# Patient Record
Sex: MALE | Race: WHITE | Employment: OTHER | URBAN - METROPOLITAN AREA
[De-identification: names, ages, dates, MRNs, and addresses within clinical notes are randomized per-mention and may not be internally consistent; named-entity substitution may affect disease eponyms.]

---

## 2017-05-05 ENCOUNTER — TELEPHONE (OUTPATIENT)
Dept: FAMILY MEDICINE | Facility: CLINIC | Age: 82
End: 2017-05-05

## 2017-05-05 DIAGNOSIS — Z51.81 MEDICATION MONITORING ENCOUNTER: ICD-10-CM

## 2017-05-05 DIAGNOSIS — G47.30 HYPERSOMNIA WITH SLEEP APNEA: ICD-10-CM

## 2017-05-05 DIAGNOSIS — Z82.49 FAMILY HISTORY OF CARDIOVASCULAR DISEASE: ICD-10-CM

## 2017-05-05 DIAGNOSIS — G47.10 HYPERSOMNIA WITH SLEEP APNEA: ICD-10-CM

## 2017-05-05 DIAGNOSIS — N18.30 CKD (CHRONIC KIDNEY DISEASE) STAGE 3, GFR 30-59 ML/MIN (H): Primary | ICD-10-CM

## 2017-05-05 DIAGNOSIS — N52.9 ERECTILE DYSFUNCTION, UNSPECIFIED ERECTILE DYSFUNCTION TYPE: ICD-10-CM

## 2017-05-05 DIAGNOSIS — I47.10 PAROXYSMAL SUPRAVENTRICULAR TACHYCARDIA (H): ICD-10-CM

## 2017-05-05 DIAGNOSIS — I10 HYPERTENSION GOAL BP (BLOOD PRESSURE) < 140/90: ICD-10-CM

## 2017-05-05 DIAGNOSIS — K21.9 GASTROESOPHAGEAL REFLUX DISEASE WITHOUT ESOPHAGITIS: ICD-10-CM

## 2017-05-05 DIAGNOSIS — E78.5 HYPERLIPIDEMIA LDL GOAL <130: ICD-10-CM

## 2017-05-05 NOTE — TELEPHONE ENCOUNTER
Patient is coming in for pre physical lab work 5/24. Please put in orders.     Thank you,  Pallavi CASTAÑEDA   Central Scheduler

## 2017-05-24 DIAGNOSIS — K21.9 GASTROESOPHAGEAL REFLUX DISEASE WITHOUT ESOPHAGITIS: ICD-10-CM

## 2017-05-24 DIAGNOSIS — I47.10 PAROXYSMAL SUPRAVENTRICULAR TACHYCARDIA (H): ICD-10-CM

## 2017-05-24 DIAGNOSIS — E78.5 HYPERLIPIDEMIA LDL GOAL <130: ICD-10-CM

## 2017-05-24 DIAGNOSIS — Z51.81 MEDICATION MONITORING ENCOUNTER: ICD-10-CM

## 2017-05-24 DIAGNOSIS — Z82.49 FAMILY HISTORY OF CARDIOVASCULAR DISEASE: ICD-10-CM

## 2017-05-24 DIAGNOSIS — N18.30 CKD (CHRONIC KIDNEY DISEASE) STAGE 3, GFR 30-59 ML/MIN (H): ICD-10-CM

## 2017-05-24 DIAGNOSIS — I10 HYPERTENSION GOAL BP (BLOOD PRESSURE) < 140/90: ICD-10-CM

## 2017-05-24 LAB
ALBUMIN UR-MCNC: ABNORMAL MG/DL
APPEARANCE UR: CLEAR
BILIRUB UR QL STRIP: NEGATIVE
COLOR UR AUTO: YELLOW
ERYTHROCYTE [DISTWIDTH] IN BLOOD BY AUTOMATED COUNT: 13.2 % (ref 10–15)
GLUCOSE UR STRIP-MCNC: NEGATIVE MG/DL
HCT VFR BLD AUTO: 39.5 % (ref 40–53)
HGB BLD-MCNC: 13.2 G/DL (ref 13.3–17.7)
HGB UR QL STRIP: ABNORMAL
KETONES UR STRIP-MCNC: NEGATIVE MG/DL
LEUKOCYTE ESTERASE UR QL STRIP: NEGATIVE
MCH RBC QN AUTO: 33.2 PG (ref 26.5–33)
MCHC RBC AUTO-ENTMCNC: 33.4 G/DL (ref 31.5–36.5)
MCV RBC AUTO: 99 FL (ref 78–100)
NITRATE UR QL: NEGATIVE
PH UR STRIP: 6.5 PH (ref 5–7)
PLATELET # BLD AUTO: 194 10E9/L (ref 150–450)
RBC # BLD AUTO: 3.98 10E12/L (ref 4.4–5.9)
RBC #/AREA URNS AUTO: NORMAL /HPF (ref 0–2)
SP GR UR STRIP: 1.02 (ref 1–1.03)
URN SPEC COLLECT METH UR: ABNORMAL
UROBILINOGEN UR STRIP-ACNC: 0.2 EU/DL (ref 0.2–1)
WBC # BLD AUTO: 9 10E9/L (ref 4–11)
WBC #/AREA URNS AUTO: NORMAL /HPF (ref 0–2)

## 2017-05-24 PROCEDURE — 80053 COMPREHEN METABOLIC PANEL: CPT | Performed by: FAMILY MEDICINE

## 2017-05-24 PROCEDURE — 85027 COMPLETE CBC AUTOMATED: CPT | Performed by: FAMILY MEDICINE

## 2017-05-24 PROCEDURE — 36415 COLL VENOUS BLD VENIPUNCTURE: CPT | Performed by: FAMILY MEDICINE

## 2017-05-24 PROCEDURE — 81001 URINALYSIS AUTO W/SCOPE: CPT | Performed by: FAMILY MEDICINE

## 2017-05-24 PROCEDURE — 82550 ASSAY OF CK (CPK): CPT | Performed by: FAMILY MEDICINE

## 2017-05-24 PROCEDURE — 82043 UR ALBUMIN QUANTITATIVE: CPT | Performed by: FAMILY MEDICINE

## 2017-05-24 PROCEDURE — 80061 LIPID PANEL: CPT | Performed by: FAMILY MEDICINE

## 2017-05-25 LAB
ALBUMIN SERPL-MCNC: 3.8 G/DL (ref 3.4–5)
ALP SERPL-CCNC: 38 U/L (ref 40–150)
ALT SERPL W P-5'-P-CCNC: 27 U/L (ref 0–70)
ANION GAP SERPL CALCULATED.3IONS-SCNC: 12 MMOL/L (ref 3–14)
AST SERPL W P-5'-P-CCNC: 23 U/L (ref 0–45)
BILIRUB SERPL-MCNC: 0.3 MG/DL (ref 0.2–1.3)
BUN SERPL-MCNC: 31 MG/DL (ref 7–30)
CALCIUM SERPL-MCNC: 9.3 MG/DL (ref 8.5–10.1)
CHLORIDE SERPL-SCNC: 111 MMOL/L (ref 94–109)
CHOLEST SERPL-MCNC: 176 MG/DL
CK SERPL-CCNC: 132 U/L (ref 30–300)
CO2 SERPL-SCNC: 20 MMOL/L (ref 20–32)
CREAT SERPL-MCNC: 1.53 MG/DL (ref 0.66–1.25)
CREAT UR-MCNC: 156 MG/DL
GFR SERPL CREATININE-BSD FRML MDRD: 44 ML/MIN/1.7M2
GLUCOSE SERPL-MCNC: 106 MG/DL (ref 70–99)
HDLC SERPL-MCNC: 48 MG/DL
LDLC SERPL CALC-MCNC: 96 MG/DL
MICROALBUMIN UR-MCNC: 63 MG/L
MICROALBUMIN/CREAT UR: 40.45 MG/G CR (ref 0–17)
NONHDLC SERPL-MCNC: 128 MG/DL
POTASSIUM SERPL-SCNC: 4.5 MMOL/L (ref 3.4–5.3)
PROT SERPL-MCNC: 6.9 G/DL (ref 6.8–8.8)
SODIUM SERPL-SCNC: 143 MMOL/L (ref 133–144)
TRIGL SERPL-MCNC: 159 MG/DL

## 2017-05-30 PROBLEM — Z91.09 ENVIRONMENTAL ALLERGIES: Status: ACTIVE | Noted: 2017-05-30

## 2017-05-31 ENCOUNTER — OFFICE VISIT (OUTPATIENT)
Dept: FAMILY MEDICINE | Facility: CLINIC | Age: 82
End: 2017-05-31
Payer: COMMERCIAL

## 2017-05-31 VITALS
HEART RATE: 89 BPM | DIASTOLIC BLOOD PRESSURE: 68 MMHG | SYSTOLIC BLOOD PRESSURE: 126 MMHG | TEMPERATURE: 98.2 F | WEIGHT: 207 LBS | HEIGHT: 67 IN | OXYGEN SATURATION: 94 % | BODY MASS INDEX: 32.49 KG/M2

## 2017-05-31 DIAGNOSIS — Z00.00 ENCOUNTER FOR ROUTINE ADULT HEALTH EXAMINATION WITHOUT ABNORMAL FINDINGS: Primary | ICD-10-CM

## 2017-05-31 DIAGNOSIS — Z82.49 FAMILY HISTORY OF CARDIOVASCULAR DISEASE: ICD-10-CM

## 2017-05-31 DIAGNOSIS — H91.93 HEARING LOSS, BILATERAL: ICD-10-CM

## 2017-05-31 DIAGNOSIS — G89.29 CHRONIC MIDLINE LOW BACK PAIN WITHOUT SCIATICA: ICD-10-CM

## 2017-05-31 DIAGNOSIS — K21.9 GASTROESOPHAGEAL REFLUX DISEASE WITHOUT ESOPHAGITIS: ICD-10-CM

## 2017-05-31 DIAGNOSIS — Z12.5 SCREENING FOR PROSTATE CANCER: ICD-10-CM

## 2017-05-31 DIAGNOSIS — N18.30 CKD (CHRONIC KIDNEY DISEASE) STAGE 3, GFR 30-59 ML/MIN (H): ICD-10-CM

## 2017-05-31 DIAGNOSIS — Z51.81 MEDICATION MONITORING ENCOUNTER: ICD-10-CM

## 2017-05-31 DIAGNOSIS — I10 HYPERTENSION GOAL BP (BLOOD PRESSURE) < 140/90: ICD-10-CM

## 2017-05-31 DIAGNOSIS — N52.9 ERECTILE DYSFUNCTION, UNSPECIFIED ERECTILE DYSFUNCTION TYPE: ICD-10-CM

## 2017-05-31 DIAGNOSIS — R06.09 DOE (DYSPNEA ON EXERTION): ICD-10-CM

## 2017-05-31 DIAGNOSIS — Z12.11 SCREEN FOR COLON CANCER: ICD-10-CM

## 2017-05-31 DIAGNOSIS — E78.5 HYPERLIPIDEMIA LDL GOAL <130: ICD-10-CM

## 2017-05-31 DIAGNOSIS — G47.30 HYPERSOMNIA WITH SLEEP APNEA: ICD-10-CM

## 2017-05-31 DIAGNOSIS — Z91.09 ENVIRONMENTAL ALLERGIES: ICD-10-CM

## 2017-05-31 DIAGNOSIS — N40.0 HYPERTROPHY OF PROSTATE WITHOUT URINARY OBSTRUCTION: ICD-10-CM

## 2017-05-31 DIAGNOSIS — G47.10 HYPERSOMNIA WITH SLEEP APNEA: ICD-10-CM

## 2017-05-31 DIAGNOSIS — M54.50 CHRONIC MIDLINE LOW BACK PAIN WITHOUT SCIATICA: ICD-10-CM

## 2017-05-31 PROBLEM — Z71.89 ADVANCED DIRECTIVES, COUNSELING/DISCUSSION: Status: ACTIVE | Noted: 2017-05-31

## 2017-05-31 PROCEDURE — G0439 PPPS, SUBSEQ VISIT: HCPCS | Performed by: FAMILY MEDICINE

## 2017-05-31 RX ORDER — NICOTINE POLACRILEX 4 MG/1
20 GUM, CHEWING ORAL DAILY
Qty: 90 TABLET | Refills: 3 | Status: SHIPPED | OUTPATIENT
Start: 2017-05-31 | End: 2018-09-20

## 2017-05-31 RX ORDER — ATORVASTATIN CALCIUM 10 MG/1
10 TABLET, FILM COATED ORAL DAILY
Qty: 90 TABLET | Refills: 3 | Status: SHIPPED | OUTPATIENT
Start: 2017-05-31 | End: 2018-09-20

## 2017-05-31 RX ORDER — FINASTERIDE 5 MG/1
5 TABLET, FILM COATED ORAL DAILY
Qty: 90 TABLET | Refills: 3 | Status: SHIPPED | OUTPATIENT
Start: 2017-05-31 | End: 2018-09-20

## 2017-05-31 RX ORDER — TERAZOSIN 10 MG/1
10 CAPSULE ORAL AT BEDTIME
Qty: 90 CAPSULE | Refills: 3 | Status: SHIPPED | OUTPATIENT
Start: 2017-05-31 | End: 2018-06-18

## 2017-05-31 RX ORDER — ATENOLOL 50 MG/1
50 TABLET ORAL DAILY
Qty: 90 TABLET | Refills: 3 | Status: SHIPPED | OUTPATIENT
Start: 2017-05-31 | End: 2018-06-18

## 2017-05-31 NOTE — PATIENT INSTRUCTIONS
[facility]                        To reach your care team during and after hours:   [phone]  To reach our pharmacy:        [phone]    Clinic Hours                        Our clinic hours are:    Monday   7:30 am to 7:00 pm                  Tuesday through Friday 7:30 am to 5:00 pm                             Saturday   8:00 am to 12:00 pm      Sunday   Closed      Pharmacy Hours                        Our pharmacy hours are:    Monday   8:30 am to 7:00 pm       Tuesday to Friday  8:30 am to 6:00 pm                       Saturday    9:00 am to 1:00 pm              Sunday    Closed              There is also information available at our web site:  [email or web address]    If your provider ordered any lab tests and you do not receive the results within 10 business days, please call the clinic.    If you need a medication refill please contact your pharmacy.  Please allow 2-3 business days for your refill to be completed.    Our clinic offers telephone visits and e visits.  Please ask one of your team members to explain more.      Use [employer] (secure email communication and access to your chart) to send your primary care provider a message or make an appointment. Ask someone on your Team how to sign up for [employer].  Immunizations                      Immunization History   Administered Date(s) Administered     Influenza (H1N1) 01/20/2010     Influenza (High Dose) 3 valent vaccine 10/03/2011, 09/20/2012, 10/15/2013, 10/03/2014, 10/15/2015, 09/30/2016     Influenza (IIV3) 10/18/2004, 10/12/2005, 10/23/2006, 10/17/2007, 10/15/2009, 09/22/2010     Pneumococcal (PCV 13) 05/20/2015     Pneumococcal 23 valent 01/04/2007     TD (ADULT, 7+) 04/20/2007     TDAP Vaccine (Boostrix) 05/23/2012     Zoster vaccine, live 01/01/2008        Health Maintenance                         There are no preventive care reminders to display for this patient.        Services Typically covered by Medicare Recommended  Completed   Vaccines    Pneumonoccol    Influenza    Hepatitis B (if medium/high risk)     Once for patients after age 65    Yearly  Medium/high risk factors:    End Stage Kidney Disease    Hemophiliacs who received Factor XIII or IX concentrates    Clients of institutions for developmentally disabled    Persons who live in same house as a Hepatitis B carrier    Homosexual men    Illicit injectable drug users    Health care workers     Mammogram Covered: One-time screen between age 35-39, annually for age 40+     Pap and Pelvic Exam Covered: Annually if  high risk,  or childbearing age with abnormal Pap in last 3 years.  Q24 months for all other women     Prostate Cancer Screening    Digital rectal exam    PSA Covered: Annually for all men > age 50     Corolrectal Cancer Screening Screening colonoscopy every 10 years, more often for high risk patients     Diabetes Self-Management Training Requires referral by treating physician for patient with diabetes     Diabetes Screening    Fasting blood sugar or glucose tolerance test   Once yearly, twice yearly if prediabetic     Cardiovascular Screening Blood Tests    Total Cholesterol    HDL    Triglycerides Every 5 years     Medical Nutrition Therapy for Diabetes or Renal Disease Requires referral by treating physician for patient with diabetes or kidney disease     Glaucoma Screening Annually for patients with one of the following risk factors:    Diabetes Mellitus    Family history of Glaucoma    -American age 50 and over    -American age 65 and over     Bone Mass Measurement Every 24 months if one of the following risk factors:    Estrogen deficiency    Vertebral abnormalities on x-ray indicative of Osteoporosis, Osteopenia, or Vertebral fracture    Receiving/expected to receive the equivalent of at least 5 mg of Prednisone per day for > 3 months    Hyperparathyroidism    Patient being monitored for response to Osteoporosis Therapy     One-time AAA screen   Must be ordered as part of Medicare IPPE   Any patient with a family history of AAA    Males Age 65-75, with history of smoking at least 100 cigarettes in lifetime     Smoking Cessation Counseling Beneficiaries who use tobacco are eligible to receive 2 cessation attempts per year; each attempt includes maximum of 4 sessions     HIV Screening Annually for beneficiaries at increased risk:       Increased risk for HIV infection is defined in the  National Coverage Determinations (NCD) Manual,  Publication 100-03 Sections 190.14 (diagnostic) and 210.7 (screening). See http://www.cms.gov/manuals/downloads/nlc129p0_Jpjn6.pdf and http://www.cms.gov/manuals/downloads/xsq060q6_Omqr0.pdf on the Internet.  Three times per pregnancy for beneficiaries who are pregnant.     Future Annual Wellness Visit Annually, for all beneficiaries.       Preventive Health Recommendations:       Male Ages 65 and over    Yearly exam:             See your health care provider every year in order to  o   Review health changes.   o   Discuss preventive care.    o   Review your medicines if your doctor has prescribed any.    Talk with your health care provider about whether you should have a test to screen for prostate cancer (PSA).    Every 3 years, have a diabetes test (fasting glucose). If you are at risk for diabetes, you should have this test more often.    Every 5 years, have a cholesterol test. Have this test more often if you are at risk for high cholesterol or heart disease.     Every 10 years, have a colonoscopy. Or, have a yearly FIT test (stool test). These exams will check for colon cancer.    Talk to with your health care provider about screening for Abdominal Aortic Aneurysm if you have a family history of AAA or have a history of smoking.  Shots:     Get a flu shot each year.     Get a tetanus shot every 10 years.     Talk to your doctor about your pneumonia vaccines. There are now two you should receive - Pneumovax (PPSV 23) and  Prevnar (PCV 13).    Talk to your doctor about a shingles vaccine.     Talk to your doctor about the hepatitis B vaccine.  Nutrition:     Eat at least 5 servings of fruits and vegetables each day.     Eat whole-grain bread, whole-wheat pasta and brown rice instead of white grains and rice.     Talk to your doctor about Calcium and Vitamin D.   Lifestyle    Exercise for at least 150 minutes a week (30 minutes a day, 5 days a week). This will help you control your weight and prevent disease.     Limit alcohol to one drink per day.     No smoking.     Wear sunscreen to prevent skin cancer.     See your dentist every six months for an exam and cleaning.     See your eye doctor every 1 to 2 years to screen for conditions such as glaucoma, macular degeneration and cataracts.               Low Back Pain            What is low back pain?   Low back pain is pain and stiffness in the lower back. It is one of the most common reasons people miss work.   How does it occur?   Your lower back is called your lumbar spine. It is made up of 5 bones called lumbar vertebrae. In between the vertebrae are shock absorbers called disks. Back pain can occur from an injury to the vertebrae or when a disk bulges or herniates.   Low back pain is usually caused when a ligament or muscle holding a vertebra in its proper position is strained. Vertebrae are bones that make up the spinal column through which the spinal cord passes. When these muscles or ligaments become weak or strained, the spine loses its stability, resulting in pain.   Low back pain can occur if your job involves lifting and carrying heavy objects, or if you spend a lot of time sitting or standing in one position or bending over. It can be caused by a fall or by unusually strenuous exercise. It can be brought on by the tension and stress that cause headaches in some people. It can even be brought on by violent sneezing or coughing.   People who are overweight may have low back  pain because of the added stress on their back.   Back pain may occur when the muscles, joints, bones, and connective tissues of the back become inflamed as a result of an infection or an immune system problem. Arthritic disorders as well as some congenital and degenerative conditions may cause back pain.   Back pain accompanied by loss of bladder or bowel control, trouble moving your legs, or numbness or tingling in your arms or legs requires immediate medical treatment.   What are the symptoms?   Symptoms include:   pain in the back or legs   stiffness, spasm, or limited motion   The pain may be constant or may happen only in certain positions. It may get worse when you cough, sneeze, bend, twist, or strain during a bowel movement. The pain may be in only one spot or may spread to other areas, most commonly down the buttocks and into the back of the thigh.   A low back strain typically does not produce pain past the knee into the calf or foot. Tingling or numbness in the calf or foot may indicate a herniated disk or pinched nerve.   Be sure to see your healthcare provider if:   You have weakness in your leg, especially if you cannot lift your foot, because this may be a sign of nerve damage.   You have new bowel or bladder problems as well as back pain, which may be a sign of severe injury to your spinal cord.   You have pain that gets worse despite treatment.   How is it diagnosed?   Your healthcare provider will review your medical history and examine you. You may have X-rays, an MRI, CT scan, or a bone scan.   How is it treated?   To treat this condition:   Put an ice pack, gel pack, or package of frozen vegetables, wrapped in a cloth on the area every 3 to 4 hours, for up to 20 minutes at a time for the first 2 or 3 days.   Use a heating pad or hot water bottle. Don't let the heating pad get too hot, and don't fall asleep with it. You could get a burn.   Rest in bed on a firm mattress. Often it helps to lie on  your back with your knees raised on a pillow. However, some people prefer to lie on their side with their knees bent. It's best to try to stay active, so try not to rest in bed longer than 1 to 2 days.   Take muscle relaxants as recommended by your healthcare provider.   Take an anti-inflammatory such as ibuprofen, or other medicine as directed by your provider. Nonsteroidal anti-inflammatory medicines (NSAIDs) may cause stomach bleeding and other problems. These risks increase with age. Read the label and take as directed. Unless recommended by your healthcare provider, do not take for more than 10 days.   Get a back massage by a trained person.   Wear a belt or corset to support your back.   Do the exercises recommended by your provider. Your provider may also prescribe physical therapy.   Talk with a counselor, if your back pain is related to tension caused by emotional problems.   When the pain is gone, ask your healthcare provider about starting an exercise program such as the following:   Exercise moderately every day, using stretching and warm-up exercises suggested by your provider or physical therapist.   Exercise vigorously for about 30 minutes 3 times a week by walking, swimming, using a stationary bicycle, or doing low-impact aerobics.   Exercising regularly will not only help your back, it will also help keep you healthier overall.   How long will the effects last?   The effects of back pain last as long as the cause exists or until your body recovers from the strain, usually a day or two but sometimes weeks.   How can I take care of myself?   In addition to the treatment described above, keep in mind these suggestions:   Practice good posture. Stand with your head up, shoulders straight, chest forward, weight balanced evenly on both feet, and pelvis tucked in.   Lose weight if you are overweight   Keep your core muscles strong. These are your abdominal and back muscles.   Sleep without a pillow under  your head.   Pain is the best way to  the pace you should set in increasing your activity and exercise. Minor discomfort, stiffness, soreness, and mild aches need not interfere with activity. However, limit your activities temporarily if:   Your symptoms return.   The pain increases when you are more active.   The pain increases within 24 hours after a new or higher level of activity.   When can I return to my normal activities?   Everyone recovers from an injury at a different rate. Return to your activities depends on how soon your back recovers, not by how many days or weeks it has been since your injury has occurred. In general, the longer you have symptoms before you start treatment, the longer it will take to get better. The goal is to return to your normal activities as soon as is safely possible. If you return too soon you may worsen your injury.   It is important that you have fully recovered from your low back pain before you return to any strenuous activity. You must be able to have the same range of motion that you had before your injury. You must be able to walk and twist without pain.   What can I do to help prevent low back pain?   You can reduce the strain on your back by doing the following:   Don't push with your arms when you move a heavy object. Turn around and push backwards so the strain is taken by your legs.   Whenever you sit, sit in a straight-backed chair and hold your spine against the back of the chair.   Bend your knees and hips and keep your back straight when you lift a heavy object.   Avoid lifting heavy objects higher than your waist.   Hold packages you carry close to your body, with your arms bent.   Use a footrest for one foot when you stand or sit in one spot for a long time. This keeps your back straight.   Bend your knees when you bend over.   Sit close to the pedals when you drive and use your seat belt and a hard backrest or pillow.   Lie on your side with your knees  bent when you sleep or rest. It may help to put a pillow between your knees.   Put a pillow under your knees when you sleep on your back.   Raise the foot of the bed 8 inches to discourage sleeping on your stomach unless you have other problems that require that you keep your head elevated.   To rest your back, hold each of these positions for 5?minutes or longer:   Lie on your back, bend your knees, and put pillows under your knees.   Lie on your back on the floor with a pillow under your neck. Bend your knees to a 90-degree angle, and put your lower legs and feet on a chair.   Lie on your back, bend your knees, and bring one knee up to your chest and hold it there. Repeat with the other knee, then bring both knees to your chest. When holding your knee to your chest, grab your thigh rather than your lower leg to avoid over flexing your knee.     Published by [employer].  This content is reviewed periodically and is subject to change as new health information becomes available. The information is intended to inform and educate and is not a replacement for medical evaluation, advice, diagnosis or treatment by a healthcare professional.   Developed by Alia Angel RN, [place], and [facility].   ? 2010 [facility] and/or its affiliates. All Rights Reserved.           Low Back Pain Exercise          Standing hamstring stretch: Put the heel of one leg on a stool about 15 inches high. Keep your leg straight. Lean forward, bending at the hips until you feel a mild stretch in the back of your thigh. Make sure you do not roll your shoulders or bend at the waist when doing this. You want to stretch your leg, not your lower back. Hold the stretch for 15 to 30 seconds. Repeat with each leg 3 times.   Cat and camel: Get down on your hands and knees. Let your stomach sag, allowing your back to curve downward. Hold this position for 5 seconds. Then arch your back and hold for 5 seconds. Do 3 sets of 10.   Quadruped arm and leg  raise: Get down on your hands and knees. Pull in your belly button and tighten your abdominal muscles to stiffen your spine. While keeping your abdominals tight, raise one arm and the opposite leg away from you. Hold this position for 5 seconds. Lower your arm and leg slowly and change sides. Do this 10 times on each side.   Pelvic tilt: Lie on your back with your knees bent and your feet flat on the floor. Tighten your abdominal muscles and push your lower back into the floor. Hold this position for 5 seconds, then relax. Do 3 sets of 10.   Partial curl: Lie on your back with your knees bent and your feet flat on the floor. Tighten your stomach muscles. Tuck your chin to your chest. With your hands stretched out in front of you, curl your upper body forward until your shoulders clear the floor. Hold this position for 3 seconds. Don't hold your breath. It helps to breathe out as you lift your shoulders up. Relax back to the floor. Repeat 10 times. Build to 3 sets of 10. To challenge yourself, clasp your hands behind your head and keep your elbows out to the side.   Gluteal stretch: Lie on your back with both knees bent. Rest the ankle of one leg over the knee of your other leg. Grasp the thigh of the bottom leg and pull toward your chest. You will feel a stretch along the buttocks and possibly along the outside of your hip. Hold the stretch for 15 to 30 seconds. Repeat 3 times with each leg.   Extension exercise:   0. Lie face down on the floor for 5 minutes. If this hurts too much, lie face down with a pillow under your stomach. This should relieve your leg or back pain. When you can lie on your stomach for 5 minutes without a pillow, you can continue with Part B of this exercise.   0. After lying on your stomach for 5 minutes, prop yourself up on your elbows for another 5 minutes. If you can do this without having more leg or buttock pain, you can start doing part C of this exercise.   0. Lie on your stomach with  your hands under your shoulders. Then press down on your hands and extend your elbows while keeping your hips flat on the floor. Hold for 1 second and lower yourself to the floor. Do 3 to 5 sets of 10 repetitions. Rest for 1 minute between sets. You should have no pain in your legs when you do this, but it is normal to feel some pain in your lower back.   Do this exercise several times a day.   Side plank: Lie on your side with your legs, hips, and shoulders in a straight line. Prop yourself up onto your forearm so your elbow is directly under your shoulder. Lift your hips off the floor and balance on your forearm and the outside of your foot. Try to hold this position for 15 seconds, then slowly lower your hip to the ground. Switch sides and repeat. Work up to holding for 1 minute or longer. This exercise can be made easier by starting with your knees and hips flexed toward your chest.   Published by [employer].  This content is reviewed periodically and is subject to change as new health information becomes available. The information is intended to inform and educate and is not a replacement for medical evaluation, advice, diagnosis or treatment by a healthcare professional.   Written by Patsy Ordonez, MS, PT, and Alia Marrufo, PT, [facility], [facility], for [employer]   ? 2010 [facility] and/or its affiliates. All Rights Reserved.         Copyright   [employer] 2011

## 2017-05-31 NOTE — PROGRESS NOTES
SUBJECTIVE:                                                            Kevin Fierro is a 83 year old male who presents for Preventive Visit/CPX.    Are you in the first 12 months of your Medicare Part B coverage?  No    Healthy Habits:    Do you get at least three servings of calcium containing foods daily (dairy, green leafy vegetables, etc.)? yes    Amount of exercise or daily activities, outside of work: some    Problems taking medications regularly No    Medication side effects: No    Have you had an eye exam in the past two years? yes    Do you see a dentist twice per year? yes    Do you have sleep apnea, excessive snoring or daytime drowsiness?uses CPAP    COGNITIVE SCREEN  1) Repeat 3 items (Banana, Sunrise, Chair)    2) Clock draw: NORMAL  3) 3 item recall: Recalls 2 objects   Results: NORMAL clock, 1-2 items recalled: COGNITIVE IMPAIRMENT LESS LIKELY    Mini-CogTM Copyright S Delphine. Licensed by the author for use in [facility] [email or web address]; reprinted with permission ([email or web address]). All rights reserved.      Low back pain - persists - HX DDD - no radiation - no incontinence, except mild urine, nocturia x 2, stools normal brown, constipation at times, using miralax, back better with menthol pads, no help with tylenol, worse leaning over at kitchen counter    WALKER - no cp - no edema - worse going up hills/stairs - no sob at rest or with sleeping    SNHL - using hearing aids    Follows with derm - skin cancer    Sleep Apnea - using CPAP - snoring resolved and sleeping    GERD - overall quite good.    Environmental allergies - better    Hyperlipidemia Follow-Up      Rate your low fat/cholesterol diet?: fair    Taking statin?  Yes, no muscle aches from statin    Other lipid medications/supplements?:  None    Recent Labs   Lab Test  05/24/17   0900  08/26/16   0850  05/14/15   0822  05/15/14   0753   CHOL  176  193  159  180   HDL  48  39*  49  33*   LDL  96  97  83  87   TRIG  159*  284*  137  303*    CHOLHDLRATIO   --    --   3.2  5.5*          CKD/Hypertension Follow-up      Outpatient blood pressures are being checked at home very rarely.    Low Salt Diet: not monitoring salt    Creatinine   Date Value Ref Range Status   05/24/2017 1.53 (H) 0.66 - 1.25 mg/dL Final     BP Readings from Last 3 Encounters:   05/31/17 126/68   08/31/16 126/76   08/15/15 124/70        Reviewed and updated as needed this visit by clinical staff  Tobacco  Allergies  Meds  Med Hx  Surg Hx  Fam Hx  Soc Hx        Reviewed and updated as needed this visit by Provider        Social History   Substance Use Topics     Smoking status: Never Smoker     Smokeless tobacco: Never Used     Alcohol use 2.4 - 3.0 oz/week     4 - 5 Standard drinks or equivalent per week      Comment: 4-5 drinks per week avg       The patient does not drink >3 drinks per day nor >7 drinks per week.    Today's PHQ-2 Score:   PHQ-2 ( 1999 Pf[employer]) 5/31/2017 8/31/2016   Q1: Little interest or pleasure in doing things 0 0   Q2: Feeling down, depressed or hopeless 0 0   PHQ-2 Score 0 0   Q1: Little interest or pleasure in doing things - -   Q2: Feeling down, depressed or hopeless - -   PHQ-2 Score - -       Do you feel safe in your environment - Yes    Do you have a Health Care Directive?: Yes: Advance Directive has been received and scanned.    Current providers sharing in care for this patient include:   Patient Care Team:  Chance Espinosa MD as PCP - General      Hearing impairment: Yes, uses hearing aids    Ability to successfully perform activities of daily living: Yes, no assistance needed     Fall risk:  Fallen 2 or more times in the past year?: No  Any fall with injury in the past year?: No    Home safety:  none identified      The following health maintenance items are reviewed in Epic and correct as of today:  Health Maintenance   Topic Date Due     PSA Q1 YR  08/26/2017     FALL RISK ASSESSMENT  08/31/2017     INFLUENZA VACCINE (SYSTEM ASSIGNED)  09/01/2017      BMP Q1 YR  05/24/2018     HEMOGLOBIN Q1 YR  05/24/2018     LIPID MONITORING Q1 YEAR  05/24/2018     MICROALBUMIN Q1 YEAR  05/24/2018     WELLNESS VISIT Q1 YR  05/31/2018     ADVANCE DIRECTIVE PLANNING Q5 YRS  08/31/2021     TETANUS Q10 YR  05/23/2022     PNEUMOCOCCAL  Completed         Health Maintenance     Colonoscopy:  NA, per GI   FIT:  NA, per GI              PSA:  Recent PSA noted   DEXA:  NA    PSA   Date Value Ref Range Status   08/26/2016 2.95 0 - 4 ug/L Final     Comment:     Assay Method:  Chemiluminescence using Siemens Vista analyzer     There are no preventive care reminders to display for this patient.    Current Problem List    Patient Active Problem List   Diagnosis     Hypertension goal BP (blood pressure) < 140/90     Hypertrophy of prostate without urinary obstruction     Hearing loss     PSVT     Seborrheic dermatitis     Other and unspecified 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     Hypersomnia with sleep apnea     Family history of cardiovascular disease     Seasonal allergies     ED (erectile dysfunction)     Hyperlipidemia LDL goal <130     Lung nodules     Advanced directives, counseling/discussion     GERD (gastroesophageal reflux disease)     CKD (chronic kidney disease) stage 3, GFR 30-59 ml/min     Environmental allergies       Past Medical History    Past Medical History:   Diagnosis Date     Arthritis .     Choroidal nevus of left eye     Dr Connor     CKD (chronic kidney disease) stage 3, GFR 30-59 ml/min      Colon polyps      ED (erectile dysfunction)      Family history of cardiovascular disease      GERD (gastroesophageal reflux disease) 2013     Hematuria 1999    dr scott     Hyperlipidemia LDL goal <130 1990     Hypertension goal BP (blood pressure) < 140/90 1990     Hypertrophy of prostate without urinary obstruction and other lower urinary tract symptoms (LUTS)      Lung nodules 11/10    CT scan stable     obstructive SLEEP APNEA 5/2007     CPAP     Other and unspecified 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 5/25/2005     PSVT      Seasonal allergies      Unspecified hearing loss 5/2005    hearing aids, L>R - Dr Hernandez       Past Surgical History    Past Surgical History:   Procedure Laterality Date     HC COLONOSCOPY THRU STOMA, DIAGNOSTIC  2005, 5/10, 5/11    Polyps-> due 2015 - Ct Colonography negative     HC REPAIR INCISIONAL HERNIA,REDUCIBLE  1960    Hernia Repair, Incisional, Unilateral     STRESS ECHO (METRO)  7/09, 5/12    Negative     TONSILLECTOMY & ADENOIDECTOMY  1946       Current Medications    Current Outpatient Prescriptions   Medication Sig Dispense Refill     atenolol (TENORMIN) 50 MG tablet Take 1 tablet (50 mg) by mouth daily 90 tablet 3     atorvastatin (LIPITOR) 10 MG tablet Take 1 tablet (10 mg) by mouth daily 90 tablet 3     terazosin (HYTRIN) 10 MG capsule Take 1 capsule (10 mg) by mouth At Bedtime 90 capsule 3     finasteride (PROSCAR) 5 MG tablet Take 1 tablet (5 mg) by mouth daily 90 tablet 3     omeprazole 20 MG tablet Take 1 tablet (20 mg) by mouth daily Take 30-60 minutes before a meal. 90 tablet 3     Capsaicin (CAPSAICIN HP) 0.1 % CREA        sildenafil (VIAGRA) 50 MG tablet Take 1 tablet (50 mg) by mouth daily as needed for erectile dysfunction 18 tablet 3     clotrimazole-betamethasone (LOTRISONE) cream Apply  topically 2 times daily. 45 g 3     selenium sulfide (SELSUN) 2.5 % shampoo Apply daily to every other day - lather, wait 10 minutes and rinse 360 mL 3     oxymetazoline (AFRIN) 0.05 % nasal spray Spray 2 sprays into both nostrils as needed.       CALCIUM 600 + D 600-200 MG-UNIT OR TABS  3 MONTHS 3     MULTI-VITAMIN OR TABS 1 tablet daily       ASPIRIN 81 MG OR TABS 1 tab po QD (Once per day)       [DISCONTINUED] atenolol (TENORMIN) 50 MG tablet Take 1 tablet (50 mg) by mouth daily 90 tablet 3     [DISCONTINUED] atorvastatin (LIPITOR) 10 MG tablet Take 1 tablet (10 mg) by mouth daily  90 tablet 3     [DISCONTINUED] terazosin (HYTRIN) 10 MG capsule Take 1 capsule (10 mg) by mouth At Bedtime 90 capsule 3       Allergies    No Known Allergies    Immunizations    Immunization History   Administered Date(s) Administered     Influenza (H1N1) 01/20/2010     Influenza (High Dose) 3 valent vaccine 10/03/2011, 09/20/2012, 10/15/2013, 10/03/2014, 10/15/2015, 09/30/2016     Influenza (IIV3) 10/18/2004, 10/12/2005, 10/23/2006, 10/17/2007, 10/15/2009, 09/22/2010     Pneumococcal (PCV 13) 05/20/2015     Pneumococcal 23 valent 01/04/2007     TD (ADULT, 7+) 04/20/2007     TDAP Vaccine (Boostrix) 05/23/2012     Zoster vaccine, live 01/01/2008       Family History    Family History   Problem Relation Age of Onset     C.A.D. Father      age 50's     Hypertension Father      C.A.D. Brother      C.A.D. Brother      mid 40's     DIABETES Brother      CANCER Brother      pancreatic CA     Coronary Artery Disease Brother      Cancer - colorectal No family hx of      Prostate Cancer No family hx of        Social History    Social History     Social History     Marital status:      Spouse name: Leticia     Number of children: 3     Years of education: 18     Occupational History      Retired     Social History Main Topics     Smoking status: Never Smoker     Smokeless tobacco: Never Used     Alcohol use 2.4 - 3.0 oz/week     4 - 5 Standard drinks or equivalent per week      Comment: 4-5 drinks per week avg     Drug use: No     Sexual activity: Yes     Partners: Female     Other Topics Concern     Blood Transfusions No     Caffeine Concern Yes     1 serv/day, rare pop, occas chocolate (3-4/yr)     Sleep Concern Yes     MIMI, wakes feeling rested     Exercise Yes     30-45' 2-3 x/wk     Seat Belt Yes     Parent/Sibling W/ Cabg, Mi Or Angioplasty Before 65f 55m? Yes     Social History Narrative               ROS:  C: NEGATIVE for fever, chills, change in weight  I: NEGATIVE for worrisome rashes, moles or lesions  E:  "NEGATIVE for vision changes or irritation  E/M: NEGATIVE for ear, mouth and throat problems  R: NEGATIVE for significant cough or SOB  CV: NEGATIVE for chest pain, palpitations or peripheral edema  GI: NEGATIVE for nausea, abdominal pain, heartburn, or change in bowel habits  : NEGATIVE for frequency, dysuria, or hematuria  M: NEGATIVE for significant arthralgias or myalgia  N: NEGATIVE for weakness, dizziness or paresthesias  E: NEGATIVE for temperature intolerance, skin/hair changes  H: NEGATIVE for bleeding problems  P: NEGATIVE for changes in mood or affect    Problem list, Medication list, Allergies, and Medical/Social/Surgical histories reviewed in [facility] and updated as appropriate.  OBJECTIVE:                                                            /68  Pulse 89  Temp 98.2  F (36.8  C) (Oral)  Ht 5' 7\" (1.702 m)  Wt 207 lb (93.9 kg)  SpO2 94%  BMI 32.42 kg/m2 Estimated body mass index is 32.42 kg/(m^2) as calculated from the following:    Height as of this encounter: 5' 7\" (1.702 m).    Weight as of this encounter: 207 lb (93.9 kg).  EXAM:   GENERAL: healthy, alert and no distress  EYES: Eyes grossly normal to inspection, PERRL and conjunctivae and sclerae normal  HENT: ear canals and TM's normal, nose and mouth without ulcers or lesions  NECK: no adenopathy, no asymmetry, masses, or scars and thyroid normal to palpation  RESP: lungs clear to auscultation - no rales, rhonchi or wheezes  CV: regular rate and rhythm, normal S1 S2, no S3 or S4, no murmur, click or rub, no peripheral edema and peripheral pulses strong  ABDOMEN: soft, nontender, no hepatosplenomegaly, no masses and bowel sounds normal   (male): deferred per patient  RECTAL: deferred per patient  MS: no gross musculoskeletal defects noted, no edema  SKIN: no suspicious lesions or rashes  NEURO: Normal strength and tone, mentation intact and speech normal  PSYCH: mentation appears normal, affect normal/bright  LYMPH: no cervical, " "supraclavicular, axillary, or inguinal adenopathy    ASSESSMENT / PLAN:                                                                ICD-10-CM    1. Encounter for routine adult health examination without abnormal findings Z00.00    2. Chronic midline low back pain without sciatica M54.5 MR Lumbar Spine w/o Contrast    G89.29    3. WALKER (dyspnea on exertion) R06.09 Exercise Stress Echocardiogram   4. CKD (chronic kidney disease) stage 3, GFR 30-59 ml/min N18.3 atenolol (TENORMIN) 50 MG tablet     terazosin (HYTRIN) 10 MG capsule   5. Hypertension goal BP (blood pressure) < 140/90 I10 Exercise Stress Echocardiogram     atenolol (TENORMIN) 50 MG tablet     terazosin (HYTRIN) 10 MG capsule   6. Hyperlipidemia LDL goal <130 E78.5 Exercise Stress Echocardiogram     atorvastatin (LIPITOR) 10 MG tablet   7. 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 Gastroesophageal reflux disease without esophagitis K21.9 omeprazole 20 MG tablet   9. Environmental allergies Z91.09    10. Hearing loss, bilateral H91.93    11. Hypertrophy of prostate without urinary obstruction N40.0 finasteride (PROSCAR) 5 MG tablet   12. Erectile dysfunction, unspecified erectile dysfunction type N52.9    13. Family history of cardiovascular disease Z82.49    14. Screening for prostate cancer Z12.5    15. Screen for colon cancer Z12.11    16. Medication monitoring encounter Z51.81        End of Life Planning:  Patient currently has an advanced directive: Yes.  Practitioner is supportive of decision.    COUNSELING:  Reviewed preventive health counseling, as reflected in patient instructions       Regular exercise       Healthy diet/nutrition       Vision screening       Hearing screening       Dental care       Colon cancer screening       Prostate cancer screening        Estimated body mass index is 32.42 kg/(m^2) as calculated from the following:    Height as of this encounter: 5' 7\" (1.702 m).    Weight as of this encounter: 207 lb (93.9 " kg).  Weight management plan: diet and exercise   reports that he has never smoked. He has never used smokeless tobacco.      Appropriate preventive services were discussed with this patient, including applicable screening as appropriate for cardiovascular disease, diabetes, osteopenia/osteoporosis, and glaucoma.  As appropriate for age/gender, discussed screening for colorectal cancer, prostate cancer, breast cancer, and cervical cancer. Checklist reviewing preventive services available has been given to the patient.    Reviewed patients plan of care and provided an AVS. The Intermediate Care Plan ( asthma action plan, low back pain action plan, and migraine action plan) for Kevin meets the Care Plan requirement. This Care Plan has been established and reviewed with the Patient.    Counseling Resources:  ATP IV Guidelines  Pooled Cohorts Equation Calculator  Breast Cancer Risk Calculator  FRAX Risk Assessment  ICSI Preventive Guidelines  Dietary Guidelines for Americans, 2010  USDA's MyPlate  ASA Prophylaxis  Lung CA Screening    Chance Espinosa MD FAALittle [facility]

## 2017-05-31 NOTE — NURSING NOTE
"Chief Complaint   Patient presents with     Wellness Visit       Initial /68  Pulse 89  Temp 98.2  F (36.8  C) (Oral)  Ht 5' 7\" (1.702 m)  Wt 207 lb (93.9 kg)  SpO2 94%  BMI 32.42 kg/m2 Estimated body mass index is 32.42 kg/(m^2) as calculated from the following:    Height as of this encounter: 5' 7\" (1.702 m).    Weight as of this encounter: 207 lb (93.9 kg)..  BP completed using cuff size: soraida [place]  "

## 2017-05-31 NOTE — MR AVS SNAPSHOT
After Visit Summary   5/31/2017    Kevin Fierro    MRN: [id number]           Patient Information     Date Of Birth          5/11/1934        Visit Information        Provider Department      5/31/2017 10:40 AM Chance Espinosa MD [facility] [place]        Today's Diagnoses     Encounter for routine adult health examination without abnormal findings    -  1    Chronic midline low back pain without sciatica        WALKER (dyspnea on exertion)        CKD (chronic kidney disease) stage 3, GFR 30-59 ml/min        Hypertension goal BP (blood pressure) < 140/90        Hyperlipidemia LDL goal <130        Hypersomnia with sleep apnea        Gastroesophageal reflux disease without esophagitis        Environmental allergies        Hearing loss, bilateral        Hypertrophy of prostate without urinary obstruction        Erectile dysfunction, unspecified erectile dysfunction type        Family history of cardiovascular disease        Screening for prostate cancer        Screen for colon cancer        Medication monitoring encounter          Care Instructions        [facility] [place]                        To reach your care team during and after hours:   [phone]  To reach our pharmacy:        [phone]    Clinic Hours                        Our clinic hours are:    Monday   7:30 am to 7:00 pm                  Tuesday through Friday 7:30 am to 5:00 pm                             Saturday   8:00 am to 12:00 pm      Sunday   Closed      Pharmacy Hours                        Our pharmacy hours are:    Monday   8:30 am to 7:00 pm       Tuesday to Friday  8:30 am to 6:00 pm                       Saturday    9:00 am to 1:00 pm              Sunday    Closed              There is also information available at our web site:  [email or web address]    If your provider ordered any lab tests and you do not receive the results within 10 business days, please call the clinic.    If you need a medication  refill please contact your pharmacy.  Please allow 2-3 business days for your refill to be completed.    Our clinic offers telephone visits and e visits.  Please ask one of your team members to explain more.      Use [employer]hart (secure email communication and access to your chart) to send your primary care provider a message or make an appointment. Ask someone on your Team how to sign up for [employer].  Immunizations                      Immunization History   Administered Date(s) Administered     Influenza (H1N1) 01/20/2010     Influenza (High Dose) 3 valent vaccine 10/03/2011, 09/20/2012, 10/15/2013, 10/03/2014, 10/15/2015, 09/30/2016     Influenza (IIV3) 10/18/2004, 10/12/2005, 10/23/2006, 10/17/2007, 10/15/2009, 09/22/2010     Pneumococcal (PCV 13) 05/20/2015     Pneumococcal 23 valent 01/04/2007     TD (ADULT, 7+) 04/20/2007     TDAP Vaccine (Boostrix) 05/23/2012     Zoster vaccine, live 01/01/2008        Health Maintenance                         There are no preventive care reminders to display for this patient.        Services Typically covered by Medicare Recommended Completed   Vaccines    Pneumonoccol    Influenza    Hepatitis B (if medium/high risk)     Once for patients after age 65    Yearly  Medium/high risk factors:    End Stage Kidney Disease    Hemophiliacs who received Factor XIII or IX concentrates    Clients of institutions for developmentally disabled    Persons who live in same house as a Hepatitis B carrier    Homosexual men    Illicit injectable drug users    Health care workers     Mammogram Covered: One-time screen between age 35-39, annually for age 40+     Pap and Pelvic Exam Covered: Annually if  high risk,  or childbearing age with abnormal Pap in last 3 years.  Q24 months for all other women     Prostate Cancer Screening    Digital rectal exam    PSA Covered: Annually for all men > age 50     Corolrectal Cancer Screening Screening colonoscopy every 10 years, more often for high risk  patients     Diabetes Self-Management Training Requires referral by treating physician for patient with diabetes     Diabetes Screening    Fasting blood sugar or glucose tolerance test   Once yearly, twice yearly if prediabetic     Cardiovascular Screening Blood Tests    Total Cholesterol    HDL    Triglycerides Every 5 years     Medical Nutrition Therapy for Diabetes or Renal Disease Requires referral by treating physician for patient with diabetes or kidney disease     Glaucoma Screening Annually for patients with one of the following risk factors:    Diabetes Mellitus    Family history of Glaucoma    -American age 50 and over    -American age 65 and over     Bone Mass Measurement Every 24 months if one of the following risk factors:    Estrogen deficiency    Vertebral abnormalities on x-ray indicative of Osteoporosis, Osteopenia, or Vertebral fracture    Receiving/expected to receive the equivalent of at least 5 mg of Prednisone per day for > 3 months    Hyperparathyroidism    Patient being monitored for response to Osteoporosis Therapy     One-time AAA screen  Must be ordered as part of Medicare IPPE   Any patient with a family history of AAA    Males Age 65-75, with history of smoking at least 100 cigarettes in lifetime     Smoking Cessation Counseling Beneficiaries who use tobacco are eligible to receive 2 cessation attempts per year; each attempt includes maximum of 4 sessions     HIV Screening Annually for beneficiaries at increased risk:       Increased risk for HIV infection is defined in the  National Coverage Determinations (NCD) Manual,  Publication 100-03 Sections 190.14 (diagnostic) and 210.7 (screening). See http://www.cms.gov/manuals/downloads/hyq672l9_Ynwu9.pdf and http://www.cms.gov/manuals/downloads/lce873s0_Lehu6.pdf on the Internet.  Three times per pregnancy for beneficiaries who are pregnant.     Future Annual Wellness Visit Annually, for all beneficiaries.       Preventive  Health Recommendations:       Male Ages 65 and over    Yearly exam:             See your health care provider every year in order to  o   Review health changes.   o   Discuss preventive care.    o   Review your medicines if your doctor has prescribed any.    Talk with your health care provider about whether you should have a test to screen for prostate cancer (PSA).    Every 3 years, have a diabetes test (fasting glucose). If you are at risk for diabetes, you should have this test more often.    Every 5 years, have a cholesterol test. Have this test more often if you are at risk for high cholesterol or heart disease.     Every 10 years, have a colonoscopy. Or, have a yearly FIT test (stool test). These exams will check for colon cancer.    Talk to with your health care provider about screening for Abdominal Aortic Aneurysm if you have a family history of AAA or have a history of smoking.  Shots:     Get a flu shot each year.     Get a tetanus shot every 10 years.     Talk to your doctor about your pneumonia vaccines. There are now two you should receive - Pneumovax (PPSV 23) and Prevnar (PCV 13).    Talk to your doctor about a shingles vaccine.     Talk to your doctor about the hepatitis B vaccine.  Nutrition:     Eat at least 5 servings of fruits and vegetables each day.     Eat whole-grain bread, whole-wheat pasta and brown rice instead of white grains and rice.     Talk to your doctor about Calcium and Vitamin D.   Lifestyle    Exercise for at least 150 minutes a week (30 minutes a day, 5 days a week). This will help you control your weight and prevent disease.     Limit alcohol to one drink per day.     No smoking.     Wear sunscreen to prevent skin cancer.     See your dentist every six months for an exam and cleaning.     See your eye doctor every 1 to 2 years to screen for conditions such as glaucoma, macular degeneration and cataracts.               Low Back Pain            What is low back pain?   Low back  pain is pain and stiffness in the lower back. It is one of the most common reasons people miss work.   How does it occur?   Your lower back is called your lumbar spine. It is made up of 5 bones called lumbar vertebrae. In between the vertebrae are shock absorbers called disks. Back pain can occur from an injury to the vertebrae or when a disk bulges or herniates.   Low back pain is usually caused when a ligament or muscle holding a vertebra in its proper position is strained. Vertebrae are bones that make up the spinal column through which the spinal cord passes. When these muscles or ligaments become weak or strained, the spine loses its stability, resulting in pain.   Low back pain can occur if your job involves lifting and carrying heavy objects, or if you spend a lot of time sitting or standing in one position or bending over. It can be caused by a fall or by unusually strenuous exercise. It can be brought on by the tension and stress that cause headaches in some people. It can even be brought on by violent sneezing or coughing.   People who are overweight may have low back pain because of the added stress on their back.   Back pain may occur when the muscles, joints, bones, and connective tissues of the back become inflamed as a result of an infection or an immune system problem. Arthritic disorders as well as some congenital and degenerative conditions may cause back pain.   Back pain accompanied by loss of bladder or bowel control, trouble moving your legs, or numbness or tingling in your arms or legs requires immediate medical treatment.   What are the symptoms?   Symptoms include:   pain in the back or legs   stiffness, spasm, or limited motion   The pain may be constant or may happen only in certain positions. It may get worse when you cough, sneeze, bend, twist, or strain during a bowel movement. The pain may be in only one spot or may spread to other areas, most commonly down the buttocks and into the  back of the thigh.   A low back strain typically does not produce pain past the knee into the calf or foot. Tingling or numbness in the calf or foot may indicate a herniated disk or pinched nerve.   Be sure to see your healthcare provider if:   You have weakness in your leg, especially if you cannot lift your foot, because this may be a sign of nerve damage.   You have new bowel or bladder problems as well as back pain, which may be a sign of severe injury to your spinal cord.   You have pain that gets worse despite treatment.   How is it diagnosed?   Your healthcare provider will review your medical history and examine you. You may have X-rays, an MRI, CT scan, or a bone scan.   How is it treated?   To treat this condition:   Put an ice pack, gel pack, or package of frozen vegetables, wrapped in a cloth on the area every 3 to 4 hours, for up to 20 minutes at a time for the first 2 or 3 days.   Use a heating pad or hot water bottle. Don't let the heating pad get too hot, and don't fall asleep with it. You could get a burn.   Rest in bed on a firm mattress. Often it helps to lie on your back with your knees raised on a pillow. However, some people prefer to lie on their side with their knees bent. It's best to try to stay active, so try not to rest in bed longer than 1 to 2 days.   Take muscle relaxants as recommended by your healthcare provider.   Take an anti-inflammatory such as ibuprofen, or other medicine as directed by your provider. Nonsteroidal anti-inflammatory medicines (NSAIDs) may cause stomach bleeding and other problems. These risks increase with age. Read the label and take as directed. Unless recommended by your healthcare provider, do not take for more than 10 days.   Get a back massage by a trained person.   Wear a belt or corset to support your back.   Do the exercises recommended by your provider. Your provider may also prescribe physical therapy.   Talk with a counselor, if your back pain is  related to tension caused by emotional problems.   When the pain is gone, ask your healthcare provider about starting an exercise program such as the following:   Exercise moderately every day, using stretching and warm-up exercises suggested by your provider or physical therapist.   Exercise vigorously for about 30 minutes 3 times a week by walking, swimming, using a stationary bicycle, or doing low-impact aerobics.   Exercising regularly will not only help your back, it will also help keep you healthier overall.   How long will the effects last?   The effects of back pain last as long as the cause exists or until your body recovers from the strain, usually a day or two but sometimes weeks.   How can I take care of myself?   In addition to the treatment described above, keep in mind these suggestions:   Practice good posture. Stand with your head up, shoulders straight, chest forward, weight balanced evenly on both feet, and pelvis tucked in.   Lose weight if you are overweight   Keep your core muscles strong. These are your abdominal and back muscles.   Sleep without a pillow under your head.   Pain is the best way to  the pace you should set in increasing your activity and exercise. Minor discomfort, stiffness, soreness, and mild aches need not interfere with activity. However, limit your activities temporarily if:   Your symptoms return.   The pain increases when you are more active.   The pain increases within 24 hours after a new or higher level of activity.   When can I return to my normal activities?   Everyone recovers from an injury at a different rate. Return to your activities depends on how soon your back recovers, not by how many days or weeks it has been since your injury has occurred. In general, the longer you have symptoms before you start treatment, the longer it will take to get better. The goal is to return to your normal activities as soon as is safely possible. If you return too soon you  may worsen your injury.   It is important that you have fully recovered from your low back pain before you return to any strenuous activity. You must be able to have the same range of motion that you had before your injury. You must be able to walk and twist without pain.   What can I do to help prevent low back pain?   You can reduce the strain on your back by doing the following:   Don't push with your arms when you move a heavy object. Turn around and push backwards so the strain is taken by your legs.   Whenever you sit, sit in a straight-backed chair and hold your spine against the back of the chair.   Bend your knees and hips and keep your back straight when you lift a heavy object.   Avoid lifting heavy objects higher than your waist.   Hold packages you carry close to your body, with your arms bent.   Use a footrest for one foot when you stand or sit in one spot for a long time. This keeps your back straight.   Bend your knees when you bend over.   Sit close to the pedals when you drive and use your seat belt and a hard backrest or pillow.   Lie on your side with your knees bent when you sleep or rest. It may help to put a pillow between your knees.   Put a pillow under your knees when you sleep on your back.   Raise the foot of the bed 8 inches to discourage sleeping on your stomach unless you have other problems that require that you keep your head elevated.   To rest your back, hold each of these positions for 5?minutes or longer:   Lie on your back, bend your knees, and put pillows under your knees.   Lie on your back on the floor with a pillow under your neck. Bend your knees to a 90-degree angle, and put your lower legs and feet on a chair.   Lie on your back, bend your knees, and bring one knee up to your chest and hold it there. Repeat with the other knee, then bring both knees to your chest. When holding your knee to your chest, grab your thigh rather than your lower leg to avoid over flexing your  knee.     Published by [employer].  This content is reviewed periodically and is subject to change as new health information becomes available. The information is intended to inform and educate and is not a replacement for medical evaluation, advice, diagnosis or treatment by a healthcare professional.   Developed by Alia Angel RN, [place], and [facility].   ? 2010 [facility] and/or its affiliates. All Rights Reserved.           Low Back Pain Exercise          Standing hamstring stretch: Put the heel of one leg on a stool about 15 inches high. Keep your leg straight. Lean forward, bending at the hips until you feel a mild stretch in the back of your thigh. Make sure you do not roll your shoulders or bend at the waist when doing this. You want to stretch your leg, not your lower back. Hold the stretch for 15 to 30 seconds. Repeat with each leg 3 times.   Cat and camel: Get down on your hands and knees. Let your stomach sag, allowing your back to curve downward. Hold this position for 5 seconds. Then arch your back and hold for 5 seconds. Do 3 sets of 10.   Quadruped arm and leg raise: Get down on your hands and knees. Pull in your belly button and tighten your abdominal muscles to stiffen your spine. While keeping your abdominals tight, raise one arm and the opposite leg away from you. Hold this position for 5 seconds. Lower your arm and leg slowly and change sides. Do this 10 times on each side.   Pelvic tilt: Lie on your back with your knees bent and your feet flat on the floor. Tighten your abdominal muscles and push your lower back into the floor. Hold this position for 5 seconds, then relax. Do 3 sets of 10.   Partial curl: Lie on your back with your knees bent and your feet flat on the floor. Tighten your stomach muscles. Tuck your chin to your chest. With your hands stretched out in front of you, curl your upper body forward until your shoulders clear the floor. Hold this position for 3 seconds. Don't  hold your breath. It helps to breathe out as you lift your shoulders up. Relax back to the floor. Repeat 10 times. Build to 3 sets of 10. To challenge yourself, clasp your hands behind your head and keep your elbows out to the side.   Gluteal stretch: Lie on your back with both knees bent. Rest the ankle of one leg over the knee of your other leg. Grasp the thigh of the bottom leg and pull toward your chest. You will feel a stretch along the buttocks and possibly along the outside of your hip. Hold the stretch for 15 to 30 seconds. Repeat 3 times with each leg.   Extension exercise:   0. Lie face down on the floor for 5 minutes. If this hurts too much, lie face down with a pillow under your stomach. This should relieve your leg or back pain. When you can lie on your stomach for 5 minutes without a pillow, you can continue with Part B of this exercise.   0. After lying on your stomach for 5 minutes, prop yourself up on your elbows for another 5 minutes. If you can do this without having more leg or buttock pain, you can start doing part C of this exercise.   0. Lie on your stomach with your hands under your shoulders. Then press down on your hands and extend your elbows while keeping your hips flat on the floor. Hold for 1 second and lower yourself to the floor. Do 3 to 5 sets of 10 repetitions. Rest for 1 minute between sets. You should have no pain in your legs when you do this, but it is normal to feel some pain in your lower back.   Do this exercise several times a day.   Side plank: Lie on your side with your legs, hips, and shoulders in a straight line. Prop yourself up onto your forearm so your elbow is directly under your shoulder. Lift your hips off the floor and balance on your forearm and the outside of your foot. Try to hold this position for 15 seconds, then slowly lower your hip to the ground. Switch sides and repeat. Work up to holding for 1 minute or longer. This exercise can be made easier by  starting with your knees and hips flexed toward your chest.   Published by [employer].  This content is reviewed periodically and is subject to change as new health information becomes available. The information is intended to inform and educate and is not a replacement for medical evaluation, advice, diagnosis or treatment by a healthcare professional.   Written by Patsy Ordonez, MS, PT, and Alia Marrufo, PT, [facility], OCS, for [facility]   ? 2010 [facility] and/or its affiliates. All Rights Reserved.         Copyright   [employer] Reference Systems 2011                        Follow-ups after your visit        Future tests that were ordered for you today     Open Future Orders        Priority Expected Expires Ordered    MR Lumbar Spine w/o Contrast Routine  5/31/2018 5/31/2017    Exercise Stress Echocardiogram Routine  5/31/2018 5/31/2017            Who to contact     If you have questions or need follow up information about today's clinic visit or your schedule please contact [facility] directly at [phone].  Normal or non-critical lab and imaging results will be communicated to you by [employer], letter or phone within 4 business days after the clinic has received the results. If you do not hear from us within 7 days, please contact the clinic through [employer] or phone. If you have a critical or abnormal lab result, we will notify you by phone as soon as possible.  Submit refill requests through [employer] or call your pharmacy and they will forward the refill request to us. Please allow 3 business days for your refill to be completed.          Additional Information About Your Visit        [employer] Information     [employer] gives you secure access to your electronic health record. If you see a primary care provider, you can also send messages to your care team and make appointments. If you have questions, please call your primary care clinic.  If you do not have a primary care provider, please call  "[phone] and they will assist you.        Care EveryWhere ID     This is your Care EveryWhere ID. This could be used by other organizations to access your [facility] medical records  [id number]        Your Vitals Were     Pulse Temperature Height Pulse Oximetry BMI (Body Mass Index)       89 98.2  F (36.8  C) (Oral) 5' 7\" (1.702 m) 94% 32.42 kg/m2        Blood Pressure from Last 3 Encounters:   05/31/17 126/68   08/31/16 126/76   08/15/15 124/70    Weight from Last 3 Encounters:   05/31/17 207 lb (93.9 kg)   08/31/16 200 lb (90.7 kg)   08/15/15 192 lb 5 oz (87.2 kg)                 Where to get your medicines      Some of these will need a paper prescription and others can be bought over the counter.  Ask your nurse if you have questions.     Bring a paper prescription for each of these medications     atenolol 50 MG tablet    atorvastatin 10 MG tablet    finasteride 5 MG tablet    omeprazole 20 MG tablet    terazosin 10 MG capsule          Primary Care Provider Office Phone # Fax #    Chance Espinosa [phone]       [address] [address]        Thank you!     Thank you for choosing [facility]  for your care. Our goal is always to provide you with excellent care. Hearing back from our patients is one way we can continue to improve our services. Please take a few minutes to complete the written survey that you may receive in the mail after your visit with us. Thank you!             Your Updated Medication List - Protect others around you: Learn how to safely use, store and throw away your medicines at www.disposemymeds.org.          This list is accurate as of: 5/31/17 11:51 AM.  Always use your most recent med list.                   Brand Name Dispense Instructions for use    aspirin 81 MG tablet      1 tab po QD (Once per day)       atenolol 50 MG tablet    TENORMIN    90 tablet    Take 1 tablet (50 mg) by mouth daily       " atorvastatin 10 MG tablet    LIPITOR    90 tablet    Take 1 tablet (10 mg) by mouth daily       CALCIUM 600 + D 600-200 MG-UNIT Tabs     3 MONTHS        CAPSAICIN HP 0.1 % cream   Generic drug:  Capsaicin          clotrimazole-betamethasone cream    LOTRISONE    45 g    Apply  topically 2 times daily.       finasteride 5 MG tablet    PROSCAR    90 tablet    Take 1 tablet (5 mg) by mouth daily       Multi-vitamin Tabs tablet   Generic drug:  multivitamin, therapeutic with minerals      1 tablet daily       omeprazole 20 MG tablet     90 tablet    Take 1 tablet (20 mg) by mouth daily Take 30-60 minutes before a meal.       oxymetazoline 0.05 % spray    AFRIN     Spray 2 sprays into both nostrils as needed.       selenium sulfide 2.5 % lotion    SELSUN    360 mL    Apply daily to every other day - lather, wait 10 minutes and rinse       sildenafil 50 MG cap/tab    VIAGRA    18 tablet    Take 1 tablet (50 mg) by mouth daily as needed for erectile dysfunction       terazosin 10 MG capsule    HYTRIN    90 capsule    Take 1 capsule (10 mg) by mouth At Bedtime

## 2017-06-22 ENCOUNTER — TELEPHONE (OUTPATIENT)
Dept: FAMILY MEDICINE | Facility: CLINIC | Age: 82
End: 2017-06-22

## 2017-06-22 NOTE — TELEPHONE ENCOUNTER
Patient states having stress test scheduled and requesting orders regarding medications to hold prior to test.    Please advise,  Amita Dye RN  [facility] [employer]

## 2017-06-22 NOTE — TELEPHONE ENCOUNTER
Topic: Procedural Question           o     On July 14 I'm scheduled for a stress test and they recommend that I check with you to see what meds I should avoid at that time.          Calixto Campuzano

## 2017-06-23 NOTE — TELEPHONE ENCOUNTER
Patient notified by phone.  He verbalized understanding and agrees with plan.    MELVIN Polk, RN, PHN  [facility]

## 2017-06-27 ENCOUNTER — TRANSFERRED RECORDS (OUTPATIENT)
Dept: HEALTH INFORMATION MANAGEMENT | Facility: CLINIC | Age: 82
End: 2017-06-27

## 2017-07-14 ENCOUNTER — HOSPITAL ENCOUNTER (OUTPATIENT)
Dept: CARDIOLOGY | Facility: CLINIC | Age: 82
End: 2017-07-14
Attending: FAMILY MEDICINE
Payer: COMMERCIAL

## 2017-07-14 ENCOUNTER — HOSPITAL ENCOUNTER (OUTPATIENT)
Dept: MRI IMAGING | Facility: CLINIC | Age: 82
Discharge: HOME | End: 2017-07-14
Attending: FAMILY MEDICINE | Admitting: FAMILY MEDICINE
Payer: COMMERCIAL

## 2017-07-14 DIAGNOSIS — M54.50 CHRONIC MIDLINE LOW BACK PAIN WITHOUT SCIATICA: ICD-10-CM

## 2017-07-14 DIAGNOSIS — R06.09 DOE (DYSPNEA ON EXERTION): ICD-10-CM

## 2017-07-14 DIAGNOSIS — G89.29 CHRONIC MIDLINE LOW BACK PAIN WITHOUT SCIATICA: ICD-10-CM

## 2017-07-14 DIAGNOSIS — I10 HYPERTENSION GOAL BP (BLOOD PRESSURE) < 140/90: ICD-10-CM

## 2017-07-14 DIAGNOSIS — E78.5 HYPERLIPIDEMIA LDL GOAL <130: ICD-10-CM

## 2017-07-14 PROCEDURE — 93016 CV STRESS TEST SUPVJ ONLY: CPT | Performed by: INTERNAL MEDICINE

## 2017-07-14 PROCEDURE — 93018 CV STRESS TEST I&R ONLY: CPT | Performed by: INTERNAL MEDICINE

## 2017-07-14 PROCEDURE — 40000264 ECHO STRESS TEST WITH LUMASON

## 2017-07-14 PROCEDURE — 93325 DOPPLER ECHO COLOR FLOW MAPG: CPT | Mod: 26 | Performed by: INTERNAL MEDICINE

## 2017-07-14 PROCEDURE — 93321 DOPPLER ECHO F-UP/LMTD STD: CPT | Mod: 26 | Performed by: INTERNAL MEDICINE

## 2017-07-14 PROCEDURE — 93350 STRESS TTE ONLY: CPT | Mod: 26 | Performed by: INTERNAL MEDICINE

## 2017-07-14 PROCEDURE — 72148 MRI LUMBAR SPINE W/O DYE: CPT

## 2017-07-14 PROCEDURE — 25500064 ZZH RX 255 OP 636: Performed by: FAMILY MEDICINE

## 2017-07-14 RX ADMIN — SULFUR HEXAFLUORIDE 4 ML: KIT at 13:15

## 2017-07-16 PROBLEM — M48.061 LUMBAR STENOSIS: Status: ACTIVE | Noted: 2017-01-01

## 2017-07-18 ENCOUNTER — MYC MEDICAL ADVICE (OUTPATIENT)
Dept: FAMILY MEDICINE | Facility: CLINIC | Age: 82
End: 2017-07-18

## 2017-07-18 ENCOUNTER — TELEPHONE (OUTPATIENT)
Dept: FAMILY MEDICINE | Facility: CLINIC | Age: 82
End: 2017-07-18

## 2017-07-18 DIAGNOSIS — M48.061 LUMBAR STENOSIS: Primary | ICD-10-CM

## 2017-07-18 NOTE — TELEPHONE ENCOUNTER
RN spoke with patient and gave him [facility] scheduling number.  He will call to schedule if he does not hear from them within a few days.    MELVIN Polk, RN, [facility]) [phone]

## 2017-07-18 NOTE — TELEPHONE ENCOUNTER
Patient given [facility] referral information over the phone.    Maria Teresa Cali, MELVIN, RN, N  [facility]) [phone]

## 2017-07-18 NOTE — TELEPHONE ENCOUNTER
Reason for Call: Request for an order or referral:    Order or referral being requested: [facility]  Referral needed    Date needed: as soon as possible    Has the patient been seen by the PCP for this problem? YES    Additional comments: Pt got letter stating this. Just had a MRI. Please call pt when referral is in place     Phone number Patient can be reached at:  Cell number on file:    Telephone Information:   Mobile [phone]       Best Time:  any    Can we leave a detailed message on this number?  YES    Call taken on 7/18/2017 at 12:57 PM by Erika Cano

## 2017-07-22 ENCOUNTER — OFFICE VISIT (OUTPATIENT)
Dept: ORTHOPEDICS | Facility: CLINIC | Age: 82
End: 2017-07-22
Payer: COMMERCIAL

## 2017-07-22 VITALS
SYSTOLIC BLOOD PRESSURE: 118 MMHG | BODY MASS INDEX: 32.18 KG/M2 | WEIGHT: 205 LBS | HEIGHT: 67 IN | DIASTOLIC BLOOD PRESSURE: 70 MMHG

## 2017-07-22 DIAGNOSIS — M54.50 CHRONIC MIDLINE LOW BACK PAIN WITHOUT SCIATICA: Primary | ICD-10-CM

## 2017-07-22 DIAGNOSIS — G89.29 CHRONIC MIDLINE LOW BACK PAIN WITHOUT SCIATICA: Primary | ICD-10-CM

## 2017-07-22 DIAGNOSIS — M51.369 LUMBAR DEGENERATIVE DISC DISEASE: ICD-10-CM

## 2017-07-22 PROCEDURE — 99203 OFFICE O/P NEW LOW 30 MIN: CPT | Performed by: PHYSICAL MEDICINE & REHABILITATION

## 2017-07-22 NOTE — NURSING NOTE
"Chief Complaint   Patient presents with     Musculoskeletal Problem     chronic low back pain       Initial /70  Ht 5' 7\" (1.702 m)  Wt 205 lb (93 kg)  BMI 32.11 kg/m2 Estimated body mass index is 32.11 kg/(m^2) as calculated from the following:    Height as of this encounter: 5' 7\" (1.702 m).    Weight as of this encounter: 205 lb (93 kg).  Medication Reconciliation: complete     Patrick Vivas ATC      "

## 2017-07-22 NOTE — PROGRESS NOTES
[facility]   Clinic Visit s Jul 22, 2017    Subjective:  Kevin Fierro is a 83 year old male who is seen in consultation at the request of Dr. Espinosa for evaluation of chronic midline low back pain without radiation.    Symptoms began 1+ year ago.  Reports insidious onset without acute precipitating event.  Reports aching and dull back pain that is located midline with radiation absent.  Pain is 5/10 in maximal severity and 1/10 currently.  Symptoms are generally worse with standing activities and better with rest/laying down.  Other treatment has consisted of Acetaminophen and icy hot patches with moderate relief.  Associated symptoms include denies any bowel/bladder dysfunction or saddle anesthesia.  Denies any numbness/tingling/weakness of the lower extremities. Denies any history of low back injuries/surgeries.    Patient's past medical, surgical, social, and family histories are reviewed today.  There are no significant contributory medical issues  Past Medical History:   Diagnosis Date     Arthritis .     Choroidal nevus of left eye     Dr Connor     CKD (chronic kidney disease) stage 3, GFR 30-59 ml/min      Colon polyps      ED (erectile dysfunction)      Family history of cardiovascular disease      GERD (gastroesophageal reflux disease) 2013     Hematuria 1999    dr scott     Hyperlipidemia LDL goal <130 1990     Hypertension goal BP (blood pressure) < 140/90 1990     Hypertrophy of prostate without urinary obstruction and other lower urinary tract symptoms (LUTS)      Lumbar stenosis 2017    mild to moderate foraminal and central     Lung nodules 11/10    CT scan stable     obstructive SLEEP APNEA 5/2007    CPAP     Other and unspecified 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 5/25/2005     PSVT      Seasonal allergies      Unspecified hearing loss 5/2005    hearing aids, L>R - Dr Hernandez       Review of Systems:  Constitutional: NEGATIVE for fever,  "chills, or change in weight  Skin: NEGATIVE for worrisome rashes, moles, or lesions  Neuro: NEGATIVE for weakness of the lower extremities  MSK: see HPI  Additional 10 point ROS is negative other than symptoms noted above and in HPI    Objective:  /70  Ht 5' 7\" (1.702 m)  Wt 205 lb (93 kg)  BMI 32.11 kg/m2  General: healthy, alert, obese, and in no distress  Skin: no suspicious lesions or rashes  Psych: mentation appears normal and affect normal/bright  HEENT: no scleral icterus  CV: no pedal edema  Resp: normal respiratory effort without conversational dyspnea   Neuro: sensory exam is within normal limits.  Motor strength as noted below  Lymph: no palpable lymphadenopathy    MSK:    THORACIC/LUMBAR SPINE  Inspection:    No redness, swelling, overlying skin change, or gross deformity/asymmetry  Palpation:    Tender about the lower lumbar spinous processes    No tenderness over the left para lumbar muscles, right para lumbar muscles, left SI joint, right SI joint, left sciatic notch, or right sciatic notch  Range of Motion:     Lumbar flexion limited by pain    Lumbar extension within normal limits  Strength:    Quadriceps 5/5, hamstrings 5/5, gastrocsoleus 5/5, tibialis anterior 4+/5, and extensor hallicus longus 4+/5 (left)  Special Tests:    Positive: None    Negative: Straight leg raise (bilateral), SI joint compression (bilateral), and OSORIO (bilateral)    Imaging:  No x-rays indicated during today's visit  Previous films were reviewed today, independent visualization of images was performed, and results were discussed with the patient  MR LUMBAR SPINE WITHOUT CONTRAST  7/14/2017  IMPRESSION:   1. Multilevel degenerative disc disease throughout the lumbar spine without significant acute disc protrusion.  2. Mild-to-moderate central stenosis at L4-L5 related to multiple factors. No other significant central stenosis.  3. Multilevel foraminal stenosis bilaterally as described above, most prominent at " L4-L5.    ASSESSMENT:  1. Chronic midline low back pain without sciatica  2. Lumbar degenerative disc disease    PLAN:  1. Formal physical therapy - specific exercises to include centralization with flexion/extension activities with mobilization/manipulation and core stabilization with use of modalities (ie ice, ultrasound, electrical stimulation, etc.) as needed with home exercise prescription.  2. Activity modification as discussed, including limitation of activities that cause pain/discomfort.  3. Acetaminophen/heat/ice as needed for improved pain control.  4. Follow-up in 4-6 weeks if no improvement of symptoms for further evaluation/medical care.  If symptoms resolve completely, can follow-up as needed.  Consider a L4-5 interlaminar epidural corticosteroid injection, bilateral L4-5 or L5-S1 lumbar facet joint corticosteroid injections, etc. as deemed appropriate at follow-up clinical visit. Instructed to contact our office should the condition evolve or worsen, or should any new/progressive neurologic symptoms develop.    Patient's conditions were thoroughly discussed during today's visit with greater than 50% of the visit spent counseling the patient with total time spent face-to-face with the patient being 20 minutes.    Kodak Long DO, CAM  [facility]    Disclaimer: This note consists of symbols derived from keyboarding, dictation and/or voice recognition software. As a result, there may be errors in the script that have gone undetected. Please consider this when interpreting information found in this chart.

## 2017-07-22 NOTE — MR AVS SNAPSHOT
After Visit Summary   7/22/2017    Kevin Fierro    MRN: [id number]           Patient Information     Date Of Birth          5/11/1934        Visit Information        Provider Department      7/22/2017 8:20 AM Kodak Long DO [facility]        Today's Diagnoses     Chronic midline low back pain without sciatica    -  1    Lumbar degenerative disc disease          Care Instructions    We addressed the following today:    1. Low back pain/arthritis    Activity modification as discussed  Physical therapy: [facility] - [phone]  Topical Treatments: Ice or heat  Over the counter medication: Acetaminophen (Tylenol) 1000 mg every 6 hours with food (Maximum of 3000 mg/day)  Follow up in 4-6 weeks if no improvement of symptoms for further evaluation/medical care (sooner if needed; call direct clinic number [[phone]] at any time with questions or concerns)              Follow-ups after your visit        Additional Services     NA[facility] PT, HAND, AND CHIROPRACTIC REFERRAL       **This order will print in the [facility] Scheduling Office**    Physical Therapy, Hand Therapy and Chiropractic Care are available through:    *[facility]  *[facility]  *[facility]    Call one number to schedule at any of the above locations: [phone].    Your provider has referred you to: Physical Therapy at [facility] or [facility]    Indication/Reason for Referral: Low Back Pain  Therapy Orders: Evaluate and Treat  Special Programs: None  Special Request: None    Lara Bermudez      Additional Comments for the Therapist or Chiropractor: Formal physical therapy - specific exercises to include centralization with flexion/extension activities with mobilization/manipulation and core stabilization with use of modalities (ie ice, ultrasound, electrical stimulation, etc.) as needed with home exercise prescription.    Please be aware that coverage  "of these services is subject to the terms and limitations of your health insurance plan.  Call member services at your health plan with any benefit or coverage questions.      Please bring the following to your appointment:    *Your personal calendar for scheduling future appointments  *Comfortable clothing                  Who to contact     If you have questions or need follow up information about today's clinic visit or your schedule please contact [facility] directly at [phone].  Normal or non-critical lab and imaging results will be communicated to you by Lindsey Shellhart, letter or phone within 4 business days after the clinic has received the results. If you do not hear from us within 7 days, please contact the clinic through [employer] or phone. If you have a critical or abnormal lab result, we will notify you by phone as soon as possible.  Submit refill requests through [employer] or call your pharmacy and they will forward the refill request to us. Please allow 3 business days for your refill to be completed.          Additional Information About Your Visit        Lindsey ShellharHashTip Information     [employer] gives you secure access to your electronic health record. If you see a primary care provider, you can also send messages to your care team and make appointments. If you have questions, please call your primary care clinic.  If you do not have a primary care provider, please call [phone] and they will assist you.        Care EveryWhere ID     This is your Care EveryWhere ID. This could be used by other organizations to access your [place] medical records  [id number]        Your Vitals Were     Height BMI (Body Mass Index)                5' 7\" (1.702 m) 32.11 kg/m2           Blood Pressure from Last 3 Encounters:   07/22/17 118/70   05/31/17 126/68   08/31/16 126/76    Weight from Last 3 Encounters:   07/22/17 205 lb (93 kg)   05/31/17 207 lb (93.9 kg)   08/31/16 200 lb (90.7 kg)              We " Performed the Following     NADIR PT, HAND, AND CHIROPRACTIC REFERRAL        Primary Care Provider Office Phone # Fax #    Chance Espinosa [phone]       [facility] [address] [place] [address]        Equal Access to Services     RATNA SILVIA : Hadii aad ku hadasho Soomaali, waaxda luqadaha, qaybta kaalmada adeegyada, waxay bernardoin hayaan julieth flahertydarinelje dias. So [facility] [phone].    ATENCIÓN: Si habla español, tiene a helms disposición servicios gratuitos de asistencia lingüística. Llame al [phone].    We comply with applicable federal civil rights laws and [place] laws. We do not discriminate on the basis of race, color, national origin, age, disability sex, sexual orientation or gender identity.            Thank you!     Thank you for choosing [facility]  for your care. Our goal is always to provide you with excellent care. Hearing back from our patients is one way we can continue to improve our services. Please take a few minutes to complete the written survey that you may receive in the mail after your visit with us. Thank you!             Your Updated Medication List - Protect others around you: Learn how to safely use, store and throw away your medicines at www.disposemymeds.org.          This list is accurate as of: 7/22/17  9:05 AM.  Always use your most recent med list.                   Brand Name Dispense Instructions for use Diagnosis    aspirin 81 MG tablet      1 tab po QD (Once per day)        atenolol 50 MG tablet    TENORMIN    90 tablet    Take 1 tablet (50 mg) by mouth daily    CKD (chronic kidney disease) stage 3, GFR 30-59 ml/min, Hypertension goal BP (blood pressure) < 140/90       atorvastatin 10 MG tablet    LIPITOR    90 tablet    Take 1 tablet (10 mg) by mouth daily    Hyperlipidemia LDL goal <130       CALCIUM 600 + D 600-200 MG-UNIT Tabs     3 MONTHS         CAPSAICIN HP 0.1 % cream   Generic drug:  Capsaicin            clotrimazole-betamethasone cream    LOTRISONE    45 g    Apply  topically 2 times daily.    Jock itch       finasteride 5 MG tablet    PROSCAR    90 tablet    Take 1 tablet (5 mg) by mouth daily    Hypertrophy of prostate without urinary obstruction       Multi-vitamin Tabs tablet   Generic drug:  multivitamin, therapeutic with minerals      1 tablet daily        omeprazole 20 MG tablet     90 tablet    Take 1 tablet (20 mg) by mouth daily Take 30-60 minutes before a meal.    Gastroesophageal reflux disease without esophagitis       oxymetazoline 0.05 % spray    AFRIN     Spray 2 sprays into both nostrils as needed.        selenium sulfide 2.5 % lotion    SELSUN    360 mL    Apply daily to every other day - lather, wait 10 minutes and rinse    Seborrheic dermatitis, unspecified       sildenafil 50 MG cap/tab    VIAGRA    18 tablet    Take 1 tablet (50 mg) by mouth daily as needed for erectile dysfunction    Erectile dysfunction, unspecified erectile dysfunction type       terazosin 10 MG capsule    HYTRIN    90 capsule    Take 1 capsule (10 mg) by mouth At Bedtime    CKD (chronic kidney disease) stage 3, GFR 30-59 ml/min, Hypertension goal BP (blood pressure) < 140/90

## 2017-07-22 NOTE — PATIENT INSTRUCTIONS
We addressed the following today:    1. Low back pain/arthritis    Activity modification as discussed  Physical therapy: [facility] - [phone]  Topical Treatments: Ice or heat  Over the counter medication: Acetaminophen (Tylenol) 1000 mg every 6 hours with food (Maximum of 3000 mg/day)  Follow up in 4-6 weeks if no improvement of symptoms for further evaluation/medical care (sooner if needed; call direct clinic number [[phone]] at any time with questions or concerns)

## 2017-07-22 NOTE — Clinical Note
Dear Kevin Curiel saw me at [facility] on Jul 22, 2017.  Please refer to the visit note at your convenience and feel free to contact me should you have any questions.  Sincerely,  Kodak Long DO, [facility]

## 2017-07-23 ENCOUNTER — MYC MEDICAL ADVICE (OUTPATIENT)
Dept: FAMILY MEDICINE | Facility: CLINIC | Age: 82
End: 2017-07-23

## 2017-07-24 ENCOUNTER — TELEPHONE (OUTPATIENT)
Dept: FAMILY MEDICINE | Facility: CLINIC | Age: 82
End: 2017-07-24

## 2017-07-24 DIAGNOSIS — N18.30 CKD (CHRONIC KIDNEY DISEASE) STAGE 3, GFR 30-59 ML/MIN (H): Primary | ICD-10-CM

## 2017-07-24 NOTE — TELEPHONE ENCOUNTER
Advised patient to make an OV with MD MARIBEL.     MyChart message sent.     Viviane Garrett RN  [facility]

## 2017-07-24 NOTE — TELEPHONE ENCOUNTER
[employer] Message sent to patient.     Awaiting response.     Viviane Garrett RN  [facility]

## 2017-07-25 ENCOUNTER — RADIANT APPOINTMENT (OUTPATIENT)
Dept: GENERAL RADIOLOGY | Facility: CLINIC | Age: 82
End: 2017-07-25
Attending: FAMILY MEDICINE
Payer: COMMERCIAL

## 2017-07-25 ENCOUNTER — OFFICE VISIT (OUTPATIENT)
Dept: FAMILY MEDICINE | Facility: CLINIC | Age: 82
End: 2017-07-25
Payer: COMMERCIAL

## 2017-07-25 VITALS
SYSTOLIC BLOOD PRESSURE: 116 MMHG | HEIGHT: 67 IN | TEMPERATURE: 97 F | OXYGEN SATURATION: 96 % | HEART RATE: 84 BPM | DIASTOLIC BLOOD PRESSURE: 74 MMHG | BODY MASS INDEX: 32.96 KG/M2 | WEIGHT: 210 LBS

## 2017-07-25 DIAGNOSIS — G47.10 HYPERSOMNIA WITH SLEEP APNEA: ICD-10-CM

## 2017-07-25 DIAGNOSIS — E78.5 HYPERLIPIDEMIA LDL GOAL <130: ICD-10-CM

## 2017-07-25 DIAGNOSIS — B02.9 HERPES ZOSTER WITHOUT COMPLICATION: ICD-10-CM

## 2017-07-25 DIAGNOSIS — Z51.81 MEDICATION MONITORING ENCOUNTER: ICD-10-CM

## 2017-07-25 DIAGNOSIS — K21.9 GASTROESOPHAGEAL REFLUX DISEASE WITHOUT ESOPHAGITIS: ICD-10-CM

## 2017-07-25 DIAGNOSIS — N18.30 CKD (CHRONIC KIDNEY DISEASE) STAGE 3, GFR 30-59 ML/MIN (H): ICD-10-CM

## 2017-07-25 DIAGNOSIS — I10 HYPERTENSION GOAL BP (BLOOD PRESSURE) < 140/90: ICD-10-CM

## 2017-07-25 DIAGNOSIS — Z91.09 ENVIRONMENTAL ALLERGIES: ICD-10-CM

## 2017-07-25 DIAGNOSIS — R07.81 RIB PAIN ON LEFT SIDE: Primary | ICD-10-CM

## 2017-07-25 DIAGNOSIS — G47.30 HYPERSOMNIA WITH SLEEP APNEA: ICD-10-CM

## 2017-07-25 DIAGNOSIS — R07.81 RIB PAIN ON LEFT SIDE: ICD-10-CM

## 2017-07-25 PROCEDURE — 99214 OFFICE O/P EST MOD 30 MIN: CPT | Performed by: FAMILY MEDICINE

## 2017-07-25 PROCEDURE — 71101 X-RAY EXAM UNILAT RIBS/CHEST: CPT | Mod: LT

## 2017-07-25 RX ORDER — ACYCLOVIR 800 MG/1
800 TABLET ORAL
Qty: 35 TABLET | Refills: 0 | Status: SHIPPED | OUTPATIENT
Start: 2017-07-25 | End: 2017-08-01

## 2017-07-25 NOTE — NURSING NOTE
"Chief Complaint   Patient presents with     Rib Pain       Initial /74  Pulse 84  Temp 97  F (36.1  C) (Oral)  Ht 5' 7\" (1.702 m)  Wt 210 lb (95.3 kg)  SpO2 96%  BMI 32.89 kg/m2 Estimated body mass index is 32.89 kg/(m^2) as calculated from the following:    Height as of this encounter: 5' 7\" (1.702 m).    Weight as of this encounter: 210 lb (95.3 kg)..  BP completed using cuff size: soraida [place]  "

## 2017-07-25 NOTE — MR AVS SNAPSHOT
After Visit Summary   7/25/2017    Kevin Fierro    MRN: [id number]           Patient Information     Date Of Birth          5/11/1934        Visit Information        Provider Department      7/25/2017 9:20 AM Chance Espinosa MD [facility]        Today's Diagnoses     Rib pain on left side    -  1    Herpes zoster without complication        CKD (chronic kidney disease) stage 3, GFR 30-59 ml/min        Hypertension goal BP (blood pressure) < 140/90        Hyperlipidemia LDL goal <130        Hypersomnia with sleep apnea        Environmental allergies        Gastroesophageal reflux disease without esophagitis        Medication monitoring encounter          Care Instructions    Prescribed acyclovir for Shingles - take 5 times daily.    Tylenol as needed - 2 x 500 mg every 8 hours routinely.    Will consider pain medication if needed.     Heat could worsen symptoms.     Chest X-Ray completed.    Follow up with Dr. Long and PT.     [facility] [place] [facility]                        To reach your care team during and after hours:   [phone]  To reach our pharmacy:        [phone]    Clinic Hours                        Our clinic hours are:    Monday   7:30 am to 7:00 pm                  Tuesday through Friday 7:30 am to 5:00 pm                             Saturday   8:00 am to 12:00 pm      Sunday   Closed      Pharmacy Hours                        Our pharmacy hours are:    Monday   8:30 am to 7:00 pm       Tuesday to Friday  8:30 am to 6:00 pm                       Saturday    9:00 am to 1:00 pm              Sunday    Closed              There is also information available at our web site:  [email or web address]    If your provider ordered any lab tests and you do not receive the results within 10 business days, please call the clinic.    If you need a medication refill please contact your pharmacy.  Please allow 2-3 business days for your refill to be completed.    Our clinic offers  telephone visits and e visits.  Please ask one of your team members to explain more.      Use [employer] (secure email communication and access to your chart) to send your primary care provider a message or make an appointment. Ask someone on your Team how to sign up for [employer].  Immunizations                      Immunization History   Administered Date(s) Administered     Influenza (H1N1) 01/20/2010     Influenza (High Dose) 3 valent vaccine 10/03/2011, 09/20/2012, 10/15/2013, 10/03/2014, 10/15/2015, 09/30/2016     Influenza (IIV3) 10/18/2004, 10/12/2005, 10/23/2006, 10/17/2007, 10/15/2009, 09/22/2010     Pneumococcal (PCV 13) 05/20/2015     Pneumococcal 23 valent 01/04/2007     TD (ADULT, 7+) 04/20/2007     TDAP Vaccine (Boostrix) 05/23/2012     Zoster vaccine, live 01/01/2008        Health Maintenance                         There are no preventive care reminders to display for this patient.                Shingles (Herpes Zoster)  What is shingles?   Shingles is an infection caused by the same virus that causes chickenpox. This virus is called varicella zoster. You cannot develop shingles unless you have had a previous infection of chickenpox (usually as a child).   Shingles is also called herpes zoster. This infection is most common in people over 50 years old, but young people can have it as well.   How does it occur?   If you have had chickenpox, you are at risk for later developing shingles. After you recover from chickenpox, the chickenpox virus stays in your body. It moves to the roots of your nerve cells (near the spinal cord) and becomes inactive (dormant). Later, if the virus becomes active again, shingles is the name given to the symptoms it causes.   What exactly causes the virus to become active is not known. A weakened immune system seems to allow reactivation of the virus. This may occur with normal aging, immune-suppressing medicines, or another illness, or after major surgery. It can also happen  as a complication of cancer or AIDS or treatment of these illnesses. Chronic use of steroid drugs may trigger shingles. The virus may also become active again after the skin is injured or sunburned. Emotional stress seems to be a common trigger as well.   What are the symptoms?   The first sign of shingles is often burning, sharp pain, tingling, or numbness in your skin on one side of your body or face. The most common site is the back or upper abdomen. You may have severe itching or aching. You also may feel tired and ill with fever, chills, headache, and upset stomach or belly pain.   One to 14 days after you start feeling pain, you will notice a rash of small blisters on reddened skin. Within a few days after they appear, the blisters will turn yellow, then dry and crust over. Over the next 2 weeks the crusts drop off, and the skin continues to heal over the next several days to weeks.   Because shingles usually follows nerve paths, the blisters are usually found in a line, often extending from the back or side around to the belly. The blisters are almost always on just one side of the body. Shingles usually doesn't cross the midline of the body. The rash also may appear on one side of your face or scalp. The painful rash may be in the area of your ear or eye. When shingles occurs on the head or scalp, symptoms can include headaches and weakness of one side of the face, which causes that side of the face to look droopy. The symptoms usually go away eventually, but it may take many months.   In some cases the pain can last for weeks, months, or years, long after the rash heals. This is called postherpetic neuralgia.   Is shingles contagious?   You cannot get shingles from someone else. However, if you have never had chickenpox, you may get chickenpox from close contact with someone who has shingles because the blisters contain chickenpox virus.   If you have shingles, make sure that anyone who has not had  chickenpox or the chickenpox shot does not come into contact with your blisters until the blisters are completely dry. Once your blisters are crusted over, they are no longer contagious.   How is it diagnosed?   Your healthcare provider will ask about your medical history and symptoms and will examine you. The diagnosis is usually obvious from the appearance of the skin. To confirm the diagnosis, your provider may order lab tests to look for the virus in fluid from a blister.   How is it treated?   It is best to start treatment as soon as possible after you notice the rash. See your healthcare provider to discuss treatment with antiviral medicine, such as acyclovir. This medicine is most effective if you start taking it within the first 3 days of the rash. Antiviral medicine may speed your recovery and lessen the chance that the pain will last for a long time.   Your provider may also recommend or prescribe:   medicine for pain   antibacterial salves or lotions to help prevent bacterial infection of the blisters   corticosteroids (if you are over 50).   How long will the effects last?   The rash from shingles will heal in 1 to 3 weeks and the pain or irritation will usually go away in 3 to 5 weeks. When shingles occurs on the head or scalp, the symptoms usually go away eventually, but it may take many months.   If the virus damages a nerve, you may have pain, numbness, or tingling for months or even years after the rash is healed. This is called postherpetic neuralgia. This chronic condition is most likely to occur after a shingles outbreak in people over 50 years old. Taking antiviral medicine as soon as the shingles is diagnosed may help prevent this problem.   How can I take care of myself?   Take a pain-relief medicine such as acetaminophen. Take other medicine as prescribed by your healthcare provider.   Put cool, moist washcloths on the rash.   Rest in bed during the early stages if you have fever and other  symptoms.   Try not to let clothing or bed linens rub against the rash and irritate it.   Call your healthcare provider right away if:   You develop worsening pain or fever.   You develop a severe headache, stiff neck, hearing loss, or changes in your ability to think.   The blisters show signs of bacterial infection, such as increasing pain or redness, or milky yellow drainage from the blister sites.   The blisters are close to the eyes or you have pain in your eyes or trouble seeing.   You have trouble walking.   You have trouble breathing or a severe cough.   You have a fever higher than 101.5? F (38.6? C.)   You have a rash involving your eye or difficulty looking at bright light.   The blisters appear infected. Signs or symptoms of infection include:   Your skin is becoming redder or more painful.   You have red streaks from the blisters going toward your heart.   The blister area gets very warm to touch.   Pus or other fluid starts leaking from the blisters.   You have chills, nausea, vomiting, or muscle aches.   How can I help prevent shingles?   If you have never had chickenpox, you can get a shot to help prevent infection with the chickenpox virus.   If you have had chickenpox, a vaccine, called Zostavax, is available for people 60 years of age and older. The vaccine can help prevent or lessen the symptoms of shingles. It cannot be used to treat shingles once you have it.   You can protect your immune system and lessen your chances of getting shingles by trying to keep stress under control, exercising regularly, and eating a healthy diet.     Published by [employer].  This content is reviewed periodically and is subject to change as new health information becomes available. The information is intended to inform and educate and is not a replacement for medical evaluation, advice, diagnosis or treatment by a healthcare professional.   Developed by [employer].   ? 2010 [employer] and/or its affiliates. All  "Rights Reserved.   Copyright   [employer] Reference [employer] 2011                Follow-ups after your visit        Your next 10 appointments already scheduled     Jul 28, 2017  9:30 AM CDT   [facility] with Dariel Sanders PT   [facility]    [address] [place] [address] [phone]            Aug 04, 2017 11:50 AM CDT   [facility] with Job Georges PT   [facility] ([facility])    [address] [place] [address] [phone]              Who to contact     If you have questions or need follow up information about today's clinic visit or your schedule please contact [facility] directly at [phone].  Normal or non-critical lab and imaging results will be communicated to you by MyChart, letter or phone within 4 business days after the clinic has received the results. If you do not hear from us within 7 days, please contact the clinic through [employer] or phone. If you have a critical or abnormal lab result, we will notify you by phone as soon as possible.  Submit refill requests through [employer] or call your pharmacy and they will forward the refill request to us. Please allow 3 business days for your refill to be completed.          Additional Information About Your Visit        [employer] Information     [employer] gives you secure access to your electronic health record. If you see a primary care provider, you can also send messages to your care team and make appointments. If you have questions, please call your primary care clinic.  If you do not have a primary care provider, please call [phone] and they will assist you.        Care EveryWhere ID     This is your Care EveryWhere ID. This could be used by other organizations to access your [facility] medical records  [id number]        Your Vitals Were     Pulse Temperature Height Pulse Oximetry BMI (Body Mass Index)       84 97  F (36.1  C) (Oral) 5' 7\" " (1.702 m) 96% 32.89 kg/m2        Blood Pressure from Last 3 Encounters:   07/25/17 116/74   07/22/17 118/70   05/31/17 126/68    Weight from Last 3 Encounters:   07/25/17 210 lb (95.3 kg)   07/22/17 205 lb (93 kg)   05/31/17 207 lb (93.9 kg)                 Today's Medication Changes          These changes are accurate as of: 7/25/17 10:05 AM.  If you have any questions, ask your nurse or doctor.               Start taking these medicines.        Dose/Directions    acyclovir 800 MG tablet   Commonly known as:  ZOVIRAX   Used for:  Herpes zoster without complication   Started by:  Chance Espinosa MD        Dose:  800 mg   Take 1 tablet (800 mg) by mouth 5 times daily   Quantity:  35 tablet   Refills:  0            Where to get your medicines      These medications were sent to [facility] [place] - [address] [address]     Phone:  [phone]     acyclovir 800 MG tablet                Primary Care Provider Office Phone # Fax #    Chance Espinosa [phone]       [address] [address]        Equal Access to Services     TANIA VEGA AH: Hadii kira ku hadasho Soomaali, waaxda luqadaha, qaybta kaalmada adeegyada, waxay idiin hayebenezern julieth dias. So [facility] [phone].    ATENCIÓN: Si habla español, tiene a helms disposición servicios gratuitos de asistencia lingüística. ame al [phone].    We comply with applicable federal civil rights laws and [place] laws. We do not discriminate on the basis of race, color, national origin, age, disability sex, sexual orientation or gender identity.            Thank you!     Thank you for choosing [facility]  for your care. Our goal is always to provide you with excellent care. Hearing back from our patients is one way we can continue to improve our services. Please take a few minutes to complete the written survey that  you may receive in the mail after your visit with us. Thank you!             Your Updated Medication List - Protect others around you: Learn how to safely use, store and throw away your medicines at www.disposemymeds.org.          This list is accurate as of: 7/25/17 10:05 AM.  Always use your most recent med list.                   Brand Name Dispense Instructions for use Diagnosis    acyclovir 800 MG tablet    ZOVIRAX    35 tablet    Take 1 tablet (800 mg) by mouth 5 times daily    Herpes zoster without complication       aspirin 81 MG tablet      1 tab po QD (Once per day)        atenolol 50 MG tablet    TENORMIN    90 tablet    Take 1 tablet (50 mg) by mouth daily    CKD (chronic kidney disease) stage 3, GFR 30-59 ml/min, Hypertension goal BP (blood pressure) < 140/90       atorvastatin 10 MG tablet    LIPITOR    90 tablet    Take 1 tablet (10 mg) by mouth daily    Hyperlipidemia LDL goal <130       CALCIUM 600 + D 600-200 MG-UNIT Tabs     3 MONTHS         CAPSAICIN HP 0.1 % cream   Generic drug:  Capsaicin           clotrimazole-betamethasone cream    LOTRISONE    45 g    Apply  topically 2 times daily.    Jock itch       finasteride 5 MG tablet    PROSCAR    90 tablet    Take 1 tablet (5 mg) by mouth daily    Hypertrophy of prostate without urinary obstruction       Multi-vitamin Tabs tablet   Generic drug:  multivitamin, therapeutic with minerals      1 tablet daily        omeprazole 20 MG tablet     90 tablet    Take 1 tablet (20 mg) by mouth daily Take 30-60 minutes before a meal.    Gastroesophageal reflux disease without esophagitis       oxymetazoline 0.05 % spray    AFRIN     Spray 2 sprays into both nostrils as needed.        selenium sulfide 2.5 % lotion    SELSUN    360 mL    Apply daily to every other day - lather, wait 10 minutes and rinse    Seborrheic dermatitis, unspecified       sildenafil 50 MG cap/tab    VIAGRA    18 tablet    Take 1 tablet (50 mg) by mouth daily as needed for erectile  dysfunction    Erectile dysfunction, unspecified erectile dysfunction type       terazosin 10 MG capsule    HYTRIN    90 capsule    Take 1 capsule (10 mg) by mouth At Bedtime    CKD (chronic kidney disease) stage 3, GFR 30-59 ml/min, Hypertension goal BP (blood pressure) < 140/90

## 2017-07-25 NOTE — PATIENT INSTRUCTIONS
Prescribed acyclovir for Shingles - take 5 times daily.    Tylenol as needed - 2 x 500 mg every 8 hours routinely.    Will consider pain medication if needed.     Heat could worsen symptoms.     Chest X-Ray completed.    Follow up with Dr. Long and PT.     [facility]                        To reach your care team during and after hours:   [phone]  To reach our pharmacy:        [phone]    Clinic Hours                        Our clinic hours are:    Monday   7:30 am to 7:00 pm                  Tuesday through Friday 7:30 am to 5:00 pm                             Saturday   8:00 am to 12:00 pm      Sunday   Closed      Pharmacy Hours                        Our pharmacy hours are:    Monday   8:30 am to 7:00 pm       Tuesday to Friday  8:30 am to 6:00 pm                       Saturday    9:00 am to 1:00 pm              Sunday    Closed              There is also information available at our web site:  [email or web address]    If your provider ordered any lab tests and you do not receive the results within 10 business days, please call the clinic.    If you need a medication refill please contact your pharmacy.  Please allow 2-3 business days for your refill to be completed.    Our clinic offers telephone visits and e visits.  Please ask one of your team members to explain more.      Use [employer] (secure email communication and access to your chart) to send your primary care provider a message or make an appointment. Ask someone on your Team how to sign up for [email or web address].  Immunizations                      Immunization History   Administered Date(s) Administered     Influenza (H1N1) 01/20/2010     Influenza (High Dose) 3 valent vaccine 10/03/2011, 09/20/2012, 10/15/2013, 10/03/2014, 10/15/2015, 09/30/2016     Influenza (IIV3) 10/18/2004, 10/12/2005, 10/23/2006, 10/17/2007, 10/15/2009, 09/22/2010     Pneumococcal (PCV 13) 05/20/2015     Pneumococcal 23 valent 01/04/2007     TD (ADULT, 7+) 04/20/2007      TDAP Vaccine (Boostrix) 05/23/2012     Zoster vaccine, live 01/01/2008        Health Maintenance                         There are no preventive care reminders to display for this patient.                Shingles (Herpes Zoster)  What is shingles?   Shingles is an infection caused by the same virus that causes chickenpox. This virus is called varicella zoster. You cannot develop shingles unless you have had a previous infection of chickenpox (usually as a child).   Shingles is also called herpes zoster. This infection is most common in people over 50 years old, but young people can have it as well.   How does it occur?   If you have had chickenpox, you are at risk for later developing shingles. After you recover from chickenpox, the chickenpox virus stays in your body. It moves to the roots of your nerve cells (near the spinal cord) and becomes inactive (dormant). Later, if the virus becomes active again, shingles is the name given to the symptoms it causes.   What exactly causes the virus to become active is not known. A weakened immune system seems to allow reactivation of the virus. This may occur with normal aging, immune-suppressing medicines, or another illness, or after major surgery. It can also happen as a complication of cancer or AIDS or treatment of these illnesses. Chronic use of steroid drugs may trigger shingles. The virus may also become active again after the skin is injured or sunburned. Emotional stress seems to be a common trigger as well.   What are the symptoms?   The first sign of shingles is often burning, sharp pain, tingling, or numbness in your skin on one side of your body or face. The most common site is the back or upper abdomen. You may have severe itching or aching. You also may feel tired and ill with fever, chills, headache, and upset stomach or belly pain.   One to 14 days after you start feeling pain, you will notice a rash of small blisters on reddened skin. Within a few days  after they appear, the blisters will turn yellow, then dry and crust over. Over the next 2 weeks the crusts drop off, and the skin continues to heal over the next several days to weeks.   Because shingles usually follows nerve paths, the blisters are usually found in a line, often extending from the back or side around to the belly. The blisters are almost always on just one side of the body. Shingles usually doesn't cross the midline of the body. The rash also may appear on one side of your face or scalp. The painful rash may be in the area of your ear or eye. When shingles occurs on the head or scalp, symptoms can include headaches and weakness of one side of the face, which causes that side of the face to look droopy. The symptoms usually go away eventually, but it may take many months.   In some cases the pain can last for weeks, months, or years, long after the rash heals. This is called postherpetic neuralgia.   Is shingles contagious?   You cannot get shingles from someone else. However, if you have never had chickenpox, you may get chickenpox from close contact with someone who has shingles because the blisters contain chickenpox virus.   If you have shingles, make sure that anyone who has not had chickenpox or the chickenpox shot does not come into contact with your blisters until the blisters are completely dry. Once your blisters are crusted over, they are no longer contagious.   How is it diagnosed?   Your healthcare provider will ask about your medical history and symptoms and will examine you. The diagnosis is usually obvious from the appearance of the skin. To confirm the diagnosis, your provider may order lab tests to look for the virus in fluid from a blister.   How is it treated?   It is best to start treatment as soon as possible after you notice the rash. See your healthcare provider to discuss treatment with antiviral medicine, such as acyclovir. This medicine is most effective if you start  taking it within the first 3 days of the rash. Antiviral medicine may speed your recovery and lessen the chance that the pain will last for a long time.   Your provider may also recommend or prescribe:   medicine for pain   antibacterial salves or lotions to help prevent bacterial infection of the blisters   corticosteroids (if you are over 50).   How long will the effects last?   The rash from shingles will heal in 1 to 3 weeks and the pain or irritation will usually go away in 3 to 5 weeks. When shingles occurs on the head or scalp, the symptoms usually go away eventually, but it may take many months.   If the virus damages a nerve, you may have pain, numbness, or tingling for months or even years after the rash is healed. This is called postherpetic neuralgia. This chronic condition is most likely to occur after a shingles outbreak in people over 50 years old. Taking antiviral medicine as soon as the shingles is diagnosed may help prevent this problem.   How can I take care of myself?   Take a pain-relief medicine such as acetaminophen. Take other medicine as prescribed by your healthcare provider.   Put cool, moist washcloths on the rash.   Rest in bed during the early stages if you have fever and other symptoms.   Try not to let clothing or bed linens rub against the rash and irritate it.   Call your healthcare provider right away if:   You develop worsening pain or fever.   You develop a severe headache, stiff neck, hearing loss, or changes in your ability to think.   The blisters show signs of bacterial infection, such as increasing pain or redness, or milky yellow drainage from the blister sites.   The blisters are close to the eyes or you have pain in your eyes or trouble seeing.   You have trouble walking.   You have trouble breathing or a severe cough.   You have a fever higher than 101.5? F (38.6? C.)   You have a rash involving your eye or difficulty looking at bright light.   The blisters appear  infected. Signs or symptoms of infection include:   Your skin is becoming redder or more painful.   You have red streaks from the blisters going toward your heart.   The blister area gets very warm to touch.   Pus or other fluid starts leaking from the blisters.   You have chills, nausea, vomiting, or muscle aches.   How can I help prevent shingles?   If you have never had chickenpox, you can get a shot to help prevent infection with the chickenpox virus.   If you have had chickenpox, a vaccine, called Zostavax, is available for people 60 years of age and older. The vaccine can help prevent or lessen the symptoms of shingles. It cannot be used to treat shingles once you have it.   You can protect your immune system and lessen your chances of getting shingles by trying to keep stress under control, exercising regularly, and eating a healthy diet.     Published by [employer].  This content is reviewed periodically and is subject to change as new health information becomes available. The information is intended to inform and educate and is not a replacement for medical evaluation, advice, diagnosis or treatment by a healthcare professional.   Developed by [employer].   ? 2010 [employer] and/or its affiliates. All Rights Reserved.   Copyright   [employer] 2011

## 2017-07-25 NOTE — PROGRESS NOTES
SUBJECTIVE:                                                    Kevin Fierro is a 83 year old male who presents to clinic today for the following health issues:    Had stress test done 1 week ago 7/14/17 - sore rib on the left side from being probe from US, intermittent pain started a few days after US - pain has increased in frequency  - no reported bruising visual, no worsening factors, does not worsen with deep breaths, coughing, or sneezing - was using heating pad and ice pack for pain and may have left a burn gideon/rash on skin. Kevin reports that pain kept him awake last night.  No known rash.     Denies itching and burning.     Kevin reports that  Dr. Long referred him to PT which he has later this week for chronic low back pain.     GERD -- no current problems.     Sleep apnea -- controlled with cpap machine.     Hyperlipidemia -- atorvastatin     Recent Labs   Lab Test  05/24/17   0900  08/26/16   0850  05/14/15   0822  05/15/14   0753   CHOL  176  193  159  180   HDL  48  39*  49  33*   LDL  96  97  83  87   TRIG  159*  284*  137  303*   CHOLHDLRATIO   --    --   3.2  5.5*     Hypertension -- atenolol    BP Readings from Last 3 Encounters:   07/25/17 116/74   07/22/17 118/70   05/31/17 126/68     CKD -- stage 3    Creatinine   Date Value Ref Range Status   05/24/2017 1.53 (H) 0.66 - 1.25 mg/dL Final       Problem list and histories reviewed & adjusted, as indicated.  Additional history: as documented    Reviewed and updated as needed this visit by clinical staff  Tobacco  Allergies  Meds  Med Hx  Surg Hx  Fam Hx  Soc Hx      Reviewed and updated as needed this visit by Provider       Wt Readings from Last 4 Encounters:   07/25/17 210 lb (95.3 kg)   07/22/17 205 lb (93 kg)   05/31/17 207 lb (93.9 kg)   08/31/16 200 lb (90.7 kg)       Health Maintenance    There are no preventive care reminders to display for this patient.    Current Problem List    Patient Active Problem List   Diagnosis      Hypertension goal BP (blood pressure) < 140/90     Hypertrophy of prostate without urinary obstruction     Hearing loss     PSVT     Seborrheic dermatitis     Other and unspecified 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     Hypersomnia with sleep apnea     Family history of cardiovascular disease     Seasonal allergies     ED (erectile dysfunction)     Hyperlipidemia LDL goal <130     Lung nodules     Advanced directives, counseling/discussion     GERD (gastroesophageal reflux disease)     CKD (chronic kidney disease) stage 3, GFR 30-59 ml/min     Environmental allergies     Lumbar stenosis       Past Medical History    Past Medical History:   Diagnosis Date     Arthritis .     Choroidal nevus of left eye     Dr Connor     CKD (chronic kidney disease) stage 3, GFR 30-59 ml/min      Colon polyps      ED (erectile dysfunction)      Family history of cardiovascular disease      GERD (gastroesophageal reflux disease) 2013     Hematuria 1999    dr scott     Hyperlipidemia LDL goal <130 1990     Hypertension goal BP (blood pressure) < 140/90 1990     Hypertrophy of prostate without urinary obstruction and other lower urinary tract symptoms (LUTS)      Lumbar stenosis 2017    mild to moderate foraminal and central     Lung nodules 11/10    CT scan stable     obstructive SLEEP APNEA 5/2007    CPAP     Other and unspecified 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 5/25/2005     PSVT      Seasonal allergies      Unspecified hearing loss 5/2005    hearing aids, L>R - Dr Hernandez       Past Surgical History    Past Surgical History:   Procedure Laterality Date     HC COLONOSCOPY THRU STOMA, DIAGNOSTIC  2005, 5/10, 5/11    Polyps-> due 2015 - Ct Colonography negative     HC REPAIR INCISIONAL HERNIA,REDUCIBLE  1960    Hernia Repair, Incisional, Unilateral     STRESS ECHO (METRO)  7/09, 5/12, 7/17    Negative     TONSILLECTOMY & ADENOIDECTOMY  1946       Current  Medications    Current Outpatient Prescriptions   Medication Sig Dispense Refill     acyclovir (ZOVIRAX) 800 MG tablet Take 1 tablet (800 mg) by mouth 5 times daily 35 tablet 0     atenolol (TENORMIN) 50 MG tablet Take 1 tablet (50 mg) by mouth daily 90 tablet 3     atorvastatin (LIPITOR) 10 MG tablet Take 1 tablet (10 mg) by mouth daily 90 tablet 3     terazosin (HYTRIN) 10 MG capsule Take 1 capsule (10 mg) by mouth At Bedtime 90 capsule 3     finasteride (PROSCAR) 5 MG tablet Take 1 tablet (5 mg) by mouth daily 90 tablet 3     omeprazole 20 MG tablet Take 1 tablet (20 mg) by mouth daily Take 30-60 minutes before a meal. 90 tablet 3     Capsaicin (CAPSAICIN HP) 0.1 % CREA        sildenafil (VIAGRA) 50 MG tablet Take 1 tablet (50 mg) by mouth daily as needed for erectile dysfunction 18 tablet 3     clotrimazole-betamethasone (LOTRISONE) cream Apply  topically 2 times daily. 45 g 3     selenium sulfide (SELSUN) 2.5 % shampoo Apply daily to every other day - lather, wait 10 minutes and rinse 360 mL 3     oxymetazoline (AFRIN) 0.05 % nasal spray Spray 2 sprays into both nostrils as needed.       CALCIUM 600 + D 600-200 MG-UNIT OR TABS  3 MONTHS 3     MULTI-VITAMIN OR TABS 1 tablet daily       ASPIRIN 81 MG OR TABS 1 tab po QD (Once per day)         Allergies    No Known Allergies    Immunizations    Immunization History   Administered Date(s) Administered     Influenza (H1N1) 01/20/2010     Influenza (High Dose) 3 valent vaccine 10/03/2011, 09/20/2012, 10/15/2013, 10/03/2014, 10/15/2015, 09/30/2016     Influenza (IIV3) 10/18/2004, 10/12/2005, 10/23/2006, 10/17/2007, 10/15/2009, 09/22/2010     Pneumococcal (PCV 13) 05/20/2015     Pneumococcal 23 valent 01/04/2007     TD (ADULT, 7+) 04/20/2007     TDAP Vaccine (Boostrix) 05/23/2012     Zoster vaccine, live 01/01/2008       Family History    Family History   Problem Relation Age of Onset     C.A.D. Father      age 50's     Hypertension Father      C.A.D. Brother       "JENNY Brother      mid 40's     DIABETES Brother      CANCER Brother      pancreatic CA     Coronary Artery Disease Brother      Cancer - colorectal No family hx of      Prostate Cancer No family hx of        Social History    Social History     Social History     Marital status:      Spouse name: Leticia     Number of children: 3     Years of education: 18     Occupational History      Retired     Social History Main Topics     Smoking status: Never Smoker     Smokeless tobacco: Never Used     Alcohol use 2.4 - 3.0 oz/week     4 - 5 Standard drinks or equivalent per week      Comment: 4-5 drinks per week avg     Drug use: No     Sexual activity: Yes     Partners: Female     Other Topics Concern     Blood Transfusions No     Caffeine Concern Yes     1 serv/day, rare pop, occas chocolate (3-4/yr)     Sleep Concern Yes     MIMI, wakes feeling rested     Exercise Yes     30-45' 2-3 x/wk     Seat Belt Yes     Parent/Sibling W/ Cabg, Mi Or Angioplasty Before 65f 55m? Yes     Social History Narrative       All above reviewed and updated, all stable unless otherwise noted    Recent labs reviewed    ROS:  10 point ROS of systems including Constitutional, Eyes, Respiratory, Cardiovascular, Gastroenterology, Genitourinary, Integumentary, Muscularskeletal, Psychiatric were all negative except for pertinent positives noted in my HPI.    OBJECTIVE:                                                    /74  Pulse 84  Temp 97  F (36.1  C) (Oral)  Ht 5' 7\" (1.702 m)  Wt 210 lb (95.3 kg)  SpO2 96%  BMI 32.89 kg/m2  Body mass index is 32.89 kg/(m^2).  GENERAL: healthy, alert and no distress  EYES: Eyes grossly normal to inspection, extraocular movements - intact, and PERRL  HENT: ear canals and TM's normal upon viewing with otoscope - mild cerumen right ear, nose and mouth without ulcers or lesions upon viewing with otoscope  RESP: lungs clear to auscultation - no rales, no rhonchi, no wheezes  CV: regular rates and " rhythm, normal S1 S2, no S3 or S4 and no murmur, no click or rub -  ABDOMEN: umbilical hernia, soft, no tenderness, no  hepatosplenomegaly, no masses, normal bowel sounds  MS: extremities- no gross deformities noted, no edema  SKIN: rash mid left side in stripe pattern, lateral mid lower left ribs clusters of red vesicular changes, otherwise no suspicious lesions  NEURO: mentation intact and speech normal  BACK: tenderness 10 cm superior to rash, no CVA tenderness, no paralumbar tenderness  PSYCH: Alert and oriented times 3; speech- coherent , normal rate and volume; able to articulate logical thoughts, able to abstract reason, no tangential thoughts, no hallucinations or delusions, affect- normal    CXR -- no obvious fractures.     DIAGNOSTICS/PROCEDURES:                                                      No results found for this or any previous visit (from the past 24 hour(s)).     ASSESSMENT/PLAN:                                                        ICD-10-CM    1. Rib pain on left side R07.81 XR Ribs & Chest Left G/E 3 Views   2. Herpes zoster without complication B02.9 acyclovir (ZOVIRAX) 800 MG tablet   3. CKD (chronic kidney disease) stage 3, GFR 30-59 ml/min N18.3    4. Hypertension goal BP (blood pressure) < 140/90 I10    5. Hyperlipidemia LDL goal <130 E78.5    6. Hypersomnia with sleep apnea G47.10     G47.30    7. Environmental allergies Z91.09    8. Gastroesophageal reflux disease without esophagitis K21.9    9. Medication monitoring encounter Z51.81        Discussed treatment/modality options, including risk and benefits, he desires further health care maintenance, new medication(acyclovir), OTC meds(tylenol), physical therapy and observation. All diagnosis above reviewed and noted above, otherwise stable.  See [facility] orders for further details.  Follow up as needed.    Chest XR completed, radiology to review.     Prescribed acyclovir, reviewed no change no current CKD.     OTC Tylenol. Will  consider pain medication if needed.     Discussed that heat could worsen pain.     Follow up with Dr. Long and PT.     Follow up if not improving, appointment made in 1 week - will cancel if feeling better.     There are no preventive care reminders to display for this patient.    See Patient Instructions    This document serves as a record of the services and decisions personally performed and made by Chance Espinosa MD [facility]. It was created on their behalf by Joslyn Moreno, a trained medical scribe. The creation of this document is based the provider's statements to the medical scribe. Joslyn Moreno July 25, 2017 9:37 AM              Chance Espinosa MD [address]  [address] [phone] Fax

## 2017-07-26 RX ORDER — GABAPENTIN 300 MG/1
CAPSULE ORAL
Qty: 30 CAPSULE | Refills: 1 | Status: SHIPPED | OUTPATIENT
Start: 2017-07-26 | End: 2017-10-30

## 2017-07-26 NOTE — TELEPHONE ENCOUNTER
Pt calling back for xray results. (pt also went over stress test results with triage, letter was sent to pt today after 3rd attempt).    Also wanted to know if besides ice packs in shingles areas, if they can try a stronger strength capsacin ointment.  Pt uses [facility] pharmacy.    Routing to PCP for further review/recommendations/orders.  Dennise White RN

## 2017-07-26 NOTE — TELEPHONE ENCOUNTER
Please rx gabapentin 300 mg at HS (#30, r x 1) with close follow up    Creatinine   Date Value Ref Range Status   05/24/2017 1.53 (H) 0.66 - 1.25 mg/dL Final

## 2017-07-26 NOTE — TELEPHONE ENCOUNTER
Patient notified of note below.  He is okay with trying the Gabapentin.  He will let us know if pain is not controlled with Gabapentin and Tylenol.    MELVIN Polk, RN, N  [facility]) [phone]

## 2017-07-26 NOTE — TELEPHONE ENCOUNTER
Called [phone] (home)     Informed of message below    Patient stated an understanding and agreed with plan.    Viviane Garrett RN  [facility]

## 2017-07-26 NOTE — TELEPHONE ENCOUNTER
We could try gabapentin, typically for long term post herpetic pain.    Avoid topical pain creams with open wounds.    May use tylenol 500 mg 2 every 8 hrs as needed, could add narcotics if needed

## 2017-07-28 ENCOUNTER — THERAPY VISIT (OUTPATIENT)
Dept: PHYSICAL THERAPY | Facility: CLINIC | Age: 82
End: 2017-07-28
Payer: COMMERCIAL

## 2017-07-28 DIAGNOSIS — G89.29 CHRONIC MIDLINE LOW BACK PAIN WITHOUT SCIATICA: Primary | ICD-10-CM

## 2017-07-28 DIAGNOSIS — M54.50 CHRONIC MIDLINE LOW BACK PAIN WITHOUT SCIATICA: Primary | ICD-10-CM

## 2017-07-28 PROCEDURE — 97110 THERAPEUTIC EXERCISES: CPT | Mod: GP | Performed by: PHYSICAL THERAPIST

## 2017-07-28 PROCEDURE — G8982 BODY POS GOAL STATUS: HCPCS | Mod: GP | Performed by: PHYSICAL THERAPIST

## 2017-07-28 PROCEDURE — 97161 PT EVAL LOW COMPLEX 20 MIN: CPT | Mod: GP | Performed by: PHYSICAL THERAPIST

## 2017-07-28 PROCEDURE — G8981 BODY POS CURRENT STATUS: HCPCS | Mod: GP | Performed by: PHYSICAL THERAPIST

## 2017-07-28 NOTE — MR AVS SNAPSHOT
After Visit Summary   7/28/2017    Kevin Fierro    MRN: [id number]           Patient Information     Date Of Birth          5/11/1934        Visit Information        Provider Department      7/28/2017 9:30 AM Dariel Sanders PT [facility]        Today's Diagnoses     Chronic midline low back pain without sciatica    -  1       Follow-ups after your visit        Your next 10 appointments already scheduled     Aug 01, 2017 10:20 AM CDT   SHORT with Chance Espinosa MD   [facility]    [address] [place] [id number] [phone]            Aug 18, 2017 11:30 AM CDT   NADIR Spine with Dariel Sanders PT   [facility] [place] (NADIR [facility])    [address] [facility] [address] [phone]              Who to contact     If you have questions or need follow up information about today's clinic visit or your schedule please contact [facility] directly at [phone].  Normal or non-critical lab and imaging results will be communicated to you by [email or web address], letter or phone within 4 business days after the clinic has received the results. If you do not hear from us within 7 days, please contact the clinic through ReDent Novat or phone. If you have a critical or abnormal lab result, we will notify you by phone as soon as possible.  Submit refill requests through Aptana or call your pharmacy and they will forward the refill request to us. Please allow 3 business days for your refill to be completed.          Additional Information About Your Visit        [email or web address] Information     Aptana gives you secure access to your electronic health record. If you see a primary care provider, you can also send messages to your care team and make appointments. If you have questions, please call your primary care clinic.  If you do not have a primary care provider, please call [phone]  and they will assist you.        Care EveryWhere ID     This is your Care EveryWhere ID. This could be used by other organizations to access your [place] medical records  [id number]         Blood Pressure from Last 3 Encounters:   07/25/17 116/74   07/22/17 118/70   05/31/17 126/68    Weight from Last 3 Encounters:   07/25/17 95.3 kg (210 lb)   07/22/17 93 kg (205 lb)   05/31/17 93.9 kg (207 lb)              We Performed the Following     HC PT EVAL, LOW COMPLEXITY     NADIR INITIAL EVAL REPORT     THERAPEUTIC EXERCISES        Primary Care Provider Office Phone # Fax #    Chance Espinosa [phone]       [address] [address]        Equal Access to Services     TANIA VEGA : Hadii aad ku hadasho Soomaali, waaxda luqadaha, qaybta kaalmada adeegyada, waxay bernardoin hayaan julieth redmond . So [facility] [phone].    ATENCIÓN: Si habla español, tiene a helms disposición servicios gratuitos de asistencia lingüística. Llame al [phone].    We comply with applicable federal civil rights laws and [place] laws. We do not discriminate on the basis of race, color, national origin, age, disability sex, sexual orientation or gender identity.            Thank you!     Thank you for choosing [facility]  for your care. Our goal is always to provide you with excellent care. Hearing back from our patients is one way we can continue to improve our services. Please take a few minutes to complete the written survey that you may receive in the mail after your visit with us. Thank you!             Your Updated Medication List - Protect others around you: Learn how to safely use, store and throw away your medicines at www.disposemymeds.org.          This list is accurate as of: 7/28/17 10:07 AM.  Always use your most recent med list.                   Brand Name Dispense Instructions for use Diagnosis    acyclovir 800 MG tablet    ZOVIRAX    35  tablet    Take 1 tablet (800 mg) by mouth 5 times daily    Herpes zoster without complication       aspirin 81 MG tablet      1 tab po QD (Once per day)        atenolol 50 MG tablet    TENORMIN    90 tablet    Take 1 tablet (50 mg) by mouth daily    CKD (chronic kidney disease) stage 3, GFR 30-59 ml/min, Hypertension goal BP (blood pressure) < 140/90       atorvastatin 10 MG tablet    LIPITOR    90 tablet    Take 1 tablet (10 mg) by mouth daily    Hyperlipidemia LDL goal <130       CALCIUM 600 + D 600-200 MG-UNIT Tabs     3 MONTHS         clotrimazole-betamethasone cream    LOTRISONE    45 g    Apply  topically 2 times daily.    Jock itch       finasteride 5 MG tablet    PROSCAR    90 tablet    Take 1 tablet (5 mg) by mouth daily    Hypertrophy of prostate without urinary obstruction       gabapentin 300 MG capsule    NEURONTIN    30 capsule    Take 1 tablet (300 mg) every night    CKD (chronic kidney disease) stage 3, GFR 30-59 ml/min       Multi-vitamin Tabs tablet   Generic drug:  multivitamin, therapeutic with minerals      1 tablet daily        omeprazole 20 MG tablet     90 tablet    Take 1 tablet (20 mg) by mouth daily Take 30-60 minutes before a meal.    Gastroesophageal reflux disease without esophagitis       oxymetazoline 0.05 % spray    AFRIN     Spray 2 sprays into both nostrils as needed.        selenium sulfide 2.5 % lotion    SELSUN    360 mL    Apply daily to every other day - lather, wait 10 minutes and rinse    Seborrheic dermatitis, unspecified       sildenafil 50 MG cap/tab    VIAGRA    18 tablet    Take 1 tablet (50 mg) by mouth daily as needed for erectile dysfunction    Erectile dysfunction, unspecified erectile dysfunction type       terazosin 10 MG capsule    HYTRIN    90 capsule    Take 1 capsule (10 mg) by mouth At Bedtime    CKD (chronic kidney disease) stage 3, GFR 30-59 ml/min, Hypertension goal BP (blood pressure) < 140/90

## 2017-07-28 NOTE — PROGRESS NOTES
Subjective:    HPI Comments: Patient is a 83 year old male who presents with complaints of central LBP. Symptoms began 3 years ago with no known JOSEPH and have remained unchanged since initial occurrence. Pain is described as achy and is currently rated as 1/10 best (rest) and 4/10 worst (standing > 10 minutes). Symptoms worsen with standing > 10 minutes, sitting > 30 minutes and improve with lying supine with LE's extended. Pain is constant and worsens as the day goes on. Patient wishes to return to playing clarinet, prepping food, and performing all ADL's without difficulty. Patient's general health is listed as good. PT has reviewed and confirmed patient's past medical history.      Patient is a 83 year old male presenting with rehab back hpi.                                      Pertinent medical history includes:  Overweight, high blood pressure and other (Shingles).  Medical allergies: no.    Current medications:  High blood pressure medication.          Barriers include:  None as reported by the patient.    Red flags:  None as reported by the patient.                        Objective:    System                                           Hip Evaluation    Hip Strength:    Flexion:   Left: 5/5   Pain:  Right: 5/5   Pain:                    Extension:  Left: 5/5  Pain:Right: 5/5    Pain:    Abduction:  Left: 5/5     Pain:Right: 5/5    Pain:    Internal Rotation:  Left: 5/5    Pain:Right: 5/5   Pain:  External Rotation:  Left: 4/5   Pain:  Right: 4/5   Pain:                             Colleen Lumbar Evaluation    Posture:  Sitting: fair  Standing: fair  Lordosis: Reduced  Lateral Shift: no  Correction of Posture: no effect    Movement Loss:  Flexion (Flex): nil  Extension (EXT): nil and pain  Side Sabin R (SG R): nil  Side Sabin L (SG L): nil                                             Significant belly size with anterior pelvic tilt  ROS    Assessment/Plan:      Patient is a 83 year old male with lumbar  complaints.    Patient has the following significant findings with corresponding treatment plan.                Diagnosis 1:  Lumbar DDD  Pain -  self management, education and home program  Decreased strength - therapeutic exercise and home program  Impaired muscle performance - home program  Decreased function - home program  Impaired posture - home program    Therapy Evaluation Codes:   1) History comprised of:   Personal factors that impact the plan of care:      Age and Cognition.    Comorbidity factors that impact the plan of care are:      High blood pressure and Overweight.     Medications impacting care: High blood pressure.  2) Examination of Body Systems comprised of:   Body structures and functions that impact the plan of care:      Lumbar spine.   Activity limitations that impact the plan of care are:      Sitting and Standing.  3) Clinical presentation characteristics are:   Stable/Uncomplicated.  4) Decision-Making    Low complexity using standardized patient assessment instrument and/or measureable assessment of functional outcome.  Cumulative Therapy Evaluation is: Low complexity.    Previous and current functional limitations:  (See Goal Flow Sheet for this information)    Short term and Long term goals: (See Goal Flow Sheet for this information)     Communication ability:  Patient appears to be able to clearly communicate and understand verbal and written communication and follow directions correctly.  Treatment Explanation - The following has been discussed with the patient:   RX ordered/plan of care  Anticipated outcomes  Possible risks and side effects  This patient would benefit from PT intervention to resume normal activities.   Rehab potential is fair due to belly size and significant anterior pelvic tilt.    Frequency:  2 X a month, once daily  Duration:  for 1 months tapering to 1 X a month over 1 months  Discharge Plan:  Achieve all LTG.  Independent in home treatment program.  Reach  maximal therapeutic benefit.    Please refer to the daily flowsheet for treatment today, total treatment time and time spent performing 1:1 timed codes.

## 2017-08-01 ENCOUNTER — OFFICE VISIT (OUTPATIENT)
Dept: FAMILY MEDICINE | Facility: CLINIC | Age: 82
End: 2017-08-01
Payer: COMMERCIAL

## 2017-08-01 VITALS
SYSTOLIC BLOOD PRESSURE: 132 MMHG | OXYGEN SATURATION: 97 % | TEMPERATURE: 97.9 F | BODY MASS INDEX: 32.49 KG/M2 | DIASTOLIC BLOOD PRESSURE: 76 MMHG | HEART RATE: 82 BPM | WEIGHT: 207 LBS | HEIGHT: 67 IN

## 2017-08-01 DIAGNOSIS — B02.9 HERPES ZOSTER WITHOUT COMPLICATION: Primary | ICD-10-CM

## 2017-08-01 DIAGNOSIS — M54.50 CHRONIC MIDLINE LOW BACK PAIN WITHOUT SCIATICA: ICD-10-CM

## 2017-08-01 DIAGNOSIS — K21.9 GASTROESOPHAGEAL REFLUX DISEASE WITHOUT ESOPHAGITIS: ICD-10-CM

## 2017-08-01 DIAGNOSIS — G47.30 HYPERSOMNIA WITH SLEEP APNEA: ICD-10-CM

## 2017-08-01 DIAGNOSIS — M48.061 LUMBAR STENOSIS: ICD-10-CM

## 2017-08-01 DIAGNOSIS — Z91.09 ENVIRONMENTAL ALLERGIES: ICD-10-CM

## 2017-08-01 DIAGNOSIS — N18.30 CKD (CHRONIC KIDNEY DISEASE) STAGE 3, GFR 30-59 ML/MIN (H): ICD-10-CM

## 2017-08-01 DIAGNOSIS — I10 HYPERTENSION GOAL BP (BLOOD PRESSURE) < 140/90: ICD-10-CM

## 2017-08-01 DIAGNOSIS — Z51.81 MEDICATION MONITORING ENCOUNTER: ICD-10-CM

## 2017-08-01 DIAGNOSIS — G89.29 CHRONIC MIDLINE LOW BACK PAIN WITHOUT SCIATICA: ICD-10-CM

## 2017-08-01 DIAGNOSIS — G47.10 HYPERSOMNIA WITH SLEEP APNEA: ICD-10-CM

## 2017-08-01 DIAGNOSIS — E78.5 HYPERLIPIDEMIA LDL GOAL <130: ICD-10-CM

## 2017-08-01 PROCEDURE — 99214 OFFICE O/P EST MOD 30 MIN: CPT | Performed by: FAMILY MEDICINE

## 2017-08-01 NOTE — NURSING NOTE
"Chief Complaint   Patient presents with     RECHECK       Initial /76  Pulse 82  Temp 97.9  F (36.6  C) (Oral)  Ht 5' 7\" (1.702 m)  Wt 207 lb (93.9 kg)  SpO2 97%  BMI 32.42 kg/m2 Estimated body mass index is 32.42 kg/(m^2) as calculated from the following:    Height as of this encounter: 5' 7\" (1.702 m).    Weight as of this encounter: 207 lb (93.9 kg)..  BP completed using cuff size: soraida New MA  "

## 2017-08-01 NOTE — MR AVS SNAPSHOT
After Visit Summary   8/1/2017    Kevin Fierro    MRN: [id number]           Patient Information     Date Of Birth          5/11/1934        Visit Information        Provider Department      8/1/2017 10:20 AM Chance Espinosa MD [facility]        Today's Diagnoses     Herpes zoster without complication    -  1    CKD (chronic kidney disease) stage 3, GFR 30-59 ml/min        Hypertension goal BP (blood pressure) < 140/90        Hyperlipidemia LDL goal <130        Hypersomnia with sleep apnea        Chronic midline low back pain without sciatica        Lumbar stenosis        Gastroesophageal reflux disease without esophagitis        Environmental allergies        Medication monitoring encounter          Care Instructions    Continue with gabapentin as needed for pain.    Follow up if not improving or worsening.    Shingles can return to the same location. If the same pain begins again, follow up to refill and begin acyclovir.       [facility]                        To reach your care team during and after hours:   [phone]  To reach our pharmacy:        [phone]    Clinic Hours                        Our clinic hours are:    Monday   7:30 am to 7:00 pm                  Tuesday through Friday 7:30 am to 5:00 pm                             Saturday   8:00 am to 12:00 pm      Sunday   Closed      Pharmacy Hours                        Our pharmacy hours are:    Monday   8:30 am to 7:00 pm       Tuesday to Friday  8:30 am to 6:00 pm                       Saturday    9:00 am to 1:00 pm              Sunday    Closed              There is also information available at our web site:  [email or web address]    If your provider ordered any lab tests and you do not receive the results within 10 business days, please call the clinic.    If you need a medication refill please contact your pharmacy.  Please allow 2-3 business days for your refill to be completed.    Our clinic  offers telephone visits and e visits.  Please ask one of your team members to explain more.      Use Digital Bloomhart (secure email communication and access to your chart) to send your primary care provider a message or make an appointment. Ask someone on your Team how to sign up for Digital Bloomhart.  Immunizations                      Immunization History   Administered Date(s) Administered     Influenza (H1N1) 01/20/2010     Influenza (High Dose) 3 valent vaccine 10/03/2011, 09/20/2012, 10/15/2013, 10/03/2014, 10/15/2015, 09/30/2016     Influenza (IIV3) 10/18/2004, 10/12/2005, 10/23/2006, 10/17/2007, 10/15/2009, 09/22/2010     Pneumococcal (PCV 13) 05/20/2015     Pneumococcal 23 valent 01/04/2007     TD (ADULT, 7+) 04/20/2007     TDAP Vaccine (Boostrix) 05/23/2012     Zoster vaccine, live 01/01/2008        Health Maintenance                         Health Maintenance Due   Topic Date Due     ANDREW QUESTIONNAIRE 1 YEAR  05/11/1952     Depression Assessment - yearly  05/11/1952     Prostate Test (PSA) - yearly  08/26/2017                   Follow-ups after your visit        Your next 10 appointments already scheduled     Aug 18, 2017 11:30 AM CDT   NADIR Spine with Dariel Sanders PT   [facility] [place] ([facility] [place])    [address] [facility]  [place] [address] [phone]              Who to contact     If you have questions or need follow up information about today's clinic visit or your schedule please contact [facility] directly at [phone].  Normal or non-critical lab and imaging results will be communicated to you by MyChart, letter or phone within 4 business days after the clinic has received the results. If you do not hear from us within 7 days, please contact the clinic through MyChart or phone. If you have a critical or abnormal lab result, we will notify you by phone as soon as possible.  Submit refill requests through [employer] or call your pharmacy and they will forward the refill  "request to us. Please allow 3 business days for your refill to be completed.          Additional Information About Your Visit        MyChart Information     Silver Pushhart gives you secure access to your electronic health record. If you see a primary care provider, you can also send messages to your care team and make appointments. If you have questions, please call your primary care clinic.  If you do not have a primary care provider, please call [phone] and they will assist you.        Care EveryWhere ID     This is your Care EveryWhere ID. This could be used by other organizations to access your [place] medical records  [id number]        Your Vitals Were     Pulse Temperature Height Pulse Oximetry BMI (Body Mass Index)       82 97.9  F (36.6  C) (Oral) 5' 7\" (1.702 m) 97% 32.42 kg/m2        Blood Pressure from Last 3 Encounters:   08/01/17 132/76   07/25/17 116/74   07/22/17 118/70    Weight from Last 3 Encounters:   08/01/17 207 lb (93.9 kg)   07/25/17 210 lb (95.3 kg)   07/22/17 205 lb (93 kg)              Today, you had the following     No orders found for display       Primary Care Provider Office Phone # Fax #    Chance Espinosa [phone]       [facility] [address] [place] [address]        Equal Access to Services     TANIA VEGA AH: Hadii aad ku hadasho Soomaali, waaxda luqadaha, qaybta kaalmada adeegyada, mahad dias. So [facility] [phone].    ATENCIÓN: Si habla español, tiene a helms disposición servicios gratuitos de asistencia lingüística. Llame al [phone].    We comply with applicable federal civil rights laws and [place] laws. We do not discriminate on the basis of race, color, national origin, age, disability sex, sexual orientation or gender identity.            Thank you!     Thank you for choosing [facility]  for your care. Our goal is always to provide you with excellent care. Hearing back from our " patients is one way we can continue to improve our services. Please take a few minutes to complete the written survey that you may receive in the mail after your visit with us. Thank you!             Your Updated Medication List - Protect others around you: Learn how to safely use, store and throw away your medicines at www.disposemymeds.org.          This list is accurate as of: 8/1/17 11:23 AM.  Always use your most recent med list.                   Brand Name Dispense Instructions for use Diagnosis    aspirin 81 MG tablet      1 tab po QD (Once per day)        atenolol 50 MG tablet    TENORMIN    90 tablet    Take 1 tablet (50 mg) by mouth daily    CKD (chronic kidney disease) stage 3, GFR 30-59 ml/min, Hypertension goal BP (blood pressure) < 140/90       atorvastatin 10 MG tablet    LIPITOR    90 tablet    Take 1 tablet (10 mg) by mouth daily    Hyperlipidemia LDL goal <130       CALCIUM 600 + D 600-200 MG-UNIT Tabs     3 MONTHS         clotrimazole-betamethasone cream    LOTRISONE    45 g    Apply  topically 2 times daily.    Jock itch       finasteride 5 MG tablet    PROSCAR    90 tablet    Take 1 tablet (5 mg) by mouth daily    Hypertrophy of prostate without urinary obstruction       gabapentin 300 MG capsule    NEURONTIN    30 capsule    Take 1 tablet (300 mg) every night    CKD (chronic kidney disease) stage 3, GFR 30-59 ml/min       Multi-vitamin Tabs tablet   Generic drug:  multivitamin, therapeutic with minerals      1 tablet daily        omeprazole 20 MG tablet     90 tablet    Take 1 tablet (20 mg) by mouth daily Take 30-60 minutes before a meal.    Gastroesophageal reflux disease without esophagitis       oxymetazoline 0.05 % spray    AFRIN     Spray 2 sprays into both nostrils as needed.        selenium sulfide 2.5 % lotion    SELSUN    360 mL    Apply daily to every other day - lather, wait 10 minutes and rinse    Seborrheic dermatitis, unspecified       sildenafil 50 MG cap/tab    VIAGRA    18  tablet    Take 1 tablet (50 mg) by mouth daily as needed for erectile dysfunction    Erectile dysfunction, unspecified erectile dysfunction type       terazosin 10 MG capsule    HYTRIN    90 capsule    Take 1 capsule (10 mg) by mouth At Bedtime    CKD (chronic kidney disease) stage 3, GFR 30-59 ml/min, Hypertension goal BP (blood pressure) < 140/90

## 2017-08-01 NOTE — PROGRESS NOTES
SUBJECTIVE:                                                    Kevin Fierro is a 83 year old male who presents to clinic today for the following health issues:    8/1/17 --    Follow up on rib pain - was prescribed acyclovir and advised tylenol as needed for pain. Overall, Kevin reports that he feels better and believes that the rash is improving. Pain has improved and he has been able to sleep better at night - notes that ice packs have helped.     Kevin reports a recent knot in the middle of his back and believes it could be due to tensing up from shingles pain. He notes that low back pain has improved with PT.    Allergies -- no current problems.    Depression/Anxiety -- no current concerns.     PHQ-9: 1, ANDREW-7: 0    Sleep apnea -- currently using cpap machine with improvement.      Hypertension -- controlled with atenolol.     BP Readings from Last 3 Encounters:   08/01/17 132/76   07/25/17 116/74   07/22/17 118/70     Hyperlipidemia -- controlled with atorvastatin.     Recent Labs   Lab Test  05/24/17   0900  08/26/16   0850  05/14/15   0822  05/15/14   0753   CHOL  176  193  159  180   HDL  48  39*  49  33*   LDL  96  97  83  87   TRIG  159*  284*  137  303*   CHOLHDLRATIO   --    --   3.2  5.5*     GERD -- no current problems.     CKD -- stage 3.    Creatinine   Date Value Ref Range Status   05/24/2017 1.53 (H) 0.66 - 1.25 mg/dL Final     7/25/17 --     Had stress test done 1 week ago 7/14/17 - sore rib on the left side from being probe from US, intermittent pain started a few days after US - pain has increased in frequency  - no reported bruising visual, no worsening factors, does not worsen with deep breaths, coughing, or sneezing - was using heating pad and ice pack for pain and may have left a burn gideon/rash on skin. Kevin reports that pain kept him awake last night.  No known rash.      Denies itching and burning.     Radiology review of chest XR: Elevation left hemidiaphragm. Slight  irregularity distal  end of the left 10th rib which could represent a fracture deformity,  age unknown. No other findings.    Problem list and histories reviewed & adjusted, as indicated.  Additional history: as documented    Reviewed and updated as needed this visit by clinical staff  Tobacco  Allergies  Meds  Med Hx  Surg Hx  Fam Hx  Soc Hx      Reviewed and updated as needed this visit by Provider       BP Readings from Last 3 Encounters:   08/01/17 132/76   07/25/17 116/74   07/22/17 118/70       Wt Readings from Last 4 Encounters:   08/01/17 207 lb (93.9 kg)   07/25/17 210 lb (95.3 kg)   07/22/17 205 lb (93 kg)   05/31/17 207 lb (93.9 kg)       Health Maintenance    Health Maintenance Due   Topic Date Due     ANDREW QUESTIONNAIRE 1 YEAR  05/11/1952     PHQ-9 Q1YR  05/11/1952     PSA Q1 YR  08/26/2017       Current Problem List    Patient Active Problem List   Diagnosis     Hypertension goal BP (blood pressure) < 140/90     Hypertrophy of prostate without urinary obstruction     Hearing loss     PSVT     Seborrheic dermatitis     Other and unspecified 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     Hypersomnia with sleep apnea     Family history of cardiovascular disease     Seasonal allergies     ED (erectile dysfunction)     Hyperlipidemia LDL goal <130     Lung nodules     Advanced directives, counseling/discussion     GERD (gastroesophageal reflux disease)     CKD (chronic kidney disease) stage 3, GFR 30-59 ml/min     Environmental allergies     Lumbar stenosis     Chronic midline low back pain without sciatica       Past Medical History    Past Medical History:   Diagnosis Date     Arthritis .     Choroidal nevus of left eye     Dr Connor     CKD (chronic kidney disease) stage 3, GFR 30-59 ml/min      Colon polyps      ED (erectile dysfunction)      Family history of cardiovascular disease      GERD (gastroesophageal reflux disease) 2013     Hematuria 1999    dr scott      Hyperlipidemia LDL goal <130 1990     Hypertension goal BP (blood pressure) < 140/90 1990     Hypertrophy of prostate without urinary obstruction and other lower urinary tract symptoms (LUTS)      Lumbar stenosis 2017    mild to moderate foraminal and central     Lung nodules 11/10    CT scan stable     obstructive SLEEP APNEA 5/2007    CPAP     Other and unspecified 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 5/25/2005     PSVT      Seasonal allergies      Unspecified hearing loss 5/2005    hearing aids, L>R - Dr Hernandez       Past Surgical History    Past Surgical History:   Procedure Laterality Date     HC COLONOSCOPY THRU STOMA, DIAGNOSTIC  2005, 5/10, 5/11    Polyps-> due 2015 - Ct Colonography negative     HC REPAIR INCISIONAL HERNIA,REDUCIBLE  1960    Hernia Repair, Incisional, Unilateral     STRESS ECHO (METRO)  7/09, 5/12, 7/17    Negative     TONSILLECTOMY & ADENOIDECTOMY  1946       Current Medications    Current Outpatient Prescriptions   Medication Sig Dispense Refill     gabapentin (NEURONTIN) 300 MG capsule Take 1 tablet (300 mg) every night 30 capsule 1     atenolol (TENORMIN) 50 MG tablet Take 1 tablet (50 mg) by mouth daily 90 tablet 3     atorvastatin (LIPITOR) 10 MG tablet Take 1 tablet (10 mg) by mouth daily 90 tablet 3     terazosin (HYTRIN) 10 MG capsule Take 1 capsule (10 mg) by mouth At Bedtime 90 capsule 3     finasteride (PROSCAR) 5 MG tablet Take 1 tablet (5 mg) by mouth daily 90 tablet 3     omeprazole 20 MG tablet Take 1 tablet (20 mg) by mouth daily Take 30-60 minutes before a meal. 90 tablet 3     sildenafil (VIAGRA) 50 MG tablet Take 1 tablet (50 mg) by mouth daily as needed for erectile dysfunction 18 tablet 3     clotrimazole-betamethasone (LOTRISONE) cream Apply  topically 2 times daily. 45 g 3     selenium sulfide (SELSUN) 2.5 % shampoo Apply daily to every other day - lather, wait 10 minutes and rinse 360 mL 3     oxymetazoline (AFRIN) 0.05 % nasal spray  Spray 2 sprays into both nostrils as needed.       CALCIUM 600 + D 600-200 MG-UNIT OR TABS  3 MONTHS 3     MULTI-VITAMIN OR TABS 1 tablet daily       ASPIRIN 81 MG OR TABS 1 tab po QD (Once per day)         Allergies    No Known Allergies    Immunizations    Immunization History   Administered Date(s) Administered     Influenza (H1N1) 01/20/2010     Influenza (High Dose) 3 valent vaccine 10/03/2011, 09/20/2012, 10/15/2013, 10/03/2014, 10/15/2015, 09/30/2016     Influenza (IIV3) 10/18/2004, 10/12/2005, 10/23/2006, 10/17/2007, 10/15/2009, 09/22/2010     Pneumococcal (PCV 13) 05/20/2015     Pneumococcal 23 valent 01/04/2007     TD (ADULT, 7+) 04/20/2007     TDAP Vaccine (Boostrix) 05/23/2012     Zoster vaccine, live 01/01/2008       Family History    Family History   Problem Relation Age of Onset     C.A.HALEIGH Father      age 50's     Hypertension Father      C.A.HALEIGH Brother      C.A.MANE. Brother      mid 40's     DIABETES Brother      CANCER Brother      pancreatic CA     Coronary Artery Disease Brother      Cancer - colorectal No family hx of      Prostate Cancer No family hx of        Social History    Social History     Social History     Marital status:      Spouse name: Leticia     Number of children: 3     Years of education: 18     Occupational History      Retired     Social History Main Topics     Smoking status: Never Smoker     Smokeless tobacco: Never Used     Alcohol use 2.4 - 3.0 oz/week     4 - 5 Standard drinks or equivalent per week      Comment: 4-5 drinks per week avg     Drug use: No     Sexual activity: Yes     Partners: Female     Other Topics Concern     Blood Transfusions No     Caffeine Concern Yes     1 serv/day, rare pop, occas chocolate (3-4/yr)     Sleep Concern Yes     MIMI, wakes feeling rested     Exercise Yes     30-45' 2-3 x/wk     Seat Belt Yes     Parent/Sibling W/ Cabg, Mi Or Angioplasty Before 65f 55m? Yes     Social History Narrative       All above reviewed and updated, all  "stable unless otherwise noted    Recent labs reviewed    ROS:  10 point ROS of systems including Constitutional, Eyes, Respiratory, Cardiovascular, Gastroenterology, Genitourinary, Integumentary, Muscularskeletal, Psychiatric were all negative except for pertinent positives noted in my HPI.    OBJECTIVE:                                                    /76  Pulse 82  Temp 97.9  F (36.6  C) (Oral)  Ht 5' 7\" (1.702 m)  Wt 207 lb (93.9 kg)  SpO2 97%  BMI 32.42 kg/m2  Body mass index is 32.42 kg/(m^2).  GENERAL: healthy, alert and no distress  EYES: Eyes grossly normal to inspection, extraocular movements - intact, and PERRL  HENT: ear canals and TM's normal upon viewing with otoscope, nose and mouth without ulcers or lesions upon viewing with otoscope  NECK: no tenderness, no adenopathy, no asymmetry, no masses, no stiffness; thyroid- normal to palpation  RESP: lungs clear to auscultation - no rales, no rhonchi, no wheezes  CV: regular rates and rhythm, normal S1 S2, no S3 or S4 and 2/6 systolic murmur best at RUSB, no click or rub -  ABDOMEN: soft, no tenderness, no  hepatosplenomegaly, no masses, normal bowel sounds  MS: extremities- no gross deformities noted, no edema  SKIN: rash mid left side with red clusters - improved, healing, no signs of secondary infection, no suspicious lesions, no rashes  NEURO: mentation intact and speech normal  BACK: mid back tenderness without rash, no CVA tenderness, no paralumbar tenderness  PSYCH: Alert and oriented times 3; speech- coherent , normal rate and volume; able to articulate logical thoughts, able to abstract reason, no tangential thoughts, no hallucinations or delusions, affect- normal    DIAGNOSTICS/PROCEDURES:                                                      No results found for this or any previous visit (from the past 24 hour(s)).     ASSESSMENT/PLAN:                                                        ICD-10-CM    1. Herpes zoster without " complication B02.9    2. CKD (chronic kidney disease) stage 3, GFR 30-59 ml/min N18.3    3. Hypertension goal BP (blood pressure) < 140/90 I10    4. Hyperlipidemia LDL goal <130 E78.5    5. Hypersomnia with sleep apnea G47.10     G47.30    6. Chronic midline low back pain without sciatica M54.5     G89.29    7. Lumbar stenosis M48.06    8. Gastroesophageal reflux disease without esophagitis K21.9    9. Environmental allergies Z91.09    10. Medication monitoring encounter Z51.81        Discussed treatment/modality options, including risk and benefits, he desires advised diet, exercise, and weight loss, further health care maintenance, physical therapy and observation. All diagnosis above reviewed and noted above, otherwise stable.  [facility].  Follow up as needed.    Continue with gabapentin HS as needed for pain.    Ice as needed.      Follow up if not improving or worsening.    Advised to follow up if shingles return in the future.      Health Maintenance Due   Topic Date Due     ANDREW QUESTIONNAIRE 1 YEAR  05/11/1952     PHQ-9 Q1YR  05/11/1952     PSA Q1 YR  08/26/2017     See Patient Instructions    This document serves as a record of the services and decisions personally performed and made by Chance Espinosa MD [facility]. It was created on their behalf by Joslyn Moreno, a trained medical scribe. The creation of this document is based the provider's statements to the medical scribe. Joslyn Moreno August 1, 2017 10:53 AM              Chance Espinosa MD [address]  [address]  [phone] Fax

## 2017-08-01 NOTE — PATIENT INSTRUCTIONS
Continue with gabapentin as needed for pain.    Follow up if not improving or worsening.    Shingles can return to the same location. If the same pain begins again, follow up to refill and begin acyclovir.       [facility]                        To reach your care team during and after hours:   [phone]  To reach our pharmacy:        [phone]    Clinic Hours                        Our clinic hours are:    Monday   7:30 am to 7:00 pm                  Tuesday through Friday 7:30 am to 5:00 pm                             Saturday   8:00 am to 12:00 pm      Sunday   Closed      Pharmacy Hours                        Our pharmacy hours are:    Monday   8:30 am to 7:00 pm       Tuesday to Friday  8:30 am to 6:00 pm                       Saturday    9:00 am to 1:00 pm              Sunday    Closed              There is also information available at our web site:  [email or web address]    If your provider ordered any lab tests and you do not receive the results within 10 business days, please call the clinic.    If you need a medication refill please contact your pharmacy.  Please allow 2-3 business days for your refill to be completed.    Our clinic offers telephone visits and e visits.  Please ask one of your team members to explain more.      Use UsherBuddy (secure email communication and access to your chart) to send your primary care provider a message or make an appointment. Ask someone on your Team how to sign up for UsherBuddy.  Immunizations                      Immunization History   Administered Date(s) Administered     Influenza (H1N1) 01/20/2010     Influenza (High Dose) 3 valent vaccine 10/03/2011, 09/20/2012, 10/15/2013, 10/03/2014, 10/15/2015, 09/30/2016     Influenza (IIV3) 10/18/2004, 10/12/2005, 10/23/2006, 10/17/2007, 10/15/2009, 09/22/2010     Pneumococcal (PCV 13) 05/20/2015     Pneumococcal 23 valent 01/04/2007     TD (ADULT, 7+) 04/20/2007     TDAP Vaccine (Boostrix) 05/23/2012      Zoster vaccine, live 01/01/2008        Health Maintenance                         Health Maintenance Due   Topic Date Due     ANDREW QUESTIONNAIRE 1 YEAR  05/11/1952     Depression Assessment - yearly  05/11/1952     Prostate Test (PSA) - yearly  08/26/2017

## 2017-08-02 ASSESSMENT — ANXIETY QUESTIONNAIRES
1. FEELING NERVOUS, ANXIOUS, OR ON EDGE: NOT AT ALL
2. NOT BEING ABLE TO STOP OR CONTROL WORRYING: NOT AT ALL
7. FEELING AFRAID AS IF SOMETHING AWFUL MIGHT HAPPEN: NOT AT ALL
3. WORRYING TOO MUCH ABOUT DIFFERENT THINGS: NOT AT ALL
6. BECOMING EASILY ANNOYED OR IRRITABLE: NOT AT ALL
5. BEING SO RESTLESS THAT IT IS HARD TO SIT STILL: NOT AT ALL
GAD7 TOTAL SCORE: 0
IF YOU CHECKED OFF ANY PROBLEMS ON THIS QUESTIONNAIRE, HOW DIFFICULT HAVE THESE PROBLEMS MADE IT FOR YOU TO DO YOUR WORK, TAKE CARE OF THINGS AT HOME, OR GET ALONG WITH OTHER PEOPLE: NOT DIFFICULT AT ALL

## 2017-08-02 ASSESSMENT — PATIENT HEALTH QUESTIONNAIRE - PHQ9: 5. POOR APPETITE OR OVEREATING: NOT AT ALL

## 2017-08-03 ASSESSMENT — ANXIETY QUESTIONNAIRES: GAD7 TOTAL SCORE: 0

## 2017-08-03 ASSESSMENT — PATIENT HEALTH QUESTIONNAIRE - PHQ9: SUM OF ALL RESPONSES TO PHQ QUESTIONS 1-9: 1

## 2017-08-18 ENCOUNTER — THERAPY VISIT (OUTPATIENT)
Dept: PHYSICAL THERAPY | Facility: CLINIC | Age: 82
End: 2017-08-18
Payer: COMMERCIAL

## 2017-08-18 DIAGNOSIS — G89.29 CHRONIC MIDLINE LOW BACK PAIN WITHOUT SCIATICA: ICD-10-CM

## 2017-08-18 DIAGNOSIS — M54.50 CHRONIC MIDLINE LOW BACK PAIN WITHOUT SCIATICA: ICD-10-CM

## 2017-08-18 PROCEDURE — 97110 THERAPEUTIC EXERCISES: CPT | Mod: GP | Performed by: PHYSICAL THERAPIST

## 2017-09-22 ENCOUNTER — THERAPY VISIT (OUTPATIENT)
Dept: PHYSICAL THERAPY | Facility: CLINIC | Age: 82
End: 2017-09-22
Payer: COMMERCIAL

## 2017-09-22 DIAGNOSIS — M54.50 CHRONIC MIDLINE LOW BACK PAIN WITHOUT SCIATICA: ICD-10-CM

## 2017-09-22 DIAGNOSIS — G89.29 CHRONIC MIDLINE LOW BACK PAIN WITHOUT SCIATICA: ICD-10-CM

## 2017-09-22 PROCEDURE — 97110 THERAPEUTIC EXERCISES: CPT | Mod: GP | Performed by: PHYSICAL THERAPIST

## 2017-09-22 NOTE — PROGRESS NOTES
"Subjective:    HPI  Oswestry Score: 10 %                 Objective:    System    Physical Exam    General     ROS    Assessment/Plan:      DISCHARGE REPORT    Progress reporting period is from July 2017 to September 2017.       SUBJECTIVE  Subjective: Patient reports minimal changes in pain; does feel stronger; states that he has performed his HEP 3x/day 80% of the time over the past 4 weeks; does c/o L Hip Bursitis which he has had \"for a long time\"    Current pain level is 1/10  .     Previous pain level was  4/10  .   Changes in function:  Yes (See Goal flowsheet attached for changes in current functional level)  Adverse reaction to treatment or activity: None    OBJECTIVE  Changes noted in objective findings:  Yes  Objective: Hip MMT: All 5/5 & pain-free     ASSESSMENT/PLAN  Updated problem list and treatment plan: Diagnosis 1:  Low Back Pain/DDD  Pain -  self management, education and home program  Decreased strength - therapeutic exercise and home program  Impaired muscle performance - home program  Decreased function - home program  STG/LTGs have been met or progress has been made towards goals:  Yes (See Goal flow sheet completed today.)  Assessment of Progress: The patient's condition has potential to improve. However, PT feels that patient's belly size is causing lumbar extension which is the pain-generating mechanism for the patient. Patient does understand this and states that he is OK with his belly size.  Self Management Plans:  Patient has been instructed in a home treatment program.  Patient is independent in a home treatment program.  Patient  has been instructed in self management of symptoms.  I have re-evaluated this patient and find that the nature, scope, duration and intensity of the therapy is appropriate for the medical condition of the patient.  Kevin continues to require the following intervention to meet STG and LTG's:  PT intervention is no longer required to meet " STG/LTG.    Recommendations:  This patient is ready to be discharged from therapy and continue their home treatment program.    Please refer to the daily flowsheet for treatment today, total treatment time and time spent performing 1:1 timed codes.

## 2017-09-22 NOTE — MR AVS SNAPSHOT
After Visit Summary   9/22/2017    Kevin Fierro    MRN: [id number]           Patient Information     Date Of Birth          5/11/1934        Visit Information        Provider Department      9/22/2017 11:30 AM Dariel Sanders PT [facility]        Today's Diagnoses     Chronic midline low back pain without sciatica           Follow-ups after your visit        Who to contact     If you have questions or need follow up information about today's clinic visit or your schedule please contact [facility] directly at [phone].  Normal or non-critical lab and imaging results will be communicated to you by Ryzinghart, letter or phone within 4 business days after the clinic has received the results. If you do not hear from us within 7 days, please contact the clinic through Tantalinet or phone. If you have a critical or abnormal lab result, we will notify you by phone as soon as possible.  Submit refill requests through [email or web address] or call your pharmacy and they will forward the refill request to us. Please allow 3 business days for your refill to be completed.          Additional Information About Your Visit        MyChart Information     [email or web address] gives you secure access to your electronic health record. If you see a primary care provider, you can also send messages to your care team and make appointments. If you have questions, please call your primary care clinic.  If you do not have a primary care provider, please call [phone] and they will assist you.        Care EveryWhere ID     This is your Care EveryWhere ID. This could be used by other organizations to access your [facility] medical records  [id number]         Blood Pressure from Last 3 Encounters:   08/01/17 132/76   07/25/17 116/74   07/22/17 118/70    Weight from Last 3 Encounters:   08/01/17 93.9 kg (207 lb)   07/25/17 95.3 kg (210 lb)   07/22/17 93 kg (205 lb)              We Performed the  Following     NADIR PROGRESS NOTES REPORT     THERAPEUTIC EXERCISES        Primary Care Provider Office Phone # Fax #    Chance Espinosa [phone]       [address] [place] [id number]        Equal Access to Services     TANIA VEGA : Hadtulio kira ku noaho Sosangitaali, waaxda luqadaha, qaybta kaalmada adeegyada, mahad silva laJessemirian dias. So [facility] [phone].    ATENCIÓN: Si habla español, tiene a helms disposición servicios gratuitos de asistencia lingüística. Llame al [phone].    We comply with applicable federal civil rights laws and [place] laws. We do not discriminate on the basis of race, color, national origin, age, disability sex, sexual orientation or gender identity.            Thank you!     Thank you for choosing [facility]  for your care. Our goal is always to provide you with excellent care. Hearing back from our patients is one way we can continue to improve our services. Please take a few minutes to complete the written survey that you may receive in the mail after your visit with us. Thank you!             Your Updated Medication List - Protect others around you: Learn how to safely use, store and throw away your medicines at www.disposemymeds.org.          This list is accurate as of: 9/22/17 11:51 AM.  Always use your most recent med list.                   Brand Name Dispense Instructions for use Diagnosis    aspirin 81 MG tablet      1 tab po QD (Once per day)        atenolol 50 MG tablet    TENORMIN    90 tablet    Take 1 tablet (50 mg) by mouth daily    CKD (chronic kidney disease) stage 3, GFR 30-59 ml/min, Hypertension goal BP (blood pressure) < 140/90       atorvastatin 10 MG tablet    LIPITOR    90 tablet    Take 1 tablet (10 mg) by mouth daily    Hyperlipidemia LDL goal <130       CALCIUM 600 + D 600-200 MG-UNIT Tabs     3 MONTHS         clotrimazole-betamethasone cream    LOTRISONE    45 g    Apply  topically 2 times  daily.    Jock itch       finasteride 5 MG tablet    PROSCAR    90 tablet    Take 1 tablet (5 mg) by mouth daily    Hypertrophy of prostate without urinary obstruction       gabapentin 300 MG capsule    NEURONTIN    30 capsule    Take 1 tablet (300 mg) every night    CKD (chronic kidney disease) stage 3, GFR 30-59 ml/min       Multi-vitamin Tabs tablet   Generic drug:  multivitamin, therapeutic with minerals      1 tablet daily        omeprazole 20 MG tablet     90 tablet    Take 1 tablet (20 mg) by mouth daily Take 30-60 minutes before a meal.    Gastroesophageal reflux disease without esophagitis       oxymetazoline 0.05 % spray    AFRIN     Spray 2 sprays into both nostrils as needed.        selenium sulfide 2.5 % lotion    SELSUN    360 mL    Apply daily to every other day - lather, wait 10 minutes and rinse    Seborrheic dermatitis, unspecified       sildenafil 50 MG tablet    VIAGRA    18 tablet    Take 1 tablet (50 mg) by mouth daily as needed for erectile dysfunction    Erectile dysfunction, unspecified erectile dysfunction type       terazosin 10 MG capsule    HYTRIN    90 capsule    Take 1 capsule (10 mg) by mouth At Bedtime    CKD (chronic kidney disease) stage 3, GFR 30-59 ml/min, Hypertension goal BP (blood pressure) < 140/90

## 2017-10-30 ENCOUNTER — OFFICE VISIT (OUTPATIENT)
Dept: FAMILY MEDICINE | Facility: CLINIC | Age: 82
End: 2017-10-30
Payer: COMMERCIAL

## 2017-10-30 ENCOUNTER — TELEPHONE (OUTPATIENT)
Dept: FAMILY MEDICINE | Facility: CLINIC | Age: 82
End: 2017-10-30

## 2017-10-30 VITALS
DIASTOLIC BLOOD PRESSURE: 74 MMHG | HEIGHT: 67 IN | HEART RATE: 92 BPM | TEMPERATURE: 97.5 F | WEIGHT: 203 LBS | OXYGEN SATURATION: 96 % | BODY MASS INDEX: 31.86 KG/M2 | SYSTOLIC BLOOD PRESSURE: 116 MMHG

## 2017-10-30 DIAGNOSIS — R00.2 HEART PALPITATIONS: Primary | ICD-10-CM

## 2017-10-30 PROCEDURE — 93000 ELECTROCARDIOGRAM COMPLETE: CPT | Performed by: PHYSICIAN ASSISTANT

## 2017-10-30 PROCEDURE — 99214 OFFICE O/P EST MOD 30 MIN: CPT | Performed by: PHYSICIAN ASSISTANT

## 2017-10-30 NOTE — TELEPHONE ENCOUNTER
"Patient is calling with symptoms for the last 3 days- fatigue, lightness in the head, no chest pain, no nausea, no radiation of pain to arm but then he says he has he may have a \"muscle cramp\" in the left arm. SOB x months.  Had stress test in July for all these issues. He also feels like he has skipped beats but this is long going.  He is wondering if his BP medications aren't working.   BP: 130/90  120/70.  No appts available with Dr Espinosa but Kevin is willing to see another provider.    Scheduled for 3:40 with Denise.  Routed to Dr Espinosa as EMI Dean RN- Triage [employer]      "

## 2017-10-30 NOTE — PROGRESS NOTES
"  SUBJECTIVE:                                                    Kevin Fierro is a 83 year old male who presents to clinic today for the following health issues:      Patient is calling with symptoms for the last 3 days- fatigue, lightness in the head, no chest pain, no nausea, no radiation of pain to arm but then he says he has he may have a \"muscle cramp\" in the left arm. SOB x months.  Had stress test in July for all these issues. He also feels like he has skipped beats but this is long going.  He is wondering if his BP medications aren't working.   BP: 130/90  120/70.  No appts available with Dr Espinosa but Kevin is willing to see another provider.     Scheduled for 3:40 with Denise.  Routed to Dr Espinosa as EMI Dean RN- Triage [employer]        Dizziness  Onset: on going - -worse x1 week    Description:   Do you feel faint:  YES- slightly  Does it feel like the surroundings (bed, room) are moving: YES- sometimes  Unsteady/off balance: no   Have you passed out or fallen: no     Intensity: mild, moderate    Progression of Symptoms:  worsening    Accompanying Signs & Symptoms:  Heart palpitations: YES- feels like it is skipping a beat on medication - \"acting up again\" states the pt  Nausea, vomiting: no   Weakness in arms or legs: no   Fatigue: YES -- on goign  Vision or speech changes: no   Ringing in ears (Tinnitus): no   Hearing Loss: YES - not worse from prior    History:   Head trauma/concussion hx: no   Previous similar symptoms: YES- diagnosed for at least 20 years  Recent bleeding history: no     Precipitating factors:   Worse with activity or head movement: no   Any new medications (BP?): no   Alcohol/drug abuse/withdrawal: no     Alleviating factors:   Does staying in a fixed position give relief:  YES- usually bothers the most hen laying down    Therapies Tried and outcome:       Patient reports that he is here for this \"skipped beat\" sensation that he feels.  He reports that these have " "come and gone over the last 20 years.  He reports that they are \"back\" in the last 1-2 weeks.  He denies dizziness, chest pains or shortness of breath with the palpitations.  He sometimes has a clammy feeling in his hands.      He does not feel like they are changed from what he has had in the past, but is here today because they are back.  He says they have tried to \"catch\" these in the past, but have not found much.        He is not having the palpitations right now.      Problem list and histories reviewed & adjusted, as indicated.  Additional history: as documented      ROS:  Constitutional, HEENT, cardiovascular, pulmonary, GI, , musculoskeletal, neuro, skin, endocrine and psych systems are negative, except as otherwise noted.    OBJECTIVE:                                                    /74 (BP Location: Right arm, Patient Position: Chair, Cuff Size: Adult Large)  Pulse 92  Temp 97.5  F (36.4  C) (Oral)  Ht 5' 7\" (1.702 m)  Wt 203 lb (92.1 kg)  SpO2 96%  BMI 31.79 kg/m2  Body mass index is 31.79 kg/(m^2).  GENERAL: healthy, alert and no distress  EYES: Eyes grossly normal to inspection, PERRL and conjunctivae and sclerae normal  RESP: lungs clear to auscultation - no rales, rhonchi or wheezes  CV: regular rate and rhythm, normal S1 S2, no S3 or S4, no murmur, click or rub, no peripheral edema and peripheral pulses strong  MS: no gross musculoskeletal defects noted, no edema  NEURO: Normal strength and tone, mentation intact and speech normal  PSYCH: mentation appears normal, affect normal/bright    Diagnostic Test Results:  EKG - unremarkable     ASSESSMENT/PLAN:                                                      Kevin was seen today for dizziness.    Diagnoses and all orders for this visit:    Heart palpitations  -     EKG 12-lead complete w/read - Clinics  -     Zio Patch Holter; Future  -     TSH with free T4 reflex; Future  -     Comprehensive metabolic panel; Future  -     CBC with " platelets differential; Future      - Patient informed that today, in clinic, the EKG is normal appearing.   - Exam is unremarkable.    - Patient denies symptoms with his hear palpitations and reports to having had these in the past.    - Zio patch ordered for further workup.      - Patient to seek immediate medical attention if symptoms change or worsen in any way.     -- Patient agrees with and understands the plan today.      See Patient Instructions:  Please work with [facility] to have the Zio patch placed.      Please seek immediate medical attention if symptoms change or worsen in any way.  Go to the ER if needed.         Denise Cerda PA-C    [facility] [place]

## 2017-10-30 NOTE — MR AVS SNAPSHOT
After Visit Summary   10/30/2017    Kevin Fierro    MRN: [id number]           Patient Information     Date Of Birth          5/11/1934        Visit Information        Provider Department      10/30/2017 3:40 PM Denise Cerda PA-C [facility]        Today's Diagnoses     Heart palpitations    -  1      Care Instructions    Please work with [facility] to have the Zio patch placed.      Please seek immediate medical attention if symptoms change or worsen in any way.  Go to the ER if needed.           Follow-ups after your visit        Future tests that were ordered for you today     Open Future Orders        Priority Expected Expires Ordered    Zio Patch Holter Routine  12/14/2017 10/30/2017            Who to contact     If you have questions or need follow up information about today's clinic visit or your schedule please contact [facility] directly at [phone].  Normal or non-critical lab and imaging results will be communicated to you by Aldishart, letter or phone within 4 business days after the clinic has received the results. If you do not hear from us within 7 days, please contact the clinic through Aldishart or phone. If you have a critical or abnormal lab result, we will notify you by phone as soon as possible.  Submit refill requests through TapMyBack or call your pharmacy and they will forward the refill request to us. Please allow 3 business days for your refill to be completed.          Additional Information About Your Visit        MyChart Information     TapMyBack gives you secure access to your electronic health record. If you see a primary care provider, you can also send messages to your care team and make appointments. If you have questions, please call your primary care clinic.  If you do not have a primary care provider, please call [phone] and they will assist you.        Care EveryWhere ID     This is your Care EveryWhere ID. This  "could be used by other organizations to access your [place] medical records  [id number]        Your Vitals Were     Pulse Temperature Height Pulse Oximetry BMI (Body Mass Index)       92 97.5  F (36.4  C) (Oral) 5' 7\" (1.702 m) 96% 31.79 kg/m2        Blood Pressure from Last 3 Encounters:   10/30/17 116/74   08/01/17 132/76   07/25/17 116/74    Weight from Last 3 Encounters:   10/30/17 203 lb (92.1 kg)   08/01/17 207 lb (93.9 kg)   07/25/17 210 lb (95.3 kg)              We Performed the Following     EKG 12-lead complete w/read - Clinics        Primary Care Provider Office Phone # Fax #    Chance Espinosa [phone] [address] [facility] [address]        Equal Access to Services     [facility]: Hadii kira carrizales hadasho Soomaali, waaxda luqadaha, qaybta kaalmada adeegyada, mahad barahona haymirian redmond . So [facility] [phone].    ATENCIÓN: Si habla español, tiene a helms disposición servicios gratuitos de asistencia lingüística. Llame al [phone].    We comply with applicable federal civil rights laws and [place] laws. We do not discriminate on the basis of race, color, national origin, age, disability, sex, sexual orientation, or gender identity.            Thank you!     Thank you for choosing [facility]  for your care. Our goal is always to provide you with excellent care. Hearing back from our patients is one way we can continue to improve our services. Please take a few minutes to complete the written survey that you may receive in the mail after your visit with us. Thank you!             Your Updated Medication List - Protect others around you: Learn how to safely use, store and throw away your medicines at www.disposemymeds.org.          This list is accurate as of: 10/30/17  4:40 PM.  Always use your most recent med list.                   Brand Name Dispense Instructions for use Diagnosis    aspirin 81 MG tablet      1 tab po QD (Once per day)    "     atenolol 50 MG tablet    TENORMIN    90 tablet    Take 1 tablet (50 mg) by mouth daily    CKD (chronic kidney disease) stage 3, GFR 30-59 ml/min, Hypertension goal BP (blood pressure) < 140/90       atorvastatin 10 MG tablet    LIPITOR    90 tablet    Take 1 tablet (10 mg) by mouth daily    Hyperlipidemia LDL goal <130       CALCIUM 600 + D 600-200 MG-UNIT Tabs     3 MONTHS         clotrimazole-betamethasone cream    LOTRISONE    45 g    Apply  topically 2 times daily.    Jock itch       finasteride 5 MG tablet    PROSCAR    90 tablet    Take 1 tablet (5 mg) by mouth daily    Hypertrophy of prostate without urinary obstruction       Multi-vitamin Tabs tablet   Generic drug:  multivitamin, therapeutic with minerals      1 tablet daily        omeprazole 20 MG tablet     90 tablet    Take 1 tablet (20 mg) by mouth daily Take 30-60 minutes before a meal.    Gastroesophageal reflux disease without esophagitis       oxymetazoline 0.05 % spray    AFRIN     Spray 2 sprays into both nostrils as needed.        selenium sulfide 2.5 % lotion    SELSUN    360 mL    Apply daily to every other day - lather, wait 10 minutes and rinse    Seborrheic dermatitis, unspecified       sildenafil 50 MG tablet    VIAGRA    18 tablet    Take 1 tablet (50 mg) by mouth daily as needed for erectile dysfunction    Erectile dysfunction, unspecified erectile dysfunction type       terazosin 10 MG capsule    HYTRIN    90 capsule    Take 1 capsule (10 mg) by mouth At Bedtime    CKD (chronic kidney disease) stage 3, GFR 30-59 ml/min, Hypertension goal BP (blood pressure) < 140/90

## 2017-10-30 NOTE — NURSING NOTE
"Chief Complaint   Patient presents with     Dizziness       Initial /74 (BP Location: Right arm, Patient Position: Chair, Cuff Size: Adult Large)  Pulse 92  Temp 97.5  F (36.4  C) (Oral)  Ht 5' 7\" (1.702 m)  Wt 203 lb (92.1 kg)  SpO2 96%  BMI 31.79 kg/m2 Estimated body mass index is 31.79 kg/(m^2) as calculated from the following:    Height as of this encounter: 5' 7\" (1.702 m).    Weight as of this encounter: 203 lb (92.1 kg).  Medication Reconciliation: complete   Csaba Mlnarik CMA    "

## 2017-10-30 NOTE — TELEPHONE ENCOUNTER
Reason for call:  Patient reporting a symptom    Symptom or request: same day work in for BP Med check     Duration (how long have symptoms been present): no symptoms    Have you been treated for this before? Yes    See triage note

## 2017-10-30 NOTE — PATIENT INSTRUCTIONS
Please work with [facility] to have the Zio patch placed.      Please seek immediate medical attention if symptoms change or worsen in any way.  Go to the ER if needed.

## 2017-11-02 ENCOUNTER — TELEPHONE (OUTPATIENT)
Dept: FAMILY MEDICINE | Facility: CLINIC | Age: 82
End: 2017-11-02

## 2017-11-02 DIAGNOSIS — R00.2 HEART PALPITATIONS: ICD-10-CM

## 2017-11-02 LAB
BASOPHILS # BLD AUTO: 0 10E9/L (ref 0–0.2)
BASOPHILS NFR BLD AUTO: 0.2 %
DIFFERENTIAL METHOD BLD: ABNORMAL
EOSINOPHIL # BLD AUTO: 0.3 10E9/L (ref 0–0.7)
EOSINOPHIL NFR BLD AUTO: 2.5 %
ERYTHROCYTE [DISTWIDTH] IN BLOOD BY AUTOMATED COUNT: 13.1 % (ref 10–15)
HCT VFR BLD AUTO: 39.8 % (ref 40–53)
HGB BLD-MCNC: 13.2 G/DL (ref 13.3–17.7)
LYMPHOCYTES # BLD AUTO: 1.4 10E9/L (ref 0.8–5.3)
LYMPHOCYTES NFR BLD AUTO: 14.3 %
MCH RBC QN AUTO: 33.1 PG (ref 26.5–33)
MCHC RBC AUTO-ENTMCNC: 33.2 G/DL (ref 31.5–36.5)
MCV RBC AUTO: 100 FL (ref 78–100)
MONOCYTES # BLD AUTO: 1 10E9/L (ref 0–1.3)
MONOCYTES NFR BLD AUTO: 10.3 %
NEUTROPHILS # BLD AUTO: 7.2 10E9/L (ref 1.6–8.3)
NEUTROPHILS NFR BLD AUTO: 72.7 %
PLATELET # BLD AUTO: 165 10E9/L (ref 150–450)
RBC # BLD AUTO: 3.99 10E12/L (ref 4.4–5.9)
WBC # BLD AUTO: 10 10E9/L (ref 4–11)

## 2017-11-02 PROCEDURE — 36415 COLL VENOUS BLD VENIPUNCTURE: CPT | Performed by: PHYSICIAN ASSISTANT

## 2017-11-02 PROCEDURE — 80050 GENERAL HEALTH PANEL: CPT | Performed by: PHYSICIAN ASSISTANT

## 2017-11-02 NOTE — TELEPHONE ENCOUNTER
Called [phone] (home)     Patient states that he still feels like his heart is skipping beats. Denies other symptoms (fatigue, chest pain, nausea)  Zio Patch is scheduled for Tuesday, 11/07/2017.   Lab only scheduled for today, 11/02/2017.     Viviane Garrett RN  [facility]

## 2017-11-02 NOTE — TELEPHONE ENCOUNTER
Please call patient to see how he is doing.  I saw him on Monday for symptoms of palpitations.  Please be sure he has scheduled a time to have the Zio patch placed.     Also, I did want to have him do labs.  Please let him know that I ordered a couple of blood tests that I would like him to have done as soon as he can.  He can schedule a lab only appointment for the lab work.      Thank you.

## 2017-11-03 LAB
ALBUMIN SERPL-MCNC: 3.2 G/DL (ref 3.4–5)
ALP SERPL-CCNC: 38 U/L (ref 40–150)
ALT SERPL W P-5'-P-CCNC: 39 U/L (ref 0–70)
ANION GAP SERPL CALCULATED.3IONS-SCNC: 10 MMOL/L (ref 3–14)
AST SERPL W P-5'-P-CCNC: 28 U/L (ref 0–45)
BILIRUB SERPL-MCNC: 0.4 MG/DL (ref 0.2–1.3)
BUN SERPL-MCNC: 32 MG/DL (ref 7–30)
CALCIUM SERPL-MCNC: 9 MG/DL (ref 8.5–10.1)
CHLORIDE SERPL-SCNC: 105 MMOL/L (ref 94–109)
CO2 SERPL-SCNC: 21 MMOL/L (ref 20–32)
CREAT SERPL-MCNC: 1.6 MG/DL (ref 0.66–1.25)
GFR SERPL CREATININE-BSD FRML MDRD: 41 ML/MIN/1.7M2
GLUCOSE SERPL-MCNC: 85 MG/DL (ref 70–99)
POTASSIUM SERPL-SCNC: 4.6 MMOL/L (ref 3.4–5.3)
PROT SERPL-MCNC: 6.7 G/DL (ref 6.8–8.8)
SODIUM SERPL-SCNC: 136 MMOL/L (ref 133–144)
TSH SERPL DL<=0.005 MIU/L-ACNC: 1.13 MU/L (ref 0.4–4)

## 2017-11-03 NOTE — TELEPHONE ENCOUNTER
Patient was already advised that if new or worsening symptoms appear (reviewed new & worsening symptoms) to be seen in the the ER    Viviane Garrett RN  [facility]

## 2017-11-03 NOTE — TELEPHONE ENCOUNTER
Noted.      Patient will be notified of lab results once they are back.  Please be sure he s told to seek immediate medical attention if symptoms change or worsen in any way.

## 2017-11-06 NOTE — PROGRESS NOTES
Barbara Brown ,    The results from your recent lab work appear to be stable.  The electrolytes are fine, and the other lab values appear to be stable.      For additional lab test information, labtestsonline.org is an excellent reference.      Thank you for choosing [facility] for your health care needs,      Denise Cerda PA-C

## 2017-11-07 ENCOUNTER — HOSPITAL ENCOUNTER (OUTPATIENT)
Dept: CARDIOLOGY | Facility: CLINIC | Age: 82
Discharge: HOME | End: 2017-11-07
Attending: PHYSICIAN ASSISTANT | Admitting: PHYSICIAN ASSISTANT
Payer: COMMERCIAL

## 2017-11-07 DIAGNOSIS — R00.2 HEART PALPITATIONS: ICD-10-CM

## 2017-11-07 PROCEDURE — 0296T ZIO PATCH HOLTER: CPT

## 2017-11-07 PROCEDURE — 0298T ZZC EXT ECG > 48HR TO 21 DAY REVIEW AND INTERPRETATN: CPT | Performed by: INTERNAL MEDICINE

## 2017-11-10 ENCOUNTER — TRANSFERRED RECORDS (OUTPATIENT)
Dept: HEALTH INFORMATION MANAGEMENT | Facility: CLINIC | Age: 82
End: 2017-11-10

## 2017-11-10 ENCOUNTER — TELEPHONE (OUTPATIENT)
Dept: FAMILY MEDICINE | Facility: CLINIC | Age: 82
End: 2017-11-10

## 2017-11-10 NOTE — TELEPHONE ENCOUNTER
EMI for PCP:    Wife calling in for patient- patient is in background answering questions:  patient was seen by Denise Cerda on 10/30/17 for heart skipping beats-   patient is reporting Increasing shortness of breath,  Denies chest pains, nausea/dizziness/  Wearing a heart monitor and having more than normal PVC's  Did have shortness of breath when he was seen by Denise- but feels it is increasing  Can speak in full sentences  Advised to go to the ER- patient requesting an appointment at the clinic. Advised that with his symptoms we would send him to the ER not the clinic  Wife will take  to [place] ER  If worsening symptoms- call 911    Wife verbalized understanding    Maryana Garcia,RN  [facility]  [phone]

## 2017-11-16 ENCOUNTER — TELEPHONE (OUTPATIENT)
Dept: FAMILY MEDICINE | Facility: CLINIC | Age: 82
End: 2017-11-16

## 2017-11-16 NOTE — TELEPHONE ENCOUNTER
Reason for Call:  Other returning call    Detailed comments: Pt and his wife called this afternoon and they said that they were called and left a message and were told to give the PL location a call and to ask for a triage nurse. Please give them a call back ASAP. Thank you.    Phone Number Patient can be reached at: Home number on file [phone] (home)    Best Time:     Can we leave a detailed message on this number? YES    Call taken on 11/16/2017 at 4:27 PM by Ebonie Randhawa

## 2017-11-17 ENCOUNTER — OFFICE VISIT (OUTPATIENT)
Dept: FAMILY MEDICINE | Facility: CLINIC | Age: 82
End: 2017-11-17
Payer: COMMERCIAL

## 2017-11-17 DIAGNOSIS — I47.10 PAROXYSMAL SUPRAVENTRICULAR TACHYCARDIA (H): ICD-10-CM

## 2017-11-17 DIAGNOSIS — I49.9 IRREGULAR CARDIAC RHYTHM: Primary | ICD-10-CM

## 2017-11-17 DIAGNOSIS — J40 BRONCHITIS: ICD-10-CM

## 2017-11-17 PROCEDURE — 93000 ELECTROCARDIOGRAM COMPLETE: CPT | Performed by: PHYSICIAN ASSISTANT

## 2017-11-17 PROCEDURE — 99214 OFFICE O/P EST MOD 30 MIN: CPT | Performed by: PHYSICIAN ASSISTANT

## 2017-11-17 NOTE — TELEPHONE ENCOUNTER
Please see 11/07/2017 Zio Results Note.     Viviane Garrett RN  [facility]

## 2017-11-17 NOTE — PROGRESS NOTES
"  SUBJECTIVE:   Kevin Fierro is a 83 year old male who presents to clinic today for the following health issues:      ED/UC Followup:    Facility:  [facility]  Date of visit: 11/10/2017  Reason for visit: SOB /Bronchitis  Current Status: Stable     Patient reports that he went to the ER for shortness of breath and cough.  He is tired.  He was started on Azithromycin for the bronchitis.   VQ scan was negative for PE.  Negative chest XR.  Troponins were negative.      He says that the symptoms have improved, but he still feels tired.  He denies fevers, chills or night sweats.      He would also like to review his holter report.  He is still having the heart palpitations.  He reports that his symptoms are stable and that his ER workup was negative.         Problem list and histories reviewed & adjusted, as indicated.  Additional history: as documented    Labs reviewed in EPIC    Reviewed and updated as needed this visit by clinical staff       Reviewed and updated as needed this visit by Provider         ROS:  Constitutional, HEENT, cardiovascular, pulmonary, gi and gu systems are negative, except as otherwise noted.      OBJECTIVE:   /70  Pulse 60  Temp 97.7  F (36.5  C) (Tympanic)  Resp 14  Ht 5' 7\" (1.702 m)  Wt 202 lb (91.6 kg)  SpO2 95%  BMI 31.64 kg/m2  Body mass index is 31.64 kg/(m^2).  GENERAL: healthy, alert and no distress  EYES: Eyes grossly normal to inspection, PERRL and conjunctivae and sclerae normal  HENT: ear canals and TM's normal, nose and mouth without ulcers or lesions  NECK: no adenopathy, no asymmetry, masses, or scars and thyroid normal to palpation  RESP: lungs clear to auscultation - no rales, rhonchi or wheezes  CV: regular rate and rhythm, normal S1 S2, no S3 or S4, no murmur, click or rub, no peripheral edema and peripheral pulses strong  MS: no gross musculoskeletal defects noted, no edema  SKIN: no suspicious lesions or rashes  NEURO: Normal strength and tone, " mentation intact and speech normal  PSYCH: mentation appears normal, affect normal/bright    Diagnostic Test Results:  none     ASSESSMENT/PLAN:     1. Irregular cardiac rhythm    - EKG 12-lead complete w/read - Clinics  - CARDIOLOGY EVAL ADULT REFERRAL    2. PSVT    - Five short runs of 19 beats or less seen on three day monitor.  Patient currently on Atenolol for the blood pressure and SVT.  Dose not increased due to lower pulse of 60 bpm.    - Patient to followup with EP as soon as he is able.     3. Bronchitis    - Lung are clear on exam.  Symptoms are improving.  Still some fatigue.  Antibiotic on board for 10 days, so medication still active.        Patient Instructions   Please followup with cardiology as soon as you are able.     Please seek immediate medical attention if symptoms change or worsen in any way.      -- Patient agrees with and understands the plan today.      Denise Cerda PA-C  [facility] [place]

## 2017-11-17 NOTE — MR AVS SNAPSHOT
After Visit Summary   11/17/2017    Kevin Fierro    MRN: [id number]           Patient Information     Date Of Birth          5/11/1934        Visit Information        Provider Department      11/17/2017 3:40 PM Denise Cerda PA-C [facility]        Today's Diagnoses     Irregular cardiac rhythm    -  1      Care Instructions    Please followup with cardiology as soon as you are able.     Please seek immediate medical attention if symptoms change or worsen in any way.            Follow-ups after your visit        Additional Services     CARDIOLOGY EVAL ADULT REFERRAL       Your provider has referred you to:  FM[facility]: [facility] [place] [phone]   [email or web address]  UMP: [facility] [phone]   [email or web address] [facility] [phone]   [email or web address]    Please be aware that coverage of these services is subject to the terms and limitations of your health insurance plan.  Call member services at your health plan with any benefit or coverage questions.      Type of Referral:  New EP Consult    Timeframe requested:  1-3 Days    Please bring the following to your appointment:  >>   Any x-rays, CTs or MRIs which have been performed.  Contact the facility where they were done to arrange for  prior to your scheduled appointment.    >>   List of current medications  >>   This referral request   >>   Any documents/labs given to you for this referral                  Who to contact     If you have questions or need follow up information about today's clinic visit or your schedule please contact [facility] directly at [phone].  Normal or non-critical lab and imaging results will be  "communicated to you by [employer] Labshart, letter or phone within 4 business days after the clinic has received the results. If you do not hear from us within 7 days, please contact the clinic through Wallstr or phone. If you have a critical or abnormal lab result, we will notify you by phone as soon as possible.  Submit refill requests through Wallstr or call your pharmacy and they will forward the refill request to us. Please allow 3 business days for your refill to be completed.          Additional Information About Your Visit        Wallstr Information     Wallstr gives you secure access to your electronic health record. If you see a primary care provider, you can also send messages to your care team and make appointments. If you have questions, please call your primary care clinic.  If you do not have a primary care provider, please call [phone] and they will assist you.        Care EveryWhere ID     This is your Care EveryWhere ID. This could be used by other organizations to access your [facility] medical records  [id number]        Your Vitals Were     Pulse Temperature Respirations Height Pulse Oximetry BMI (Body Mass Index)    90 97.7  F (36.5  C) (Tympanic) 14 5' 7\" (1.702 m) 95% 31.64 kg/m2       Blood Pressure from Last 3 Encounters:   11/17/17 124/70   10/30/17 116/74   08/01/17 132/76    Weight from Last 3 Encounters:   11/17/17 202 lb (91.6 kg)   10/30/17 203 lb (92.1 kg)   08/01/17 207 lb (93.9 kg)              We Performed the Following     CARDIOLOGY EVAL ADULT REFERRAL     EKG 12-lead complete w/read - Clinics        Primary Care Provider Office Phone # Fax #    Chance Espinosa [phone]       [facility] [address]        Equal Access to Services     [facility] SIL[facility]IA : Brooke Carreno, wabrandi chapman, qaybta kaalmada jessi, mahad dias. So [facility] [phone].    ATENCIÓN: Si habla español, tiene a helms disposición servicios " sarina de asistencia lingüística. Maeve hinton [phone].    We comply with applicable federal civil rights laws and [place] laws. We do not discriminate on the basis of race, color, national origin, age, disability, sex, sexual orientation, or gender identity.            Thank you!     Thank you for choosing [facility]  for your care. Our goal is always to provide you with excellent care. Hearing back from our patients is one way we can continue to improve our services. Please take a few minutes to complete the written survey that you may receive in the mail after your visit with us. Thank you!             Your Updated Medication List - Protect others around you: Learn how to safely use, store and throw away your medicines at www.disposemymeds.org.          This list is accurate as of: 11/17/17  5:14 PM.  Always use your most recent med list.                   Brand Name Dispense Instructions for use Diagnosis    aspirin 81 MG tablet      1 tab po QD (Once per day)        atenolol 50 MG tablet    TENORMIN    90 tablet    Take 1 tablet (50 mg) by mouth daily    CKD (chronic kidney disease) stage 3, GFR 30-59 ml/min, Hypertension goal BP (blood pressure) < 140/90       atorvastatin 10 MG tablet    LIPITOR    90 tablet    Take 1 tablet (10 mg) by mouth daily    Hyperlipidemia LDL goal <130       CALCIUM 600 + D 600-200 MG-UNIT Tabs     3 MONTHS         clotrimazole-betamethasone cream    LOTRISONE    45 g    Apply  topically 2 times daily.    Jock itch       finasteride 5 MG tablet    PROSCAR    90 tablet    Take 1 tablet (5 mg) by mouth daily    Hypertrophy of prostate without urinary obstruction       Multi-vitamin Tabs tablet   Generic drug:  multivitamin, therapeutic with minerals      1 tablet daily        omeprazole 20 MG tablet     90 tablet    Take 1 tablet (20 mg) by mouth daily Take 30-60 minutes before a meal.    Gastroesophageal reflux disease without esophagitis       oxymetazoline  0.05 % spray    AFRIN     Spray 2 sprays into both nostrils as needed.        selenium sulfide 2.5 % lotion    SELSUN    360 mL    Apply daily to every other day - lather, wait 10 minutes and rinse    Seborrheic dermatitis, unspecified       sildenafil 50 MG tablet    VIAGRA    18 tablet    Take 1 tablet (50 mg) by mouth daily as needed for erectile dysfunction    Erectile dysfunction, unspecified erectile dysfunction type       terazosin 10 MG capsule    HYTRIN    90 capsule    Take 1 capsule (10 mg) by mouth At Bedtime    CKD (chronic kidney disease) stage 3, GFR 30-59 ml/min, Hypertension goal BP (blood pressure) < 140/90

## 2017-11-17 NOTE — PATIENT INSTRUCTIONS
Please followup with cardiology as soon as you are able.     Please seek immediate medical attention if symptoms change or worsen in any way.

## 2017-11-17 NOTE — NURSING NOTE
"Chief Complaint   Patient presents with     Hospital F/U       Initial /70  Pulse 90  Temp 97.7  F (36.5  C) (Tympanic)  Resp 14  Ht 5' 7\" (1.702 m)  Wt 202 lb (91.6 kg)  SpO2 95%  BMI 31.64 kg/m2 Estimated body mass index is 31.64 kg/(m^2) as calculated from the following:    Height as of this encounter: 5' 7\" (1.702 m).    Weight as of this encounter: 202 lb (91.6 kg).  Medication Reconciliation: complete   Cecy Bloedow LPN    "

## 2017-11-19 VITALS
BODY MASS INDEX: 31.71 KG/M2 | DIASTOLIC BLOOD PRESSURE: 70 MMHG | HEIGHT: 67 IN | RESPIRATION RATE: 14 BRPM | OXYGEN SATURATION: 95 % | TEMPERATURE: 97.7 F | WEIGHT: 202 LBS | HEART RATE: 60 BPM | SYSTOLIC BLOOD PRESSURE: 124 MMHG

## 2017-11-21 ENCOUNTER — TELEPHONE (OUTPATIENT)
Dept: FAMILY MEDICINE | Facility: CLINIC | Age: 82
End: 2017-11-21

## 2017-11-21 NOTE — TELEPHONE ENCOUNTER
Wife is calling - stating she is pretty sure Michele MANN made comment that pt was having PACs as well with his Zio patch and would bandar to know how often/ or how many pt was having for their records     Rn reviewed zio patch results there was no comment about PACs - pt's wife would like to have Michele MANN review and again because she is sure Michele MANN mentioned this     Best # [phone] ok Lm     Please review and advise     Thank you     Danielle Mills RN, BSN  [facility]

## 2017-11-22 NOTE — TELEPHONE ENCOUNTER
Called and spoke with patient's wife, per patient request.  Answered question regarding PAC's on the report.  They did not have further questions.

## 2017-11-27 ENCOUNTER — OFFICE VISIT (OUTPATIENT)
Dept: CARDIOLOGY | Facility: CLINIC | Age: 82
End: 2017-11-27
Attending: PHYSICIAN ASSISTANT
Payer: COMMERCIAL

## 2017-11-27 VITALS
SYSTOLIC BLOOD PRESSURE: 148 MMHG | HEIGHT: 67 IN | HEART RATE: 74 BPM | DIASTOLIC BLOOD PRESSURE: 82 MMHG | WEIGHT: 201 LBS | BODY MASS INDEX: 31.55 KG/M2

## 2017-11-27 DIAGNOSIS — R00.2 PALPITATIONS: ICD-10-CM

## 2017-11-27 DIAGNOSIS — R06.09 DYSPNEA ON EXERTION: ICD-10-CM

## 2017-11-27 DIAGNOSIS — I47.10 PAROXYSMAL SUPRAVENTRICULAR TACHYCARDIA (H): Primary | ICD-10-CM

## 2017-11-27 DIAGNOSIS — R06.09 DYSPNEA ON EXERTION: Primary | ICD-10-CM

## 2017-11-27 PROCEDURE — 93000 ELECTROCARDIOGRAM COMPLETE: CPT | Performed by: INTERNAL MEDICINE

## 2017-11-27 PROCEDURE — 99204 OFFICE O/P NEW MOD 45 MIN: CPT | Performed by: INTERNAL MEDICINE

## 2017-11-27 NOTE — PROGRESS NOTES
HPI and Plan:   See dictation    Orders Placed This Encounter   Procedures     EKG 12-lead complete w/read - Clinics (performed today)     Pulmonary Function Test       No orders of the defined types were placed in this encounter.      There are no discontinued medications.      Encounter Diagnoses   Name Primary?     Hypertension goal BP (blood pressure) < 140/90 Yes     Hyperlipidemia LDL goal <130      PSVT      Dyspnea on exertion        CURRENT MEDICATIONS:  Current Outpatient Prescriptions   Medication Sig Dispense Refill     atenolol (TENORMIN) 50 MG tablet Take 1 tablet (50 mg) by mouth daily 90 tablet 3     atorvastatin (LIPITOR) 10 MG tablet Take 1 tablet (10 mg) by mouth daily 90 tablet 3     terazosin (HYTRIN) 10 MG capsule Take 1 capsule (10 mg) by mouth At Bedtime 90 capsule 3     finasteride (PROSCAR) 5 MG tablet Take 1 tablet (5 mg) by mouth daily 90 tablet 3     omeprazole 20 MG tablet Take 1 tablet (20 mg) by mouth daily Take 30-60 minutes before a meal. 90 tablet 3     sildenafil (VIAGRA) 50 MG tablet Take 1 tablet (50 mg) by mouth daily as needed for erectile dysfunction 18 tablet 3     clotrimazole-betamethasone (LOTRISONE) cream Apply  topically 2 times daily. 45 g 3     selenium sulfide (SELSUN) 2.5 % shampoo Apply daily to every other day - lather, wait 10 minutes and rinse 360 mL 3     oxymetazoline (AFRIN) 0.05 % nasal spray Spray 2 sprays into both nostrils as needed.       CALCIUM 600 + D 600-200 MG-UNIT OR TABS  3 MONTHS 3     MULTI-VITAMIN OR TABS 1 tablet daily       ASPIRIN 81 MG OR TABS 1 tab po QD (Once per day)         ALLERGIES   No Known Allergies    PAST MEDICAL HISTORY:  Past Medical History:   Diagnosis Date     Arthritis .     Choroidal nevus of left eye     Dr Connor     CKD (chronic kidney disease) stage 3, GFR 30-59 ml/min      Colon polyps      ED (erectile dysfunction)      Family history of cardiovascular disease      GERD (gastroesophageal reflux disease) 2013      Hematuria 1999    dr scott     Hyperlipidemia LDL goal <130 1990     Hypertension goal BP (blood pressure) < 140/90 1990     Hypertrophy of prostate without urinary obstruction and other lower urinary tract symptoms (LUTS)      Lumbar stenosis 2017    mild to moderate foraminal and central     Lung nodules 11/10    CT scan stable     obstructive SLEEP APNEA 5/2007    CPAP     Other and unspecified 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 5/25/2005     PSVT 11/2017    few runs, rare PVC     Seasonal allergies      Unspecified hearing loss 5/2005    hearing aids, L>R - Dr Hernandez       PAST SURGICAL HISTORY:  Past Surgical History:   Procedure Laterality Date     HC COLONOSCOPY THRU STOMA, DIAGNOSTIC  2005, 5/10, 5/11    Polyps-> due 2015 - Ct Colonography negative     HC REPAIR INCISIONAL HERNIA,REDUCIBLE  1960    Hernia Repair, Incisional, Unilateral     STRESS ECHO (METRO)  7/09, 5/12, 7/17    Negative     TONSILLECTOMY & ADENOIDECTOMY  1946       FAMILY HISTORY:  Family History   Problem Relation Age of Onset     C.A.D. Father      age 50's     Hypertension Father      C.A.D. Brother      C.A.D. Brother      mid 40's     DIABETES Brother      CANCER Brother      pancreatic CA     Coronary Artery Disease Brother      Cancer - colorectal No family hx of      Prostate Cancer No family hx of        SOCIAL HISTORY:  Social History     Social History     Marital status:      Spouse name: Leticia     Number of children: 3     Years of education: 18     Occupational History      Retired     Social History Main Topics     Smoking status: Never Smoker     Smokeless tobacco: Never Used     Alcohol use 2.4 - 3.0 oz/week     4 - 5 Standard drinks or equivalent per week      Comment: 4-5 drinks per week avg     Drug use: No     Sexual activity: Yes     Partners: Female     Other Topics Concern     Blood Transfusions No     Caffeine Concern Yes     1 serv/day, rare pop, occas chocolate (3-4/yr)      Sleep Concern Yes     MIMI, wakes feeling rested     Exercise Yes     30-45' 2-3 x/wk     Seat Belt Yes     Parent/Sibling W/ Cabg, Mi Or Angioplasty Before 65f 55m? Yes     Social History Narrative       Review of Systems:  Skin:  Negative       Eyes:  Positive for glasses    ENT:  Positive for hearing loss    Respiratory:  Positive for shortness of breath;dyspnea on exertion;sleep apnea;CPAP     Cardiovascular:    Positive for;palpitations;lightheadedness;dizziness;fatigue    Gastroenterology: Negative      Genitourinary:  Negative      Musculoskeletal:  Positive for nocturnal cramping    Neurologic:  Negative      Psychiatric:  Negative      Heme/Lymph/Imm:  Negative      Endocrine:  Negative        [id number]

## 2017-11-27 NOTE — MR AVS SNAPSHOT
After Visit Summary   11/27/2017    Kevin Fierro    MRN: [id number]           Patient Information     Date Of Birth          5/11/1934        Visit Information        Provider Department      11/27/2017 1:15 PM Sudha Hodges MD [facility]        Today's Diagnoses     Hypertension goal BP (blood pressure) < 140/90    -  1    Hyperlipidemia LDL goal <130        PSVT        Dyspnea on exertion           Follow-ups after your visit        Future tests that were ordered for you today     Open Future Orders        Priority Expected Expires Ordered    Pulmonary Function Test Routine 11/30/2017 2/13/2027 11/27/2017            Who to contact     If you have questions or need follow up information about today's clinic visit or your schedule please contact [facility] directly at [phone].  Normal or non-critical lab and imaging results will be communicated to you by Yakazhart, letter or phone within 4 business days after the clinic has received the results. If you do not hear from us within 7 days, please contact the clinic through Yakazhart or phone. If you have a critical or abnormal lab result, we will notify you by phone as soon as possible.  Submit refill requests through Sosedi or call your pharmacy and they will forward the refill request to us. Please allow 3 business days for your refill to be completed.          Additional Information About Your Visit        MyChart Information     Sosedi gives you secure access to your electronic health record. If you see a primary care provider, you can also send messages to your care team and make appointments. If you have questions, please call your primary care clinic.  If you do not have a primary care provider, please call [phone] and they will assist you.        Care EveryWhere ID     This is your Care EveryWhere ID. This could be used by other organizations  "to access your [facility] medical records  [id number]        Your Vitals Were     Pulse Height BMI (Body Mass Index)             74 1.702 m (5' 7\") 31.48 kg/m2          Blood Pressure from Last 3 Encounters:   11/27/17 148/82   11/17/17 124/70   10/30/17 116/74    Weight from Last 3 Encounters:   11/27/17 91.2 kg (201 lb)   11/17/17 91.6 kg (202 lb)   10/30/17 92.1 kg (203 lb)              We Performed the Following     EKG 12-lead complete w/read - Clinics (performed today)        Primary Care Provider Office Phone # Fax #    Chance Espinosa [phone]       [facility] [facility] [address]        Equal Access to Services     [facility] CenterELISABETH : Hadii aad ku hadasho Soodessa, waaxda luqadaha, qaybta kaalmada adeegyada, mahad redmond . So [facility] [phone].    ATENCIÓN: Si habla español, tiene a helms disposición servicios gratuitos de asistencia lingüística. Llame al [phone].    We comply with applicable federal civil rights laws and [place] laws. We do not discriminate on the basis of race, color, national origin, age, disability, sex, sexual orientation, or gender identity.            Thank you!     Thank you for choosing [facility]  for your care. Our goal is always to provide you with excellent care. Hearing back from our patients is one way we can continue to improve our services. Please take a few minutes to complete the written survey that you may receive in the mail after your visit with us. Thank you!             Your Updated Medication List - Protect others around you: Learn how to safely use, store and throw away your medicines at www.disposemymeds.org.          This list is accurate as of: 11/27/17  1:49 PM.  Always use your most recent med list.                   Brand Name Dispense Instructions for use Diagnosis    aspirin 81 MG tablet      1 tab po QD (Once per day)        atenolol 50 MG tablet    TENORMIN    90 " tablet    Take 1 tablet (50 mg) by mouth daily    CKD (chronic kidney disease) stage 3, GFR 30-59 ml/min, Hypertension goal BP (blood pressure) < 140/90       atorvastatin 10 MG tablet    LIPITOR    90 tablet    Take 1 tablet (10 mg) by mouth daily    Hyperlipidemia LDL goal <130       CALCIUM 600 + D 600-200 MG-UNIT Tabs     3 MONTHS         clotrimazole-betamethasone cream    LOTRISONE    45 g    Apply  topically 2 times daily.    Jock itch       finasteride 5 MG tablet    PROSCAR    90 tablet    Take 1 tablet (5 mg) by mouth daily    Hypertrophy of prostate without urinary obstruction       Multi-vitamin Tabs tablet   Generic drug:  multivitamin, therapeutic with minerals      1 tablet daily        omeprazole 20 MG tablet     90 tablet    Take 1 tablet (20 mg) by mouth daily Take 30-60 minutes before a meal.    Gastroesophageal reflux disease without esophagitis       oxymetazoline 0.05 % spray    AFRIN     Spray 2 sprays into both nostrils as needed.        selenium sulfide 2.5 % lotion    SELSUN    360 mL    Apply daily to every other day - lather, wait 10 minutes and rinse    Seborrheic dermatitis, unspecified       sildenafil 50 MG tablet    VIAGRA    18 tablet    Take 1 tablet (50 mg) by mouth daily as needed for erectile dysfunction    Erectile dysfunction, unspecified erectile dysfunction type       terazosin 10 MG capsule    HYTRIN    90 capsule    Take 1 capsule (10 mg) by mouth At Bedtime    CKD (chronic kidney disease) stage 3, GFR 30-59 ml/min, Hypertension goal BP (blood pressure) < 140/90

## 2017-11-27 NOTE — LETTER
11/27/2017      Chance Espinosa MD  [address] [facility] [id number]      RE: Kevin Fierro       Dear Colleague,    I had the pleasure of seeing Kevin Fierro in the [facility].    HISTORY OF PRESENT ILLNESS:    It is my pleasure seeing Mr. Kevin Fierro for evaluation of palpitation and irregular heart beating.  He is a very pleasant 83-year-old male with history of sleep apnea treated with CPAP, dyslipidemia and no previously documented cardiac issues.      The patient has been complaining of dyspnea on exertion.  In 07/2017, he underwent an exercise echocardiogram.  He exercised for 9 minutes and 43 seconds on a standard Guilherme protocol, achieving a heart rate of 120 beats per minute.  The heart rate response was not ideal, but there was no evidence of ischemia at this relatively modest level of myocardial oxygen demand.      The patient has also complained of palpitation which he feels as a tickle in his throat from time to time.  On occasion he has a sensation of irregular heart beating or brief racing.  A 3 day ZioPatch monitor was completed in early November.  This showed very frequent supraventricular ectopic beats with a burden of approximately 16%.  There were 5 runs of SVT, longest of 19 beats.  There were no significant pauses.  There was no atrial fibrillation.      On 11/10/2017, the patient presented to an emergency room complaining of dyspnea.  He underwent extensive evaluation including a chest x-ray that showed possible lung fibrosis as well as a lung perfusion scan which was normal.  No apparent reason for acute dyspnea was found.      Mr. Fierro is only mildly active at this point.  His wife of 60 years told me that he has abstained from exercising because of fear he may aggravate an underlying heart problem.  The patient is a lifelong nonsmoker, but he has had significant exposure to secondhand smoke.  Mr. Fierro has never had syncope or  near-syncope.  He lives with his wife in [place].      PHYSICAL EXAMINATION:   VITAL SIGNS:  Blood pressure 148/82, pulse is 74 and regular, weight 91 kg., height 170 cm.   GENERAL:  He is a pleasant gentleman who is moderately overweight.  He is in no apparent distress.  Room air saturation was 94%.  We went for an approximately 100 yard walk in the hallways.  The patient's oxygen saturation dropped to 92%.  His heart rate increased from the 70s up to the 90s.   HEENT:  Head normocephalic, atraumatic.  Sclerae anicteric.   NECK:  Very thick, supple, without apparent carotid bruits.   LUNGS:  With mildly decreased breath sounds, but no apparent crackles or wheezes.   CARDIOVASCULAR:  Normal JVP, regular rhythm with occasional ectopic beats.  No gallop, murmur or rub.     ABDOMEN:  At least moderately obese, soft, nontender.   EXTREMITIES:  Trace edema.   SKIN:  No rash.   BACK:  No CVA tenderness.   NEUROLOGIC:  Alert and oriented x3.      DIAGNOSTIC STUDIES:    Recent laboratory tests:  Sodium 136, potassium 4.6, creatinine 1.6, TSH 1.13, hematocrit 39.8%, white count 10,000.      His 12-lead ECG today showed sinus rhythm and was within normal limits.      For results of his stress echocardiogram and ZIO Patch monitor, please see HPI.     IMPRESSION:   1.  Palpitation.  This is due to frequent supraventricular ectopic beats.  He also has brief runs of atrial tachycardia.  There was no atrial fibrillation on the recent monitor.        I assured the patient that frequent atrial ectopy is an arrhythmia that is benign and does not require treatment.  In fact, treatment for this arrhythmia is often worse than the disease itself as meds can cause side-effects.  I do note that the patient is already on atenolol 50 mg daily, which I encouraged him to continue.         Frequent PACs in this age group is often a precursor for atrial fibrillation.  However, so far the patient has not had documented atrial fibrillation.     "  2.  Dyspnea on exertion.  I do not see a clear cardiac reason for dyspnea on exertion.  He does have a low normal resting oxygen saturation that slightly decreases with exertion.  He also had possible \"lung scarring\" on a recent chest x-ray.  I have ordered PFTs with DLCO.  If abnormal, it may be reasonable to refer the patient to a pulmonary specialist.      RECOMMENDATIONS:   A.  No treatment for PACs and nonsustained atrial tachycardia.   B.  Pulmonary function tests with DLCO.      We will call the patient with the results of his spirometry.  We would be happy to evaluate him in the future as needed.        Outpatient Encounter Prescriptions as of 11/27/2017   Medication Sig Dispense Refill     atenolol (TENORMIN) 50 MG tablet Take 1 tablet (50 mg) by mouth daily 90 tablet 3     atorvastatin (LIPITOR) 10 MG tablet Take 1 tablet (10 mg) by mouth daily 90 tablet 3     terazosin (HYTRIN) 10 MG capsule Take 1 capsule (10 mg) by mouth At Bedtime 90 capsule 3     finasteride (PROSCAR) 5 MG tablet Take 1 tablet (5 mg) by mouth daily 90 tablet 3     omeprazole 20 MG tablet Take 1 tablet (20 mg) by mouth daily Take 30-60 minutes before a meal. 90 tablet 3     sildenafil (VIAGRA) 50 MG tablet Take 1 tablet (50 mg) by mouth daily as needed for erectile dysfunction 18 tablet 3     clotrimazole-betamethasone (LOTRISONE) cream Apply  topically 2 times daily. 45 g 3     selenium sulfide (SELSUN) 2.5 % shampoo Apply daily to every other day - lather, wait 10 minutes and rinse 360 mL 3     oxymetazoline (AFRIN) 0.05 % nasal spray Spray 2 sprays into both nostrils as needed.       CALCIUM 600 + D 600-200 MG-UNIT OR TABS  3 MONTHS 3     MULTI-VITAMIN OR TABS 1 tablet daily       ASPIRIN 81 MG OR TABS 1 tab po QD (Once per day)       No facility-administered encounter medications on file as of 11/27/2017.        Again, thank you for allowing me to participate in the care of your patient.      Sincerely,    Sudha Hodges MD "     [facility]

## 2017-11-27 NOTE — PROGRESS NOTES
HISTORY OF PRESENT ILLNESS:    It is my pleasure seeing Mr. Kevin Fierro for evaluation of palpitation and irregular heart beating.  He is a very pleasant 83-year-old male with history of sleep apnea treated with CPAP, dyslipidemia and no previously documented cardiac issues.      The patient has been complaining of dyspnea on exertion.  In 07/2017, he underwent an exercise echocardiogram.  He exercised for 9 minutes and 43 seconds on a standard Guilherme protocol, achieving a heart rate of 120 beats per minute.  The heart rate response was not ideal, but there was no evidence of ischemia at this relatively modest level of myocardial oxygen demand.      The patient has also complained of palpitation which he feels as a tickle in his throat from time to time.  On occasion he has a sensation of irregular heart beating or brief racing.  A 3 day ZioPatch monitor was completed in early November.  This showed very frequent supraventricular ectopic beats with a burden of approximately 16%.  There were 5 runs of SVT, longest of 19 beats.  There were no significant pauses.  There was no atrial fibrillation.      On 11/10/2017, the patient presented to an emergency room complaining of dyspnea.  He underwent extensive evaluation including a chest x-ray that showed possible lung fibrosis as well as a lung perfusion scan which was normal.  No apparent reason for acute dyspnea was found.      Mr. Fierro is only mildly active at this point.  His wife of 60 years told me that he has abstained from exercising because of fear he may aggravate an underlying heart problem.  The patient is a lifelong nonsmoker, but he has had significant exposure to secondhand smoke.  Mr. Fierro has never had syncope or near-syncope.  He lives with his wife in [place].      PHYSICAL EXAMINATION:   VITAL SIGNS:  Blood pressure 148/82, pulse is 74 and regular, weight 91 kg., height 170 cm.   GENERAL:  He is a pleasant gentleman who is moderately  "overweight.  He is in no apparent distress.  Room air saturation was 94%.  We went for an approximately 100 yard walk in the hallways.  The patient's oxygen saturation dropped to 92%.  His heart rate increased from the 70s up to the 90s.   HEENT:  Head normocephalic, atraumatic.  Sclerae anicteric.   NECK:  Very thick, supple, without apparent carotid bruits.   LUNGS:  With mildly decreased breath sounds, but no apparent crackles or wheezes.   CARDIOVASCULAR:  Normal JVP, regular rhythm with occasional ectopic beats.  No gallop, murmur or rub.     ABDOMEN:  At least moderately obese, soft, nontender.   EXTREMITIES:  Trace edema.   SKIN:  No rash.   BACK:  No CVA tenderness.   NEUROLOGIC:  Alert and oriented x3.      DIAGNOSTIC STUDIES:    Recent laboratory tests:  Sodium 136, potassium 4.6, creatinine 1.6, TSH 1.13, hematocrit 39.8%, white count 10,000.      His 12-lead ECG today showed sinus rhythm and was within normal limits.      For results of his stress echocardiogram and ZIO Patch monitor, please see HPI.        IMPRESSION:   1.  Palpitation.  This is due to frequent supraventricular ectopic beats.  He also has brief runs of atrial tachycardia.  There was no atrial fibrillation on the recent monitor.        I assured the patient that frequent atrial ectopy is an arrhythmia that is benign and does not require treatment.  In fact, treatment for this arrhythmia is often worse than the disease itself as meds can cause side-effects.  I do note that the patient is already on atenolol 50 mg daily, which I encouraged him to continue.         Frequent PACs in this age group is often a precursor for atrial fibrillation.  However, so far the patient has not had documented atrial fibrillation.      2.  Dyspnea on exertion.  I do not see a clear cardiac reason for dyspnea on exertion.  He does have a low normal resting oxygen saturation that slightly decreases with exertion.  He also had possible \"lung scarring\" on a " recent chest x-ray.  I have ordered PFTs with DLCO.  If abnormal, it may be reasonable to refer the patient to a pulmonary specialist.      RECOMMENDATIONS:   A.  No treatment for PACs and nonsustained atrial tachycardia.   B.  Pulmonary function tests with DLCO.      We will call the patient with the results of his spirometry.  We would be happy to evaluate him in the future as needed.         NIA MANZANARES MD, FACC         cc:   [place] [address]          D: 2017 13:59   T: 2017 14:52   MT: ANILA      Name:     JAYLYN REDMAN   MRN:      [id number]        Account:      [id number]   :      1934           Service Date: 2017      Document: [id number]

## 2017-11-30 ENCOUNTER — HOSPITAL ENCOUNTER (OUTPATIENT)
Dept: RESPIRATORY THERAPY | Facility: CLINIC | Age: 82
Discharge: HOME | End: 2017-11-30
Attending: INTERNAL MEDICINE | Admitting: INTERNAL MEDICINE
Payer: COMMERCIAL

## 2017-11-30 DIAGNOSIS — R06.09 DYSPNEA ON EXERTION: ICD-10-CM

## 2017-11-30 LAB
DLCOCOR-%PRED-PRE: 56 %
DLCOCOR-PRE: 12.57 ML/MIN/MMHG
DLCOUNC-%PRED-PRE: 54 %
DLCOUNC-PRE: 12.04 ML/MIN/MMHG
DLCOUNC-PRED: 22.2 ML/MIN/MMHG
ERV-%PRED-PRE: 110 %
ERV-PRE: 0.62 L
ERV-PRED: 0.56 L
EXPTIME-PRE: 9.61 SEC
FEF2575-%PRED-PRE: 136 %
FEF2575-PRE: 2.48 L/SEC
FEF2575-PRED: 1.82 L/SEC
FEFMAX-%PRED-PRE: 125 %
FEFMAX-PRE: 7.93 L/SEC
FEFMAX-PRED: 6.33 L/SEC
FEV1-%PRED-PRE: 102 %
FEV1-PRE: 2.68 L
FEV1FEV6-PRE: 79 %
FEV1FEV6-PRED: 76 %
FEV1FVC-PRE: 79 %
FEV1FVC-PRED: 75 %
FEV1SVC-PRE: 79 %
FEV1SVC-PRED: 63 %
FIFMAX-PRE: 2 L/SEC
FRCPLETH-%PRED-PRE: 69 %
FRCPLETH-PRE: 2.56 L
FRCPLETH-PRED: 3.7 L
FVC-%PRED-PRE: 96 %
FVC-PRE: 3.39 L
FVC-PRED: 3.52 L
IC-%PRED-PRE: 77 %
IC-PRE: 2.75 L
IC-PRED: 3.54 L
RVPLETH-%PRED-PRE: 67 %
RVPLETH-PRE: 1.94 L
RVPLETH-PRED: 2.87 L
TLCPLETH-%PRED-PRE: 78 %
TLCPLETH-PRE: 5.3 L
TLCPLETH-PRED: 6.72 L
VA-%PRED-PRE: 64 %
VA-PRE: 4.14 L
VC-%PRED-PRE: 82 %
VC-PRE: 3.37 L
VC-PRED: 4.1 L

## 2017-11-30 PROCEDURE — 94010 BREATHING CAPACITY TEST: CPT

## 2017-11-30 PROCEDURE — 94726 PLETHYSMOGRAPHY LUNG VOLUMES: CPT

## 2017-11-30 PROCEDURE — 94729 DIFFUSING CAPACITY: CPT

## 2017-11-30 NOTE — PROGRESS NOTES
PFT Note:        Pt completed pulmonary function testing with DLCO.  Good Pt effort and cooperation.     Spirometry  Meets all ATS-ERS recommendations.    Plethysmography  All plethysmographic measurements meet ATS-ERS recommendations    DLCO  Meets all ATS-ERS recommendations.  DLCO is an average of 2 maneuvers.  Predicted DLCO is corrected for a Hgb of 13.2 drawn on 11/2/2017.    November 30, 2017.2:37 PM  Conner Toro

## 2017-12-04 ENCOUNTER — TELEPHONE (OUTPATIENT)
Dept: CARDIOLOGY | Facility: CLINIC | Age: 82
End: 2017-12-04

## 2017-12-04 NOTE — TELEPHONE ENCOUNTER
Message  Received: Yesterday       Sudha Hodges MD  [facility] Heart Ep Nurse                     I do not have the formal reading on this test but gas transfer (DLCO) seems impaired (56% of predicted).  No obstruction.   This may in part explain his WALKER.   Pt may want to further discuss this finding with Dr. Espinosa at the next appointment.   No need to see us again unless new issues arise.   DI       Writer called pt with results and he verbalized understanding. Will discuss with Dr. Espinosa. HALEIGH Argueta RN.

## 2017-12-05 ENCOUNTER — TELEPHONE (OUTPATIENT)
Dept: FAMILY MEDICINE | Facility: CLINIC | Age: 82
End: 2017-12-05

## 2017-12-05 NOTE — PROCEDURES
"Please see medical chart for graphs and statistics related to this report.       REFERRING PHYSICIAN: Sudha Hodges               TECHNICIAN: Conner Toro   DIAGNOSIS: WALKER, PSVT, lung nodule, HTN, GERD, CKD   HEIGHT: 68\"     WEIGHT: 200 lbs   DYSPNEA: on hills and stairs         COUGH: productive   WHEEZE: no wheeze         TOBACCO PROD: never smokes   MEDICATIONS:         POST-TEST COMMENTS: Patient completed pulmonary function testing with DLCO. Good patient effort and cooperation. All testing meets ATS-ERS recommendations. DLCO is an average of 2 maneuvers. Predicted DLCO is corrected for a Hgb of 13.2 drawn on 2017.       INTERPRETATION:   1. Normal lung mechanics   2. No obstruction   3. Moderate restriction   4. Moderately impaired gas transfer, but normal adjusted for volume         PAUL NEGRON MD             D: 2017 16:01   T: 2017 16:11   MT:       Name:     JAYLYN REDMAN   MRN:      [id number]        Account:        [id number]   :      1934           Procedure Date: 2017      Document: [id number]       cc: Sudha Espinosa MD   "

## 2017-12-06 ENCOUNTER — TELEPHONE (OUTPATIENT)
Dept: FAMILY MEDICINE | Facility: CLINIC | Age: 82
End: 2017-12-06

## 2017-12-06 DIAGNOSIS — J98.4 RESTRICTIVE LUNG DISEASE: Primary | ICD-10-CM

## 2017-12-06 NOTE — TELEPHONE ENCOUNTER
Clinic Action Needed:Yes, please return call  Reason for Call: Kevin called kelvin stating that he had a call from clinic to call them back.  It wasn't clear to me what clinic was calling him about.  He had already received message from Tuesday about getting a copy of his results at  to  and schedule a f/u appointment.  Please return call.  Thank you.     Routed to: RV Triage    Leonila Meyer RN  [facility]

## 2017-12-07 NOTE — TELEPHONE ENCOUNTER
Called pt regarding results of PFT. Pt wanted to follow up with pulmonary instead of TS first and will call pulmonary. Cancelled pts appt for now.    Dennise White RN  [facility]

## 2018-01-03 ENCOUNTER — OFFICE VISIT (OUTPATIENT)
Dept: FAMILY MEDICINE | Facility: CLINIC | Age: 83
End: 2018-01-03
Payer: COMMERCIAL

## 2018-01-03 VITALS
TEMPERATURE: 98 F | WEIGHT: 205 LBS | HEIGHT: 67 IN | HEART RATE: 97 BPM | SYSTOLIC BLOOD PRESSURE: 108 MMHG | DIASTOLIC BLOOD PRESSURE: 64 MMHG | BODY MASS INDEX: 32.18 KG/M2 | OXYGEN SATURATION: 97 %

## 2018-01-03 DIAGNOSIS — R07.0 THROAT PAIN: Primary | ICD-10-CM

## 2018-01-03 DIAGNOSIS — J06.9 VIRAL URI WITH COUGH: ICD-10-CM

## 2018-01-03 LAB
DEPRECATED S PYO AG THROAT QL EIA: NORMAL
SPECIMEN SOURCE: NORMAL

## 2018-01-03 PROCEDURE — 99213 OFFICE O/P EST LOW 20 MIN: CPT | Performed by: FAMILY MEDICINE

## 2018-01-03 PROCEDURE — 87880 STREP A ASSAY W/OPTIC: CPT | Performed by: FAMILY MEDICINE

## 2018-01-03 PROCEDURE — 87081 CULTURE SCREEN ONLY: CPT | Performed by: FAMILY MEDICINE

## 2018-01-03 NOTE — NURSING NOTE
"Chief Complaint   Patient presents with     Pharyngitis       Initial /64  Pulse 97  Temp 98  F (36.7  C) (Oral)  Ht 5' 7\" (1.702 m)  Wt 205 lb (93 kg)  SpO2 97%  BMI 32.11 kg/m2 Estimated body mass index is 32.11 kg/(m^2) as calculated from the following:    Height as of this encounter: 5' 7\" (1.702 m).    Weight as of this encounter: 205 lb (93 kg).  Medication Reconciliation: complete   Pat Johnson Certified Medical Assistant    "

## 2018-01-03 NOTE — PROGRESS NOTES
SUBJECTIVE:   Kevin Fierro is a 83 year old male who presents to clinic today for the following health issues:      Acute Illness   Acute illness concerns: Sore Throat   Onset: X1 Day     Fever: no    Chills/Sweats: no    Headache (location?): YES    Sinus Pressure:YES    Conjunctivitis:  no    Ear Pain: no    Rhinorrhea: YES    Congestion: YES    Sore Throat: YES, but resolved now.     Chest pain: no     Cough: YES-productive of clear sputum    Wheeze: no    Decreased Appetite: no    Nausea: no    Vomiting: no    Diarrhea:  no    Dysuria/Freq.: no    Fatigue/Achiness: YES- tired     Sick/Strep Exposure: YES- wife have similar symptoms     Seen in clinic November , Z-holger and prednisone was prescribed - Dx Bronchitis    Therapies Tried and outcome: OTC cold med - no relief         Problem list and histories reviewed & adjusted, as indicated.  Additional history: as documented    Patient Active Problem List   Diagnosis     Hypertension goal BP (blood pressure) < 140/90     Hypertrophy of prostate without urinary obstruction     Hearing loss     PSVT     Seborrheic dermatitis     Other and unspecified 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     Hypersomnia with sleep apnea     Family history of cardiovascular disease     Seasonal allergies     ED (erectile dysfunction)     Hyperlipidemia LDL goal <130     Lung nodules     Advanced directives, counseling/discussion     GERD (gastroesophageal reflux disease)     CKD (chronic kidney disease) stage 3, GFR 30-59 ml/min     Environmental allergies     Lumbar stenosis     Restrictive lung disease     Past Surgical History:   Procedure Laterality Date     HC COLONOSCOPY THRU STOMA, DIAGNOSTIC  2005, 5/10, 5/11    Polyps-> due 2015 - Ct Colonography negative     HC REPAIR INCISIONAL HERNIA,REDUCIBLE  1960    Hernia Repair, Incisional, Unilateral     STRESS ECHO (METRO)  7/09, 5/12, 7/17    Negative     TONSILLECTOMY & ADENOIDECTOMY  1946        Social History   Substance Use Topics     Smoking status: Never Smoker     Smokeless tobacco: Never Used     Alcohol use 2.4 - 3.0 oz/week     4 - 5 Standard drinks or equivalent per week      Comment: 4-5 drinks per week avg     Family History   Problem Relation Age of Onset     C.A.D. Father      age 50's     Hypertension Father      C.A.D. Brother      C.A.D. Brother      mid 40's     DIABETES Brother      CANCER Brother      pancreatic CA     Coronary Artery Disease Brother      Cancer - colorectal No family hx of      Prostate Cancer No family hx of          Current Outpatient Prescriptions   Medication Sig Dispense Refill     atenolol (TENORMIN) 50 MG tablet Take 1 tablet (50 mg) by mouth daily 90 tablet 3     atorvastatin (LIPITOR) 10 MG tablet Take 1 tablet (10 mg) by mouth daily 90 tablet 3     terazosin (HYTRIN) 10 MG capsule Take 1 capsule (10 mg) by mouth At Bedtime 90 capsule 3     finasteride (PROSCAR) 5 MG tablet Take 1 tablet (5 mg) by mouth daily 90 tablet 3     omeprazole 20 MG tablet Take 1 tablet (20 mg) by mouth daily Take 30-60 minutes before a meal. 90 tablet 3     sildenafil (VIAGRA) 50 MG tablet Take 1 tablet (50 mg) by mouth daily as needed for erectile dysfunction 18 tablet 3     clotrimazole-betamethasone (LOTRISONE) cream Apply  topically 2 times daily. 45 g 3     selenium sulfide (SELSUN) 2.5 % shampoo Apply daily to every other day - lather, wait 10 minutes and rinse 360 mL 3     oxymetazoline (AFRIN) 0.05 % nasal spray Spray 2 sprays into both nostrils as needed.       CALCIUM 600 + D 600-200 MG-UNIT OR TABS  3 MONTHS 3     MULTI-VITAMIN OR TABS 1 tablet daily       ASPIRIN 81 MG OR TABS 1 tab po QD (Once per day)           Reviewed and updated as needed this visit by clinical staffTobacco  Allergies  Meds  Problems  Med Hx  Surg Hx  Fam Hx  Soc Hx        Reviewed and updated as needed this visit by Provider  Allergies  Meds  Problems         ROS:  Constitutional, HEENT,  "cardiovascular, pulmonary, gi and gu systems are negative, except as otherwise noted.      OBJECTIVE:   /64  Pulse 97  Temp 98  F (36.7  C) (Oral)  Ht 5' 7\" (1.702 m)  Wt 205 lb (93 kg)  SpO2 97%  BMI 32.11 kg/m2  Body mass index is 32.11 kg/(m^2).  GENERAL: healthy, alert and no distress  EYES: Eyes grossly normal to inspection, PERRL and conjunctivae and sclerae normal  HENT: ear canals and TM's normal, nose and mouth without ulcers or lesions  NECK: no adenopathy and no asymmetry, masses, or scars  RESP: lungs clear to auscultation - no rales, rhonchi or wheezes  CV: regular rate and rhythm, normal S1 S2, no S3 or S4, no murmur, click or rub, no peripheral edema and peripheral pulses strong  ABDOMEN: soft, nontender, no hepatosplenomegaly, no masses and bowel sounds normal  MS: no gross musculoskeletal defects noted, no edema    Diagnostic Test Results:  Rapid strep - negative, culture pending    ASSESSMENT/PLAN:   1. Throat pain: likely due to viral upper respiratory infection. Follow-up on strep culture  - Strep, Rapid Screen  - Beta strep group A culture    2. Viral URI with cough: hemodynamically stable, afebrile and non-toxic appearing. The signs and symptoms are consistent with viral upper respiratory illness. The patient is well appearing and in no significant distress. The patient will be treated symptomatically on an outpatient basis. Encourage oral hydration, acetaminophen for sore throat. Can also utilize salt water gargles. If any worsening of cough, new respiratory symptoms, return to clinic for further evaluation    Jesús Yeh, DO  [facility]  "

## 2018-01-03 NOTE — MR AVS SNAPSHOT
"              After Visit Summary   1/3/2018    Kevin Fierro    MRN: [id number]           Patient Information     Date Of Birth          5/11/1934        Visit Information        Provider Department      1/3/2018 10:40 AM [facility]        Today's Diagnoses     Throat pain    -  1    Viral URI with cough           Follow-ups after your visit        Who to contact     If you have questions or need follow up information about today's clinic visit or your schedule please contact [facility] directly at [phone].  Normal or non-critical lab and imaging results will be communicated to you by MediSwipehart, letter or phone within 4 business days after the clinic has received the results. If you do not hear from us within 7 days, please contact the clinic through Cronotet or phone. If you have a critical or abnormal lab result, we will notify you by phone as soon as possible.  Submit refill requests through MetroLinked or call your pharmacy and they will forward the refill request to us. Please allow 3 business days for your refill to be completed.          Additional Information About Your Visit        MyChart Information     MetroLinked gives you secure access to your electronic health record. If you see a primary care provider, you can also send messages to your care team and make appointments. If you have questions, please call your primary care clinic.  If you do not have a primary care provider, please call [phone] and they will assist you.        Care EveryWhere ID     This is your Care EveryWhere ID. This could be used by other organizations to access your [place] medical records  [id number]        Your Vitals Were     Pulse Temperature Height Pulse Oximetry BMI (Body Mass Index)       97 98  F (36.7  C) (Oral) 5' 7\" (1.702 m) 97% 32.11 kg/m2        Blood Pressure from Last 3 Encounters:   01/03/18 108/64   11/27/17 148/82   11/17/17 124/70    Weight from Last 3 " Encounters:   01/03/18 205 lb (93 kg)   11/27/17 201 lb (91.2 kg)   11/17/17 202 lb (91.6 kg)              We Performed the Following     Beta strep group A culture     Strep, Rapid Screen        Primary Care Provider Office Phone # Fax #    Chance Espinosa [phone] [address] [facility] [address]        Equal Access to Services     [facility]IS[facility] : Hadii aad ku hadasho Soomaali, waaxda luqadaha, qaybta kaalmada adeegyada, waxay idiin hayaan adeeg khdarinelje lapatrice . So [facility] [phone].    ATENCIÓN: Si per rodriguez, tiene a helms disposición servicios gratuitos de asistencia lingüística. Llame al [phone].    We comply with applicable federal civil rights laws and [place] laws. We do not discriminate on the basis of race, color, national origin, age, disability, sex, sexual orientation, or gender identity.            Thank you!     Thank you for choosing [facility]  for your care. Our goal is always to provide you with excellent care. Hearing back from our patients is one way we can continue to improve our services. Please take a few minutes to complete the written survey that you may receive in the mail after your visit with us. Thank you!             Your Updated Medication List - Protect others around you: Learn how to safely use, store and throw away your medicines at www.disposemymeds.org.          This list is accurate as of: 1/3/18 11:30 AM.  Always use your most recent med list.                   Brand Name Dispense Instructions for use Diagnosis    aspirin 81 MG tablet      1 tab po QD (Once per day)        atenolol 50 MG tablet    TENORMIN    90 tablet    Take 1 tablet (50 mg) by mouth daily    CKD (chronic kidney disease) stage 3, GFR 30-59 ml/min, Hypertension goal BP (blood pressure) < 140/90       atorvastatin 10 MG tablet    LIPITOR    90 tablet    Take 1 tablet (10 mg) by mouth daily    Hyperlipidemia LDL goal <130       CALCIUM 600 + D 600-200 MG-UNIT  Tabs     3 MONTHS         clotrimazole-betamethasone cream    LOTRISONE    45 g    Apply  topically 2 times daily.    Jock itch       finasteride 5 MG tablet    PROSCAR    90 tablet    Take 1 tablet (5 mg) by mouth daily    Hypertrophy of prostate without urinary obstruction       Multi-vitamin Tabs tablet   Generic drug:  multivitamin, therapeutic with minerals      1 tablet daily        omeprazole 20 MG tablet     90 tablet    Take 1 tablet (20 mg) by mouth daily Take 30-60 minutes before a meal.    Gastroesophageal reflux disease without esophagitis       oxymetazoline 0.05 % spray    AFRIN     Spray 2 sprays into both nostrils as needed.        selenium sulfide 2.5 % lotion    SELSUN    360 mL    Apply daily to every other day - lather, wait 10 minutes and rinse    Seborrheic dermatitis, unspecified       sildenafil 50 MG tablet    VIAGRA    18 tablet    Take 1 tablet (50 mg) by mouth daily as needed for erectile dysfunction    Erectile dysfunction, unspecified erectile dysfunction type       terazosin 10 MG capsule    HYTRIN    90 capsule    Take 1 capsule (10 mg) by mouth At Bedtime    CKD (chronic kidney disease) stage 3, GFR 30-59 ml/min, Hypertension goal BP (blood pressure) < 140/90

## 2018-01-04 LAB
BACTERIA SPEC CULT: NORMAL
SPECIMEN SOURCE: NORMAL

## 2018-01-16 ENCOUNTER — TRANSFERRED RECORDS (OUTPATIENT)
Dept: HEALTH INFORMATION MANAGEMENT | Facility: CLINIC | Age: 83
End: 2018-01-16

## 2018-05-04 ENCOUNTER — TRANSFERRED RECORDS (OUTPATIENT)
Dept: HEALTH INFORMATION MANAGEMENT | Facility: CLINIC | Age: 83
End: 2018-05-04

## 2018-05-10 ENCOUNTER — THERAPY VISIT (OUTPATIENT)
Dept: PHYSICAL THERAPY | Facility: CLINIC | Age: 83
End: 2018-05-10
Payer: COMMERCIAL

## 2018-05-10 DIAGNOSIS — M25.551 BILATERAL HIP PAIN: Primary | ICD-10-CM

## 2018-05-10 DIAGNOSIS — M70.61 GREATER TROCHANTERIC BURSITIS OF BOTH HIPS: ICD-10-CM

## 2018-05-10 DIAGNOSIS — M70.62 GREATER TROCHANTERIC BURSITIS OF BOTH HIPS: ICD-10-CM

## 2018-05-10 DIAGNOSIS — M25.552 BILATERAL HIP PAIN: Primary | ICD-10-CM

## 2018-05-10 PROCEDURE — G8978 MOBILITY CURRENT STATUS: HCPCS | Mod: GP | Performed by: PHYSICAL THERAPIST

## 2018-05-10 PROCEDURE — 97110 THERAPEUTIC EXERCISES: CPT | Mod: GP | Performed by: PHYSICAL THERAPIST

## 2018-05-10 PROCEDURE — 97161 PT EVAL LOW COMPLEX 20 MIN: CPT | Mod: GP | Performed by: PHYSICAL THERAPIST

## 2018-05-10 PROCEDURE — G8979 MOBILITY GOAL STATUS: HCPCS | Mod: GP | Performed by: PHYSICAL THERAPIST

## 2018-05-10 NOTE — MR AVS SNAPSHOT
After Visit Summary   5/10/2018    Kevin Fierro    MRN: [id number]           Patient Information     Date Of Birth          5/11/1934        Visit Information        Provider Department      5/10/2018 12:30 PM Job Georges PT [facility]        Today's Diagnoses     Bilateral hip pain    -  1    Greater trochanteric bursitis of both hips           Follow-ups after your visit        Your next 10 appointments already scheduled     May 24, 2018 10:30 AM CDT   NADIR Extremity with Job Georges PT   [facility] (NA[facility])    [address] [facility] [address] [phone]              Who to contact     If you have questions or need follow up information about today's clinic visit or your schedule please contact [facility] directly at [phone].  Normal or non-critical lab and imaging results will be communicated to you by MyChart, letter or phone within 4 business days after the clinic has received the results. If you do not hear from us within 7 days, please contact the clinic through Mowblyhart or phone. If you have a critical or abnormal lab result, we will notify you by phone as soon as possible.  Submit refill requests through LoLo or call your pharmacy and they will forward the refill request to us. Please allow 3 business days for your refill to be completed.          Additional Information About Your Visit        MyChart Information     LoLo gives you secure access to your electronic health record. If you see a primary care provider, you can also send messages to your care team and make appointments. If you have questions, please call your primary care clinic.  If you do not have a primary care provider, please call [phone] and they will assist you.        Care EveryWhere ID     This is your Care EveryWhere ID. This could be used by other organizations to access your [place]  medical records  [id number]         Blood Pressure from Last 3 Encounters:   01/03/18 108/64   11/27/17 148/82   11/17/17 124/70    Weight from Last 3 Encounters:   01/03/18 93 kg (205 lb)   11/27/17 91.2 kg (201 lb)   11/17/17 91.6 kg (202 lb)              We Performed the Following     HC PT EVAL, LOW COMPLEXITY     NADIR INITIAL EVAL REPORT     THERAPEUTIC EXERCISES        Primary Care Provider Office Phone # Fax #    Chance Espinosa [phone] [address] [facility] [address]        Equal Access to Services     [facility]: Hadii aad ku hadasho Soodessa, waaxda luqadaha, qaybta kaalmada adejohnathanyada, mahad redmond . So [facility] [phone].    ATENCIÓN: Si habla español, tiene a helms disposición servicios gratuitos de asistencia lingüística. [facility] [phone].    We comply with applicable federal civil rights laws and [place] laws. We do not discriminate on the basis of race, color, national origin, age, disability, sex, sexual orientation, or gender identity.            Thank you!     Thank you for choosing [facility]  for your care. Our goal is always to provide you with excellent care. Hearing back from our patients is one way we can continue to improve our services. Please take a few minutes to complete the written survey that you may receive in the mail after your visit with us. Thank you!             Your Updated Medication List - Protect others around you: Learn how to safely use, store and throw away your medicines at www.disposemymeds.org.          This list is accurate as of 5/10/18 12:55 PM.  Always use your most recent med list.                   Brand Name Dispense Instructions for use Diagnosis    aspirin 81 MG tablet      1 tab po QD (Once per day)        atenolol 50 MG tablet    TENORMIN    90 tablet    Take 1 tablet (50 mg) by mouth daily    CKD (chronic kidney disease) stage 3, GFR 30-59 ml/min, Hypertension  goal BP (blood pressure) < 140/90       atorvastatin 10 MG tablet    LIPITOR    90 tablet    Take 1 tablet (10 mg) by mouth daily    Hyperlipidemia LDL goal <130       CALCIUM 600 + D 600-200 MG-UNIT Tabs     3 MONTHS         clotrimazole-betamethasone cream    LOTRISONE    45 g    Apply  topically 2 times daily.    Jock itch       finasteride 5 MG tablet    PROSCAR    90 tablet    Take 1 tablet (5 mg) by mouth daily    Hypertrophy of prostate without urinary obstruction       Multi-vitamin Tabs tablet   Generic drug:  multivitamin, therapeutic with minerals      1 tablet daily        omeprazole 20 MG tablet     90 tablet    Take 1 tablet (20 mg) by mouth daily Take 30-60 minutes before a meal.    Gastroesophageal reflux disease without esophagitis       oxymetazoline 0.05 % spray    AFRIN     Spray 2 sprays into both nostrils as needed.        selenium sulfide 2.5 % lotion    SELSUN    360 mL    Apply daily to every other day - lather, wait 10 minutes and rinse    Seborrheic dermatitis, unspecified       sildenafil 50 MG tablet    VIAGRA    18 tablet    Take 1 tablet (50 mg) by mouth daily as needed for erectile dysfunction    Erectile dysfunction, unspecified erectile dysfunction type       terazosin 10 MG capsule    HYTRIN    90 capsule    Take 1 capsule (10 mg) by mouth At Bedtime    CKD (chronic kidney disease) stage 3, GFR 30-59 ml/min, Hypertension goal BP (blood pressure) < 140/90

## 2018-05-10 NOTE — PROGRESS NOTES
[facility] Initial Evaluation  Subjective:  Patient is a 83 year old male presenting with rehab left hip hpi. The history is provided by the patient. No  was used.   Kevin Fierro is a 83 year old male with a bilateral hips (pain changes L, R hip pain) condition.  Occurance: Pt with bilat Hip pain, have had 2 rounds of hip injections with most recent injections being 2 weeks ago.  feeling better now.  Pt reports having pain lying on side sleeping.  Condition occurred: for unknown reasons.  This is a recurrent and chronic condition  April 2018.    Patient reports pain:  Lateral.    Pain is described as aching and is intermittent and reported as 1/10 and 2/10.  Associated symptoms:  Loss of strength. Pain is worse in the P.M..  Symptoms are exacerbated by walking, standing, ascending stairs and descending stairs and relieved by rest.          General health as reported by patient is good.  Pertinent medical history includes:  Osteoarthritis, heart problems and overweight (Lumbar DJD).  Medical allergies: no.  Other surgeries include:  None reported.  Current medications:  None as reported by the patient.  Current occupation is Retired  .        Barriers include:  Stairs.    Red flags:  None as reported by the patient.                        Objective:    Gait:    Gait Type:  Antalgic   Weight Bearing Status:  WBAT   Assistive Devices:  None                                                   Hip Evaluation  Hip PROM:    Flexion: Left: 100   Right: 100        Internal Rotation: Left: 30    Right: 30  External Rotation: Left: 50    Right: 50              Hip Strength:      Extension:  Left: 4+/5  Pain:Right: 4+/5    Pain:    Abduction:  Left: 5-/5     Pain:Right: 4+/5    Pain:    Internal Rotation:  Left: 5/5    Pain:Right: 5/5   Pain:  External Rotation:  Left: 5-/5   Pain:  Right: 5-/5   Pain:            Hip Special Testing:      Left hip negative for the following special  tests:  Fadir/Labrum or SLR  Right hip negative for the following special tests:  Fadir/Labrum or SLR    Hip Palpation:    Left hip tenderness present at:   Greater Trachanter; Abductors; Gluteus Medius and Bursa  Left hip tenderness not present at:  IT Band  Right hip tenderness present at:  Greater Trachanter; Abductors; Gluteus Medius and Bursa  Right hip tenderness not present at:  IT Band             General     ROS    Assessment/Plan:    Patient is a 83 year old male with both sides hip complaints.    Patient has the following significant findings with corresponding treatment plan.                Diagnosis 1:  B hip pain, Greater Trochanter Bursitis  Pain -  hot/cold therapy, self management, education and home program  Decreased strength - therapeutic exercise and therapeutic activities  Impaired balance - neuro re-education and therapeutic activities  Decreased proprioception - neuro re-education and therapeutic activities  Impaired gait - gait training  Decreased function - therapeutic activities    Therapy Evaluation Codes:   1) History comprised of:   Personal factors that impact the plan of care:      Age.    Comorbidity factors that impact the plan of care are:      Overweight.     Medications impacting care: None.  2) Examination of Body Systems comprised of:   Body structures and functions that impact the plan of care:      Hip.   Activity limitations that impact the plan of care are:      Lifting, Stairs, Walking and Laying down.  3) Clinical presentation characteristics are:   Stable/Uncomplicated.  4) Decision-Making    Low complexity using standardized patient assessment instrument and/or measureable assessment of functional outcome.  Cumulative Therapy Evaluation is: Low complexity.    Previous and current functional limitations:  (See Goal Flow Sheet for this information)    Short term and Long term goals: (See Goal Flow Sheet for this information)     Communication ability:  Patient appears to  be able to clearly communicate and understand verbal and written communication and follow directions correctly.  Treatment Explanation - The following has been discussed with the patient:   RX ordered/plan of care  Anticipated outcomes  Possible risks and side effects  This patient would benefit from PT intervention to resume normal activities.   Rehab potential is good.    Frequency:  3 X month, once daily  Duration:  for 2 months  Discharge Plan:  Achieve all LTG.  Independent in home treatment program.  Reach maximal therapeutic benefit.    Please refer to the daily flowsheet for treatment today, total treatment time and time spent performing 1:1 timed codes.

## 2018-05-10 NOTE — LETTER
[facility]TH[facility]  [address]  [facility] [address]  [phone]    May 10, 2018    Re: Kevin Fierro   :   1934  MRN:  [id number]   REFERRING PHYSICIAN:   Yunior Crespo    [facility]    Date of Initial Evaluation:  05/10/2018  Visits:  Rxs Used: 1  Reason for Referral:     Bilateral hip pain  Greater trochanteric bursitis of both hips    EVALUATION SUMMARY    [facility] Initial Evaluation  Subjective:  Patient is a 83 year old male presenting with rehab left hip hpi. The history is provided by the patient. No  was used.   Kevin Fierro is a 83 year old male with a bilateral hips (pain changes L, R hip pain) condition.  Occurance: Pt with bilat Hip pain, have had 2 rounds of hip injections with most recent injections being 2 weeks ago.  feeling better now.  Pt reports having pain lying on side sleeping.  Condition occurred: for unknown reasons.  This is a recurrent and chronic condition  2018.    Patient reports pain:  Lateral.    Pain is described as aching and is intermittent and reported as 1/10 and 2/10.  Associated symptoms:  Loss of strength. Pain is worse in the P.M..  Symptoms are exacerbated by walking, standing, ascending stairs and descending stairs and relieved by rest.          General health as reported by patient is good.  Pertinent medical history includes:  Osteoarthritis, heart problems and overweight (Lumbar DJD).  Medical allergies: no.  Other surgeries include:  None reported.  Current medications:  None as reported by the patient.  Current occupation is Retired  Barriers include:  Stairs.  Red flags:  None as reported by the patient.  Objective:  Gait:    Gait Type:  Antalgic   Weight Bearing Status:  WBAT   Assistive Devices:  None   Hip Evaluation  Hip PROM:    Flexion: Left: 100   Right: 100  Internal Rotation: Left: 30    Right: 30  External Rotation: Left: 50    Right:  50    Hip Strength:    Extension:  Left: 4+/5  Pain:Right: 4+/5    Pain:    Abduction:  Left: 5-/5     Pain:Right: 4+/5    Pain:  Internal Rotation:  Left: 5/5    Pain:Right: 5/5   Pain:  External Rotation:  Left: 5-/5   Pain:  Right: 5-/5   Pain:  Hip Special Testing:    Left hip negative for the following special tests:  Fadir/Labrum or SLR  Re: Kevin Fierro   :   1934      Right hip negative for the following special tests:  Fadir/Labrum or SLR    Hip Palpation:    Left hip tenderness present at:   Greater Trachanter; Abductors; Gluteus Medius and Bursa  Left hip tenderness not present at:  IT Band  Right hip tenderness present at:  Greater Trachanter; Abductors; Gluteus Medius and Bursa  Right hip tenderness not present at:  IT Band    Assessment/Plan:    Patient is a 83 year old male with both sides hip complaints.    Patient has the following significant findings with corresponding treatment plan.                Diagnosis 1:  B hip pain, Greater Trochanter Bursitis  Pain -  hot/cold therapy, self management, education and home program  Decreased strength - therapeutic exercise and therapeutic activities  Impaired balance - neuro re-education and therapeutic activities  Decreased proprioception - neuro re-education and therapeutic activities  Impaired gait - gait training  Decreased function - therapeutic activities    Therapy Evaluation Codes:   1) History comprised of:   Personal factors that impact the plan of care:      Age.    Comorbidity factors that impact the plan of care are:      Overweight.     Medications impacting care: None.  2) Examination of Body Systems comprised of:   Body structures and functions that impact the plan of care:      Hip.   Activity limitations that impact the plan of care are:      Lifting, Stairs, Walking and Laying down.  3) Clinical presentation characteristics are:   Stable/Uncomplicated.  4) Decision-Making    Low complexity using standardized patient  assessment instrument and/or measureable assessment of functional outcome.  Cumulative Therapy Evaluation is: Low complexity.    Previous and current functional limitations:  (See Goal Flow Sheet for this information)    Short term and Long term goals: (See Goal Flow Sheet for this information)     Communication ability:  Patient appears to be able to clearly communicate and understand verbal and written communication and follow directions correctly.  Treatment Explanation - The following has been discussed with the patient:   RX ordered/plan of care  Anticipated outcomes  Possible risks and side effects  This patient would benefit from PT intervention to resume normal activities.     Re: Kevin Fierro   :   1934    Rehab potential is good.    Frequency:  3 X month, once daily  Duration:  for 2 months  Discharge Plan:  Achieve all LTG.  Independent in home treatment program.  Reach maximal therapeutic benefit.    Thank you for your referral.    INQUIRIES  Therapist: Den Georges, PT  [facility]  [address] [address]  Phone: [phone]  Fax: [phone]

## 2018-06-04 ENCOUNTER — THERAPY VISIT (OUTPATIENT)
Dept: PHYSICAL THERAPY | Facility: CLINIC | Age: 83
End: 2018-06-04
Payer: COMMERCIAL

## 2018-06-04 DIAGNOSIS — M70.61 GREATER TROCHANTERIC BURSITIS OF BOTH HIPS: ICD-10-CM

## 2018-06-04 DIAGNOSIS — M70.62 GREATER TROCHANTERIC BURSITIS OF BOTH HIPS: ICD-10-CM

## 2018-06-04 DIAGNOSIS — M25.552 BILATERAL HIP PAIN: ICD-10-CM

## 2018-06-04 DIAGNOSIS — M25.551 BILATERAL HIP PAIN: ICD-10-CM

## 2018-06-04 PROCEDURE — 97110 THERAPEUTIC EXERCISES: CPT | Mod: GP | Performed by: PHYSICAL THERAPIST

## 2018-06-04 PROCEDURE — 97112 NEUROMUSCULAR REEDUCATION: CPT | Mod: GP | Performed by: PHYSICAL THERAPIST

## 2018-06-18 DIAGNOSIS — I10 HYPERTENSION GOAL BP (BLOOD PRESSURE) < 140/90: ICD-10-CM

## 2018-06-18 DIAGNOSIS — N18.30 CKD (CHRONIC KIDNEY DISEASE) STAGE 3, GFR 30-59 ML/MIN (H): ICD-10-CM

## 2018-06-19 NOTE — TELEPHONE ENCOUNTER
"Requested Prescriptions   Pending Prescriptions Disp Refills     atenolol (TENORMIN) 50 MG tablet [Pharmacy Med Name: ATENOLOL 50MG TABS] 90 tablet 3        Last Written Prescription Date:  5.31.17  Last Fill Quantity: 90,  # refills: 3   Last Office Visit: 1/3/2018   Future Office Visit:      Sig: TAKE ONE TABLET BY MOUTH EVERY DAY    Beta-Blockers Protocol Passed    6/18/2018  8:23 PM       Passed - Blood pressure under 140/90 in past 12 months    BP Readings from Last 3 Encounters:   01/03/18 108/64   11/27/17 148/82   11/17/17 124/70                Passed - Patient is age 6 or older       Passed - Recent (12 mo) or future (30 days) visit within the authorizing provider's specialty    Patient had office visit in the last 12 months or has a visit in the next 30 days with authorizing provider or within the authorizing provider's specialty.  See \"Patient Info\" tab in inbasket, or \"Choose Columns\" in Meds & Orders section of the refill encounter.            terazosin (HYTRIN) 10 MG capsule [Pharmacy Med Name: TERAZOSIN *10MG* CAPS] 90 capsule 3     Sig: TAKE ONE CAPSULE BY MOUTH AT BEDTIME    Alpha Blockers Failed    6/18/2018  8:23 PM       Failed - Patient does not have Tadalafil, Vardenafil, or Sildenafil on their medication list       Passed - Blood pressure under 140/90 in past 12 months    BP Readings from Last 3 Encounters:   01/03/18 108/64   11/27/17 148/82   11/17/17 124/70                Passed - Recent (12 mo) or future (30 days) visit within the authorizing provider's specialty    Patient had office visit in the last 12 months or has a visit in the next 30 days with authorizing provider or within the authorizing provider's specialty.  See \"Patient Info\" tab in inbasket, or \"Choose Columns\" in Meds & Orders section of the refill encounter.           Passed - Patient is 18 years of age or older          "

## 2018-06-20 RX ORDER — ATENOLOL 50 MG/1
TABLET ORAL
Qty: 90 TABLET | Refills: 3 | Status: SHIPPED | OUTPATIENT
Start: 2018-06-20 | End: 2018-09-20

## 2018-06-20 RX ORDER — TERAZOSIN 10 MG/1
CAPSULE ORAL
Qty: 90 CAPSULE | Refills: 3 | Status: SHIPPED | OUTPATIENT
Start: 2018-06-20 | End: 2018-09-20

## 2018-06-21 NOTE — TELEPHONE ENCOUNTER
Left non-detailed message for patient to call back.  Please schedule follow up when patient calls back.  (see previous notes for details)  Patient currently at [facility]    [facility] Campuzano

## 2018-07-02 ENCOUNTER — TELEPHONE (OUTPATIENT)
Dept: FAMILY MEDICINE | Facility: CLINIC | Age: 83
End: 2018-07-02

## 2018-07-02 ENCOUNTER — OFFICE VISIT (OUTPATIENT)
Dept: FAMILY MEDICINE | Facility: CLINIC | Age: 83
End: 2018-07-02
Payer: COMMERCIAL

## 2018-07-02 ENCOUNTER — RADIANT APPOINTMENT (OUTPATIENT)
Dept: GENERAL RADIOLOGY | Facility: CLINIC | Age: 83
End: 2018-07-02
Attending: FAMILY MEDICINE
Payer: COMMERCIAL

## 2018-07-02 VITALS
SYSTOLIC BLOOD PRESSURE: 130 MMHG | OXYGEN SATURATION: 96 % | TEMPERATURE: 97.8 F | HEART RATE: 59 BPM | BODY MASS INDEX: 32.49 KG/M2 | DIASTOLIC BLOOD PRESSURE: 78 MMHG | WEIGHT: 207 LBS | HEIGHT: 67 IN

## 2018-07-02 DIAGNOSIS — N18.30 CKD (CHRONIC KIDNEY DISEASE) STAGE 3, GFR 30-59 ML/MIN (H): ICD-10-CM

## 2018-07-02 DIAGNOSIS — I47.10 PAROXYSMAL SUPRAVENTRICULAR TACHYCARDIA (H): ICD-10-CM

## 2018-07-02 DIAGNOSIS — S20.211A RIB CONTUSION, RIGHT, INITIAL ENCOUNTER: Primary | ICD-10-CM
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
## 2018-07-02 PROCEDURE — 71101 X-RAY EXAM UNILAT RIBS/CHEST: CPT | Mod: RT

## 2018-07-02 PROCEDURE — 99213 OFFICE O/P EST LOW 20 MIN: CPT | Performed by: FAMILY MEDICINE

## 2018-07-02 RX ORDER — ACETAMINOPHEN AND CODEINE PHOSPHATE 300; 30 MG/1; MG/1
1-2 TABLET ORAL EVERY 6 HOURS PRN
Qty: 18 TABLET | Refills: 0 | Status: SHIPPED | OUTPATIENT
Start: 2018-07-02 | End: 2018-09-20

## 2018-07-02 NOTE — MR AVS SNAPSHOT
After Visit Summary   7/2/2018    Kevin Fierro    MRN: [id number]           Patient Information     Date Of Birth          5/11/1934        Visit Information        Provider Department      7/2/2018 10:00 AM Lane Gaston MD [facility]        Today's Diagnoses     Rib contusion, right, initial encounter    -  1    h/o Paroxysmal supraventricular tachycardia (H)        CKD (chronic kidney disease) stage 3, GFR 30-59 ml/min           Follow-ups after your visit        Follow-up notes from your care team     Return in about 1 week (around 7/9/2018), or if symptoms worsen or fail to improve.      Your next 10 appointments already scheduled     Jul 06, 2018 10:30 AM CDT   NADIR Extremity with Job Georges PT   [facility] [place] (NADIR [facility])    [address]  [place] [address] [phone]              Who to contact     If you have questions or need follow up information about today's clinic visit or your schedule please contact [facility] directly at [phone].  Normal or non-critical lab and imaging results will be communicated to you by MyChart, letter or phone within 4 business days after the clinic has received the results. If you do not hear from us within 7 days, please contact the clinic through [employer] or phone. If you have a critical or abnormal lab result, we will notify you by phone as soon as possible.  Submit refill requests through [employer] or call your pharmacy and they will forward the refill request to us. Please allow 3 business days for your refill to be completed.          Additional Information About Your Visit        MyChart Information     [employer] gives you secure access to your electronic health record. If you see a primary care provider, you can also send messages to your care team and make appointments. If you have questions, please call your primary care clinic.  If you do not have a primary care  "provider, please call [phone] and they will assist you.        Care EveryWhere ID     This is your Care EveryWhere ID. This could be used by other organizations to access your [place] medical records  [id number]        Your Vitals Were     Pulse Temperature Height Pulse Oximetry BMI (Body Mass Index)       59 97.8  F (36.6  C) (Oral) 5' 7\" (1.702 m) 96% 32.42 kg/m2        Blood Pressure from Last 3 Encounters:   07/02/18 130/78   01/03/18 108/64   11/27/17 148/82    Weight from Last 3 Encounters:   07/02/18 207 lb (93.9 kg)   01/03/18 205 lb (93 kg)   11/27/17 201 lb (91.2 kg)                 Today's Medication Changes          These changes are accurate as of 7/2/18 10:44 AM.  If you have any questions, ask your nurse or doctor.               Start taking these medicines.        Dose/Directions    acetaminophen-codeine 300-30 MG per tablet   Commonly known as:  TYLENOL WITH CODEINE #3   Used for:  Rib contusion, right, initial encounter   Started by:  Lane Gaston MD        Dose:  1-2 tablet   Take 1-2 tablets by mouth every 6 hours as needed for mild pain   Quantity:  18 tablet   Refills:  0            Where to get your medicines      Some of these will need a paper prescription and others can be bought over the counter.  Ask your nurse if you have questions.     Bring a paper prescription for each of these medications     acetaminophen-codeine 300-30 MG per tablet               Information about OPIOIDS     PRESCRIPTION OPIOIDS: WHAT YOU NEED TO KNOW   We gave you an opioid (narcotic) pain medicine. It is important to manage your pain, but opioids are not always the best choice. You should first try all the other options your care team gave you. Take this medicine for as short a time (and as few doses) as possible.     These medicines have risks:    DO NOT drive when on new or higher doses of pain medicine. These medicines can affect your alertness and reaction times, and you could be arrested for " driving under the influence (DUI). If you need to use opioids long-term, talk to your care team about driving.    DO NOT operate heave machinery    DO NOT do any other dangerous activities while taking these medicines.     DO NOT drink any alcohol while taking these medicines.      If the opioid prescribed includes acetaminophen, DO NOT take with any other medicines that contain acetaminophen. Read all labels carefully. Look for the word  acetaminophen  or  Tylenol.  Ask your pharmacist if you have questions or are unsure.    You can get addicted to pain medicines, especially if you have a history of addiction (chemical, alcohol or substance dependence). Talk to your care team about ways to reduce this risk.    Store your pills in a secure place, locked if possible. We will not replace any lost or stolen medicine. If you don t finish your medicine, please throw away (dispose) as directed by your pharmacist. The [place] Pollution Control Agency has more information about safe disposal: https://www.pca.[facility].us/living-green/managing-unwanted-medications.     All opioids tend to cause constipation. Drink plenty of water and eat foods that have a lot of fiber, such as fruits, vegetables, prune juice, apple juice and high-fiber cereal. Take a laxative (Miralax, milk of magnesia, Colace, Senna) if you don t move your bowels at least every other day.          Primary Care Provider Office Phone # Fax #    Chance Espinosa [phone]       [address] [place] [address]        Equal Access to Services     [facility] CenterELISABETH AH: Hadii aad ku hadasho Soodessa, waaxda luqadaha, qaybta kaalmada adejohnathanyada, mahda dias. So [facility] [phone].    ATENCIÓN: Si habla español, tiene a helms disposición servicios gratuitos de asistencia lingüística. Llame al [phone].    We comply with applicable federal civil rights laws and [place] laws. We do not discriminate on the basis of  race, color, national origin, age, disability, sex, sexual orientation, or gender identity.            Thank you!     Thank you for choosing [facility]  for your care. Our goal is always to provide you with excellent care. Hearing back from our patients is one way we can continue to improve our services. Please take a few minutes to complete the written survey that you may receive in the mail after your visit with us. Thank you!             Your Updated Medication List - Protect others around you: Learn how to safely use, store and throw away your medicines at www.disposemymeds.org.          This list is accurate as of 7/2/18 10:44 AM.  Always use your most recent med list.                   Brand Name Dispense Instructions for use Diagnosis    acetaminophen-codeine 300-30 MG per tablet    TYLENOL WITH CODEINE #3    18 tablet    Take 1-2 tablets by mouth every 6 hours as needed for mild pain    Rib contusion, right, initial encounter       aspirin 81 MG tablet      1 tab po QD (Once per day)        atenolol 50 MG tablet    TENORMIN    90 tablet    TAKE ONE TABLET BY MOUTH EVERY DAY    CKD (chronic kidney disease) stage 3, GFR 30-59 ml/min, Hypertension goal BP (blood pressure) < 140/90       atorvastatin 10 MG tablet    LIPITOR    90 tablet    Take 1 tablet (10 mg) by mouth daily    Hyperlipidemia LDL goal <130       CALCIUM 600 + D 600-200 MG-UNIT Tabs     3 MONTHS         clotrimazole-betamethasone cream    LOTRISONE    45 g    Apply  topically 2 times daily.    Jock itch       finasteride 5 MG tablet    PROSCAR    90 tablet    Take 1 tablet (5 mg) by mouth daily    Hypertrophy of prostate without urinary obstruction       Multi-vitamin Tabs tablet   Generic drug:  multivitamin, therapeutic with minerals      1 tablet daily        omeprazole 20 MG tablet     90 tablet    Take 1 tablet (20 mg) by mouth daily Take 30-60 minutes before a meal.    Gastroesophageal reflux disease without esophagitis        oxymetazoline 0.05 % spray    AFRIN     Spray 2 sprays into both nostrils as needed.        selenium sulfide 2.5 % lotion    SELSUN    360 mL    Apply daily to every other day - lather, wait 10 minutes and rinse    Seborrheic dermatitis, unspecified       sildenafil 50 MG tablet    VIAGRA    18 tablet    Take 1 tablet (50 mg) by mouth daily as needed for erectile dysfunction    Erectile dysfunction, unspecified erectile dysfunction type       terazosin 10 MG capsule    HYTRIN    90 capsule    TAKE ONE CAPSULE BY MOUTH AT BEDTIME    CKD (chronic kidney disease) stage 3, GFR 30-59 ml/min, Hypertension goal BP (blood pressure) < 140/90

## 2018-07-02 NOTE — PROGRESS NOTES
"  SUBJECTIVE:                                                    Kevin Fierro is a 84 year old male who presents to clinic today for the following health issues:    3 days ago pt fell on steps going into the cabin. Landed on elbow and this poked into his rib area.  Pt had a large bruise on right side rib cage. Pt did not hit their head or lose consciousness.        Duration: 3 days    Description (location/character/radiation): no pain with deep breaths, pain with stretching/flexing the muscles, reaching, getting out of bed/chair    Intensity:  moderate    Accompanying signs and symptoms: denies redness, swelling, SHORTNESS OF BREATH but pain with a full breath    History (similar episodes/previous evaluation): None    Precipitating or alleviating factors: tylenol and ice helped    Therapies tried and outcome: tylenol, ice- helped a small amount    Problem list and histories reviewed & adjusted, as indicated.  Additional history: as documented    ROS:  Constitutional, HEENT, cardiovascular, pulmonary, GI, , musculoskeletal, neuro, skin, endocrine and psych systems are negative, except as otherwise noted.    OBJECTIVE:                                                    /78  Pulse 59  Temp 97.8  F (36.6  C) (Oral)  Ht 5' 7\" (1.702 m)  Wt 207 lb (93.9 kg)  SpO2 96%  BMI 32.42 kg/m2 Body mass index is 32.42 kg/(m^2).   GENERAL: healthy, alert, well nourished, well hydrated, no distress  HENT: ear canals- normal; TMs- normal; Nose- normal; Mouth- no ulcers, no lesions  NECK: no tenderness, no adenopathy, no asymmetry, no masses, no stiffness; thyroid- normal to palpation  RESP: lungs clear to auscultation - no rales, no rhonchi, no wheezes  CV: regular rates and rhythm, normal S1 S2, no S3 or S4 and no murmur, no click or rub -  ABDOMEN: soft, no tenderness, no  hepatosplenomegaly, no masses, normal bowel sounds  MS: extremities- no gross deformities noted, no edema  SKIN: no suspicious lesions, no " rashes  NEURO: strength and tone- normal, sensory exam- grossly normal, mentation- intact, speech- normal, reflexes- symmetric  BACK: no CVA tenderness, no paralumbar tenderness  Diagnostic test results:  Xray - chest/rib - no fx     ASSESSMENT/PLAN:         Kevin was seen today for musculoskeletal problem.    Diagnoses and all orders for this visit:    Rib contusion, right, initial encounter  -     acetaminophen-codeine (TYLENOL WITH CODEINE #3) 300-30 MG per tablet; Take 1-2 tablets by mouth every 6 hours as needed for mild pain    Ice or heat prn    h/o Paroxysmal supraventricular tachycardia (H)- no recent significant symptoms.     CKD (chronic kidney disease) stage 3, GFR 30-59 ml/min  - no nsaids     Risks, benefits and alternatives of treatments discussed. Plan agreed on.      Followup: Return in about 1 week (around 7/9/2018), or if symptoms worsen or fail to improve.    See patient instructions.             Ralph Gaston MD   Pager: [phone]

## 2018-07-02 NOTE — TELEPHONE ENCOUNTER
3 days ago pt fell on steps going into the cabin. Landed on elbow and this poked into his rib area.  Pt had a large bruise on right side rib cage. Pt did not hit their head or lose consciousness.       Duration: 3 days    Description (location/character/radiation): no pain with deep breaths, pain with stretching/flexing the muscles, reaching, getting out of bed    Intensity:  moderate    Accompanying signs and symptoms: denies redness, swelling, SOB    History (similar episodes/previous evaluation): None    Precipitating or alleviating factors: tylenol and ice helped    Therapies tried and outcome: tylenol, ice- helped a small amount      Advised pt to continue home cares and be seen today for evaluation. Pt wanted an appt this am, RL had an opening. Pt will be seen at 10am.    Dennise White RN  [facility] [place]

## 2018-07-20 ENCOUNTER — THERAPY VISIT (OUTPATIENT)
Dept: PHYSICAL THERAPY | Facility: CLINIC | Age: 83
End: 2018-07-20
Payer: COMMERCIAL

## 2018-07-20 DIAGNOSIS — M25.551 BILATERAL HIP PAIN: ICD-10-CM

## 2018-07-20 DIAGNOSIS — M25.552 BILATERAL HIP PAIN: ICD-10-CM

## 2018-07-20 DIAGNOSIS — M70.61 GREATER TROCHANTERIC BURSITIS OF BOTH HIPS: ICD-10-CM

## 2018-07-20 DIAGNOSIS — M70.62 GREATER TROCHANTERIC BURSITIS OF BOTH HIPS: ICD-10-CM

## 2018-07-20 PROCEDURE — 97112 NEUROMUSCULAR REEDUCATION: CPT | Mod: GP | Performed by: PHYSICAL THERAPIST

## 2018-07-20 PROCEDURE — 97110 THERAPEUTIC EXERCISES: CPT | Mod: GP | Performed by: PHYSICAL THERAPIST

## 2018-07-24 ENCOUNTER — TELEPHONE (OUTPATIENT)
Dept: FAMILY MEDICINE | Facility: CLINIC | Age: 83
End: 2018-07-24

## 2018-07-24 NOTE — TELEPHONE ENCOUNTER
Reason for Call: Request for an order     Order or referral being requested: Lab orders of physical on 9/20.  Appt for labs are earlier on 9/17    Date needed: Sept 17    Has the patient been seen by the PCP for this problem? YES    Additional comments: no    Phone number Patient can be reached at:  Home number on file [phone] (home)    Best Time:  anytime    Can we leave a detailed message on this number?  NO    Call taken on 7/24/2018 at 11:00 AM by Mary Parham

## 2018-08-09 ENCOUNTER — THERAPY VISIT (OUTPATIENT)
Dept: PHYSICAL THERAPY | Facility: CLINIC | Age: 83
End: 2018-08-09
Payer: COMMERCIAL

## 2018-08-09 DIAGNOSIS — M70.61 GREATER TROCHANTERIC BURSITIS OF BOTH HIPS: ICD-10-CM

## 2018-08-09 DIAGNOSIS — M25.551 BILATERAL HIP PAIN: ICD-10-CM

## 2018-08-09 DIAGNOSIS — M25.552 BILATERAL HIP PAIN: ICD-10-CM

## 2018-08-09 DIAGNOSIS — M70.62 GREATER TROCHANTERIC BURSITIS OF BOTH HIPS: ICD-10-CM

## 2018-08-09 PROCEDURE — 97110 THERAPEUTIC EXERCISES: CPT | Mod: GP | Performed by: PHYSICAL THERAPIST

## 2018-08-09 PROCEDURE — 97112 NEUROMUSCULAR REEDUCATION: CPT | Mod: GP | Performed by: PHYSICAL THERAPIST

## 2018-08-30 ENCOUNTER — TRANSFERRED RECORDS (OUTPATIENT)
Dept: HEALTH INFORMATION MANAGEMENT | Facility: CLINIC | Age: 83
End: 2018-08-30

## 2018-09-17 DIAGNOSIS — Z00.00 ENCOUNTER FOR ROUTINE ADULT HEALTH EXAMINATION WITHOUT ABNORMAL FINDINGS: Primary | ICD-10-CM

## 2018-09-17 LAB
ALBUMIN SERPL-MCNC: 3.2 G/DL (ref 3.4–5)
ALBUMIN UR-MCNC: 30 MG/DL
ALP SERPL-CCNC: 31 U/L (ref 40–150)
ALT SERPL W P-5'-P-CCNC: 37 U/L (ref 0–70)
ANION GAP SERPL CALCULATED.3IONS-SCNC: 11 MMOL/L (ref 3–14)
APPEARANCE UR: CLEAR
AST SERPL W P-5'-P-CCNC: 22 U/L (ref 0–45)
BILIRUB SERPL-MCNC: 0.7 MG/DL (ref 0.2–1.3)
BILIRUB UR QL STRIP: NEGATIVE
BUN SERPL-MCNC: 39 MG/DL (ref 7–30)
CALCIUM SERPL-MCNC: 8.2 MG/DL (ref 8.5–10.1)
CHLORIDE SERPL-SCNC: 110 MMOL/L (ref 94–109)
CHOLEST SERPL-MCNC: 166 MG/DL
CO2 SERPL-SCNC: 21 MMOL/L (ref 20–32)
COLOR UR AUTO: YELLOW
CREAT SERPL-MCNC: 1.28 MG/DL (ref 0.66–1.25)
CREAT UR-MCNC: 106 MG/DL
ERYTHROCYTE [DISTWIDTH] IN BLOOD BY AUTOMATED COUNT: 12.5 % (ref 10–15)
GFR SERPL CREATININE-BSD FRML MDRD: 54 ML/MIN/1.7M2
GLUCOSE SERPL-MCNC: 98 MG/DL (ref 70–99)
GLUCOSE UR STRIP-MCNC: NEGATIVE MG/DL
HBA1C MFR BLD: 5.5 % (ref 0–5.6)
HCT VFR BLD AUTO: 40.4 % (ref 40–53)
HDLC SERPL-MCNC: 57 MG/DL
HGB BLD-MCNC: 13.2 G/DL (ref 13.3–17.7)
HGB UR QL STRIP: ABNORMAL
KETONES UR STRIP-MCNC: NEGATIVE MG/DL
LDLC SERPL CALC-MCNC: 78 MG/DL
LEUKOCYTE ESTERASE UR QL STRIP: NEGATIVE
MCH RBC QN AUTO: 33.3 PG (ref 26.5–33)
MCHC RBC AUTO-ENTMCNC: 32.7 G/DL (ref 31.5–36.5)
MCV RBC AUTO: 102 FL (ref 78–100)
MICROALBUMIN UR-MCNC: 109 MG/L
MICROALBUMIN/CREAT UR: 102.83 MG/G CR (ref 0–17)
MUCOUS THREADS #/AREA URNS LPF: PRESENT /LPF
NITRATE UR QL: NEGATIVE
NON-SQ EPI CELLS #/AREA URNS LPF: ABNORMAL /LPF
NONHDLC SERPL-MCNC: 109 MG/DL
PH UR STRIP: 7 PH (ref 5–7)
PLATELET # BLD AUTO: 142 10E9/L (ref 150–450)
POTASSIUM SERPL-SCNC: 4.3 MMOL/L (ref 3.4–5.3)
PROT SERPL-MCNC: 6.5 G/DL (ref 6.8–8.8)
PSA SERPL-ACNC: 2.48 UG/L (ref 0–4)
RBC # BLD AUTO: 3.96 10E12/L (ref 4.4–5.9)
RBC #/AREA URNS AUTO: ABNORMAL /HPF
SODIUM SERPL-SCNC: 142 MMOL/L (ref 133–144)
SOURCE: ABNORMAL
SP GR UR STRIP: 1.01 (ref 1–1.03)
TRIGL SERPL-MCNC: 155 MG/DL
TSH SERPL DL<=0.005 MIU/L-ACNC: 1.6 MU/L (ref 0.4–4)
UROBILINOGEN UR STRIP-ACNC: 0.2 EU/DL (ref 0.2–1)
WBC # BLD AUTO: 12.4 10E9/L (ref 4–11)
WBC #/AREA URNS AUTO: ABNORMAL /HPF

## 2018-09-17 PROCEDURE — 36415 COLL VENOUS BLD VENIPUNCTURE: CPT | Performed by: FAMILY MEDICINE

## 2018-09-17 PROCEDURE — 81001 URINALYSIS AUTO W/SCOPE: CPT | Performed by: FAMILY MEDICINE

## 2018-09-17 PROCEDURE — 80061 LIPID PANEL: CPT | Performed by: FAMILY MEDICINE

## 2018-09-17 PROCEDURE — 83036 HEMOGLOBIN GLYCOSYLATED A1C: CPT | Performed by: FAMILY MEDICINE

## 2018-09-17 PROCEDURE — 80053 COMPREHEN METABOLIC PANEL: CPT | Performed by: FAMILY MEDICINE

## 2018-09-17 PROCEDURE — 85027 COMPLETE CBC AUTOMATED: CPT | Performed by: FAMILY MEDICINE

## 2018-09-17 PROCEDURE — G0103 PSA SCREENING: HCPCS | Performed by: FAMILY MEDICINE

## 2018-09-17 PROCEDURE — 84443 ASSAY THYROID STIM HORMONE: CPT | Performed by: FAMILY MEDICINE

## 2018-09-17 PROCEDURE — 82043 UR ALBUMIN QUANTITATIVE: CPT | Performed by: FAMILY MEDICINE

## 2018-09-20 ENCOUNTER — OFFICE VISIT (OUTPATIENT)
Dept: FAMILY MEDICINE | Facility: CLINIC | Age: 83
End: 2018-09-20
Payer: COMMERCIAL

## 2018-09-20 VITALS
SYSTOLIC BLOOD PRESSURE: 128 MMHG | HEART RATE: 84 BPM | WEIGHT: 200 LBS | BODY MASS INDEX: 31.39 KG/M2 | OXYGEN SATURATION: 98 % | TEMPERATURE: 97.7 F | DIASTOLIC BLOOD PRESSURE: 76 MMHG | HEIGHT: 67 IN

## 2018-09-20 DIAGNOSIS — N40.0 HYPERTROPHY OF PROSTATE WITHOUT URINARY OBSTRUCTION: ICD-10-CM

## 2018-09-20 DIAGNOSIS — Z00.00 ENCOUNTER FOR ROUTINE ADULT HEALTH EXAMINATION WITHOUT ABNORMAL FINDINGS: Primary | ICD-10-CM

## 2018-09-20 DIAGNOSIS — G47.30 HYPERSOMNIA WITH SLEEP APNEA: ICD-10-CM

## 2018-09-20 DIAGNOSIS — E78.5 HYPERLIPIDEMIA LDL GOAL <130: ICD-10-CM

## 2018-09-20 DIAGNOSIS — M48.061 SPINAL STENOSIS OF LUMBAR REGION, UNSPECIFIED WHETHER NEUROGENIC CLAUDICATION PRESENT: ICD-10-CM

## 2018-09-20 DIAGNOSIS — Z91.09 ENVIRONMENTAL ALLERGIES: ICD-10-CM

## 2018-09-20 DIAGNOSIS — Z82.49 FAMILY HISTORY OF CARDIOVASCULAR DISEASE: ICD-10-CM

## 2018-09-20 DIAGNOSIS — Z12.11 SCREEN FOR COLON CANCER: ICD-10-CM

## 2018-09-20 DIAGNOSIS — K21.9 GASTROESOPHAGEAL REFLUX DISEASE WITHOUT ESOPHAGITIS: ICD-10-CM

## 2018-09-20 DIAGNOSIS — R26.89 BALANCE PROBLEMS: ICD-10-CM

## 2018-09-20 DIAGNOSIS — H90.3 SENSORINEURAL HEARING LOSS (SNHL) OF BOTH EARS: ICD-10-CM

## 2018-09-20 DIAGNOSIS — Z51.81 MEDICATION MONITORING ENCOUNTER: ICD-10-CM

## 2018-09-20 DIAGNOSIS — Z12.5 SCREENING FOR PROSTATE CANCER: ICD-10-CM

## 2018-09-20 DIAGNOSIS — G47.10 HYPERSOMNIA WITH SLEEP APNEA: ICD-10-CM

## 2018-09-20 DIAGNOSIS — J98.4 RESTRICTIVE LUNG DISEASE: ICD-10-CM

## 2018-09-20 DIAGNOSIS — I10 HYPERTENSION GOAL BP (BLOOD PRESSURE) < 140/90: ICD-10-CM

## 2018-09-20 DIAGNOSIS — N18.30 CKD (CHRONIC KIDNEY DISEASE) STAGE 3, GFR 30-59 ML/MIN (H): ICD-10-CM

## 2018-09-20 PROCEDURE — 99213 OFFICE O/P EST LOW 20 MIN: CPT | Mod: 25 | Performed by: FAMILY MEDICINE

## 2018-09-20 PROCEDURE — G0439 PPPS, SUBSEQ VISIT: HCPCS | Performed by: FAMILY MEDICINE

## 2018-09-20 RX ORDER — FINASTERIDE 5 MG/1
5 TABLET, FILM COATED ORAL DAILY
Qty: 90 TABLET | Refills: 3 | Status: SHIPPED | OUTPATIENT
Start: 2018-09-20 | End: 2020-04-22

## 2018-09-20 RX ORDER — NICOTINE POLACRILEX 4 MG/1
20 GUM, CHEWING ORAL DAILY
Qty: 90 TABLET | Refills: 3 | Status: SHIPPED | OUTPATIENT
Start: 2018-09-20 | End: 2020-04-22

## 2018-09-20 RX ORDER — ATORVASTATIN CALCIUM 10 MG/1
10 TABLET, FILM COATED ORAL DAILY
Qty: 90 TABLET | Refills: 3 | Status: SHIPPED | OUTPATIENT
Start: 2018-09-20 | End: 2019-10-24

## 2018-09-20 RX ORDER — ATENOLOL 50 MG/1
50 TABLET ORAL DAILY
Qty: 90 TABLET | Refills: 3 | Status: SHIPPED | OUTPATIENT
Start: 2018-09-20 | End: 2019-07-19

## 2018-09-20 RX ORDER — TERAZOSIN 10 MG/1
10 CAPSULE ORAL AT BEDTIME
Qty: 90 CAPSULE | Refills: 3 | Status: SHIPPED | OUTPATIENT
Start: 2018-09-20 | End: 2020-01-27

## 2018-09-20 NOTE — MR AVS SNAPSHOT
After Visit Summary   9/20/2018    Kevin Fierro    MRN: [id number]           Patient Information     Date Of Birth          5/11/1934        Visit Information        Provider Department      9/20/2018 10:00 AM Chance Espinosa MD [facility] [place]        Today's Diagnoses     Encounter for routine adult health examination without abnormal findings    -  1    Balance problems        CKD (chronic kidney disease) stage 3, GFR 30-59 ml/min        Hypertension goal BP (blood pressure) < 140/90        Hyperlipidemia LDL goal <130        Restrictive lung disease        Hypersomnia with sleep apnea        Spinal stenosis of lumbar region, unspecified whether neurogenic claudication present        Environmental allergies        Gastroesophageal reflux disease without esophagitis        Sensorineural hearing loss (SNHL) of both ears        Family history of cardiovascular disease        Hypertrophy of prostate without urinary obstruction        Screening for prostate cancer        Screen for colon cancer        Medication monitoring encounter          Care Instructions        [facility]                        To reach your care team during and after hours:   [phone]  To reach our pharmacy:        [phone]    Clinic Hours                        Our clinic hours are:    Monday   7:30 am to 7:00 pm                  Tuesday through Friday 7:30 am to 5:00 pm                             Saturday   8:00 am to 12:00 pm      Sunday   Closed      Pharmacy Hours                        Our pharmacy hours are:    Monday   8:30 am to 7:00 pm       Tuesday to Friday  8:30 am to 6:00 pm                       Saturday    9:00 am to 1:00 pm              Sunday    Closed              There is also information available at our web site:  [email or web address]    If your provider ordered any lab tests and you do not receive the results within 10 business days, please call the clinic.    If you need  a medication refill please contact your pharmacy.  Please allow 2-3 business days for your refill to be completed.    Our clinic offers telephone visits and e visits.  Please ask one of your team members to explain more.      Use [employer] (secure email communication and access to your chart) to send your primary care provider a message or make an appointment. Ask someone on your Team how to sign up for [email or web address].  Immunizations                      Immunization History   Administered Date(s) Administered     Influenza (H1N1) 01/20/2010     Influenza (High Dose) 3 valent vaccine 10/03/2011, 09/20/2012, 10/15/2013, 10/03/2014, 10/15/2015, 09/30/2016, 10/10/2017, 09/17/2018     Influenza (IIV3) PF 10/18/2004, 10/12/2005, 10/23/2006, 10/17/2007, 10/15/2009, 09/22/2010     Pneumo Conj 13-V (2010&after) 05/20/2015     Pneumococcal 23 valent 01/04/2007     TD (ADULT, 7+) 04/20/2007     TDAP Vaccine (Boostrix) 05/23/2012     Zoster vaccine recombinant adjuvanted (SHINGRIX) 09/17/2018     Zoster vaccine, live 01/01/2008        Health Maintenance                         Health Maintenance Due   Topic Date Due     Wellness Visit with your Primary Provider - yearly  05/31/2018     ANDREW QUESTIONNAIRE 1 YEAR  08/01/2018     Depression Assessment - yearly  08/01/2018         Preventive Health Recommendations:       Male Ages 65 and over    Yearly exam:             See your health care provider every year in order to  o   Review health changes.   o   Discuss preventive care.    o   Review your medicines if your doctor has prescribed any.    Talk with your health care provider about whether you should have a test to screen for prostate cancer (PSA).    Every 3 years, have a diabetes test (fasting glucose). If you are at risk for diabetes, you should have this test more often.    Every 5 years, have a cholesterol test. Have this test more often if you are at risk for high cholesterol or heart disease.     Every 10 years, have a  colonoscopy. Or, have a yearly FIT test (stool test). These exams will check for colon cancer.    Talk to with your health care provider about screening for Abdominal Aortic Aneurysm if you have a family history of AAA or have a history of smoking.  Shots:     Get a flu shot each year.     Get a tetanus shot every 10 years.     Talk to your doctor about your pneumonia vaccines. There are now two you should receive - Pneumovax (PPSV 23) and Prevnar (PCV 13).    Talk to your pharmacist about a shingles vaccine.     Talk to your doctor about the hepatitis B vaccine.  Nutrition:     Eat at least 5 servings of fruits and vegetables each day.     Eat whole-grain bread, whole-wheat pasta and brown rice instead of white grains and rice.     Get adequate Calcium and Vitamin D.   Lifestyle    Exercise for at least 150 minutes a week (30 minutes a day, 5 days a week). This will help you control your weight and prevent disease.     Limit alcohol to one drink per day.     No smoking.     Wear sunscreen to prevent skin cancer.     See your dentist every six months for an exam and cleaning.     See your eye doctor every 1 to 2 years to screen for conditions such as glaucoma, macular degeneration and cataracts.          Follow-ups after your visit        Additional Services     PHYSICAL THERAPY REFERRAL       If you have not heard from the scheduling office within 2 business days, please call [phone] for all locations, with the exception of [place], please call [phone] and [place], please call [phone].    Please be aware that coverage of these services is subject to the terms and limitations of your health insurance plan.  Call member services at your health plan with any benefit or coverage questions.                  Follow-up notes from your care team     Return in about 6 months (around 3/20/2019), or if symptoms worsen or fail to improve, for Medication Recheck Visit.      Future tests that were ordered for  "you today     Open Future Orders        Priority Expected Expires Ordered    PHYSICAL THERAPY REFERRAL Routine  9/20/2019 9/20/2018            Who to contact     If you have questions or need follow up information about today's clinic visit or your schedule please contact [facility] directly at [phone].  Normal or non-critical lab and imaging results will be communicated to you by MyChart, letter or phone within 4 business days after the clinic has received the results. If you do not hear from us within 7 days, please contact the clinic through [employer] or phone. If you have a critical or abnormal lab result, we will notify you by phone as soon as possible.  Submit refill requests through [employer] or call your pharmacy and they will forward the refill request to us. Please allow 3 business days for your refill to be completed.          Additional Information About Your Visit        MyChart Information     [employer] gives you secure access to your electronic health record. If you see a primary care provider, you can also send messages to your care team and make appointments. If you have questions, please call your primary care clinic.  If you do not have a primary care provider, please call [phone] and they will assist you.        Care EveryWhere ID     This is your Care EveryWhere ID. This could be used by other organizations to access your [place] medical records  [id number]        Your Vitals Were     Pulse Temperature Height Pulse Oximetry BMI (Body Mass Index)       84 97.7  F (36.5  C) (Oral) 5' 7\" (1.702 m) 98% 31.32 kg/m2        Blood Pressure from Last 3 Encounters:   09/20/18 128/76   07/02/18 130/78   01/03/18 108/64    Weight from Last 3 Encounters:   09/20/18 200 lb (90.7 kg)   07/02/18 207 lb (93.9 kg)   01/03/18 205 lb (93 kg)                 Today's Medication Changes          These changes are accurate as of 9/20/18 12:05 PM.  If you have any questions, ask your nurse or " doctor.               These medicines have changed or have updated prescriptions.        Dose/Directions    atenolol 50 MG tablet   Commonly known as:  TENORMIN   This may have changed:  See the new instructions.   Used for:  CKD (chronic kidney disease) stage 3, GFR 30-59 ml/min, Hypertension goal BP (blood pressure) < 140/90   Changed by:  Chance Espinosa MD        Dose:  50 mg   Take 1 tablet (50 mg) by mouth daily   Quantity:  90 tablet   Refills:  3       terazosin 10 MG capsule   Commonly known as:  HYTRIN   This may have changed:  See the new instructions.   Used for:  CKD (chronic kidney disease) stage 3, GFR 30-59 ml/min, Hypertension goal BP (blood pressure) < 140/90   Changed by:  Chance Espinosa MD        Dose:  10 mg   Take 1 capsule (10 mg) by mouth At Bedtime   Quantity:  90 capsule   Refills:  3            Where to get your medicines      Some of these will need a paper prescription and others can be bought over the counter.  Ask your nurse if you have questions.     Bring a paper prescription for each of these medications     atenolol 50 MG tablet    atorvastatin 10 MG tablet    finasteride 5 MG tablet    omeprazole 20 MG tablet    terazosin 10 MG capsule                Primary Care Provider Office Phone # Fax #    Chance Espinosa [phone]       [address] [place] [address]        Equal Access to Services     [facility] CenterELISABETH : Hadii aad ku hadasho Soomaali, waaxda luqadaha, qaybta kaalmada adeegyada, mahad dias. So [facility] [phone].    ATENCIÓN: Si habla español, tiene a helms disposición servicios gratuitos de asistencia lingüística. Llame al [phone].    We comply with applicable federal civil rights laws and [place] laws. We do not discriminate on the basis of race, color, national origin, age, disability, sex, sexual orientation, or gender identity.            Thank you!     Thank you for choosing [facility]  for your care.  Our goal is always to provide you with excellent care. Hearing back from our patients is one way we can continue to improve our services. Please take a few minutes to complete the written survey that you may receive in the mail after your visit with us. Thank you!             Your Updated Medication List - Protect others around you: Learn how to safely use, store and throw away your medicines at www.disposemymeds.org.          This list is accurate as of 9/20/18 12:05 PM.  Always use your most recent med list.                   Brand Name Dispense Instructions for use Diagnosis    aspirin 81 MG tablet      1 tab po QD (Once per day)        atenolol 50 MG tablet    TENORMIN    90 tablet    Take 1 tablet (50 mg) by mouth daily    CKD (chronic kidney disease) stage 3, GFR 30-59 ml/min, Hypertension goal BP (blood pressure) < 140/90       atorvastatin 10 MG tablet    LIPITOR    90 tablet    Take 1 tablet (10 mg) by mouth daily    Hyperlipidemia LDL goal <130       CALCIUM 600 + D 600-200 MG-UNIT Tabs     3 MONTHS         clotrimazole-betamethasone cream    LOTRISONE    45 g    Apply  topically 2 times daily.    Jock itch       finasteride 5 MG tablet    PROSCAR    90 tablet    Take 1 tablet (5 mg) by mouth daily    Hypertrophy of prostate without urinary obstruction       Multi-vitamin Tabs tablet   Generic drug:  multivitamin, therapeutic with minerals      1 tablet daily        omeprazole 20 MG tablet     90 tablet    Take 1 tablet (20 mg) by mouth daily Take 30-60 minutes before a meal.    Gastroesophageal reflux disease without esophagitis       oxymetazoline 0.05 % spray    AFRIN     Spray 2 sprays into both nostrils as needed.        selenium sulfide 2.5 % lotion    SELSUN    360 mL    Apply daily to every other day - lather, wait 10 minutes and rinse    Seborrheic dermatitis, unspecified       sildenafil 50 MG tablet    VIAGRA    18 tablet    Take 1 tablet (50 mg) by mouth daily as needed for erectile dysfunction     Erectile dysfunction, unspecified erectile dysfunction type       terazosin 10 MG capsule    HYTRIN    90 capsule    Take 1 capsule (10 mg) by mouth At Bedtime    CKD (chronic kidney disease) stage 3, GFR 30-59 ml/min, Hypertension goal BP (blood pressure) < 140/90

## 2018-09-20 NOTE — PATIENT INSTRUCTIONS
[facility]                        To reach your care team during and after hours:   [phone]  To reach our pharmacy:        [phone]    Clinic Hours                        Our clinic hours are:    Monday   7:30 am to 7:00 pm                  Tuesday through Friday 7:30 am to 5:00 pm                             Saturday   8:00 am to 12:00 pm      Sunday   Closed      Pharmacy Hours                        Our pharmacy hours are:    Monday   8:30 am to 7:00 pm       Tuesday to Friday  8:30 am to 6:00 pm                       Saturday    9:00 am to 1:00 pm              Sunday    Closed              There is also information available at our web site:  [email or web address]    If your provider ordered any lab tests and you do not receive the results within 10 business days, please call the clinic.    If you need a medication refill please contact your pharmacy.  Please allow 2-3 business days for your refill to be completed.    Our clinic offers telephone visits and e visits.  Please ask one of your team members to explain more.      Use [employer] (secure email communication and access to your chart) to send your primary care provider a message or make an appointment. Ask someone on your Team how to sign up for [employer].  Immunizations                      Immunization History   Administered Date(s) Administered     Influenza (H1N1) 01/20/2010     Influenza (High Dose) 3 valent vaccine 10/03/2011, 09/20/2012, 10/15/2013, 10/03/2014, 10/15/2015, 09/30/2016, 10/10/2017, 09/17/2018     Influenza (IIV3) PF 10/18/2004, 10/12/2005, 10/23/2006, 10/17/2007, 10/15/2009, 09/22/2010     Pneumo Conj 13-V (2010&after) 05/20/2015     Pneumococcal 23 valent 01/04/2007     TD (ADULT, 7+) 04/20/2007     TDAP Vaccine (Boostrix) 05/23/2012     Zoster vaccine recombinant adjuvanted (SHINGRIX) 09/17/2018     Zoster vaccine, live 01/01/2008        Health Maintenance                         Health Maintenance Due   Topic  Date Due     Wellness Visit with your Primary Provider - yearly  05/31/2018     ANDREW QUESTIONNAIRE 1 YEAR  08/01/2018     Depression Assessment - yearly  08/01/2018         Preventive Health Recommendations:       Male Ages 65 and over    Yearly exam:             See your health care provider every year in order to  o   Review health changes.   o   Discuss preventive care.    o   Review your medicines if your doctor has prescribed any.    Talk with your health care provider about whether you should have a test to screen for prostate cancer (PSA).    Every 3 years, have a diabetes test (fasting glucose). If you are at risk for diabetes, you should have this test more often.    Every 5 years, have a cholesterol test. Have this test more often if you are at risk for high cholesterol or heart disease.     Every 10 years, have a colonoscopy. Or, have a yearly FIT test (stool test). These exams will check for colon cancer.    Talk to with your health care provider about screening for Abdominal Aortic Aneurysm if you have a family history of AAA or have a history of smoking.  Shots:     Get a flu shot each year.     Get a tetanus shot every 10 years.     Talk to your doctor about your pneumonia vaccines. There are now two you should receive - Pneumovax (PPSV 23) and Prevnar (PCV 13).    Talk to your pharmacist about a shingles vaccine.     Talk to your doctor about the hepatitis B vaccine.  Nutrition:     Eat at least 5 servings of fruits and vegetables each day.     Eat whole-grain bread, whole-wheat pasta and brown rice instead of white grains and rice.     Get adequate Calcium and Vitamin D.   Lifestyle    Exercise for at least 150 minutes a week (30 minutes a day, 5 days a week). This will help you control your weight and prevent disease.     Limit alcohol to one drink per day.     No smoking.     Wear sunscreen to prevent skin cancer.     See your dentist every six months for an exam and cleaning.     See your eye  doctor every 1 to 2 years to screen for conditions such as glaucoma, macular degeneration and cataracts.

## 2018-09-20 NOTE — PROGRESS NOTES
SUBJECTIVE:   Kevin Fierro is a 84 year old male who presents for Preventive Visit.    Are you in the first 12 months of your Medicare Part B coverage?  No    Healthy Habits:    Do you get at least three servings of calcium containing foods daily (dairy, green leafy vegetables, etc.)? yes    Amount of exercise or daily activities, outside of work: 1-2 day(s) per week    Problems taking medications regularly No    Medication side effects: No    Have you had an eye exam in the past two years? yes    Do you see a dentist twice per year? yes    Do you have sleep apnea, excessive snoring or daytime drowsiness?has CPAP machine      Ability to successfully perform activities of daily living: Yes, no assistance needed    Home safety:  none identified     Hearing impairment: Yes, has hearing aids    Fall risk:  Fallen 2 or more times in the past year?: No  Any fall with injury in the past year?: No    COGNITIVE SCREEN  1) Repeat 3 items (Leader, Season, Table)    2) Clock draw:   NORMAL  3) 3 item recall:   Recalls 2 objects   Results: NORMAL clock, 1-2 items recalled: COGNITIVE IMPAIRMENT LESS LIKELY    Mini-CogTM Copyright LA Ak[employer]. Licensed by the author for use in [facility] [employer]; reprinted with permission ([email or web address]@[email or web address]). All rights reserved.      Hand Cramps: can be bilateral or unilateral, and generally occur when he is using an IPad. The patient thinks that the cramps may be related to muscle strain in his fingers. Started a few months ago and happens 2-3 times per week. Doesn't happen when using other small objects.     Lightheaded Spells: both when standing up from a seated position and also sometimes when walking. Occurs 2-3 times per week and goes away after about 10 seconds. He takes his medications, including atenolol and terazosin, at night before bed. Wonders if this is related to being hungry. Does not feel any chest pain or abnormal heart rhythms with this lightheadedness. Never happens when  he is just sitting or laying down. Denies worsening or alleviating with particular head movements. Feels that this may cause him to fall in the future but he denies previously falling from this feeling.     Leg Weakness: The patient has noticed chronic decreased strength in his legs and thinks that it may be related to getting older but wants to check.    SOB with Exertion: Hx of restrictive lung disease (secondary to obesity per patient). The patient has noticed shortness of breath when doing activities like climbing stairs. Never smoker. About the same severity has it has been for a while. Had a long workup last year involving cardiology and respiratory per patient and the outcome was mainly a recommendation to lose weight.     PSVT: Noted 2017. Has been doing well - doesn't notice as much as a year ago. During a long workup last year Dr. Hodges (cardiologist) didn't think anything was too concerning and recommended that he see a respiratory specialist for his SOB, who eventually recommended that the patient lose weight. The patient did wear a heart monitor for a few weeks at one point.    Hypertension:    Medications: atenolol, terazosin. Has been taking medications regularly.   BP Readings from Last 3 Encounters:   09/20/18 128/76   07/02/18 130/78   01/03/18 108/64      Creatinine   Date Value Ref Range Status   09/17/2018 1.28 (H) 0.66 - 1.25 mg/dL Final     Obesity: Body mass index is 31.32 kg/(m^2).  Wt Readings from Last 5 Encounters:   09/20/18 200 lb (90.7 kg)   07/02/18 207 lb (93.9 kg)   01/03/18 205 lb (93 kg)   11/27/17 201 lb (91.2 kg)   11/17/17 202 lb (91.6 kg)       Diet: Put on diet about 2 months ago. Trying to avoid sugars and starches    Exercise: Try to get to gym a few times per week (Good). Stationary bike.     Sleep Apnea: CPAP - has been going well. No concerns.      Hyperlipidemia:    Medications: atorvastatin; was fasting on 9/17.  Recent Labs   Lab Test  09/17/18   0857  05/24/17    "0900   05/14/15   0822  05/15/14   0753   CHOL  166  176   < >  159  180   HDL  57  48   < >  49  33*   LDL  78  96   < >  83  87   TRIG  155*  159*   < >  137  303*   CHOLHDLRATIO   --    --    --   3.2  5.5*    < > = values in this interval not displayed.         CKD: BUN 39 on 9/17/18. Did see a nephrologist Dr. Noble a few years ago and they weren't too concerned per patient. The patient was told that he will continue to have blood in his urine due to \"spongy kidney\". Saw Dr. Noble previously and would like to go back.  Creatinine   Date Value Ref Range Status   09/17/2018 1.28 (H) 0.66 - 1.25 mg/dL Final       Greater Trochanteric Bursitis: Bilateral. Had shots about 2 weeks ago at [facility] and has also been seeing PT (Dr. Georges). The patient sometimes still has to stop when walking due to his symptoms and notices the pain more when sleeping. The pain is still present but PT and cortisone shots have helped a little bit. No scheduled follow-up recommended from [facility] per patient.    Right Rib Contusion: seen 7/2/18 in clinic. No further concerns.    Shingles: Did have shingles last year but this has resolvde. Got the first dose of the new shingles shot already.    Lung Nodules: CT scan stable per records - no concerns today.     BPH: takes finasteride.    Seasonal Allergies: takes oxymetazoline very rarely and knows that continuous use can worsen symptoms. Tree pollen gives symptoms such itchy eyes which eye drops help control.    GERD: takes omeprazole very rarely - symptoms are well controlled with Rolaids.    Reviewed and updated as needed this visit by clinical staff  Tobacco  Allergies  Meds  Med Hx         Reviewed and updated as needed this visit by Provider        Social History   Substance Use Topics     Smoking status: Never Smoker     Smokeless tobacco: Never Used     Alcohol use 2.4 - 3.0 oz/week     4 - 5 Standard drinks or equivalent per week      Comment: 4-5 drinks per week avg       If you drink " "alcohol do you typically have >3 drinks per day or >7 drinks per week? No                        Today's PHQ-2 Score:   PHQ-2 ( 1999 [employer]) 9/20/2018 1/3/2018   Q1: Little interest or pleasure in doing things 0 0   Q2: Feeling down, depressed or hopeless 0 0   PHQ-2 Score 0 0   Q1: Little interest or pleasure in doing things - -   Q2: Feeling down, depressed or hopeless - -   PHQ-2 Score - -       Do you feel safe in your environment - YES    Do you have a Health Care Directive?: Yes: Advance Directive has been received and scanned.    Current providers sharing in care for this patient include:   Patient Care Team:  Chance Espinosa MD as PCP - General    The following health maintenance items are reviewed in Epic and correct as of today:  Health Maintenance   Topic Date Due     WELLNESS VISIT Q1 YR  05/31/2018     ANDREW QUESTIONNAIRE 1 YEAR  08/01/2018     PHQ-9 Q1YR  08/01/2018     FALL RISK ASSESSMENT  07/02/2019     BMP Q1 YR  09/17/2019     HEMOGLOBIN Q1 YR  09/17/2019     LIPID MONITORING Q1 YEAR  09/17/2019     MICROALBUMIN Q1 YEAR  09/17/2019     PSA Q1 YR  09/17/2019     TETANUS Q10 YR  05/23/2022     ADVANCE DIRECTIVE PLANNING Q5 YRS  05/31/2022     PNEUMOCOCCAL  Completed     INFLUENZA VACCINE  Completed     Labs reviewed in EPIC    ROS:  Constitutional, HEENT, cardiovascular, pulmonary, GI, , musculoskeletal, neuro, skin, endocrine and psych systems are negative, except as otherwise noted.    OBJECTIVE:   /76  Pulse 84  Temp 97.7  F (36.5  C) (Oral)  Ht 5' 7\" (1.702 m)  Wt 200 lb (90.7 kg)  SpO2 98%  BMI 31.32 kg/m2 Estimated body mass index is 31.32 kg/(m^2) as calculated from the following:    Height as of this encounter: 5' 7\" (1.702 m).    Weight as of this encounter: 200 lb (90.7 kg).  EXAM:   GENERAL: healthy, alert and no distress  EYES: Eyes grossly normal to inspection, conjunctivae and sclerae normal  HENT: ear canals and TM's normal, nose and mouth without ulcers or lesions  RESP: " lungs clear to auscultation - no rales, rhonchi or wheezes  CV: Systolic murmur grade 2/6 auscultated at left lower sternal border; regular rate and rhythm, normal S1 S2, no S3 or S4, no click or rub, no peripheral edema and peripheral pulses strong  ABDOMEN: Reducible umbilical hernia; soft, nontender, no hepatosplenomegaly, bowel sounds normal  MS: no gross musculoskeletal defects noted, no edema  NEURO: Grossly age-appropriate strength and tone, mentation intact and speech normal  PSYCH: mentation appears normal, affect normal/bright  Declines /Rectal    Diagnostic Test Results:    Recent labs reviewed - stable/improved    ASSESSMENT / PLAN:       ICD-10-CM    1. Encounter for routine adult health examination without abnormal findings Z00.00    2. Balance problems R26.89 PHYSICAL THERAPY REFERRAL   3. CKD (chronic kidney disease) stage 3, GFR 30-59 ml/min N18.3 atenolol (TENORMIN) 50 MG tablet     terazosin (HYTRIN) 10 MG capsule   4. Hypertension goal BP (blood pressure) < 140/90 I10 atenolol (TENORMIN) 50 MG tablet     terazosin (HYTRIN) 10 MG capsule   5. Hyperlipidemia LDL goal <130 E78.5 atorvastatin (LIPITOR) 10 MG tablet   6. Restrictive lung disease J98.4    7. 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 Spinal stenosis of lumbar region, unspecified whether neurogenic claudication present M48.061    9. Environmental allergies Z91.09    10. Gastroesophageal reflux disease without esophagitis K21.9 omeprazole 20 MG tablet   11. Sensorineural hearing loss (SNHL) of both ears H90.3    12. Family history of cardiovascular disease Z82.49    13. Hypertrophy of prostate without urinary obstruction N40.0 finasteride (PROSCAR) 5 MG tablet   14. Screening for prostate cancer Z12.5    15. Screen for colon cancer Z12.11    16. Medication monitoring encounter Z51.81        Discussed treatment/modality options, including risk and benefits, he desires advised 1 multivitamin per day, advised calcium 7902-4571  "mg/d and Vitamin D 800-1200 IU/d and advised diet, exercise, and weight loss. All diagnosis above reviewed and noted above, otherwise stable.  See [facility] orders for further details.      1) Patient has already had flu shot this year. He has also already had his first shot of Shingrix - shot 2 of 2 due 11/17/18    2) For hand cramping recommended warm water, tylenol arthritis, electrolyte drinks like Gatorade G2 or tonic water. Will monitor.    3) For the patient's lightheadedness recommended that balance PT further assess cause.      Health Maintenance Due   Topic Date Due     WELLNESS VISIT Q1 YR  05/31/2018     ANDREW QUESTIONNAIRE 1 YEAR  08/01/2018     PHQ-9 Q1YR  08/01/2018     End of Life Planning:  Patient currently has an advanced directive: Yes.  Practitioner is supportive of decision.    COUNSELING:  Reviewed preventive health counseling, as reflected in patient instructions    BP Readings from Last 1 Encounters:   09/20/18 128/76     Estimated body mass index is 31.32 kg/(m^2) as calculated from the following:    Height as of this encounter: 5' 7\" (1.702 m).    Weight as of this encounter: 200 lb (90.7 kg).      Weight management plan: Discussed healthy diet and exercise guidelines and patient will follow up in 6 months in clinic to re-evaluate.     reports that he has never smoked. He has never used smokeless tobacco.      Appropriate preventive services were discussed with this patient, including applicable screening as appropriate for cardiovascular disease, diabetes, osteopenia/osteoporosis, and glaucoma.  As appropriate for age/gender, discussed screening for colorectal cancer, prostate cancer, breast cancer, and cervical cancer. Checklist reviewing preventive services available has been given to the patient.    Reviewed patients plan of care and provided an AVS. The Basic Care Plan (routine screening as documented in Health Maintenance) for Kevin meets the Care Plan requirement. This Care Plan has " been established and reviewed with the Patient and spouse.    Counseling Resources:  ATP IV Guidelines  Pooled Cohorts Equation Calculator  Breast Cancer Risk Calculator  FRAX Risk Assessment  ICSI Preventive Guidelines  Dietary Guidelines for Americans, 2010  USDA's MyPlate  ASA Prophylaxis  Lung CA Screening    Conrado Cuevas, MS3 Student, has participated in the care of this patient.     Provider Disclosure:  I agree with above History, Review of Systems, Physical exam and Plan.  I have reviewed the content of the documentation and have edited it as needed. I have personally performed the services documented here and the documentation accurately represents those services and the decisions I have made.      Electronically signed by:           Chance Espinosa MD, FAAFP    [address]  [address] [phone] Fax

## 2018-10-18 ENCOUNTER — HOSPITAL ENCOUNTER (OUTPATIENT)
Dept: PHYSICAL THERAPY | Facility: CLINIC | Age: 83
Setting detail: THERAPIES SERIES
End: 2018-10-18
Attending: FAMILY MEDICINE
Payer: COMMERCIAL

## 2018-10-18 DIAGNOSIS — R26.89 BALANCE PROBLEMS: ICD-10-CM

## 2018-10-18 PROCEDURE — 40000185 ZZHC STATISTIC PT OUTPT VISIT: Performed by: PHYSICAL THERAPIST

## 2018-10-18 PROCEDURE — G8979 MOBILITY GOAL STATUS: HCPCS | Mod: GP,CI | Performed by: PHYSICAL THERAPIST

## 2018-10-18 PROCEDURE — 97110 THERAPEUTIC EXERCISES: CPT | Mod: GP | Performed by: PHYSICAL THERAPIST

## 2018-10-18 PROCEDURE — G8978 MOBILITY CURRENT STATUS: HCPCS | Mod: GP,CJ | Performed by: PHYSICAL THERAPIST

## 2018-10-18 PROCEDURE — 97112 NEUROMUSCULAR REEDUCATION: CPT | Mod: GP | Performed by: PHYSICAL THERAPIST

## 2018-10-18 PROCEDURE — 97161 PT EVAL LOW COMPLEX 20 MIN: CPT | Mod: GP | Performed by: PHYSICAL THERAPIST

## 2018-10-21 NOTE — PROGRESS NOTES
10/18/18 1400   Quick Adds   Type of Visit Initial OP PT Evaluation   General Information   Start of Care Date 10/18/18   Referring Physician Chance Espinosa MD   Orders Evaluate and Treat as Indicated   Order Date 09/20/18   Medical Diagnosis Balance problems R26.89    Onset of illness/injury or Date of Surgery 09/20/18  (order date)   Precautions/Limitations fall precautions   Surgical/Medical history reviewed Yes   Pertinent history of current problem (include personal factors and/or comorbidities that impact the POC) Kevin Fierro is a 85 yo M who arrives for PT evaluation of his balance referred by Primary Care, Dr Jesús Fletcher, after preventative visit on 9/20/18. Per chart review:  Lightheaded: standing up from sitting and sometimes walking, recurs 2-3x/week goes away about 10 sec. Takes medication atenolol, terazosin before bed. Doesn't feel chest pain or abnormal heart rhythms with lightheadedness. Never when just sitting or laying down. Denies feeling worse or alleviating with head movements. He is worried it may cause him to fall in the future but denies previously falling. Hx of restrictive lung disease with fatigue climbing stairs. Same severity as it has been for a while. Cardiology and respiratory workup last year per pt with recommendation to lose weight. PT seen at [facility] in early September. Followed for greater trochanteric bursitis and has been seeing PT and received cortisone injections that have both helped a bit . (see PLOF below for pt report during today s evaluation)    Pertinent Visual History  wears trifocals. Has difficulty focusing reading sheet music. Takes glasses off to see better walking.    Prior level of function comment Reports issues with getting out of chairs without armrests, walking catching toes without LOB, walking with some lightheadedness and fatigues quickly, requires support from railing to navigate stairs. PAIN: Pt reports hip pain from bursitis has subsided quite a bit.  "Reports hips feel  tired  not painful.  DIZZINESS: describes lightheaded dizziness, denies vertigo. Reports dizziness after standing or walking for a while. No dizziness with transitions in bed. Inc dizziness in past 6 months. BALANCE: only feels some unsteadiness during spells of lightheadedness. FALLS: denies falls, when feeling unsteady may hold something to steady himself. TRANSITIONS: likes to have chair with armrests d/t weakness (L LE weakness > R LE). Transfers from toilet using support from counter.  WEAKNESS: Feels weakness in LEs noticed more in the past 6 months but thinks it has been an issue for longer. WALKING:  limited by inc RR fatigue. not walking for exercise and has a trail by his house he could use. Walking a distance uses a walking stick. Sometimes walking to mailbox and \"shoes will gip and catch from not lifting legs high enough  walking stick 80% of time out of the house. walks going to [employer] and shopping and uses cart for support.  STAIRS: avoids stairs to basement, needs railing reciprocal stepping. 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 EXERCISE:   Not faithful  with exz previously provided by PT. Uses seated stepper at the gym and  circuit room  legs and arms weights 1-3 x/week. Not pushing for a level of fatigue.   Previous/Current Treatment Physical Therapy  (none for balance)   Current Community Support (yard services, cleaning service)   Patient role/Employment history Retired  ()   [facility]  [place] level home with basement, with spouse)   Home/Community Accessibility Comments flight of stairs to basement, he doesnt not go to basement, 2 step to enter with railing   Assistive Devices Comments (none)   Patient/Family Goals Statement resolve dizziness leading to instability standing/walking, improve balance   Fall Risk Screen   Fall screen completed by PT   Have you fallen 2 or more times in the past year? No   Have you fallen and had an " "injury in the past year? No   Timed Up and Go score (seconds) n/t (see DGI)   Is patient a fall risk? Yes;Department fall risk interventions implemented   Fall screen comments DGI: 18/24 (</= 19/24 = inc fall risk)   Pain   Patient currently in pain Yes   Pain comments reports pain as \"fatigue\" in B hips    Vitals Signs   Heart Rate 76   SpO2 96   Blood Pressure 150/81   Vital Signs Comments transition to standing: /80, no lighthedednes   Cognitive Status Examination   Orientation orientation to person, place and time   Level of Consciousness alert   Follows Commands and Answers Questions 100% of the time;able to follow multistep instructions   Personal Safety and Judgment intact   Memory intact   Observation   Observation obese   Posture   Posture Comments wide MAXIMILIANO, B LE ER and rounded shoulders   Range of Motion (ROM)   ROM Comment L knee lacking about 10 degres TKE and R knee lacking about 5 degres TKE   Strength   Strength Comments hip flexion: R LE 5/5, L LE 4+/5 , L hip abd 4+/5, R hip abd 3+/5, B quads 5/5, B HS 4+/5; B DF 5/5,   Bed Mobility   Bed Mobility Comments indep, effortful with scooting and transitions d/t abdominal girth   Transfer Skills   Transfer Comments indep without UE support but effortful, prefers UE support   Gait   Gait Comments over short distances indoors without AD: maintains B hip ER, slow pace, slightly inc stance time R >L reciprocal stepping, foot flat inital contact L LE no heelstrike (R hip abd weakness, L hip flex weakness)   Gait Special Tests   Gait Special Tests 25 FOOT TIMED WALK;DYNAMIC GAIT INDEX   Gait Special Tests 25 Foot Timed Walk   Seconds 14.78   Gait Special Tests Dynamic Gait Index   Score out of 24 18   Balance Special Tests   Balance Special Tests Sit to stand reps;Single leg stance right;Single leg stance left   Balance Special Tests Single Leg Stance Right,   Comments 4 sec, posterior fall without protective responsee falling onto elevated mat behind him " "  Balance Special Tests Single Leg Stance Left   Comments 2 sec, posterior fall without protective responsee falling onto elevated mat behind him   Balance Special Tests Sit to Stand Reps in 30 Seconds   Reps in 30 seconds 5   Height 18   Comments wide MAXIMILIANO, no UE support   Sensory Examination   Sensory Perception Comments denies n/t   Coordination   Coordination no deficits were identified   Muscle Tone   Muscle Tone no deficits were identified   Planned Therapy Interventions   Planned Therapy Interventions balance training;gait training;neuromuscular re-education;strengthening;stretching   Clinical Impression   Criteria for Skilled Therapeutic Interventions Met yes, treatment indicated   PT Diagnosis multifactoral gait and balance disorder   Influenced by the following impairments reports: pain as \"fatigue\" in B hips in context of recent hx greater trochanteric bursitis, reports RR walking and negotiating stairs in context of restrictive lung disease, reports lightheadedness with transitions to standing, walking; assessment:  proximal B LE weakness, functional LE weakness transitions from chairs, gait impaired consistant with R hip abd weakness, L hip flex weakness, postural stability per SLS, hypertensive with BP dropping with transition to standing not considered orthostatic and in absence of lightheadedness, obese, impaired posture with wide MAXIMILIANO and B LE ER consistant with hip weakness; B LE ROM impaired: L knee lacking about 10 degres TKE and R knee lacking about 5 degres TKE;   Functional limitations due to impairments postural and gait stability with inc risk for falls, limited walking endurance, transitions from chairs, toliet, stair negotiation   Clinical Presentation Stable/Uncomplicated   Clinical Decision Making (Complexity) Low complexity   Therapy Frequency 1 time/week   Predicted Duration of Therapy Intervention (days/wks) 6 weeks  (extended end date d/t scheduling)   Risk & Benefits of therapy have " "been explained Yes   Patient, Family & other staff in agreement with plan of care Yes   Education Assessment   Barriers to Learning No barriers;Other  (pt reports \"poor motivation\" with exercises, but will try)   GOALS   PT Eval Goals 1;2;3;4;5;6;7   Goal 1   Goal Identifier DIZZINESS/FALLS (baseline: leading to instability standing/walking)   Goal Description Pt to report either no longer getting dizziness or plans for f/u with MD as appropriate with any continued dizziness and no near falls.   Target Date 01/02/19   Goal 2   Goal Identifier EXERCISE (baseline:  Not faithful  with exs previously provided by PT. Uses seated stepper at the gym and  circuit room  legs and arms weights 1-3 x/week. Not pushing for a level of fatigue.)   Goal Description Pt to be independent in appropriate HEP for strengthening, balance, and general activity tolerance to continue safely with addressing his impairments following d/c from skilled PT services.    Target Date 01/02/19   Goal 3   Goal Identifier TRANSITIONS (baseline: likes to have chair with armrests d/t weakness. Transfers from toilet using support from counter.)   Goal Description Pt to report improved strength LEs noticed with greater ease transitioning from chairs and toliet.   Target Date 01/02/19   Goal 4   Goal Identifier 30 sec sit <> stand (baseline: 5 reps 18\" without UE support, wide MAXIMILIANO)   Goal Description Pt to achieve >/= 8 reps to demonstrate improved functional LE strength with transition from standard height chair.    Target Date 01/02/19   Goal 5   Goal Identifier SLS (baseline: 2-4 sec, posterior fall without protective responsee falling onto elevated mat behind him)   Goal Description Pt to sustain B SLS > 5 sec to demonstrate improved postural stability and consistant stepping response to instability towards dec fall risk.   Target Date 01/02/19   Goal 6   Goal Identifier DGI: 18/24 (</= 19/24 = inc fall risk; MDC = 2.9 for community dwelling elderly) " "  Goal Description The client will score >/=21/24 on the DGI to achieve MDC and indicate low fall risk with community ambulation    Target Date 01/02/19   Goal 7   Goal Identifier COMMUNITY AMBULTION (baseline: reports sometimes walking to mailbox and \"shoes will gip and catch from not lifting legs high enough  walking stick 80% of time out of the house.)   Goal Description Pt to report improved stability with community distance walking, reporting he very rarely catches his toes with improved consistancy lifting LEs.   Target Date 01/02/19   Total Evaluation Time   Total Evaluation Time (Minutes) 46     "

## 2018-10-22 ENCOUNTER — TRANSFERRED RECORDS (OUTPATIENT)
Dept: HEALTH INFORMATION MANAGEMENT | Facility: CLINIC | Age: 83
End: 2018-10-22

## 2018-10-23 PROBLEM — C44.91 BCC (BASAL CELL CARCINOMA OF SKIN): Status: ACTIVE | Noted: 2018-10-01

## 2018-10-29 ENCOUNTER — ALLIED HEALTH/NURSE VISIT (OUTPATIENT)
Dept: NURSING | Facility: CLINIC | Age: 83
End: 2018-10-29
Payer: COMMERCIAL

## 2018-10-29 DIAGNOSIS — Z48.02 VISIT FOR SUTURE REMOVAL: Primary | ICD-10-CM

## 2018-10-29 PROCEDURE — 99207 ZZC NO CHARGE NURSE ONLY: CPT

## 2018-10-29 NOTE — MR AVS SNAPSHOT
After Visit Summary   10/29/2018    Kevin Fierro    MRN: [id number]           Patient Information     Date Of Birth          5/11/1934        Visit Information        Provider Department      10/29/2018 11:00 AM [facility]UL[facility]IC [facility]        Today's Diagnoses     Visit for suture removal    -  1       Follow-ups after your visit        Your next 10 appointments already scheduled     Nov 28, 2018  4:45 PM CST   Neuro Treatment with Alanna Ga, PT   [facility] ([facility])    [address] [address]   [phone]            Dec 05, 2018  2:15 PM CST   Neuro Treatment with Alanna Ga, PT   [facility] ([facility])    [address] [id number] [phone]            Dec 12, 2018  2:15 PM CST   Neuro Treatment with Alanna Ga, PT   [facility] ([facility])    [address] [id number] [phone]            Dec 19, 2018  2:15 PM CST   Neuro Treatment with Alanna Ga, PT   [facility] ([facility])    [address] [id number] [phone]            Dec 26, 2018  3:00 PM CST   Neuro Treatment with Alanna Ga, PT   [facility] ([facility])    [address] [id number] [phone]            Jan 02, 2019  2:15 PM CST   Neuro Treatment with Alanna Ga, PT   [facility] ([facility])    [address] [id number] [phone]              Who to contact     If you have questions or need follow up information about today's clinic visit or your schedule please contact [facility] directly at [phone].  Normal or non-critical lab and imaging results will be communicated to you by  MyChart, letter or phone within 4 business days after the clinic has received the results. If you do not hear from us within 7 days, please contact the clinic through [employer] or phone. If you have a critical or abnormal lab result, we will notify you by phone as soon as possible.  Submit refill requests through [employer] or call your pharmacy and they will forward the refill request to us. Please allow 3 business days for your refill to be completed.          Additional Information About Your Visit        [employer] Information     [employer] gives you secure access to your electronic health record. If you see a primary care provider, you can also send messages to your care team and make appointments. If you have questions, please call your primary care clinic.  If you do not have a primary care provider, please call [phone] and they will assist you.        Care EveryWhere ID     This is your Care EveryWhere ID. This could be used by other organizations to access your [place] medical records  [id number]         Blood Pressure from Last 3 Encounters:   09/20/18 128/76   07/02/18 130/78   01/03/18 108/64    Weight from Last 3 Encounters:   09/20/18 200 lb (90.7 kg)   07/02/18 207 lb (93.9 kg)   01/03/18 205 lb (93 kg)              Today, you had the following     No orders found for display       Primary Care Provider Office Phone # Fax #    Chance Espinosa [phone] [address] [place] [address]        Equal Access to Services     RATNA VEGA AH: Hadii kira zamorao Soodessa, waaxda luqadaha, qaybta kaalmada adeegyada, mahad dias. So [facility] [phone].    ATENCIÓN: Si habla español, tiene a helms disposición servicios gratuitos de asistencia lingüística. Maeve al [phone].    We comply with applicable federal civil rights laws and [place] laws. We do not discriminate on the basis of race, color, national origin, age, disability, sex, sexual  orientation, or gender identity.            Thank you!     Thank you for choosing [facility]  for your care. Our goal is always to provide you with excellent care. Hearing back from our patients is one way we can continue to improve our services. Please take a few minutes to complete the written survey that you may receive in the mail after your visit with us. Thank you!             Your Updated Medication List - Protect others around you: Learn how to safely use, store and throw away your medicines at www.disposemymeds.org.          This list is accurate as of 10/29/18 12:05 PM.  Always use your most recent med list.                   Brand Name Dispense Instructions for use Diagnosis    aspirin 81 MG tablet      1 tab po QD (Once per day)        atenolol 50 MG tablet    TENORMIN    90 tablet    Take 1 tablet (50 mg) by mouth daily    CKD (chronic kidney disease) stage 3, GFR 30-59 ml/min (H), Hypertension goal BP (blood pressure) < 140/90       atorvastatin 10 MG tablet    LIPITOR    90 tablet    Take 1 tablet (10 mg) by mouth daily    Hyperlipidemia LDL goal <130       CALCIUM 600 + D 600-200 MG-UNIT Tabs     3 MONTHS         clotrimazole-betamethasone cream    LOTRISONE    45 g    Apply  topically 2 times daily.    Jock itch       finasteride 5 MG tablet    PROSCAR    90 tablet    Take 1 tablet (5 mg) by mouth daily    Hypertrophy of prostate without urinary obstruction       Multi-vitamin Tabs tablet   Generic drug:  multivitamin, therapeutic with minerals      1 tablet daily        omeprazole 20 MG tablet     90 tablet    Take 1 tablet (20 mg) by mouth daily Take 30-60 minutes before a meal.    Gastroesophageal reflux disease without esophagitis       oxymetazoline 0.05 % spray    AFRIN     Spray 2 sprays into both nostrils as needed.        selenium sulfide 2.5 % lotion    SELSUN    360 mL    Apply daily to every other day - lather, wait 10 minutes and rinse    Seborrheic dermatitis,  unspecified       sildenafil 50 MG tablet    VIAGRA    18 tablet    Take 1 tablet (50 mg) by mouth daily as needed for erectile dysfunction    Erectile dysfunction, unspecified erectile dysfunction type       terazosin 10 MG capsule    HYTRIN    90 capsule    Take 1 capsule (10 mg) by mouth At Bedtime    CKD (chronic kidney disease) stage 3, GFR 30-59 ml/min (H), Hypertension goal BP (blood pressure) < 140/90

## 2018-10-29 NOTE — PROGRESS NOTES
Suture removal:     Date sutures applied: 10/22/2018         Where (setting) in which they applied:dermatology    Description:  Type: sutures  Location: right side of face    History:    Cause of laceration: removal of cancer     Accompanying Signs & Symptoms: (staff: if yes-describe)  Redness: no  Warmth: no  Drainage: no  Still bleeding: no  Fevers: no    Last tetanus shot: last tetanus booster within 10 years      Removal of 9 sutures       Danielle Mills RN, BSN  [place] [facility]

## 2018-11-28 ENCOUNTER — HOSPITAL ENCOUNTER (OUTPATIENT)
Dept: PHYSICAL THERAPY | Facility: CLINIC | Age: 83
Setting detail: THERAPIES SERIES
End: 2018-11-28
Attending: FAMILY MEDICINE
Payer: COMMERCIAL

## 2018-11-28 PROCEDURE — 40000185 ZZHC STATISTIC PT OUTPT VISIT: Performed by: PHYSICAL THERAPIST

## 2018-11-28 PROCEDURE — 97112 NEUROMUSCULAR REEDUCATION: CPT | Mod: GP | Performed by: PHYSICAL THERAPIST

## 2018-11-28 PROCEDURE — 97116 GAIT TRAINING THERAPY: CPT | Mod: GP | Performed by: PHYSICAL THERAPIST

## 2018-11-28 PROCEDURE — 97110 THERAPEUTIC EXERCISES: CPT | Mod: GP | Performed by: PHYSICAL THERAPIST

## 2018-12-05 ENCOUNTER — HOSPITAL ENCOUNTER (OUTPATIENT)
Dept: PHYSICAL THERAPY | Facility: CLINIC | Age: 83
Setting detail: THERAPIES SERIES
End: 2018-12-05
Attending: FAMILY MEDICINE
Payer: COMMERCIAL

## 2018-12-05 PROCEDURE — 40000185 ZZHC STATISTIC PT OUTPT VISIT: Performed by: PHYSICAL THERAPIST

## 2018-12-05 PROCEDURE — 97110 THERAPEUTIC EXERCISES: CPT | Mod: GP | Performed by: PHYSICAL THERAPIST

## 2018-12-05 PROCEDURE — 97112 NEUROMUSCULAR REEDUCATION: CPT | Mod: GP | Performed by: PHYSICAL THERAPIST

## 2018-12-05 PROCEDURE — 97116 GAIT TRAINING THERAPY: CPT | Mod: GP | Performed by: PHYSICAL THERAPIST

## 2018-12-12 ENCOUNTER — HOSPITAL ENCOUNTER (OUTPATIENT)
Dept: PHYSICAL THERAPY | Facility: CLINIC | Age: 83
Setting detail: THERAPIES SERIES
End: 2018-12-12
Attending: FAMILY MEDICINE
Payer: COMMERCIAL

## 2018-12-12 PROCEDURE — 97110 THERAPEUTIC EXERCISES: CPT | Mod: GP | Performed by: PHYSICAL THERAPIST

## 2018-12-19 ENCOUNTER — HOSPITAL ENCOUNTER (OUTPATIENT)
Dept: PHYSICAL THERAPY | Facility: CLINIC | Age: 83
Setting detail: THERAPIES SERIES
End: 2018-12-19
Attending: FAMILY MEDICINE
Payer: COMMERCIAL

## 2018-12-19 PROCEDURE — 97110 THERAPEUTIC EXERCISES: CPT | Mod: GP | Performed by: PHYSICAL THERAPIST

## 2018-12-19 PROCEDURE — 97112 NEUROMUSCULAR REEDUCATION: CPT | Mod: GP | Performed by: PHYSICAL THERAPIST

## 2018-12-26 ENCOUNTER — HOSPITAL ENCOUNTER (OUTPATIENT)
Dept: PHYSICAL THERAPY | Facility: CLINIC | Age: 83
Setting detail: THERAPIES SERIES
End: 2018-12-26
Attending: FAMILY MEDICINE
Payer: COMMERCIAL

## 2018-12-26 PROCEDURE — 97110 THERAPEUTIC EXERCISES: CPT | Mod: GP | Performed by: PHYSICAL THERAPIST

## 2018-12-26 PROCEDURE — 97112 NEUROMUSCULAR REEDUCATION: CPT | Mod: GP | Performed by: PHYSICAL THERAPIST

## 2019-01-02 ENCOUNTER — HOSPITAL ENCOUNTER (OUTPATIENT)
Dept: PHYSICAL THERAPY | Facility: CLINIC | Age: 84
Setting detail: THERAPIES SERIES
End: 2019-01-02
Attending: FAMILY MEDICINE
Payer: COMMERCIAL

## 2019-01-02 PROCEDURE — 97112 NEUROMUSCULAR REEDUCATION: CPT | Mod: GP | Performed by: PHYSICAL THERAPIST

## 2019-01-02 PROCEDURE — G8979 MOBILITY GOAL STATUS: HCPCS | Mod: GP,CI | Performed by: PHYSICAL THERAPIST

## 2019-01-02 PROCEDURE — 97750 PHYSICAL PERFORMANCE TEST: CPT | Mod: GP | Performed by: PHYSICAL THERAPIST

## 2019-01-02 PROCEDURE — 97110 THERAPEUTIC EXERCISES: CPT | Mod: GP | Performed by: PHYSICAL THERAPIST

## 2019-01-02 PROCEDURE — G8980 MOBILITY D/C STATUS: HCPCS | Mod: GP,CI | Performed by: PHYSICAL THERAPIST

## 2019-02-01 ENCOUNTER — TRANSFERRED RECORDS (OUTPATIENT)
Dept: HEALTH INFORMATION MANAGEMENT | Facility: CLINIC | Age: 84
End: 2019-02-01

## 2019-05-31 ENCOUNTER — TRANSFERRED RECORDS (OUTPATIENT)
Dept: HEALTH INFORMATION MANAGEMENT | Facility: CLINIC | Age: 84
End: 2019-05-31

## 2019-06-08 DIAGNOSIS — I10 HYPERTENSION GOAL BP (BLOOD PRESSURE) < 140/90: ICD-10-CM

## 2019-06-08 DIAGNOSIS — N18.30 CKD (CHRONIC KIDNEY DISEASE) STAGE 3, GFR 30-59 ML/MIN (H): ICD-10-CM

## 2019-06-10 NOTE — TELEPHONE ENCOUNTER
Please fill today if possible, patient is going out of town tomorrow. Or if this can't be done they may have to fill it in [place], [place].  Please call him if this can be done on his home phone [phone].  Gale Cano, Clinic Receptionist

## 2019-06-10 NOTE — TELEPHONE ENCOUNTER
"Requested Prescriptions   Pending Prescriptions Disp Refills     terazosin (HYTRIN) 10 MG capsule [Pharmacy Med Name: TERAZOSIN HCL 10MG CAPS]      Last Written Prescription Date:  9.20.18  Last Fill Quantity: 90 capsule,  # refills: 3   Last office visit: 9/20/2018 with prescribing provider:  Chance Espinosa MD         Future Office Visit:   Next 5 appointments (look out 90 days)    Jul 26, 2019 10:40 AM CDT  PHYSICAL with Chance Espinosa MD  [facility] ([address] [id number] [phone]            90 capsule 3     Sig: TAKE ONE CAPSULE BY MOUTH AT BEDTIME       Alpha Blockers Failed - 6/8/2019  1:40 PM        Failed - Patient does not have Tadalafil, Vardenafil, or Sildenafil on their medication list        Passed - Blood pressure under 140/90 in past 12 months     BP Readings from Last 3 Encounters:   09/20/18 128/76   07/02/18 130/78   01/03/18 108/64                 Passed - Recent (12 mo) or future (30 days) visit within the authorizing provider's specialty     Patient had office visit in the last 12 months or has a visit in the next 30 days with authorizing provider or within the authorizing provider's specialty.  See \"Patient Info\" tab in inbasket, or \"Choose Columns\" in Meds & Orders section of the refill encounter.              Passed - Medication is active on med list        Passed - Patient is 18 years of age or older        "

## 2019-06-11 RX ORDER — TERAZOSIN 10 MG/1
CAPSULE ORAL
Qty: 90 CAPSULE | Refills: 0 | Status: SHIPPED | OUTPATIENT
Start: 2019-06-11 | End: 2019-07-19

## 2019-06-11 NOTE — TELEPHONE ENCOUNTER
Patient was given order on 9/20/2018 for #90 with 3 refills that he brought to [facility] pharmacy.  I advised wife of this and she said they spoke to pharmacy and were advised new order was needed.  I advised wife I will speak with pharmacy in the morning and call them back tomorrow.  They don't leave town until tomorrow evening.      Maria Teresa Cali, BS, RN, PHN  [facility]  Ph) [phone]

## 2019-06-11 NOTE — TELEPHONE ENCOUNTER
I spoke with pharmacy and they advised me they never received the rx dated 9/20/2018.  90 day supply sent to pharmacy.  Physical is due in 9/2019.    Patient notified by phone and advised that physical is due in    MELVIN Polk, RN, N  [facility]) [phone]

## 2019-07-16 DIAGNOSIS — I10 HYPERTENSION GOAL BP (BLOOD PRESSURE) < 140/90: ICD-10-CM

## 2019-07-16 DIAGNOSIS — N18.30 CKD (CHRONIC KIDNEY DISEASE) STAGE 3, GFR 30-59 ML/MIN (H): ICD-10-CM

## 2019-07-17 NOTE — TELEPHONE ENCOUNTER
"Requested Prescriptions   Pending Prescriptions Disp Refills     atenolol (TENORMIN) 50 MG tablet [Pharmacy Med Name: ATENOLOL 50MG TABS]        Last Written Prescription Date:  9.20.18  Last Fill Quantity: 90 tablet,  # refills: 3   Last office visit: 9/20/2018 with prescribing provider:  Chance Espinosa MD               Future Office Visit:   Next 5 appointments (look out 90 days)    Jul 26, 2019 10:40 AM CDT  PHYSICAL with Chance Espinosa MD  [facility] ([facility]) [address] [place] [id number] [phone]            90 tablet 3     Sig: TAKE ONE TABLET BY MOUTH EVERY DAY       Beta-Blockers Protocol Passed - 7/16/2019  5:34 PM        Passed - Blood pressure under 140/90 in past 12 months     BP Readings from Last 3 Encounters:   09/20/18 128/76   07/02/18 130/78   01/03/18 108/64                 Passed - Patient is age 6 or older        Passed - Recent (12 mo) or future (30 days) visit within the authorizing provider's specialty     Patient had office visit in the last 12 months or has a visit in the next 30 days with authorizing provider or within the authorizing provider's specialty.  See \"Patient Info\" tab in inbasket, or \"Choose Columns\" in Meds & Orders section of the refill encounter.              Passed - Medication is active on med list        terazosin (HYTRIN) 10 MG capsule [Pharmacy Med Name: TERAZOSIN HCL 10MG CAPS]          Last Written Prescription Date:  9.20.18  Last Fill Quantity: 90 capsule,  # refills: 3   Last office visit: 9/20/2018 with prescribing provider:  Chance Espinosa MD               Future Office Visit:   Next 5 appointments (look out 90 days)    Jul 26, 2019 10:40 AM CDT  PHYSICAL with Chance Espinosa MD  [facility] ([facility]) [address] [place] [id number] [phone]            90 capsule 3     Sig: TAKE ONE CAPSULE BY MOUTH AT BEDTIME       Alpha Blockers Failed - " "7/16/2019  5:34 PM        Failed - Patient does not have Tadalafil, Vardenafil, or Sildenafil on their medication list        Passed - Blood pressure under 140/90 in past 12 months     BP Readings from Last 3 Encounters:   09/20/18 128/76   07/02/18 130/78   01/03/18 108/64                 Passed - Recent (12 mo) or future (30 days) visit within the authorizing provider's specialty     Patient had office visit in the last 12 months or has a visit in the next 30 days with authorizing provider or within the authorizing provider's specialty.  See \"Patient Info\" tab in inbasket, or \"Choose Columns\" in Meds & Orders section of the refill encounter.              Passed - Medication is active on med list        Passed - Patient is 18 years of age or older        "

## 2019-07-18 RX ORDER — TERAZOSIN 10 MG/1
CAPSULE ORAL
Qty: 90 CAPSULE | Refills: 3 | OUTPATIENT
Start: 2019-07-18

## 2019-07-18 RX ORDER — ATENOLOL 50 MG/1
TABLET ORAL
Qty: 90 TABLET | Refills: 3 | OUTPATIENT
Start: 2019-07-18

## 2019-07-19 ENCOUNTER — TELEPHONE (OUTPATIENT)
Dept: FAMILY MEDICINE | Facility: CLINIC | Age: 84
End: 2019-07-19

## 2019-07-19 DIAGNOSIS — M70.61 GREATER TROCHANTERIC BURSITIS OF BOTH HIPS: ICD-10-CM

## 2019-07-19 DIAGNOSIS — K21.9 GASTROESOPHAGEAL REFLUX DISEASE WITHOUT ESOPHAGITIS: ICD-10-CM

## 2019-07-19 DIAGNOSIS — E78.5 HYPERLIPIDEMIA LDL GOAL <130: ICD-10-CM

## 2019-07-19 DIAGNOSIS — Z82.49 FAMILY HISTORY OF CARDIOVASCULAR DISEASE: ICD-10-CM

## 2019-07-19 DIAGNOSIS — G47.30 HYPERSOMNIA WITH SLEEP APNEA: ICD-10-CM

## 2019-07-19 DIAGNOSIS — M25.552 BILATERAL HIP PAIN: ICD-10-CM

## 2019-07-19 DIAGNOSIS — Z51.81 MEDICATION MONITORING ENCOUNTER: ICD-10-CM

## 2019-07-19 DIAGNOSIS — I10 HYPERTENSION GOAL BP (BLOOD PRESSURE) < 140/90: ICD-10-CM

## 2019-07-19 DIAGNOSIS — M70.62 GREATER TROCHANTERIC BURSITIS OF BOTH HIPS: ICD-10-CM

## 2019-07-19 DIAGNOSIS — M25.551 BILATERAL HIP PAIN: ICD-10-CM

## 2019-07-19 DIAGNOSIS — M48.061 SPINAL STENOSIS OF LUMBAR REGION, UNSPECIFIED WHETHER NEUROGENIC CLAUDICATION PRESENT: ICD-10-CM

## 2019-07-19 DIAGNOSIS — Z91.09 ENVIRONMENTAL ALLERGIES: ICD-10-CM

## 2019-07-19 DIAGNOSIS — G47.10 HYPERSOMNIA WITH SLEEP APNEA: ICD-10-CM

## 2019-07-19 DIAGNOSIS — Z12.5 SCREENING FOR PROSTATE CANCER: ICD-10-CM

## 2019-07-19 DIAGNOSIS — Z12.11 SCREEN FOR COLON CANCER: ICD-10-CM

## 2019-07-19 DIAGNOSIS — Z00.00 ENCOUNTER FOR ROUTINE ADULT HEALTH EXAMINATION WITHOUT ABNORMAL FINDINGS: Primary | ICD-10-CM

## 2019-07-19 DIAGNOSIS — N18.30 CKD (CHRONIC KIDNEY DISEASE) STAGE 3, GFR 30-59 ML/MIN (H): ICD-10-CM

## 2019-07-19 DIAGNOSIS — J98.4 RESTRICTIVE LUNG DISEASE: ICD-10-CM

## 2019-07-19 RX ORDER — TERAZOSIN 10 MG/1
CAPSULE ORAL
Qty: 90 CAPSULE | Refills: 1 | Status: SHIPPED | OUTPATIENT
Start: 2019-07-19 | End: 2020-01-06

## 2019-07-19 RX ORDER — ATENOLOL 50 MG/1
50 TABLET ORAL DAILY
Qty: 90 TABLET | Refills: 0 | Status: SHIPPED | OUTPATIENT
Start: 2019-07-19 | End: 2019-10-10

## 2019-07-19 NOTE — TELEPHONE ENCOUNTER
Patient and wife notified by phone.  Physiatry referral ordered and scheduling number given to wife.      MELVIN Polk, RN, N  [facility]) [phone]

## 2019-07-19 NOTE — TELEPHONE ENCOUNTER
I spoke with patient and wife and they said that their insurance allows one physical per year and it does not have to be after 9/20/2019.    Wife states patient has been tired and having hip pain and they want his hemoglobin checked also their daughter wants them to get referral for physiatry.      Hemoglobin   Date Value Ref Range Status   09/17/2018 13.2 (L) 13.3 - 17.7 g/dL Final           MELVIN Polk, RN, N  [facility]) [phone]

## 2019-07-19 NOTE — TELEPHONE ENCOUNTER
Avoid use with sildenafil and terazosin together, low BP    BP Readings from Last 3 Encounters:   09/20/18 128/76   07/02/18 130/78   01/03/18 108/64     Creatinine   Date Value Ref Range Status   09/17/2018 1.28 (H) 0.66 - 1.25 mg/dL Final     Due for cpx after 9/20/19

## 2019-07-19 NOTE — TELEPHONE ENCOUNTER
This patient is coming in for a lab only appointment on 7/2/2019and need future labs ordered.    Next 5 appointments (look out 90 days)    Jul 26, 2019 10:40 AM CDT  PHYSICAL with Chance Espinosa MD  [facility] ([facility]) [address] [place] [id number] [phone]

## 2019-07-19 NOTE — TELEPHONE ENCOUNTER
Both prescriptions written in sept 2018 were local printed and given to the patient; patient does not have these hardcopies.  Pharmacy will need new prescriptions sent in.    Please advise.     -Carmen Cavazos,Certified Pharmacy Technician, [facility],       [facility], Ph. [phone]

## 2019-07-19 NOTE — TELEPHONE ENCOUNTER
Terazosin: Routing refill request to provider for review/approval because:  Patient has sildenafil on medication list.      Atenolol: one time refill given of Atenolol to allow medications to get to appointment    Amita RICARDO RN   [facility] [place]

## 2019-07-22 ENCOUNTER — TELEPHONE (OUTPATIENT)
Dept: PHYSICAL MEDICINE AND REHAB | Facility: CLINIC | Age: 84
End: 2019-07-22

## 2019-07-22 DIAGNOSIS — Z12.5 SCREENING FOR PROSTATE CANCER: ICD-10-CM

## 2019-07-22 DIAGNOSIS — J98.4 RESTRICTIVE LUNG DISEASE: ICD-10-CM

## 2019-07-22 DIAGNOSIS — Z82.49 FAMILY HISTORY OF CARDIOVASCULAR DISEASE: ICD-10-CM

## 2019-07-22 DIAGNOSIS — Z00.00 ENCOUNTER FOR ROUTINE ADULT HEALTH EXAMINATION WITHOUT ABNORMAL FINDINGS: ICD-10-CM

## 2019-07-22 DIAGNOSIS — K21.9 GASTROESOPHAGEAL REFLUX DISEASE WITHOUT ESOPHAGITIS: ICD-10-CM

## 2019-07-22 DIAGNOSIS — I10 HYPERTENSION GOAL BP (BLOOD PRESSURE) < 140/90: ICD-10-CM

## 2019-07-22 DIAGNOSIS — N18.30 CKD (CHRONIC KIDNEY DISEASE) STAGE 3, GFR 30-59 ML/MIN (H): ICD-10-CM

## 2019-07-22 DIAGNOSIS — Z51.81 MEDICATION MONITORING ENCOUNTER: ICD-10-CM

## 2019-07-22 DIAGNOSIS — G47.30 HYPERSOMNIA WITH SLEEP APNEA: ICD-10-CM

## 2019-07-22 DIAGNOSIS — G47.10 HYPERSOMNIA WITH SLEEP APNEA: ICD-10-CM

## 2019-07-22 DIAGNOSIS — E78.5 HYPERLIPIDEMIA LDL GOAL <130: ICD-10-CM

## 2019-07-22 LAB
ALBUMIN SERPL-MCNC: 3.4 G/DL (ref 3.4–5)
ALBUMIN UR-MCNC: NEGATIVE MG/DL
ALP SERPL-CCNC: 34 U/L (ref 40–150)
ALT SERPL W P-5'-P-CCNC: 31 U/L (ref 0–70)
ANION GAP SERPL CALCULATED.3IONS-SCNC: 10 MMOL/L (ref 3–14)
APPEARANCE UR: CLEAR
AST SERPL W P-5'-P-CCNC: 27 U/L (ref 0–45)
BILIRUB SERPL-MCNC: 0.4 MG/DL (ref 0.2–1.3)
BILIRUB UR QL STRIP: NEGATIVE
BUN SERPL-MCNC: 28 MG/DL (ref 7–30)
CALCIUM SERPL-MCNC: 9.6 MG/DL (ref 8.5–10.1)
CHLORIDE SERPL-SCNC: 108 MMOL/L (ref 94–109)
CHOLEST SERPL-MCNC: 192 MG/DL
CK SERPL-CCNC: 117 U/L (ref 30–300)
CO2 SERPL-SCNC: 24 MMOL/L (ref 20–32)
COLOR UR AUTO: YELLOW
CREAT SERPL-MCNC: 1.38 MG/DL (ref 0.66–1.25)
CREAT UR-MCNC: 100 MG/DL
ERYTHROCYTE [DISTWIDTH] IN BLOOD BY AUTOMATED COUNT: 13.3 % (ref 10–15)
GFR SERPL CREATININE-BSD FRML MDRD: 46 ML/MIN/{1.73_M2}
GLUCOSE SERPL-MCNC: 96 MG/DL (ref 70–99)
GLUCOSE UR STRIP-MCNC: NEGATIVE MG/DL
HCT VFR BLD AUTO: 41.3 % (ref 40–53)
HDLC SERPL-MCNC: 45 MG/DL
HGB BLD-MCNC: 13.6 G/DL (ref 13.3–17.7)
HGB UR QL STRIP: ABNORMAL
KETONES UR STRIP-MCNC: NEGATIVE MG/DL
LDLC SERPL CALC-MCNC: 98 MG/DL
LEUKOCYTE ESTERASE UR QL STRIP: NEGATIVE
MCH RBC QN AUTO: 32.7 PG (ref 26.5–33)
MCHC RBC AUTO-ENTMCNC: 32.9 G/DL (ref 31.5–36.5)
MCV RBC AUTO: 99 FL (ref 78–100)
MICROALBUMIN UR-MCNC: 63 MG/L
MICROALBUMIN/CREAT UR: 63.1 MG/G CR (ref 0–17)
NITRATE UR QL: NEGATIVE
NON-SQ EPI CELLS #/AREA URNS LPF: NORMAL /LPF
NONHDLC SERPL-MCNC: 147 MG/DL
PH UR STRIP: 6 PH (ref 5–7)
PLATELET # BLD AUTO: 176 10E9/L (ref 150–450)
POTASSIUM SERPL-SCNC: 4.4 MMOL/L (ref 3.4–5.3)
PROT SERPL-MCNC: 6.6 G/DL (ref 6.8–8.8)
PSA SERPL-ACNC: 3.09 UG/L (ref 0–4)
RBC # BLD AUTO: 4.16 10E12/L (ref 4.4–5.9)
RBC #/AREA URNS AUTO: NORMAL /HPF
SODIUM SERPL-SCNC: 142 MMOL/L (ref 133–144)
SOURCE: ABNORMAL
SP GR UR STRIP: 1.02 (ref 1–1.03)
TRIGL SERPL-MCNC: 243 MG/DL
TSH SERPL DL<=0.005 MIU/L-ACNC: 2.24 MU/L (ref 0.4–4)
UROBILINOGEN UR STRIP-ACNC: 0.2 EU/DL (ref 0.2–1)
WBC # BLD AUTO: 9.2 10E9/L (ref 4–11)
WBC #/AREA URNS AUTO: NORMAL /HPF

## 2019-07-22 PROCEDURE — 85027 COMPLETE CBC AUTOMATED: CPT | Performed by: FAMILY MEDICINE

## 2019-07-22 PROCEDURE — 81001 URINALYSIS AUTO W/SCOPE: CPT | Performed by: FAMILY MEDICINE

## 2019-07-22 PROCEDURE — 82043 UR ALBUMIN QUANTITATIVE: CPT | Performed by: FAMILY MEDICINE

## 2019-07-22 PROCEDURE — 82550 ASSAY OF CK (CPK): CPT | Performed by: FAMILY MEDICINE

## 2019-07-22 PROCEDURE — G0103 PSA SCREENING: HCPCS | Performed by: FAMILY MEDICINE

## 2019-07-22 PROCEDURE — 80053 COMPREHEN METABOLIC PANEL: CPT | Performed by: FAMILY MEDICINE

## 2019-07-22 PROCEDURE — 80061 LIPID PANEL: CPT | Performed by: FAMILY MEDICINE

## 2019-07-22 PROCEDURE — 36415 COLL VENOUS BLD VENIPUNCTURE: CPT | Performed by: FAMILY MEDICINE

## 2019-07-22 PROCEDURE — 84443 ASSAY THYROID STIM HORMONE: CPT | Performed by: FAMILY MEDICINE

## 2019-07-22 NOTE — TELEPHONE ENCOUNTER
[facility] Call Center    Phone Message    May a detailed message be left on voicemail: yes - please try calling cell phone for wife Leticia at [phone].    Reason for Call: Calling back regarding appt for [facility]. They dates that work for them (daughter will be going along) are 8/1 am, 8/2, 9/13, 9/16 am, 9/20 or 9/23.       Action Taken: Message routed to:  [facility]

## 2019-07-22 NOTE — TELEPHONE ENCOUNTER
[facility] Call Center    Phone Message    May a detailed message be left on voicemail: yes    Reason for Call: Other: Leticia calling to make an appointment for Kevin based on a referral from Dr. Espinosa.  She states they will be in and out today.  Please call her back as soon as possible to schedule     Action Taken: Message routed to:  [facility]

## 2019-07-23 NOTE — TELEPHONE ENCOUNTER
Pt wife called to help facilitate an appointment with Physiatry.      Pt's wife Leticia calling again to see if there were any updates on getting her  scheduled in [facility].  They are having their daughter from [place] be at this appointment and need to get this confirmed as soon as possible so she can make arrangements for the dates given. Those dates again are 8/1 am, 8/2, 9/13, 9/16 am, 9/20 or 9/23.     Pt wife was advised the place of referral would call Pt to schedule appt. If they have not heard from [email or web address] by Friday to let MD know and clinic can help facilitate. (original order placed 7/19/19)    [place] RN   [place] [facility]

## 2019-07-23 NOTE — TELEPHONE ENCOUNTER
[facility] Call Center    Phone Message    May a detailed message be left on voicemail: yes    Reason for Call: Other: Pt's wife Leticia calling again to see if there were any updates on getting her  scheduled in [facility].  They are having their daughter from [place] be at this appointment and need to get this confirmed as soon as possible so she can make arrangements for the dates given. Those dates again are 8/1 am, 8, ,  am,  or .     Please Call Leticia back at [phone]. She will be available until 10:00a today at home phone or cell, or after 3:00 pm today. She has a  at 10:00am.     Action Taken: Message routed to:  [facility]

## 2019-07-26 ENCOUNTER — OFFICE VISIT (OUTPATIENT)
Dept: FAMILY MEDICINE | Facility: CLINIC | Age: 84
End: 2019-07-26
Payer: COMMERCIAL

## 2019-07-26 VITALS
BODY MASS INDEX: 32.18 KG/M2 | OXYGEN SATURATION: 95 % | HEIGHT: 67 IN | DIASTOLIC BLOOD PRESSURE: 66 MMHG | SYSTOLIC BLOOD PRESSURE: 116 MMHG | HEART RATE: 86 BPM | WEIGHT: 205 LBS | TEMPERATURE: 97.7 F

## 2019-07-26 DIAGNOSIS — R26.89 BALANCE PROBLEMS: ICD-10-CM

## 2019-07-26 DIAGNOSIS — M48.061 SPINAL STENOSIS OF LUMBAR REGION, UNSPECIFIED WHETHER NEUROGENIC CLAUDICATION PRESENT: ICD-10-CM

## 2019-07-26 DIAGNOSIS — N18.30 CKD (CHRONIC KIDNEY DISEASE) STAGE 3, GFR 30-59 ML/MIN (H): ICD-10-CM

## 2019-07-26 DIAGNOSIS — M25.551 BILATERAL HIP PAIN: ICD-10-CM

## 2019-07-26 DIAGNOSIS — I10 HYPERTENSION GOAL BP (BLOOD PRESSURE) < 140/90: ICD-10-CM

## 2019-07-26 DIAGNOSIS — J98.4 RESTRICTIVE LUNG DISEASE: ICD-10-CM

## 2019-07-26 DIAGNOSIS — K21.9 GASTROESOPHAGEAL REFLUX DISEASE WITHOUT ESOPHAGITIS: ICD-10-CM

## 2019-07-26 DIAGNOSIS — N40.0 HYPERTROPHY OF PROSTATE WITHOUT URINARY OBSTRUCTION: ICD-10-CM

## 2019-07-26 DIAGNOSIS — G47.10 HYPERSOMNIA WITH SLEEP APNEA: ICD-10-CM

## 2019-07-26 DIAGNOSIS — Z51.81 MEDICATION MONITORING ENCOUNTER: ICD-10-CM

## 2019-07-26 DIAGNOSIS — E78.5 HYPERLIPIDEMIA LDL GOAL <130: ICD-10-CM

## 2019-07-26 DIAGNOSIS — Z82.49 FAMILY HISTORY OF CARDIOVASCULAR DISEASE: ICD-10-CM

## 2019-07-26 DIAGNOSIS — Z12.11 SCREEN FOR COLON CANCER: ICD-10-CM

## 2019-07-26 DIAGNOSIS — G47.30 HYPERSOMNIA WITH SLEEP APNEA: ICD-10-CM

## 2019-07-26 DIAGNOSIS — Z12.5 SCREENING FOR PROSTATE CANCER: ICD-10-CM

## 2019-07-26 DIAGNOSIS — M25.552 BILATERAL HIP PAIN: ICD-10-CM

## 2019-07-26 DIAGNOSIS — Z91.09 ENVIRONMENTAL ALLERGIES: ICD-10-CM

## 2019-07-26 DIAGNOSIS — H90.3 SENSORINEURAL HEARING LOSS (SNHL) OF BOTH EARS: ICD-10-CM

## 2019-07-26 DIAGNOSIS — Z00.00 ENCOUNTER FOR ROUTINE ADULT HEALTH EXAMINATION WITHOUT ABNORMAL FINDINGS: Primary | ICD-10-CM

## 2019-07-26 PROCEDURE — G0439 PPPS, SUBSEQ VISIT: HCPCS | Performed by: FAMILY MEDICINE

## 2019-07-26 PROCEDURE — 99207 C PAF COMPLETED  NO CHARGE: CPT | Performed by: FAMILY MEDICINE

## 2019-07-26 ASSESSMENT — ACTIVITIES OF DAILY LIVING (ADL): CURRENT_FUNCTION: NO ASSISTANCE NEEDED

## 2019-07-26 ASSESSMENT — MIFFLIN-ST. JEOR: SCORE: 1573.5

## 2019-07-26 NOTE — PATIENT INSTRUCTIONS
[facility]                        To reach your care team during and after hours:   [phone]  To reach our pharmacy:        [phone]    Clinic Hours                        Our clinic hours are:    Monday   7:30 am to 7:00 pm                  Tuesday through Friday 7:30 am to 5:00 pm                             Saturday   8:00 am to 12:00 pm      Sunday   Closed      Pharmacy Hours                        Our pharmacy hours are:    Monday   8:30 am to 7:00 pm       Tuesday to Friday  8:30 am to 6:00 pm                       Saturday    9:00 am to 1:00 pm              Sunday    Closed              There is also information available at our web site:  [email or web address]    If your provider ordered any lab tests and you do not receive the results within 10 business days, please call the clinic.    If you need a medication refill please contact your pharmacy.  Please allow 2-3 business days for your refill to be completed.    Our clinic offers telephone visits and e visits.  Please ask one of your team members to explain more.      Use [employer] (secure email communication and access to your chart) to send your primary care provider a message or make an appointment. Ask someone on your Team how to sign up for [employer].  Immunizations                      Immunization History   Administered Date(s) Administered     Influenza (H1N1) 01/20/2010     Influenza (High Dose) 3 valent vaccine 10/03/2011, 09/20/2012, 10/15/2013, 10/03/2014, 10/15/2015, 09/30/2016, 10/10/2017, 09/17/2018     Influenza (IIV3) PF 10/18/2004, 10/12/2005, 10/23/2006, 10/17/2007, 10/15/2009, 09/22/2010     Pneumo Conj 13-V (2010&after) 05/20/2015     Pneumococcal 23 valent 01/04/2007     TD (ADULT, 7+) 04/20/2007     TDAP Vaccine (Boostrix) 05/23/2012     Zoster vaccine recombinant adjuvanted (SHINGRIX) 09/17/2018, 11/16/2018     Zoster vaccine, live 01/01/2008        Health Maintenance                         Health Maintenance  Due   Topic Date Due     Anxiety Assessment  08/01/2018     Depression Assessment  08/01/2018       Patient Education   Personalized Prevention Plan  You are due for the preventive services outlined below.  Your care team is available to assist you in scheduling these services.  If you have already completed any of these items, please share that information with your care team to update in your medical record.  Health Maintenance Due   Topic Date Due     Anxiety Assessment  08/01/2018     Depression Assessment  08/01/2018     Preventive Health Recommendations  See your health care provider every year to    Review health changes.     Discuss preventive care.      Review your medicines if your doctor has prescribed any.    Talk with your health care provider about whether you should have a test to screen for prostate cancer (PSA).    Every 3 years, have a diabetes test (fasting glucose). If you are at risk for diabetes, you should have this test more often.    Every 5 years, have a cholesterol test. Have this test more often if you are at risk for high cholesterol or heart disease.     Every 10 years, have a colonoscopy. Or, have a yearly FIT test (stool test). These exams will check for colon cancer.    Talk to with your health care provider about screening for Abdominal Aortic Aneurysm if you have a family history of AAA or have a history of smoking.    Shots:     Get a flu shot each year.     Get a tetanus shot every 10 years.     Talk to your doctor about your pneumonia vaccines. There are now two you should receive - Pneumovax (PPSV 23) and Prevnar (PCV 13).    Talk to your pharmacist about a shingles vaccine.     Talk to your doctor about the hepatitis B vaccine.    Nutrition:     Eat at least 5 servings of fruits and vegetables each day.     Eat whole-grain bread, whole-wheat pasta and brown rice instead of white grains and rice.     Get adequate Calcium and Vitamin D.     Lifestyle    Exercise for at least 150  minutes a week (30 minutes a day, 5 days a week). This will help you control your weight and prevent disease.     Limit alcohol to one drink per day.     No smoking.     Wear sunscreen to prevent skin cancer.     See your dentist every six months for an exam and cleaning.     See your eye doctor every 1 to 2 years to screen for conditions such as glaucoma, macular degeneration and cataracts.    Personalized Prevention Plan  You are due for the preventive services outlined below.  Your care team is available to assist you in scheduling these services.  If you have already completed any of these items, please share that information with your care team to update in your medical record.  Health Maintenance Due   Topic Date Due     Anxiety Assessment  08/01/2018     Depression Assessment  08/01/2018

## 2019-07-26 NOTE — PROGRESS NOTES
"SUBJECTIVE:   Kevin Fierro is a 85 year old male who presents for Preventive Visit.    Are you in the first 12 months of your Medicare coverage?  No  Pt stated that his wife called and verified with insurance that it was OK to have a physical 1 per calendar year - pt realizes his last physical was 9/20/18    Healthy Habits:    In general, how would you rate your overall health?  Very good    Frequency of exercise:  None    Do you usually eat at least 4 servings of fruit and vegetables a day, include whole grains    & fiber and avoid regularly eating high fat or \"junk\" foods?  No    Taking medications regularly:  Yes    Barriers to taking medications:  None    Medication side effects:  None    Ability to successfully perform activities of daily living:  No assistance needed    Home Safety:  No safety concerns identified    Hearing Impairment:  Need to ask people to speak up or repeat themselves and feel that people are mumbling or not speaking clearly    In the past 6 months, have you been bothered by leaking of urine? Yes    In general, how would you rate your overall mental or emotional health?  Excellent      PHQ-2 Total Score:    Additional concerns today:  Yes (chronic back pain)    (PT AND WIFE ADVISED OF POSSIBLE ADDITIONAL CHARGES WITH ADDITIONAL CONCERNS DURING A WELLNESS VISIT)    Previous labs reviewed with patient today.    PSVT: Stable. No issues. Patient denies and CP, SOB, edema, and palpitations.     Hyperlipidemia: Patient's hyperlipidemia is moderately controlled. Patient is currently prescribed 10 mg Atorvastatin daily for hyperlipidemia management.    Recent Labs   Lab Test 07/22/19  0738 09/17/18  0857  05/14/15  0822 05/15/14  0753   CHOL 192 166   < > 159 180   HDL 45 57   < > 49 33*   LDL 98 78   < > 83 87   TRIG 243* 155*   < > 137 303*   CHOLHDLRATIO  --   --   --  3.2 5.5*    < > = values in this interval not displayed.     Hypertension: Patient presents today as normotensive. Patient " is currently prescribed 10 mg Hytrin daily and 50 mg Atenolol daily for hypertension management.    BP Readings from Last 5 Encounters:   07/26/19 116/66   09/20/18 128/76   07/02/18 130/78   01/03/18 108/64   11/27/17 148/82     Creatinine   Date Value Ref Range Status   07/22/2019 1.38 (H) 0.66 - 1.25 mg/dL Final     GERD: Stable. Patient's GERD is well controlled by 20 mg Omeprazole daily.    Sleep Apnea: Stable. Patient is currently prescribed a CPAP machine for sleep apnea management. Patient reports that he uses his machine every night. Patient reports some generalized fatigue.     Bilateral Hip/Back Pain: Patient reports he will be seeing a physiatrist for further management of his hip/back pain and has an appointment scheduled for 8/28/2019. Hip/back pain has made it difficult for patient to exercise.     Left Ear: Patient reports his left ear has felt plugged over the last 2-3 days.    Headaches: Patient states he has been experiencing a headache intermittently over the last 2-3 days and states the pain is right behind both eyes. Patient states he thinks it is a sinus headache. Patient has been sneezing and has been having some drainage lately.     Urinary Issues: Patient states he has been experiencing some leaking lately. Patient states he is wearing daily pads to prevent this.     Do you feel safe in your environment? Yes    Do you have a Health Care Directive? Yes: Advance Directive has been received and scanned.    Fall risk  Fallen 2 or more times in the past year?: No  Any fall with injury in the past year?: No    Cognitive Screening   1) Repeat 3 items (Leader, Season, Table)    2) Clock draw: NORMAL  3) 3 item recall: Recalls 1 object   Results: NORMAL clock, 1-2 items recalled: COGNITIVE IMPAIRMENT LESS LIKELY    Mini-CogTM Copyright S Delphine. Licensed by the author for use in [employer] [facility]; reprinted with permission ([email or web address]@.[facility]). All rights reserved.      Reviewed and updated as  needed this visit by clinical staff  Tobacco  Allergies  Meds  Med Hx  Surg Hx  Fam Hx  Soc Hx      Reviewed and updated as needed this visit by Provider        Social History     Tobacco Use     Smoking status: Never Smoker     Smokeless tobacco: Never Used   Substance Use Topics     Alcohol use: Yes     Alcohol/week: 2.4 - 3.0 oz     Types: 4 - 5 Standard drinks or equivalent per week     Comment: 4-5 drinks per week avg     If you drink alcohol do you typically have >3 drinks per day or >7 drinks per week? No    Alcohol Use 7/26/2019   Prescreen: >3 drinks/day or >7 drinks/week? No       Current providers sharing in care for this patient include:   Patient Care Team:  Chance Espinosa MD as PCP - General  Chance Espinosa MD as Assigned PCP    The following health maintenance items are reviewed in Epic and correct as of today:  Health Maintenance   Topic Date Due     ANDREW ASSESSMENT  08/01/2018     PHQ-9  08/01/2018     INFLUENZA VACCINE (1) 09/01/2019     MEDICARE ANNUAL WELLNESS VISIT  09/20/2019     FALL RISK ASSESSMENT  09/20/2019     BMP  07/22/2020     LIPID  07/22/2020     MICROALBUMIN  07/22/2020     PSA  07/22/2020     DTAP/TDAP/TD IMMUNIZATION (3 - Td) 05/23/2022     ADVANCE CARE PLANNING  07/26/2024     ZOSTER IMMUNIZATION  Completed     IPV IMMUNIZATION  Aged Out     MENINGITIS IMMUNIZATION  Aged Out     Health Maintenance     Colonoscopy:  N/A   FIT:  N/A              PSA:  Last 7/22/2019, Due 7/2020   DEXA:  N/A    Health Maintenance Due   Topic Date Due     ANDREW ASSESSMENT  08/01/2018     PHQ-9  08/01/2018       Current Problem List    Patient Active Problem List   Diagnosis     Hypertension goal BP (blood pressure) < 140/90     Hypertrophy of prostate without urinary obstruction     Hearing loss     Seborrheic dermatitis     Other and unspecified 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     Hypersomnia with sleep apnea     Family history of cardiovascular disease     Seasonal  allergies     ED (erectile dysfunction)     Hyperlipidemia LDL goal <130     Lung nodules     Advanced directives, counseling/discussion     GERD (gastroesophageal reflux disease)     CKD (chronic kidney disease) stage 3, GFR 30-59 ml/min (H)     Environmental allergies     Lumbar stenosis     Restrictive lung disease     Bilateral hip pain     Greater trochanteric bursitis of both hips     BCC (basal cell carcinoma of skin)       Past Medical History    Past Medical History:   Diagnosis Date     Arthritis .     BCC (basal cell carcinoma of skin) 10/2018    right jaw - Dr Limon     Choroidal nevus of left eye     Dr Connor     CKD (chronic kidney disease) stage 3, GFR 30-59 ml/min (H)      Colon polyps      ED (erectile dysfunction)      Family history of cardiovascular disease      GERD (gastroesophageal reflux disease) 2013     Hematuria 1999    dr scott     Hyperlipidemia LDL goal <130 1990     Hypertension goal BP (blood pressure) < 140/90 1990     Hypertrophy of prostate without urinary obstruction and other lower urinary tract symptoms (LUTS)      Lumbar stenosis 2017    mild to moderate foraminal and central     Lung nodules 11/10    CT scan stable     obstructive SLEEP APNEA 5/2007    CPAP     Other and unspecified 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 5/25/2005     PSVT 11/2017    few runs, rare PVC     Restrictive lung disease      Seasonal allergies      Unspecified hearing loss 5/2005    hearing aids, L>R - Dr Hernandez       Past Surgical History    Past Surgical History:   Procedure Laterality Date     HC COLONOSCOPY THRU STOMA, DIAGNOSTIC  2005, 5/10, 5/11    Polyps-> due 2015 - Ct Colonography negative     HC REPAIR INCISIONAL HERNIA,REDUCIBLE  1960    Hernia Repair, Incisional, Unilateral     STRESS ECHO (METRO)  7/09, 5/12, 7/17    Negative     TONSILLECTOMY & ADENOIDECTOMY  1946       Current Medications    Current Outpatient Medications   Medication Sig Dispense Refill      ASPIRIN 81 MG OR TABS 1 tab po QD (Once per day)       atenolol (TENORMIN) 50 MG tablet Take 1 tablet (50 mg) by mouth daily 90 tablet 0     atorvastatin (LIPITOR) 10 MG tablet Take 1 tablet (10 mg) by mouth daily 90 tablet 3     CALCIUM 600 + D 600-200 MG-UNIT OR TABS  3 MONTHS 3     clotrimazole-betamethasone (LOTRISONE) cream Apply  topically 2 times daily. 45 g 3     finasteride (PROSCAR) 5 MG tablet Take 1 tablet (5 mg) by mouth daily 90 tablet 3     MULTI-VITAMIN OR TABS 1 tablet daily       omeprazole 20 MG tablet Take 1 tablet (20 mg) by mouth daily Take 30-60 minutes before a meal. 90 tablet 3     oxymetazoline (AFRIN) 0.05 % nasal spray Spray 2 sprays into both nostrils as needed.       selenium sulfide (SELSUN) 2.5 % shampoo Apply daily to every other day - lather, wait 10 minutes and rinse 360 mL 3     sildenafil (VIAGRA) 50 MG tablet Take 1 tablet (50 mg) by mouth daily as needed for erectile dysfunction 18 tablet 3     terazosin (HYTRIN) 10 MG capsule TAKE ONE CAPSULE BY MOUTH AT BEDTIME 90 capsule 1     terazosin (HYTRIN) 10 MG capsule Take 1 capsule (10 mg) by mouth At Bedtime 90 capsule 3       Allergies    No Known Allergies    Immunizations    Immunization History   Administered Date(s) Administered     Influenza (H1N1) 01/20/2010     Influenza (High Dose) 3 valent vaccine 10/03/2011, 09/20/2012, 10/15/2013, 10/03/2014, 10/15/2015, 09/30/2016, 10/10/2017, 09/17/2018     Influenza (IIV3) PF 10/18/2004, 10/12/2005, 10/23/2006, 10/17/2007, 10/15/2009, 09/22/2010     Pneumo Conj 13-V (2010&after) 05/20/2015     Pneumococcal 23 valent 01/04/2007     TD (ADULT, 7+) 04/20/2007     TDAP Vaccine (Boostrix) 05/23/2012     Zoster vaccine recombinant adjuvanted (SHINGRIX) 09/17/2018, 11/16/2018     Zoster vaccine, live 01/01/2008       Family History    Family History   Problem Relation Age of Onset     C.A.D. Father         age 50's     Hypertension Father      JENNY Brother      JENNY Brother          mid 40's     Diabetes Brother      Cancer Brother         pancreatic CA     Coronary Artery Disease Brother      Cancer - colorectal No family hx of      Prostate Cancer No family hx of        Social History    Social History     Socioeconomic History     Marital status:      Spouse name: Leticia     Number of children: 3     Years of education: 18     Highest education level: Not on file   Occupational History     Employer: RETIRED   Social Needs     Financial resource strain: Not on file     Food insecurity:     Worry: Not on file     Inability: Not on file     Transportation needs:     Medical: Not on file     Non-medical: Not on file   Tobacco Use     Smoking status: Never Smoker     Smokeless tobacco: Never Used   Substance and Sexual Activity     Alcohol use: Yes     Alcohol/week: 2.4 - 3.0 oz     Types: 4 - 5 Standard drinks or equivalent per week     Comment: 4-5 drinks per week avg     Drug use: No     Sexual activity: Yes     Partners: Female   Lifestyle     Physical activity:     Days per week: Not on file     Minutes per session: Not on file     Stress: Not on file   Relationships     Social connections:     Talks on phone: Not on file     Gets together: Not on file     Attends Congregational service: Not on file     Active member of club or organization: Not on file     Attends meetings of clubs or organizations: Not on file     Relationship status: Not on file     Intimate partner violence:     Fear of current or ex partner: Not on file     Emotionally abused: Not on file     Physically abused: Not on file     Forced sexual activity: Not on file   Other Topics Concern      Service Not Asked     Blood Transfusions No     Caffeine Concern Yes     Comment: 1 serv/day, rare pop, occas chocolate (3-4/yr)     Occupational Exposure Not Asked     Hobby Hazards Not Asked     Sleep Concern Yes     Comment: MIMI, wakes feeling rested     Stress Concern Not Asked     Weight Concern Not Asked     Special  "Diet Not Asked     Back Care Not Asked     Exercise Yes     Comment: 30-45' 2-3 x/wk     Bike Helmet Not Asked     Seat Belt Yes     Self-Exams Not Asked     Parent/sibling w/ CABG, MI or angioplasty before 65F 55M? Yes   Social History Narrative     Not on file       Review of Systems  Constitutional, HEENT, cardiovascular, pulmonary, GI, , musculoskeletal, neuro, skin, endocrine and psych systems are negative, except as otherwise noted.    OBJECTIVE:   /66   Pulse 86   Temp 97.7  F (36.5  C) (Oral)   Ht 1.702 m (5' 7\")   Wt 93 kg (205 lb)   SpO2 95%   BMI 32.11 kg/m   Estimated body mass index is 32.11 kg/m  as calculated from the following:    Height as of this encounter: 1.702 m (5' 7\").    Weight as of this encounter: 93 kg (205 lb).  Physical Exam  GENERAL: healthy, alert and no distress  EYES: Eyes grossly normal to inspection  HENT:ear canals and TM's normal upon viewing with otoscope, nose and mouth without ulcers or lesions upon viewing with otoscope  NECK: no adenopathy, no asymmetry, masses, or scars and thyroid normal to palpation  RESP: lungs clear to auscultation - no rales, rhonchi or wheezes  CV: regular rate and rhythm, normal S1 S2, no S3 or S4, 2-3/6 systolic murmur, click or rub, no peripheral edema and peripheral pulses strong  ABDOMEN: soft, nontender, no hepatosplenomegaly, no masses and bowel sounds normal, umbilical hernia   (male): normal male genitalia without lesions or urethral discharge, no hernia  RECTAL: normal sphincter tone, no rectal masses, prostate normal size, smooth, nontender without nodules or masses  MS: no gross musculoskeletal defects noted, no edema  SKIN: no suspicious lesions or rashes  NEURO: Normal strength and tone, mentation intact and speech normal  PSYCH: mentation appears normal, affect normal/bright  BACK: no CVA tenderness, no paralumbar tenderness    Diagnostic Test Results:  No results found for this or any previous visit (from the past 24 " hour(s)).    ASSESSMENT / PLAN:       ICD-10-CM    1. Encounter for routine adult health examination without abnormal findings Z00.00 PAF COMPLETED   2. CKD (chronic kidney disease) stage 3, GFR 30-59 ml/min (H) N18.3 PAF COMPLETED   3. Hypertension goal BP (blood pressure) < 140/90 I10 PAF COMPLETED   4. Hyperlipidemia LDL goal <130 E78.5 PAF COMPLETED   5. Hypersomnia with sleep apnea G47.10 PAF COMPLETED    G47.30 SLEEP EVALUATION [place]NAG[place] REF[place] [facility] [phone] (Age 18 and up)   6. Restrictive lung disease J98.4 PAF COMPLETED   7. Environmental allergies Z91.09 PAF COMPLETED     UROLOGY ADULT REFERRAL   8. Balance problems R26.89 PAF COMPLETED   9. Bilateral hip pain M25.551     M25.552    10. Spinal stenosis of lumbar region, unspecified whether neurogenic claudication present M48.061 PAF COMPLETED   11. Gastroesophageal reflux disease without esophagitis K21.9 PAF COMPLETED   12. Sensorineural hearing loss (SNHL) of both ears H90.3 PAF COMPLETED   13. Family history of cardiovascular disease Z82.49 PAF COMPLETED   14. Hypertrophy of prostate without urinary obstruction N40.0 PAF COMPLETED   15. Screening for prostate cancer Z12.5 PAF COMPLETED   16. Screen for colon cancer Z12.11 PAF COMPLETED   17. Medication monitoring encounter Z51.81 PAF COMPLETED       Discussed treatment/modality options, including risk and benefits, he desires advised aspirin 81 mg po daily, advised 1 multivitamin per day, advised dentist every 6 months, advised diet and exercise and advised opthalmologist every 1-2 years. All diagnosis above reviewed and noted above, otherwise stable.  See [facility] orders for further details.      1) Patient's hyperlipidemia is moderately controlled. Patient is currently prescribed 10 mg Atorvastatin daily for hyperlipidemia management. Advised continued use.     2) Patient presents today as normotensive. Patient is currently prescribed 10 mg Hytrin  "daily and 50 mg Atenolol daily for hypertension management. Advised continued use.     3) Patient's GERD is well controlled by 20 mg Omeprazole daily. Advised continued use.     4) Patient is currently prescribed a CPAP machine for sleep apnea management. Advised continued use. Recommend follow up with sleep medicine for further evaluation of generalized fatigue.     5) Patient reports he will be seeing a physiatrist for further management of his hip/back pain and has an appointment scheduled for 8/28/2019. Recommend continued follow up.     6) Headaches deemed to be allergy related. Patient advised to take 10 mg Zyrtec daily for headache/allergy management.     7) Patient referred to Urology today for further evaluation of urine leakage.     8) Prescriptions refilled today.     9) Follow up in 6 months for medication recheck visit.     Health Maintenance Due   Topic Date Due     ANDREW ASSESSMENT  08/01/2018     PHQ-9  08/01/2018       End of Life Planning:  Patient currently has an advanced directive: Yes.  Practitioner is supportive of decision.    COUNSELING:  Reviewed preventive health counseling, as reflected in patient instructions    Estimated body mass index is 32.11 kg/m  as calculated from the following:    Height as of this encounter: 1.702 m (5' 7\").    Weight as of this encounter: 93 kg (205 lb).    Weight management plan: Discussed healthy diet and exercise guidelines     reports that he has never smoked. He has never used smokeless tobacco.    Appropriate preventive services were discussed with this patient, including applicable screening as appropriate for cardiovascular disease, diabetes, osteopenia/osteoporosis, and glaucoma.  As appropriate for age/gender, discussed screening for colorectal cancer, prostate cancer, breast cancer, and cervical cancer. Checklist reviewing preventive services available has been given to the patient.    Reviewed patients plan of care and provided an AVS. The Basic Care " Plan (routine screening as documented in Health Maintenance) for Kevin meets the Care Plan requirement. This Care Plan has been established and reviewed with the Patient and wife.    Counseling Resources:  ATP IV Guidelines  Pooled Cohorts Equation Calculator  Breast Cancer Risk Calculator  FRAX Risk Assessment  ICSI Preventive Guidelines  Dietary Guidelines for Americans, 2010  USDA's MyPlate  ASA Prophylaxis  Lung CA Screening    This document serves as a record of the services and decisions personally performed and made by Chance Espinosa MD. It was created on his behalf by Alberto Dominguez, a trained medical scribe. The creation of this document is based on the provider's statements to the medical scribe.  Alberto Dominguez July 26, 2019 11:12 AM     The information in this document, created by the medical scribe for me, accurately reflects the services I personally performed and the decisions made by me. I have reviewed and approved this document for accuracy prior to leaving the patient care area.  July 26, 2019          Chance Espinosa MD, [address]  [address]    [phone] Fax

## 2019-08-28 ENCOUNTER — OFFICE VISIT (OUTPATIENT)
Dept: PHYSICAL MEDICINE AND REHAB | Facility: CLINIC | Age: 84
End: 2019-08-28
Payer: COMMERCIAL

## 2019-08-28 VITALS
OXYGEN SATURATION: 98 % | RESPIRATION RATE: 16 BRPM | SYSTOLIC BLOOD PRESSURE: 125 MMHG | DIASTOLIC BLOOD PRESSURE: 62 MMHG | HEART RATE: 73 BPM

## 2019-08-28 DIAGNOSIS — M54.50 CHRONIC BILATERAL LOW BACK PAIN WITHOUT SCIATICA: Primary | ICD-10-CM

## 2019-08-28 DIAGNOSIS — M62.81 GENERALIZED MUSCLE WEAKNESS: ICD-10-CM

## 2019-08-28 DIAGNOSIS — G89.29 CHRONIC BILATERAL LOW BACK PAIN WITHOUT SCIATICA: Primary | ICD-10-CM

## 2019-08-28 DIAGNOSIS — M62.9 HAMSTRING TIGHTNESS OF BOTH LOWER EXTREMITIES: ICD-10-CM

## 2019-08-28 DIAGNOSIS — M24.559 HIP FLEXOR TIGHTNESS, UNSPECIFIED LATERALITY: ICD-10-CM

## 2019-08-28 ASSESSMENT — PAIN SCALES - GENERAL: PAINLEVEL: NO PAIN (0)

## 2019-08-28 NOTE — LETTER
8/28/2019       RE: Kevin Fierro  [address] [facility] [place] [address]     Dear Colleague,    Thank you for referring your patient, Kevin Fierro, to the [facility] PHYSICAL MEDICINE AND REHABILITATION at [facility]. Please see a copy of my visit note below.    Service Date: 08/28/2019      HISTORY OF PRESENT ILLNESS:  Mr. Kevin Fierro is a delightful 85-year-old gentleman.  He has been referred to Rehab Clinic for evaluation for chronic low back and bilateral hip pain.      Mr. Fierro states that he has had low back pain for approximately 10 or so years.  It came on insidiously and progressed insidiously.  There is no specific injury.  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  He also has bilateral hip pain.  The hip pain is located primarily in the gluteal musculature.  He also indicated he has had some shots for bursitis for the hips.  The first shot he had seemed to help, second one was questionable, third was really of no help whatsoever.  The hip pain came on approximately 4 years ago, again no injury.  He describes the pains in both areas as burning and aching in nature.  Least pain for the back and the hips is 0 on the 0-10 scale.  The rating for his worst back pain is in the 6-7 range.  For the hips it is between 8 and 9.  Pain is increased in the back like lying in bed on his back.  Also, straining activities can cause pain in the back and in the hips.      He has not had any specific therapy for his back pain.  He did have some balance exercises for his hips.      The patient reports that he uses a cane in the right hand that is more or less just for his confidence.  He does not need to use it per se, although as we discussed it I thought it would be good for him to continue using that.  He notes the ice to be a little bit helpful.  Tylenol  is also minimal help.      He denies any pain shooting from the back down the legs.  He denies numbness or tingling in the legs, just notes some generalized weakness but no specific weakness.      He denies bowel incontinence.  He does have some dribbling of bladder, but he has an appointment to see his urologist.  It does not sound like this is related to a radiculopathy.      PHYSICAL EXAMINATION:  Kevin is a delightful 85-year-old gentleman, alert, oriented and cooperative.  He rises from seated to standing position.  Gait with a cane in right hand is stable.      Deep tendon reflexes are symmetrical at knees and ankles.  Sensation intact to light touch in the lower limbs.  Manual muscle testing reveals grade 4 strength throughout both lower limbs.  He has very weak core musculature.  He is extremely tight for both the hip flexors and for the hamstrings bilaterally.  Palpation reveals minimal discomfort in the gluteal musculature bilaterally.  No significant tenderness for hip bursitis on either side.      ASSESSMENT:  Kevin does have chronic back and bilateral hip pain.  I believe the back pain is probably related to the degenerative joint disk disease of lumbar spine.  Also, he has weakness of his core muscles and he is tight in the hip flexors and in the hamstrings, all of which puts extra stress on his back.  I believe that the hip pain probably is related to gluteal weakness.  At this point in time, I see no significant evidence of bursitis.  He does also of course have tightness of the gluteal musculature.      PLAN:  I have given the patient a referral to physical therapy to work on strength and mobility exercises to stretch out the hip flexors, stretch out the hamstrings, strengthening the core muscles, that is both the abdominal and back extensor musculature and the hip girdle musculature.      I have also suggested that he consider using bilateral walking sticks which could be quite helpful for him.       I recommended the use of heat and ice on an as needed basis and Tylenol on a p.r.n. basis.  At this point in time, I see no evidence of a radiculopathy here.  Further imaging not needed at this point in time.  I would like to see the patient back in followup in Rehab Clinic in 3 months' time.      Total time spent with the patient and his wife and daughter over 60 minutes.      MD FRANCESCA Naik MD             D: 2019   T: 2019   MT: AKA      Name:     JAYLYN REDMAN   MRN:      [id number]        Account:      [id number]   :      1934           Service Date: 2019      Document: [id number]

## 2019-08-28 NOTE — NURSING NOTE
Chief Complaint   Patient presents with     New Patient     BILATERAL HIP PAIN    Rajni Joseph CMA

## 2019-08-28 NOTE — PROGRESS NOTES
Service Date: 08/28/2019      HISTORY OF PRESENT ILLNESS:  Mr. Kevin Fierro is a delightful 85-year-old gentleman.  He has been referred to Rehab Clinic for evaluation for chronic low back and bilateral hip pain.      Mr. Fierro states that he has had low back pain for approximately 10 or so years.  It came on insidiously and progressed insidiously.  There is no specific injury.  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  He also has bilateral hip pain.  The hip pain is located primarily in the gluteal musculature.  He also indicated he has had some shots for bursitis for the hips.  The first shot he had seemed to help, second one was questionable, third was really of no help whatsoever.  The hip pain came on approximately 4 years ago, again no injury.  He describes the pains in both areas as burning and aching in nature.  Least pain for the back and the hips is 0 on the 0-10 scale.  The rating for his worst back pain is in the 6-7 range.  For the hips it is between 8 and 9.  Pain is increased in the back like lying in bed on his back.  Also, straining activities can cause pain in the back and in the hips.      He has not had any specific therapy for his back pain.  He did have some balance exercises for his hips.      The patient reports that he uses a cane in the right hand that is more or less just for his confidence.  He does not need to use it per se, although as we discussed it I thought it would be good for him to continue using that.  He notes the ice to be a little bit helpful.  Tylenol is also minimal help.      He denies any pain shooting from the back down the legs.  He denies numbness or tingling in the legs, just notes some generalized weakness but no specific weakness.      He denies bowel incontinence.  He does have some dribbling of bladder, but he has an appointment to see his urologist.   It does not sound like this is related to a radiculopathy.      PHYSICAL EXAMINATION:  Kevin is a delightful 85-year-old gentleman, alert, oriented and cooperative.  He rises from seated to standing position.  Gait with a cane in right hand is stable.      Deep tendon reflexes are symmetrical at knees and ankles.  Sensation intact to light touch in the lower limbs.  Manual muscle testing reveals grade 4 strength throughout both lower limbs.  He has very weak core musculature.  He is extremely tight for both the hip flexors and for the hamstrings bilaterally.  Palpation reveals minimal discomfort in the gluteal musculature bilaterally.  No significant tenderness for hip bursitis on either side.      ASSESSMENT:  Kevin does have chronic back and bilateral hip pain.  I believe the back pain is probably related to the degenerative joint disk disease of lumbar spine.  Also, he has weakness of his core muscles and he is tight in the hip flexors and in the hamstrings, all of which puts extra stress on his back.  I believe that the hip pain probably is related to gluteal weakness.  At this point in time, I see no significant evidence of bursitis.  He does also of course have tightness of the gluteal musculature.      PLAN:  I have given the patient a referral to physical therapy to work on strength and mobility exercises to stretch out the hip flexors, stretch out the hamstrings, strengthening the core muscles, that is both the abdominal and back extensor musculature and the hip girdle musculature.      I have also suggested that he consider using bilateral walking sticks which could be quite helpful for him.      I recommended the use of heat and ice on an as needed basis and Tylenol on a p.r.n. basis.  At this point in time, I see no evidence of a radiculopathy here.  Further imaging not needed at this point in time.  I would like to see the patient back in followup in Rehab Clinic in 3 months' time.      Total time  spent with the patient and his wife and daughter over 60 minutes.      MD FRANCESCA Naik MD             D: 2019   T: 2019   MT: AKA      Name:     JAYLYN REDMAN   MRN:      [id number]        Account:      [id number]   :      1934           Service Date: 2019      Document: [id number]

## 2019-09-06 ENCOUNTER — TRANSFERRED RECORDS (OUTPATIENT)
Dept: HEALTH INFORMATION MANAGEMENT | Facility: CLINIC | Age: 84
End: 2019-09-06

## 2019-09-09 ENCOUNTER — THERAPY VISIT (OUTPATIENT)
Dept: PHYSICAL THERAPY | Facility: CLINIC | Age: 84
End: 2019-09-09
Payer: COMMERCIAL

## 2019-09-09 DIAGNOSIS — M54.50 CHRONIC BILATERAL LOW BACK PAIN WITHOUT SCIATICA: ICD-10-CM

## 2019-09-09 DIAGNOSIS — M25.551 HIP PAIN, RIGHT: ICD-10-CM

## 2019-09-09 DIAGNOSIS — G89.29 CHRONIC BILATERAL LOW BACK PAIN WITHOUT SCIATICA: ICD-10-CM

## 2019-09-09 PROBLEM — M70.61 GREATER TROCHANTERIC BURSITIS OF BOTH HIPS: Status: RESOLVED | Noted: 2018-05-10 | Resolved: 2019-09-09

## 2019-09-09 PROBLEM — M25.552 BILATERAL HIP PAIN: Status: RESOLVED | Noted: 2018-05-10 | Resolved: 2019-09-09

## 2019-09-09 PROBLEM — M70.62 GREATER TROCHANTERIC BURSITIS OF BOTH HIPS: Status: RESOLVED | Noted: 2018-05-10 | Resolved: 2019-09-09

## 2019-09-09 PROCEDURE — 97110 THERAPEUTIC EXERCISES: CPT | Mod: GP | Performed by: PHYSICAL THERAPIST

## 2019-09-09 PROCEDURE — 97161 PT EVAL LOW COMPLEX 20 MIN: CPT | Mod: GP | Performed by: PHYSICAL THERAPIST

## 2019-09-09 ASSESSMENT — ACTIVITIES OF DAILY LIVING (ADL)
WALKING_UP_STEEP_HILLS: MODERATE DIFFICULTY
STANDING_FOR_15_MINUTES: EXTREME DIFFICULTY
LIGHT_TO_MODERATE_WORK: MODERATE DIFFICULTY
WALKING_APPROXIMATELY_10_MINUTES: MODERATE DIFFICULTY
HOS_ADL_ITEM_SCORE_TOTAL: 29
HOS_ADL_COUNT: 17
HOS_ADL_HIGHEST_POTENTIAL_SCORE: 68
GOING_DOWN_1_FLIGHT_OF_STAIRS: EXTREME DIFFICULTY
STEPPING_UP_AND_DOWN_CURBS: MODERATE DIFFICULTY
WALKING_INITIALLY: EXTREME DIFFICULTY
SITTING_FOR_15_MINUTES: MODERATE DIFFICULTY
TWISTING/PIVOTING_ON_INVOLVED_LEG: SLIGHT DIFFICULTY
GETTING_INTO_AND_OUT_OF_AN_AVERAGE_CAR: MODERATE DIFFICULTY
RECREATIONAL_ACTIVITIES: EXTREME DIFFICULTY
HOW_WOULD_YOU_RATE_YOUR_CURRENT_LEVEL_OF_FUNCTION_DURING_YOUR_USUAL_ACTIVITIES_OF_DAILY_LIVING_FROM_0_TO_100_WITH_100_BEING_YOUR_LEVEL_OF_FUNCTION_PRIOR_TO_YOUR_HIP_PROBLEM_AND_0_BEING_THE_INABILITY_TO_PERFORM_ANY_OF_YOUR_USUAL_DAILY_ACTIVITIES?: 50
DEEP_SQUATTING: MODERATE DIFFICULTY
PUTTING_ON_SOCKS_AND_SHOES: MODERATE DIFFICULTY
GOING_UP_1_FLIGHT_OF_STAIRS: MODERATE DIFFICULTY
WALKING_15_MINUTES_OR_GREATER: EXTREME DIFFICULTY
HOS_ADL_SCORE(%): 42.65
HEAVY_WORK: EXTREME DIFFICULTY
WALKING_DOWN_STEEP_HILLS: MODERATE DIFFICULTY
ROLLING_OVER_IN_BED: MODERATE DIFFICULTY
GETTING_INTO_AND_OUT_OF_A_BATHTUB: MODERATE DIFFICULTY

## 2019-09-09 NOTE — PROGRESS NOTES
[facility] Initial Evaluation  Subjective:  The history is provided by the patient. No  was used.   Kevin Fierro being seen for PT for back, leg hip pain.   Date of Onset: MD chaidez 9/2019. Where condition occurred: for unknown reasons.Problem occurred: on going, Lumbar DDD with hip pain, balance, walking difficulty  and reported as 4/10 on pain scale. General health as reported by patient is good. Pertinent medical history includes:  High blood pressure and overweight.    Surgeries include:  None.  Current medications:  High blood pressure medication.     Pain is described as aching and is intermittent. Pain is worse during the day. Since onset symptoms are unchanged. Special tests:  X-ray. Previous treatment includes physical therapy. There was mild improvement following previous treatment.   Patient is Retired.   Barriers include:  None as reported by patient.  Red flags:  None as reported by patient.  Type of problem:  Lumbar   Occurance: Pt reports living more a sedentary lifestyle over the past year, pt does have access to [employer].  Overall LBP with standing, walking. This is a chronic condition    Patient reports pain:  Central lumbar spine, lumbar spine right and lumbar spine left. Radiates to:  Thigh right, thigh left, gluteals left and gluteals right. Associated symptoms:  Fatigue, loss of strength and loss of motion/stiffness. Symptoms are exacerbated by bending, standing and walking and relieved by ice and analgesics.                      Objective:    Gait:    Gait Type:  Antalgic   Weight Bearing Status:  WBAT   Assistive Devices:  Cane and none  Deviations:  Lumbar:  Trunk flexion               Lumbar/SI Evaluation  ROM:    AROM Lumbar:   Flexion:          Min loss  Ext:                    Min loss   Side Bend:        Left:  WNL    Right:  WNL  Rotation:           Left:     Right:   Side Glide:        Left:     Right:                                                                Hip Evaluation  Hip PROM:  Hip PROM:  Left Hip:    Normal  Right Hip:  Normal                          Hip Strength:        Abduction:  Left: 4/5    +   Pain:Right: 4/5   +   Pain:                  Hip Special Testing:    Left hip positive for the following special tests:  SLR   Right hip positive for the following special tests:  SLR    Hip Palpation:      Right hip tenderness present at:  Abductors; Gluteus Medius and Bursa             General     ROS    Assessment/Plan:    Patient is a 85 year old male with lumbar and right side hip complaints.    Patient has the following significant findings with corresponding treatment plan.                R hip pain, LBPDiagnosis  Pain -  hot/cold therapy, self management and education  Decreased ROM/flexibility - manual therapy and therapeutic exercise  Decreased strength - therapeutic exercise and therapeutic activities  Impaired balance - neuro re-education and therapeutic activities  Decreased proprioception - neuro re-education and therapeutic activities  Inflammation - cold therapy  Impaired gait - gait training  Decreased function - therapeutic activities  Impaired posture - neuro re-education    Previous and current functional limitations:  (See Goal Flow Sheet for this information)    Short term and Long term goals: (See Goal Flow Sheet for this information)     Communication ability:  Patient appears to be able to clearly communicate and understand verbal and written communication and follow directions correctly.  Treatment Explanation - The following has been discussed with the patient:   RX ordered/plan of care  Anticipated outcomes  Possible risks and side effects  This patient would benefit from PT intervention to resume normal activities.   Rehab potential is good.    Frequency:  2 X week, once daily  Duration:  for 6 weeks  Discharge Plan:  Achieve all LTG.  Independent in home treatment program.  Reach maximal therapeutic  benefit.    Please refer to the daily flowsheet for treatment today, total treatment time and time spent performing 1:1 timed codes.

## 2019-09-13 ENCOUNTER — THERAPY VISIT (OUTPATIENT)
Dept: PHYSICAL THERAPY | Facility: CLINIC | Age: 84
End: 2019-09-13
Payer: COMMERCIAL

## 2019-09-13 DIAGNOSIS — M54.50 CHRONIC BILATERAL LOW BACK PAIN WITHOUT SCIATICA: ICD-10-CM

## 2019-09-13 DIAGNOSIS — M25.551 HIP PAIN, RIGHT: ICD-10-CM

## 2019-09-13 DIAGNOSIS — G89.29 CHRONIC BILATERAL LOW BACK PAIN WITHOUT SCIATICA: ICD-10-CM

## 2019-09-13 PROCEDURE — 97110 THERAPEUTIC EXERCISES: CPT | Mod: GP | Performed by: PHYSICAL THERAPIST

## 2019-09-13 PROCEDURE — 97530 THERAPEUTIC ACTIVITIES: CPT | Mod: GP | Performed by: PHYSICAL THERAPIST

## 2019-09-17 ENCOUNTER — THERAPY VISIT (OUTPATIENT)
Dept: PHYSICAL THERAPY | Facility: CLINIC | Age: 84
End: 2019-09-17
Payer: COMMERCIAL

## 2019-09-17 DIAGNOSIS — G89.29 CHRONIC BILATERAL LOW BACK PAIN WITHOUT SCIATICA: ICD-10-CM

## 2019-09-17 DIAGNOSIS — M54.50 CHRONIC BILATERAL LOW BACK PAIN WITHOUT SCIATICA: ICD-10-CM

## 2019-09-17 DIAGNOSIS — M25.551 HIP PAIN, RIGHT: ICD-10-CM

## 2019-09-17 PROCEDURE — 97110 THERAPEUTIC EXERCISES: CPT | Mod: GP | Performed by: PHYSICAL THERAPIST

## 2019-09-17 PROCEDURE — 97112 NEUROMUSCULAR REEDUCATION: CPT | Mod: GP | Performed by: PHYSICAL THERAPIST

## 2019-09-24 DIAGNOSIS — N39.41 URGENCY INCONTINENCE: Primary | ICD-10-CM

## 2019-10-02 ENCOUNTER — OFFICE VISIT (OUTPATIENT)
Dept: UROLOGY | Facility: CLINIC | Age: 84
End: 2019-10-02
Attending: FAMILY MEDICINE
Payer: COMMERCIAL

## 2019-10-02 VITALS — HEART RATE: 72 BPM | WEIGHT: 200 LBS | BODY MASS INDEX: 29.62 KG/M2 | OXYGEN SATURATION: 98 % | HEIGHT: 69 IN

## 2019-10-02 DIAGNOSIS — N40.0 ENLARGED PROSTATE: Primary | ICD-10-CM

## 2019-10-02 DIAGNOSIS — N39.41 URGENCY INCONTINENCE: ICD-10-CM

## 2019-10-02 LAB
ALBUMIN UR-MCNC: NEGATIVE MG/DL
APPEARANCE UR: CLEAR
BILIRUB UR QL STRIP: NEGATIVE
COLOR UR AUTO: YELLOW
GLUCOSE UR STRIP-MCNC: NEGATIVE MG/DL
HGB UR QL STRIP: NEGATIVE
KETONES UR STRIP-MCNC: NEGATIVE MG/DL
LEUKOCYTE ESTERASE UR QL STRIP: NEGATIVE
NITRATE UR QL: NEGATIVE
PH UR STRIP: 6 PH (ref 5–7)
RESIDUAL VOLUME (RV) (EXTERNAL): 78
SOURCE: NORMAL
SP GR UR STRIP: 1.02 (ref 1–1.03)
UROBILINOGEN UR STRIP-ACNC: 0.2 EU/DL (ref 0.2–1)

## 2019-10-02 PROCEDURE — 81003 URINALYSIS AUTO W/O SCOPE: CPT | Mod: QW | Performed by: UROLOGY

## 2019-10-02 PROCEDURE — 51798 US URINE CAPACITY MEASURE: CPT | Performed by: UROLOGY

## 2019-10-02 PROCEDURE — 99203 OFFICE O/P NEW LOW 30 MIN: CPT | Mod: 25 | Performed by: UROLOGY

## 2019-10-02 ASSESSMENT — MIFFLIN-ST. JEOR: SCORE: 1582.57

## 2019-10-02 ASSESSMENT — PAIN SCALES - GENERAL: PAINLEVEL: MODERATE PAIN (5)

## 2019-10-02 NOTE — LETTER
10/2/2019       RE: Kevin Fierro  [address] [id number]     Dear Colleague,    Thank you for referring your patient, Kevin Fierro, to the [facility] [place] at [facility]. Please see a copy of my visit note below.    [facility]  Main Office: [address]  [address]  [place] [address]       CHIEF COMPLAINT:  Urinary leakage    HISTORY:   I was asked by Dr Espinosa to see this 85 year old gentleman who complains of urinary leakage. He reports that he occasionally has trouble making it to the bathroom in time and has small amounts of leakage. He also reports nocturia 2-4 times nightly. He also reports a slow urinary stream.  He is now wearing 1 pad per day.  He has been taking finasteride for many years.  In 2010 it appears that he did have some microscopic hematuria and he had a CT scan performed for this.  At that time the report said that he had an enlarged prostate.  He has had no prior prostate surgery.  He has no family history of prostate cancer or other problems.      PAST MEDICAL HISTORY:   Past Medical History:   Diagnosis Date     Arthritis .     BCC (basal cell carcinoma of skin) 10/2018    right jaw - Dr Limon     Choroidal nevus of left eye     Dr Connor     CKD (chronic kidney disease) stage 3, GFR 30-59 ml/min (H)      Colon polyps      ED (erectile dysfunction)      Family history of cardiovascular disease      GERD (gastroesophageal reflux disease) 2013     Hematuria 1999    dr scott     Hyperlipidemia LDL goal <130 1990     Hypertension goal BP (blood pressure) < 140/90 1990     Hypertrophy of prostate without urinary obstruction and other lower urinary tract symptoms (LUTS)      Lumbar stenosis 2017    mild to moderate foraminal and central     Lung nodules 11/10    CT scan stable     obstructive SLEEP APNEA 5/2007    CPAP     Other and unspecified malignant neoplasm of skin  of other and unspecified parts of face 5/25/2005     PSVT 11/2017    few runs, rare PVC     Restrictive lung disease      Seasonal allergies      Unspecified hearing loss 5/2005    hearing aids, L>R - Dr Hernandez       PAST SURGICAL HISTORY:   Past Surgical History:   Procedure Laterality Date     HC COLONOSCOPY THRU STOMA, DIAGNOSTIC  2005, 5/10, 5/11    Polyps-> due 2015 - Ct Colonography negative     HC REPAIR INCISIONAL HERNIA,REDUCIBLE  1960    Hernia Repair, Incisional, Unilateral     STRESS ECHO (METRO)  7/09, 5/12, 7/17    Negative     TONSILLECTOMY & ADENOIDECTOMY  1946       FAMILY HISTORY:   Family History   Problem Relation Age of Onset     C.A.D. Father         age 50's     Hypertension Father      C.A.D. Brother      C.A.D. Brother         mid 40's     Diabetes Brother      Cancer Brother         pancreatic CA     Coronary Artery Disease Brother      Cancer - colorectal No family hx of      Prostate Cancer No family hx of        SOCIAL HISTORY:   Social History     Tobacco Use     Smoking status: Never Smoker     Smokeless tobacco: Never Used   Substance Use Topics     Alcohol use: Yes     Alcohol/week: 4.0 - 5.0 standard drinks     Types: 4 - 5 Standard drinks or equivalent per week     Comment: 4-5 drinks per week avg        No Known Allergies      Current Outpatient Medications:      ASPIRIN 81 MG OR TABS, 1 tab po QD (Once per day), Disp: , Rfl:      atenolol (TENORMIN) 50 MG tablet, Take 1 tablet (50 mg) by mouth daily, Disp: 90 tablet, Rfl: 0     atorvastatin (LIPITOR) 10 MG tablet, Take 1 tablet (10 mg) by mouth daily, Disp: 90 tablet, Rfl: 3     CALCIUM 600 + D 600-200 MG-UNIT OR TABS, , Disp: 3 MONTHS, Rfl: 3     clotrimazole-betamethasone (LOTRISONE) cream, Apply  topically 2 times daily., Disp: 45 g, Rfl: 3     finasteride (PROSCAR) 5 MG tablet, Take 1 tablet (5 mg) by mouth daily, Disp: 90 tablet, Rfl: 3     MULTI-VITAMIN OR TABS, 1 tablet daily, Disp: , Rfl:      omeprazole 20 MG tablet,  Take 1 tablet (20 mg) by mouth daily Take 30-60 minutes before a meal., Disp: 90 tablet, Rfl: 3     oxymetazoline (AFRIN) 0.05 % nasal spray, Spray 2 sprays into both nostrils as needed., Disp: , Rfl:      selenium sulfide (SELSUN) 2.5 % shampoo, Apply daily to every other day - lather, wait 10 minutes and rinse, Disp: 360 mL, Rfl: 3     sildenafil (VIAGRA) 50 MG tablet, Take 1 tablet (50 mg) by mouth daily as needed for erectile dysfunction, Disp: 18 tablet, Rfl: 3     terazosin (HYTRIN) 10 MG capsule, TAKE ONE CAPSULE BY MOUTH AT BEDTIME, Disp: 90 capsule, Rfl: 1     terazosin (HYTRIN) 10 MG capsule, Take 1 capsule (10 mg) by mouth At Bedtime, Disp: 90 capsule, Rfl: 3    Review Of Systems:  Skin: No rash, pruritis, or skin pigmentation  Eyes: No changes in vision  Ears/Nose/Throat: No changes in hearing, no nosebleeds  Respiratory: No shortness of breath, dyspnea on exertion, cough, or hemoptysis  Cardiovascular: No chest pain or palpitations  Gastrointestinal: No diarrhea or constipation. No abdominal pain. No hematochezia  Genitourinary: see HPI  Musculoskeletal: No pain or swelling of joints, normal range of motion  Neurologic: No weakness or tremors  Psychiatric: No recent changes in memory or mood  Hematologic/Lymphatic/Immunologic: No easy bruising or enlarged lymph nodes  Endocrine: No weight gain or loss      PHYSICAL EXAM:    There were no vitals taken for this visit.  General appearance: In NAD, conversant  HEENT: Normocephalic and atraumatic, anicteric sclera  Cardiovascular: Not examined  Respiratory: normal, non-labored breathing  Gastrointestinal: negative, Abdomen soft, non-tender, and non-distended.   Musculoskeletal: Not Examined  Peripheral Vascular/extremity: No peripheral edema  Skin: Normal temperature, turgor, and texture. No rash  Psychiatric: Appropriate affect, alert and oriented to person, place, and time    Penis: Normal, uncircumcised  Scrotal skin: Normal, no lesions  Testicles: Normal  to palpation bilaterally  Epididymis: Normal to palpation bilaterally  Lymphatic: Normal inguinal lymph nodes    Digital Rectal Exam: The prostate is moderately enlarged, benign and symmetric to palpation    Cystoscopy: Not done      PSA: 3.09 in July    UA RESULTS:  Recent Labs   Lab Test 07/22/19  0734   COLOR Yellow   APPEARANCE Clear   URINEGLC Negative   URINEBILI Negative   URINEKETONE Negative   SG 1.020   UBLD Trace*   URINEPH 6.0   PROTEIN Negative   UROBILINOGEN 0.2   NITRITE Negative   LEUKEST Negative   RBCU O - 2   WBCU 0 - 5       Bladder Scan: 78mL    Other Labs:      Imaging Studies: None      CLINICAL IMPRESSION:   Enlarged prostate, urinary urgency and urge incontinence    PLAN:   He has an enlarged prostate on exam.  He has multiple lower urinary tract symptoms.  The lower urinary tract symptoms are likely due to the enlarged prostate.  Otherwise, his urinalysis appears normal and he is emptying his bladder well.  We discussed treatment options for his enlarged prostate including medical and surgical treatment options.  At this time he says he is not terribly bothered by his symptoms so he does not wish to seek any further treatment.  If his symptoms should become worse or bothersome in the future he should follow-up with me again at that time.      Parish Fung MD

## 2019-10-02 NOTE — PROGRESS NOTES
[facility]  Main Office: [address]  [address]  [place] [address]       CHIEF COMPLAINT:  Urinary leakage    HISTORY:   I was asked by Dr Espinosa to see this 85 year old gentleman who complains of urinary leakage. He reports that he occasionally has trouble making it to the bathroom in time and has small amounts of leakage. He also reports nocturia 2-4 times nightly. He also reports a slow urinary stream.  He is now wearing 1 pad per day.  He has been taking finasteride for many years.  In 2010 it appears that he did have some microscopic hematuria and he had a CT scan performed for this.  At that time the report said that he had an enlarged prostate.  He has had no prior prostate surgery.  He has no family history of prostate cancer or other problems.      PAST MEDICAL HISTORY:   Past Medical History:   Diagnosis Date     Arthritis .     BCC (basal cell carcinoma of skin) 10/2018    right jaw - Dr Limon     Choroidal nevus of left eye     Dr Connor     CKD (chronic kidney disease) stage 3, GFR 30-59 ml/min (H)      Colon polyps      ED (erectile dysfunction)      Family history of cardiovascular disease      GERD (gastroesophageal reflux disease) 2013     Hematuria 1999    dr scott     Hyperlipidemia LDL goal <130 1990     Hypertension goal BP (blood pressure) < 140/90 1990     Hypertrophy of prostate without urinary obstruction and other lower urinary tract symptoms (LUTS)      Lumbar stenosis 2017    mild to moderate foraminal and central     Lung nodules 11/10    CT scan stable     obstructive SLEEP APNEA 5/2007    CPAP     Other and unspecified 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 5/25/2005     PSVT 11/2017    few runs, rare PVC     Restrictive lung disease      Seasonal allergies      Unspecified hearing loss 5/2005    hearing aids, L>R - Dr Hernandez       PAST SURGICAL HISTORY:   Past Surgical History:   Procedure Laterality Date     HC COLONOSCOPY THRU STOMA,  DIAGNOSTIC  2005, 5/10, 5/11    Polyps-> due 2015 - Ct Colonography negative     HC REPAIR INCISIONAL HERNIA,REDUCIBLE  1960    Hernia Repair, Incisional, Unilateral     STRESS ECHO (METRO)  7/09, 5/12, 7/17    Negative     TONSILLECTOMY & ADENOIDECTOMY  1946       FAMILY HISTORY:   Family History   Problem Relation Age of Onset     C.A.D. Father         age 50's     Hypertension Father      C.A.D. Brother      C.A.D. Brother         mid 40's     Diabetes Brother      Cancer Brother         pancreatic CA     Coronary Artery Disease Brother      Cancer - colorectal No family hx of      Prostate Cancer No family hx of        SOCIAL HISTORY:   Social History     Tobacco Use     Smoking status: Never Smoker     Smokeless tobacco: Never Used   Substance Use Topics     Alcohol use: Yes     Alcohol/week: 4.0 - 5.0 standard drinks     Types: 4 - 5 Standard drinks or equivalent per week     Comment: 4-5 drinks per week avg        No Known Allergies      Current Outpatient Medications:      ASPIRIN 81 MG OR TABS, 1 tab po QD (Once per day), Disp: , Rfl:      atenolol (TENORMIN) 50 MG tablet, Take 1 tablet (50 mg) by mouth daily, Disp: 90 tablet, Rfl: 0     atorvastatin (LIPITOR) 10 MG tablet, Take 1 tablet (10 mg) by mouth daily, Disp: 90 tablet, Rfl: 3     CALCIUM 600 + D 600-200 MG-UNIT OR TABS, , Disp: 3 MONTHS, Rfl: 3     clotrimazole-betamethasone (LOTRISONE) cream, Apply  topically 2 times daily., Disp: 45 g, Rfl: 3     finasteride (PROSCAR) 5 MG tablet, Take 1 tablet (5 mg) by mouth daily, Disp: 90 tablet, Rfl: 3     MULTI-VITAMIN OR TABS, 1 tablet daily, Disp: , Rfl:      omeprazole 20 MG tablet, Take 1 tablet (20 mg) by mouth daily Take 30-60 minutes before a meal., Disp: 90 tablet, Rfl: 3     oxymetazoline (AFRIN) 0.05 % nasal spray, Spray 2 sprays into both nostrils as needed., Disp: , Rfl:      selenium sulfide (SELSUN) 2.5 % shampoo, Apply daily to every other day - lather, wait 10 minutes and rinse, Disp: 360  mL, Rfl: 3     sildenafil (VIAGRA) 50 MG tablet, Take 1 tablet (50 mg) by mouth daily as needed for erectile dysfunction, Disp: 18 tablet, Rfl: 3     terazosin (HYTRIN) 10 MG capsule, TAKE ONE CAPSULE BY MOUTH AT BEDTIME, Disp: 90 capsule, Rfl: 1     terazosin (HYTRIN) 10 MG capsule, Take 1 capsule (10 mg) by mouth At Bedtime, Disp: 90 capsule, Rfl: 3    Review Of Systems:  Skin: No rash, pruritis, or skin pigmentation  Eyes: No changes in vision  Ears/Nose/Throat: No changes in hearing, no nosebleeds  Respiratory: No shortness of breath, dyspnea on exertion, cough, or hemoptysis  Cardiovascular: No chest pain or palpitations  Gastrointestinal: No diarrhea or constipation. No abdominal pain. No hematochezia  Genitourinary: see HPI  Musculoskeletal: No pain or swelling of joints, normal range of motion  Neurologic: No weakness or tremors  Psychiatric: No recent changes in memory or mood  Hematologic/Lymphatic/Immunologic: No easy bruising or enlarged lymph nodes  Endocrine: No weight gain or loss      PHYSICAL EXAM:    There were no vitals taken for this visit.  General appearance: In NAD, conversant  HEENT: Normocephalic and atraumatic, anicteric sclera  Cardiovascular: Not examined  Respiratory: normal, non-labored breathing  Gastrointestinal: negative, Abdomen soft, non-tender, and non-distended.   Musculoskeletal: Not Examined  Peripheral Vascular/extremity: No peripheral edema  Skin: Normal temperature, turgor, and texture. No rash  Psychiatric: Appropriate affect, alert and oriented to person, place, and time    Penis: Normal, uncircumcised  Scrotal skin: Normal, no lesions  Testicles: Normal to palpation bilaterally  Epididymis: Normal to palpation bilaterally  Lymphatic: Normal inguinal lymph nodes    Digital Rectal Exam: The prostate is moderately enlarged, benign and symmetric to palpation    Cystoscopy: Not done      PSA: 3.09 in July UA RESULTS:  Recent Labs   Lab Test 07/22/19  0734   COLOR Yellow    APPEARANCE Clear   URINEGLC Negative   URINEBILI Negative   URINEKETONE Negative   SG 1.020   UBLD Trace*   URINEPH 6.0   PROTEIN Negative   UROBILINOGEN 0.2   NITRITE Negative   LEUKEST Negative   RBCU O - 2   WBCU 0 - 5       Bladder Scan: 78mL    Other Labs:      Imaging Studies: None      CLINICAL IMPRESSION:   Enlarged prostate, urinary urgency and urge incontinence    PLAN:   He has an enlarged prostate on exam.  He has multiple lower urinary tract symptoms.  The lower urinary tract symptoms are likely due to the enlarged prostate.  Otherwise, his urinalysis appears normal and he is emptying his bladder well.  We discussed treatment options for his enlarged prostate including medical and surgical treatment options.  At this time he says he is not terribly bothered by his symptoms so he does not wish to seek any further treatment.  If his symptoms should become worse or bothersome in the future he should follow-up with me again at that time.      Parish Fung MD

## 2019-10-02 NOTE — NURSING NOTE
pvr by scanner=78mL  Pt states he leaks during the day and not at nt.  Pt has urgency and then leaks.  Pt drinks one cup of coffee, Gatorade, and some alcohol.  Pt is taking finasteride and terazosin for along time.  Pt denies any dysuria or gross hem.  HALEIGH Pack CMA

## 2019-10-04 ENCOUNTER — THERAPY VISIT (OUTPATIENT)
Dept: PHYSICAL THERAPY | Facility: CLINIC | Age: 84
End: 2019-10-04
Payer: COMMERCIAL

## 2019-10-04 DIAGNOSIS — M25.551 HIP PAIN, RIGHT: ICD-10-CM

## 2019-10-04 DIAGNOSIS — M54.50 CHRONIC BILATERAL LOW BACK PAIN WITHOUT SCIATICA: ICD-10-CM

## 2019-10-04 DIAGNOSIS — G89.29 CHRONIC BILATERAL LOW BACK PAIN WITHOUT SCIATICA: ICD-10-CM

## 2019-10-04 PROCEDURE — 97110 THERAPEUTIC EXERCISES: CPT | Mod: GP | Performed by: PHYSICAL THERAPIST

## 2019-10-04 PROCEDURE — 97112 NEUROMUSCULAR REEDUCATION: CPT | Mod: GP | Performed by: PHYSICAL THERAPIST

## 2019-10-05 ENCOUNTER — HEALTH MAINTENANCE LETTER (OUTPATIENT)
Age: 84
End: 2019-10-05

## 2019-10-07 ENCOUNTER — ALLIED HEALTH/NURSE VISIT (OUTPATIENT)
Dept: NURSING | Facility: CLINIC | Age: 84
End: 2019-10-07
Payer: COMMERCIAL

## 2019-10-07 DIAGNOSIS — Z23 NEED FOR PROPHYLACTIC VACCINATION AND INOCULATION AGAINST INFLUENZA: Primary | ICD-10-CM

## 2019-10-07 PROCEDURE — G0008 ADMIN INFLUENZA VIRUS VAC: HCPCS

## 2019-10-07 PROCEDURE — 90662 IIV NO PRSV INCREASED AG IM: CPT

## 2019-10-07 PROCEDURE — 99207 ZZC NO CHARGE NURSE ONLY: CPT

## 2019-10-10 DIAGNOSIS — N18.30 CKD (CHRONIC KIDNEY DISEASE) STAGE 3, GFR 30-59 ML/MIN (H): ICD-10-CM

## 2019-10-10 DIAGNOSIS — I10 HYPERTENSION GOAL BP (BLOOD PRESSURE) < 140/90: ICD-10-CM

## 2019-10-10 RX ORDER — ATENOLOL 50 MG/1
TABLET ORAL
Qty: 90 TABLET | Refills: 0 | Status: SHIPPED | OUTPATIENT
Start: 2019-10-10 | End: 2020-01-06

## 2019-10-10 NOTE — TELEPHONE ENCOUNTER
"Requested Prescriptions   Pending Prescriptions Disp Refills     atenolol (TENORMIN) 50 MG tablet [Pharmacy Med Name: ATENOLOL 50MG TABS] 90 tablet 0     Sig: TAKE ONE TABLET BY MOUTH EVERY DAY       Last Refill:    Disp Refills Start End ZUHAIR   atenolol (TENORMIN) 50 MG tablet 90 tablet 0 7/19/2019  No   Sig - Route: Take 1 tablet (50 mg) by mouth daily - Oral     Beta-Blockers Protocol Passed - 10/10/2019 11:36 AM        Passed - Blood pressure under 140/90 in past 12 months     BP Readings from Last 3 Encounters:   08/28/19 125/62   07/26/19 116/66   09/20/18 128/76           Passed - Patient is age 6 or older        Passed - Recent (12 mo) or future (30 days) visit within the authorizing provider's specialty     Patient has had an office visit with the authorizing provider or a provider within the authorizing providers department within the previous 12 mos or has a future within next 30 days. See \"Patient Info\" tab in inbasket, or \"Choose Columns\" in Meds & Orders section of the refill encounter.       LOV: 07/26/2019          Passed - Medication is active on med list        07/26/2019 OV Notes:   9) Follow up in 6 months for medication recheck visit.     Refilled per RN Protocol.     Viviane Garrett RN  [place] [facility]    "

## 2019-10-22 ENCOUNTER — TELEPHONE (OUTPATIENT)
Dept: FAMILY MEDICINE | Facility: CLINIC | Age: 84
End: 2019-10-22

## 2019-10-22 DIAGNOSIS — L60.8 NAIL DEFORMITY: Primary | ICD-10-CM

## 2019-10-22 DIAGNOSIS — B35.1 DERMATOPHYTOSIS OF NAIL: ICD-10-CM

## 2019-10-22 NOTE — TELEPHONE ENCOUNTER
Pt looking for Podiatry referral for toe nail trimming. Pended referral and routing to PCP to review and advise.    Gume Park RN   [facility]

## 2019-10-22 NOTE — TELEPHONE ENCOUNTER
I can certainly trim nails here every 3 months, ok for podiatry referral if he desires, have him check with insurance on coverage

## 2019-10-22 NOTE — TELEPHONE ENCOUNTER
Reason for Call:  Patient states at his last appointment it was mentioned that he could possibly have his toenails clipped with podiatry and have this covered by insurance.  Patient would like to do this.  Does this need a referral?  Is this through podiatry or somebody else?     Please advise.    Best phone number to reach pt at is: [phone]  Ok to leave a message with medical info? yes    America Campuzano

## 2019-10-22 NOTE — TELEPHONE ENCOUNTER
Pt noted the insurance does not cover the Podiatry and Pt is working on a [facility]. Pt does not want a referral right at this time but will contact clinic if one is needed in the future.    Gume Park RN   [facility] [place]

## 2019-10-24 DIAGNOSIS — E78.5 HYPERLIPIDEMIA LDL GOAL <130: ICD-10-CM

## 2019-10-24 RX ORDER — ATORVASTATIN CALCIUM 10 MG/1
10 TABLET, FILM COATED ORAL DAILY
Qty: 90 TABLET | Refills: 1 | Status: SHIPPED | OUTPATIENT
Start: 2019-10-24 | End: 2020-04-22

## 2019-10-24 NOTE — TELEPHONE ENCOUNTER
"atorvastatin (LIPITOR) 10 MG tablet  Last Written Prescription Date:  9/20/2018  Last Fill Quantity: 90,  # refills: 3   Last office visit: 7/26/2019 with prescribing provider:  Chance Espinosa MD   Future Office Visit:  NA    Requested Prescriptions   Pending Prescriptions Disp Refills     atorvastatin (LIPITOR) 10 MG tablet 90 tablet 3     Sig: Take 1 tablet (10 mg) by mouth daily       Statins Protocol Passed - 10/24/2019  2:59 PM        Passed - LDL on file in past 12 months     Recent Labs   Lab Test 07/22/19  0738   LDL 98             Passed - No abnormal creatine kinase in past 12 months     Recent Labs   Lab Test 07/22/19  0738                   Passed - Recent (12 mo) or future (30 days) visit within the authorizing provider's specialty     Patient has had an office visit with the authorizing provider or a provider within the authorizing providers department within the previous 12 mos or has a future within next 30 days. See \"Patient Info\" tab in inbasket, or \"Choose Columns\" in Meds & Orders section of the refill encounter.              Passed - Medication is active on med list        Passed - Patient is age 18 or older          "

## 2019-11-04 ENCOUNTER — TRANSFERRED RECORDS (OUTPATIENT)
Dept: HEALTH INFORMATION MANAGEMENT | Facility: CLINIC | Age: 84
End: 2019-11-04

## 2019-11-07 ENCOUNTER — THERAPY VISIT (OUTPATIENT)
Dept: PHYSICAL THERAPY | Facility: CLINIC | Age: 84
End: 2019-11-07
Payer: COMMERCIAL

## 2019-11-07 DIAGNOSIS — M25.551 HIP PAIN, RIGHT: ICD-10-CM

## 2019-11-07 DIAGNOSIS — M54.50 CHRONIC BILATERAL LOW BACK PAIN WITHOUT SCIATICA: ICD-10-CM

## 2019-11-07 DIAGNOSIS — G89.29 CHRONIC BILATERAL LOW BACK PAIN WITHOUT SCIATICA: ICD-10-CM

## 2019-11-07 PROCEDURE — 97110 THERAPEUTIC EXERCISES: CPT | Mod: GP | Performed by: PHYSICAL THERAPIST

## 2019-11-07 PROCEDURE — 97112 NEUROMUSCULAR REEDUCATION: CPT | Mod: GP | Performed by: PHYSICAL THERAPIST

## 2019-11-07 ASSESSMENT — ACTIVITIES OF DAILY LIVING (ADL)
HEAVY_WORK: EXTREME DIFFICULTY
RECREATIONAL_ACTIVITIES: MODERATE DIFFICULTY
TWISTING/PIVOTING_ON_INVOLVED_LEG: SLIGHT DIFFICULTY
WALKING_INITIALLY: SLIGHT DIFFICULTY
HOS_ADL_HIGHEST_POTENTIAL_SCORE: 68
GETTING_INTO_AND_OUT_OF_AN_AVERAGE_CAR: SLIGHT DIFFICULTY
GOING_DOWN_1_FLIGHT_OF_STAIRS: MODERATE DIFFICULTY
DEEP_SQUATTING: SLIGHT DIFFICULTY
WALKING_DOWN_STEEP_HILLS: MODERATE DIFFICULTY
ROLLING_OVER_IN_BED: SLIGHT DIFFICULTY
WALKING_APPROXIMATELY_10_MINUTES: SLIGHT DIFFICULTY
WALKING_15_MINUTES_OR_GREATER: MODERATE DIFFICULTY
STEPPING_UP_AND_DOWN_CURBS: MODERATE DIFFICULTY
GETTING_INTO_AND_OUT_OF_A_BATHTUB: MODERATE DIFFICULTY
WALKING_UP_STEEP_HILLS: MODERATE DIFFICULTY
HOW_WOULD_YOU_RATE_YOUR_CURRENT_LEVEL_OF_FUNCTION_DURING_YOUR_USUAL_ACTIVITIES_OF_DAILY_LIVING_FROM_0_TO_100_WITH_100_BEING_YOUR_LEVEL_OF_FUNCTION_PRIOR_TO_YOUR_HIP_PROBLEM_AND_0_BEING_THE_INABILITY_TO_PERFORM_ANY_OF_YOUR_USUAL_DAILY_ACTIVITIES?: 65
LIGHT_TO_MODERATE_WORK: SLIGHT DIFFICULTY
GOING_UP_1_FLIGHT_OF_STAIRS: SLIGHT DIFFICULTY
SITTING_FOR_15_MINUTES: SLIGHT DIFFICULTY
PUTTING_ON_SOCKS_AND_SHOES: MODERATE DIFFICULTY
HOS_ADL_SCORE(%): 60.29
HOS_ADL_COUNT: 17
HOS_ADL_ITEM_SCORE_TOTAL: 41
STANDING_FOR_15_MINUTES: MODERATE DIFFICULTY

## 2019-11-07 NOTE — PROGRESS NOTES
Subjective:  HPI                    Objective:  System    Physical Exam    General     ROS    Assessment/Plan:    DISCHARGE REPORT    Progress reporting period is from initial to 11/7/19.       SUBJECTIVE  Subjective changes noted by patient:  Calixto returns to PT with some feelings of improved strength and better activity tolerance.  He has started going to [employer] 1-2x/week using bike and participating in a senior fitness class.  Calixto's Wife reports she thinks Calixto is moving around better around the house.  Calixto does have continue to have some hip pain.  Pt reports the past hip cortisone injection did not help much, and wonder what else can be done.  (PT discussed if there is some underlying hip OA the bursa injections may not give much help.)  .   Changes in function:  Yes (See Goal flowsheet attached for changes in current functional level)  Adverse reaction to treatment or activity: None    OBJECTIVE  Changes noted in objective findings:  Yes,  Gait no A/D needed, better upright posture with walking.  Sitting pt tends to round forward with his posture.  Hip Strength L Abd 4+/5, Add 5/5, R Abd 4+/5, Add 5/5, Ext 5/5 B  Calixto has increased his active daily exercise with his HEP and the addition of going to the [employer].  PT encourages patient to continue with the [employer] classes and add in if able daily walking at home or go to [employer] during there free walking hours/day.        ASSESSMENT/PLAN  Updated problem list and treatment plan: Diagnosis 1:  Hip pain, weakness  Pain -  self management, education and home program  Decreased strength - therapeutic exercise and therapeutic activities  Impaired balance - neuro re-education and therapeutic activities  Decreased proprioception - neuro re-education and therapeutic activities  Decreased function - therapeutic activities  Impaired posture - neuro re-education  STG/LTGs have been met or progress has been made towards goals:  Yes (See Goal flow sheet completed  today.)  Assessment of Progress: The patient's condition is improving.  Self Management Plans:  Patient has been instructed in a home treatment program.  Patient is independent in a home treatment program.  Patient  has been instructed in self management of symptoms.  Patient is independent in self management of symptoms.  The family/caregiver has been instructed in home continuation of care.  I have re-evaluated this patient and find that the nature, scope, duration and intensity of the therapy is appropriate for the medical condition of the patient.  Kevin continues to require the following intervention to meet STG and LTG's:  PT intervention is no longer required to meet STG/LTG.    Recommendations:  This patient is ready to be discharged from therapy and continue their home treatment program.      Please refer to the daily flowsheet for treatment today, total treatment time and time spent performing 1:1 timed codes.

## 2019-11-20 ENCOUNTER — OFFICE VISIT (OUTPATIENT)
Dept: PHYSICAL MEDICINE AND REHAB | Facility: CLINIC | Age: 84
End: 2019-11-20
Payer: COMMERCIAL

## 2019-11-20 VITALS
DIASTOLIC BLOOD PRESSURE: 79 MMHG | BODY MASS INDEX: 29.62 KG/M2 | WEIGHT: 200 LBS | RESPIRATION RATE: 16 BRPM | HEIGHT: 69 IN | OXYGEN SATURATION: 94 % | SYSTOLIC BLOOD PRESSURE: 121 MMHG | HEART RATE: 72 BPM

## 2019-11-20 DIAGNOSIS — G89.29 CHRONIC BILATERAL LOW BACK PAIN WITHOUT SCIATICA: Primary | ICD-10-CM

## 2019-11-20 DIAGNOSIS — M54.50 CHRONIC BILATERAL LOW BACK PAIN WITHOUT SCIATICA: Primary | ICD-10-CM

## 2019-11-20 ASSESSMENT — PAIN SCALES - GENERAL: PAINLEVEL: MODERATE PAIN (5)

## 2019-11-20 ASSESSMENT — PATIENT HEALTH QUESTIONNAIRE - PHQ9: SUM OF ALL RESPONSES TO PHQ QUESTIONS 1-9: 1

## 2019-11-20 ASSESSMENT — MIFFLIN-ST. JEOR: SCORE: 1582.57

## 2019-11-20 NOTE — NURSING NOTE
Chief Complaint   Patient presents with     RECHECK     [facility] RETURN 3 MO F/U HIP PAIN    Rajni Joseph CMA

## 2019-11-20 NOTE — LETTER
RE: Kevin Fierro  [address] [facility] [address]     Dear Colleague,    Thank you for referring your patient, Kevin Fierro, to the [facility] PHYSICAL MEDICINE AND REHABILITATION at [facility]. Please see a copy of my visit note below.    Service Date: 11/20/2019      HISTORY OF PRESENT ILLNESS:  Mr. Fierro returned to the Rehab Clinic for followup for chronic back and bilateral hip pain.      Calixto was initially seen on 08/28 of this year.      I did recommend a course of physical therapy to work on stretching, mobility, strengthening exercises, core strengthening exercises, etc.      The patient reports that has been helpful.  He is currently seeing Job Georges in physical therapy in the [facility] area.  I did review his last therapy note.  The patient is making progress.  I also spoke with the patient and his wife and daughter who are here.  He has been improving.  He still does have some hip pain, but he is finding that things are better.  He is noting he is getting a bit stronger.  He also has better activity tolerance.  He has started going to the [facility] once or twice a week for using a bicycle and also participating in the senior [employer] program.  He is noting that that is helpful.      PHYSICAL EXAMINATION:  Calixto is a delightful 85-year-old gentleman in no acute distress.  Mood and affect are excellent.  Strength is a little bit greater in the lower limbs in the 4+ range.  He does have some discomfort on palpation in the gluteal musculature, but it is certainly quite tolerable.      ASSESSMENT:  Calixto has been improving with his therapy program.  I am recommending that he continue with his therapy program until he plateaus in therapy.  He will then continue on an independent program.  I have encouraged him also to continue with the [facility] [employer] program as it is an excellent program.      Total time spent with the  patient, wife, and daughter over 25 minutes. Virtually the entire time was spent discussing the importance of the therapy and what he is doing.  I do recommend that he continue with his therapy exercises.  I am glad to see he is making improvement and will expect some further improvement by continuing with the program and then continuing on with an independent program.      Total time spent with the patient and family, over 25 minutes.  Return to the Rehab Clinic will be on an as as-needed basis.       FRANCESCA INIGUEZ MD       D: 2019   T: 2019   MT: KK      Name:     JAYLYN REDMAN   MRN:      [id number]        Account:      [id number]   :      1934           Service Date: 2019      Document: [id number]

## 2019-11-20 NOTE — PROGRESS NOTES
Service Date: 11/20/2019      HISTORY OF PRESENT ILLNESS:  Mr. Fierro returned to the Rehab Clinic for followup for chronic back and bilateral hip pain.      Calixto was initially seen on 08/28 of this year.      I did recommend a course of physical therapy to work on stretching, mobility, strengthening exercises, core strengthening exercises, etc.      The patient reports that has been helpful.  He is currently seeing Job Georges in physical therapy in the [facility].  I did review his last therapy note.  The patient is making progress.  I also spoke with the patient and his wife and daughter who are here.  He has been improving.  He still does have some hip pain, but he is finding that things are better.  He is noting he is getting a bit stronger.  He also has better activity tolerance.  He has started going to the [facility] once or twice a week for using a bicycle and also participating in the senior [employer] program.  He is noting that that is helpful.      PHYSICAL EXAMINATION:  Calixto is a delightful 85-year-old gentleman in no acute distress.  Mood and affect are excellent.  Strength is a little bit greater in the lower limbs in the 4+ range.  He does have some discomfort on palpation in the gluteal musculature, but it is certainly quite tolerable.      ASSESSMENT:  Calixto has been improving with his therapy program.  I am recommending that he continue with his therapy program until he plateaus in therapy.  He will then continue on an independent program.  I have encouraged him also to continue with the [facility] [employer] program as it is an excellent program.      Total time spent with the patient, wife, and daughter over 25 minutes. Virtually the entire time was spent discussing the importance of the therapy and what he is doing.  I do recommend that he continue with his therapy exercises.  I am glad to see he is making improvement and will expect some further improvement by continuing with  the program and then continuing on with an independent program.      Total time spent with the patient and family, over 25 minutes.  Return to the Rehab Clinic will be on an as as-needed basis.         FRANCESCA INIGUEZ MD             D: 2019   T: 2019   MT: KK      Name:     JAYLYN REDMAN   MRN:      [id number]        Account:      [id number]   :      1934           Service Date: 2019      Document: [id number]

## 2019-12-18 ENCOUNTER — THERAPY VISIT (OUTPATIENT)
Dept: PHYSICAL THERAPY | Facility: CLINIC | Age: 84
End: 2019-12-18
Payer: COMMERCIAL

## 2019-12-18 DIAGNOSIS — M51.369 DDD (DEGENERATIVE DISC DISEASE), LUMBAR: Primary | ICD-10-CM

## 2019-12-18 PROCEDURE — 97112 NEUROMUSCULAR REEDUCATION: CPT | Mod: GP | Performed by: PHYSICAL THERAPIST

## 2019-12-18 PROCEDURE — 97110 THERAPEUTIC EXERCISES: CPT | Mod: GP | Performed by: PHYSICAL THERAPIST

## 2019-12-31 ENCOUNTER — MYC MEDICAL ADVICE (OUTPATIENT)
Dept: FAMILY MEDICINE | Facility: CLINIC | Age: 84
End: 2019-12-31

## 2020-01-02 NOTE — TELEPHONE ENCOUNTER
LOV: 07/26/2019 for PX    Routing to PCP for further review/recommendations/orders.  OV or Referral?      Viviane Garrett RN  [facility]

## 2020-01-02 NOTE — TELEPHONE ENCOUNTER
Message handled by Nurse Triage with [facility] - provider name: Dr. Espinosa - advised OV in clinic.      Viviane Garrett RN  [facility]

## 2020-01-03 DIAGNOSIS — N18.30 CKD (CHRONIC KIDNEY DISEASE) STAGE 3, GFR 30-59 ML/MIN (H): ICD-10-CM

## 2020-01-03 DIAGNOSIS — I10 HYPERTENSION GOAL BP (BLOOD PRESSURE) < 140/90: ICD-10-CM

## 2020-01-03 NOTE — TELEPHONE ENCOUNTER
"Requested Prescriptions   Pending Prescriptions Disp Refills     atenolol (TENORMIN) 50 MG tablet [Pharmacy Med Name: ATENOLOL 50MG TABS]      Last Written Prescription Date:  10.10.19  Last Fill Quantity: 90 tablet,  # refills: 0   Last office visit: 7/26/2019 with prescribing provider:  Chance Espinosa MD           Future Office Visit:       90 tablet 0     Sig: TAKE ONE TABLET BY MOUTH EVERY DAY       Beta-Blockers Protocol Passed - 1/3/2020 11:02 AM        Passed - Blood pressure under 140/90 in past 12 months     BP Readings from Last 3 Encounters:   11/20/19 121/79   08/28/19 125/62   07/26/19 116/66                 Passed - Patient is age 6 or older        Passed - Recent (12 mo) or future (30 days) visit within the authorizing provider's specialty     Patient has had an office visit with the authorizing provider or a provider within the authorizing providers department within the previous 12 mos or has a future within next 30 days. See \"Patient Info\" tab in inbasket, or \"Choose Columns\" in Meds & Orders section of the refill encounter.              Passed - Medication is active on med list        terazosin (HYTRIN) 10 MG capsule [Pharmacy Med Name: TERAZOSIN HCL 10MG CAPS]          Last Written Prescription Date:  9.20.18  Last Fill Quantity: 90 capsule,  # refills: 3   Last office visit: 7/26/2019 with prescribing provider:  Chance Espinosa MD             Future Office Visit:       90 capsule 1     Sig: TAKE ONE CAPSULE BY MOUTH AT BEDTIME       Alpha Blockers Failed - 1/3/2020 11:02 AM        Failed - Patient does not have Tadalafil, Vardenafil, or Sildenafil on their medication list        Passed - Blood pressure under 140/90 in past 12 months     BP Readings from Last 3 Encounters:   11/20/19 121/79   08/28/19 125/62   07/26/19 116/66                 Passed - Recent (12 mo) or future (30 days) visit within the authorizing provider's specialty     Patient has had an office visit with the authorizing provider or a " "provider within the authorizing providers department within the previous 12 mos or has a future within next 30 days. See \"Patient Info\" tab in inbasket, or \"Choose Columns\" in Meds & Orders section of the refill encounter.              Passed - Medication is active on med list        Passed - Patient is 18 years of age or older        "

## 2020-01-06 ENCOUNTER — TELEPHONE (OUTPATIENT)
Dept: FAMILY MEDICINE | Facility: CLINIC | Age: 85
End: 2020-01-06

## 2020-01-06 RX ORDER — TERAZOSIN 10 MG/1
CAPSULE ORAL
Qty: 90 CAPSULE | Refills: 3 | Status: SHIPPED | OUTPATIENT
Start: 2020-01-06 | End: 2021-01-15

## 2020-01-06 RX ORDER — ATENOLOL 50 MG/1
TABLET ORAL
Qty: 90 TABLET | Refills: 0 | Status: SHIPPED | OUTPATIENT
Start: 2020-01-06 | End: 2020-03-13

## 2020-01-06 NOTE — TELEPHONE ENCOUNTER
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 Pt called to make an appt, scheduled for first available 2/7/20 at 10:30am. Pt would like to be worked in sooner if possible. Thank you!    Can we leave a detailed message on this number? YES    Phone number patient can be reached at: Home number on file [phone] (home)    Best Time: any    Call taken on 1/6/2020 at 11:31 AM by Mary Hardy

## 2020-01-06 NOTE — TELEPHONE ENCOUNTER
Impression: Vitreous degeneration, right eye: H43.811. OD. Plan: Discussed diagnosis in detail with patient. No treatment is required at this time. Will continue to observe condition and or symptoms. Discussed risks of progression. Call if [address] worsens. Ok to offer 2:50 on Jan 27th

## 2020-01-06 NOTE — TELEPHONE ENCOUNTER
Attempt # 3    3rd [employer] message sent.  Closing encounter.      MELVIN Polk, RN, PHN  [facility]  Office: [phone]  Fax: [phone]

## 2020-01-27 ENCOUNTER — ANCILLARY PROCEDURE (OUTPATIENT)
Dept: GENERAL RADIOLOGY | Facility: CLINIC | Age: 85
End: 2020-01-27
Attending: FAMILY MEDICINE
Payer: COMMERCIAL

## 2020-01-27 ENCOUNTER — OFFICE VISIT (OUTPATIENT)
Dept: FAMILY MEDICINE | Facility: CLINIC | Age: 85
End: 2020-01-27
Payer: COMMERCIAL

## 2020-01-27 VITALS
SYSTOLIC BLOOD PRESSURE: 156 MMHG | HEART RATE: 80 BPM | HEIGHT: 69 IN | OXYGEN SATURATION: 95 % | TEMPERATURE: 98.1 F | WEIGHT: 212 LBS | DIASTOLIC BLOOD PRESSURE: 94 MMHG | BODY MASS INDEX: 31.4 KG/M2

## 2020-01-27 DIAGNOSIS — M79.641 PAIN OF RIGHT HAND: ICD-10-CM

## 2020-01-27 DIAGNOSIS — Z51.81 MEDICATION MONITORING ENCOUNTER: ICD-10-CM

## 2020-01-27 DIAGNOSIS — M70.61 TROCHANTERIC BURSITIS OF BOTH HIPS: ICD-10-CM

## 2020-01-27 DIAGNOSIS — M70.62 TROCHANTERIC BURSITIS OF BOTH HIPS: ICD-10-CM

## 2020-01-27 DIAGNOSIS — E78.5 HYPERLIPIDEMIA LDL GOAL <130: ICD-10-CM

## 2020-01-27 DIAGNOSIS — K21.9 GASTROESOPHAGEAL REFLUX DISEASE WITHOUT ESOPHAGITIS: ICD-10-CM

## 2020-01-27 DIAGNOSIS — M79.642 PAIN OF LEFT HAND: ICD-10-CM

## 2020-01-27 DIAGNOSIS — I10 HYPERTENSION GOAL BP (BLOOD PRESSURE) < 140/90: ICD-10-CM

## 2020-01-27 DIAGNOSIS — M79.641 PAIN OF RIGHT HAND: Primary | ICD-10-CM

## 2020-01-27 DIAGNOSIS — N18.30 CKD (CHRONIC KIDNEY DISEASE) STAGE 3, GFR 30-59 ML/MIN (H): ICD-10-CM

## 2020-01-27 PROCEDURE — 73130 X-RAY EXAM OF HAND: CPT | Mod: LT

## 2020-01-27 PROCEDURE — 73130 X-RAY EXAM OF HAND: CPT | Mod: 59

## 2020-01-27 PROCEDURE — 99214 OFFICE O/P EST MOD 30 MIN: CPT | Performed by: FAMILY MEDICINE

## 2020-01-27 ASSESSMENT — MIFFLIN-ST. JEOR: SCORE: 1637.01

## 2020-01-27 NOTE — PROGRESS NOTES
[facility]  [place]    SUBJECTIVE    Kevin Fierro is a 85 year old male who presents to clinic today for the following health issues:    Joint Pain - bilateral hands R>L - MCP/PIP - pain, clicking, no swelling, no warmth, ibuprofen ? 2 tabs, twice a day      Onset: 1 month    Description:   Location: bilateral hands - right is worst  Character: Dull ache, cramping with clicking in the mornings    Intensity: 3/10    Progression of Symptoms: worse    Accompanying Signs & Symptoms:  Other symptoms: none    History:   Previous similar pain: no       Precipitating factors:   Trauma or overuse: no     Alleviating factors:  Improved by: ibuprofen?    Therapies Tried and outcome: ibuprofen with some relief    CKD/Htn    Creatinine   Date Value Ref Range Status   07/22/2019 1.38 (H) 0.66 - 1.25 mg/dL Final     BP Readings from Last 3 Encounters:   01/27/20 (!) 156/94   11/20/19 121/79   08/28/19 125/62     Recent urology consult - possible to use flomax??, not with terazosin    Reviewed and updated as needed this visit by Provider    body mass index is 31.31 kg/m .    Wt Readings from Last 4 Encounters:   01/27/20 96.2 kg (212 lb)   11/20/19 90.7 kg (200 lb)   10/02/19 90.7 kg (200 lb)   07/26/19 93 kg (205 lb)       Health Maintenance    Health Maintenance Due   Topic Date Due     PHQ-2  01/01/2020       Current Problem List    Patient Active Problem List   Diagnosis     Hypertension goal BP (blood pressure) < 140/90     Hypertrophy of prostate without urinary obstruction     Hearing loss     Seborrheic dermatitis     Other and unspecified 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     Hypersomnia with sleep apnea     Family history of cardiovascular disease     Seasonal allergies     ED (erectile dysfunction)     Hyperlipidemia LDL goal <130     Lung nodules     Advanced directives, counseling/discussion     GERD (gastroesophageal reflux disease)     CKD (chronic kidney disease) stage  3, GFR 30-59 ml/min (H)     Environmental allergies     Lumbar stenosis     Restrictive lung disease     BCC (basal cell carcinoma of skin)       Past Medical History    Past Medical History:   Diagnosis Date     Arthritis .     BCC (basal cell carcinoma of skin) 10/2018, 11/2019    right jaw, rt parietal,11/2019- Dr Limon     Choroidal nevus of left eye     Dr Connor     CKD (chronic kidney disease) stage 3, GFR 30-59 ml/min (H)      Colon polyps      ED (erectile dysfunction)      Family history of cardiovascular disease      GERD (gastroesophageal reflux disease) 2013     Hematuria 1999    dr scott     Hernia, abdominal      Hyperlipidemia LDL goal <130 1990     Hypertension goal BP (blood pressure) < 140/90 1990     Hypertrophy of prostate without urinary obstruction and other lower urinary tract symptoms (LUTS)      Lumbar stenosis 2017    mild to moderate foraminal and central     Lung nodules 11/10    CT scan stable     obstructive SLEEP APNEA 5/2007    CPAP     Other and unspecified 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 5/25/2005     Palpitations      PSVT 11/2017    few runs, rare PVC     Restrictive lung disease      Seasonal allergies      Unspecified hearing loss 5/2005    hearing aids, L>R - Dr Hernandez       Past Surgical History    Past Surgical History:   Procedure Laterality Date     CYSTOSCOPY       HC COLONOSCOPY THRU STOMA, DIAGNOSTIC  2005, 5/10, 5/11    Polyps-> due 2015 - Ct Colonography negative     HC REPAIR INCISIONAL HERNIA,REDUCIBLE  1960    Hernia Repair, Incisional, Unilateral     STRESS ECHO (METRO)  7/09, 5/12, 7/17    Negative     TONSILLECTOMY & ADENOIDECTOMY  1946       Current Medications    Current Outpatient Medications   Medication Sig Dispense Refill     ASPIRIN 81 MG OR TABS 1 tab po QD (Once per day)       atenolol (TENORMIN) 50 MG tablet TAKE ONE TABLET BY MOUTH EVERY DAY 90 tablet 0     atorvastatin (LIPITOR) 10 MG tablet Take 1 tablet (10 mg)  by mouth daily 90 tablet 1     CALCIUM 600 + D 600-200 MG-UNIT OR TABS  3 MONTHS 3     clotrimazole-betamethasone (LOTRISONE) cream Apply  topically 2 times daily. 45 g 3     finasteride (PROSCAR) 5 MG tablet Take 1 tablet (5 mg) by mouth daily 90 tablet 3     MULTI-VITAMIN OR TABS 1 tablet daily       omeprazole 20 MG tablet Take 1 tablet (20 mg) by mouth daily Take 30-60 minutes before a meal. 90 tablet 3     oxymetazoline (AFRIN) 0.05 % nasal spray Spray 2 sprays into both nostrils as needed.       selenium sulfide (SELSUN) 2.5 % shampoo Apply daily to every other day - lather, wait 10 minutes and rinse 360 mL 3     sildenafil (VIAGRA) 50 MG tablet Take 1 tablet (50 mg) by mouth daily as needed for erectile dysfunction 18 tablet 3     terazosin (HYTRIN) 10 MG capsule TAKE ONE CAPSULE BY MOUTH AT BEDTIME 90 capsule 3       Allergies    Allergies   Allergen Reactions     Pollen Extract      Tree pollen         Immunizations    Immunization History   Administered Date(s) Administered     Influenza (H1N1) 01/20/2010     Influenza (High Dose) 3 valent vaccine 10/03/2011, 09/20/2012, 10/15/2013, 10/03/2014, 10/15/2015, 09/30/2016, 10/10/2017, 09/17/2018, 10/07/2019     Influenza (IIV3) PF 10/18/2004, 10/12/2005, 10/23/2006, 10/17/2007, 10/15/2009, 09/22/2010     Pneumo Conj 13-V (2010&after) 05/20/2015     Pneumococcal 23 valent 01/04/2007     TD (ADULT, 7+) 04/20/2007     TDAP Vaccine (Boostrix) 05/23/2012     Zoster vaccine recombinant adjuvanted (SHINGRIX) 09/17/2018, 11/16/2018     Zoster vaccine, live 01/01/2008       Family History    Family History   Problem Relation Age of Onset     C.A.D. Father         age 50's     Hypertension Father      C.A.D. Brother      C.A.D. Brother         mid 40's     Diabetes Brother      Cancer Brother         pancreatic CA     Coronary Artery Disease Brother      Cancer - colorectal No family hx of      Prostate Cancer No family hx of        Social History    Social History      Socioeconomic History     Marital status:      Spouse name: Leticia     Number of children: 3     Years of education: 18     Highest education level: Not on file   Occupational History     Employer: RETIRED   Social Needs     Financial resource strain: Not on file     Food insecurity:     Worry: Not on file     Inability: Not on file     Transportation needs:     Medical: Not on file     Non-medical: Not on file   Tobacco Use     Smoking status: Never Smoker     Smokeless tobacco: Never Used   Substance and Sexual Activity     Alcohol use: Yes     Alcohol/week: 4.0 - 5.0 standard drinks     Types: 4 - 5 Standard drinks or equivalent per week     Comment: 4-5 drinks per week avg     Drug use: No     Sexual activity: Yes     Partners: Female   Lifestyle     Physical activity:     Days per week: Not on file     Minutes per session: Not on file     Stress: Not on file   Relationships     Social connections:     Talks on phone: Not on file     Gets together: Not on file     Attends [employer]ism service: Not on file     Active member of club or organization: Not on file     Attends meetings of clubs or organizations: Not on file     Relationship status: Not on file     Intimate partner violence:     Fear of current or ex partner: Not on file     Emotionally abused: Not on file     Physically abused: Not on file     Forced sexual activity: Not on file   Other Topics Concern      Service Not Asked     Blood Transfusions No     Caffeine Concern Yes     Comment: 1 serv/day, rare pop, occas chocolate (3-4/yr)     Occupational Exposure Not Asked     Hobby Hazards Not Asked     Sleep Concern Yes     Comment: MIMI, wakes feeling rested     Stress Concern Not Asked     Weight Concern Not Asked     Special Diet Not Asked     Back Care Not Asked     Exercise Yes     Comment: 30-45' 2-3 x/wk     Bike Helmet Not Asked     Seat Belt Yes     Self-Exams Not Asked     Parent/sibling w/ CABG, MI or angioplasty before 65F  "55M? Yes   Social History Narrative     Not on file       All above reviewed and updated, all stable unless otherwise noted    Recent labs reviewed    ROS    CONSTITUTIONAL: NEGATIVE for fever, chills, change in weight  INTEGUMENTARY/SKIN: NEGATIVE for worrisome rashes, moles or lesions  EYES: NEGATIVE for vision changes or irritation  ENT/MOUTH: NEGATIVE for ear, mouth and throat problems  RESP: NEGATIVE for significant cough or SOB  CV: NEGATIVE for chest pain, palpitations or peripheral edema  GI: NEGATIVE for nausea, abdominal pain, heartburn, or change in bowel habits  : NEGATIVE for frequency, dysuria, or hematuria  MUSCULOSKELETAL: NEGATIVE for significant arthralgias or myalgia  NEURO: NEGATIVE for weakness, dizziness or paresthesias  ENDOCRINE: NEGATIVE for temperature intolerance, skin/hair changes  HEME: NEGATIVE for bleeding problems  PSYCHIATRIC: NEGATIVE for changes in mood or affect    OBJECTIVE    BP (!) 156/94   Pulse 80   Temp 98.1  F (36.7  C) (Oral)   Ht 1.753 m (5' 9\")   Wt 96.2 kg (212 lb)   SpO2 95%   BMI 31.31 kg/m    Body mass index is 31.31 kg/m .  GENERAL: healthy, alert and no distress  EYES: Eyes grossly normal to inspection, extraocular movements - intact, and PERRL  HENT: ear canals- normal; TMs- normal; Nose- normal; Mouth- no ulcers, no lesions  NECK: no tenderness, no adenopathy, no asymmetry, no masses, no stiffness; thyroid- normal to palpation  RESP: lungs clear to auscultation - no rales, no rhonchi, no wheezes  CV: regular rates and rhythm, normal S1 S2, no S3 or S4 and no murmur, no click or rub -  ABDOMEN: soft, no tenderness, no  hepatosplenomegaly, no masses, normal bowel sounds  MS: extremities- no gross deformities noted, no edema  SKIN: no suspicious lesions, no rashes  NEURO: strength and tone- normal, sensory exam- grossly normal, mentation- intact, speech- normal, reflexes- symmetric  BACK: no CVA tenderness, no paralumbar tenderness  PSYCH: Alert and oriented " times 3; speech- coherent , normal rate and volume; able to articulate logical thoughts, able to abstract reason, no tangential thoughts, no hallucinations or delusions, affect- normal  LYMPHATICS: no cervical adenopathy    DIAGNOSTICS/PROCEDURE    Bilateral hand xrays benign     ASSESSMENT      ICD-10-CM    1. Pain of right hand M79.641 XR Hand Right G/E 3 Views     Orthopedic & Spine  Referral   2. Pain of left hand M79.642 XR Hand Left G/E 3 Views     Orthopedic & Spine  Referral   3. Trochanteric bursitis of both hips M70.61 Orthopedic & Spine  Referral    M70.62    4. CKD (chronic kidney disease) stage 3, GFR 30-59 ml/min (H) N18.3    5. Hypertension goal BP (blood pressure) < 140/90 I10    6. Hyperlipidemia LDL goal <130 E78.5    7. Gastroesophageal reflux disease without esophagitis K21.9    8. Medication monitoring encounter Z51.81        PLAN    Discussed treatment/modality options, including risk and benefits he desires:     1) heat/ice/ROM    2) tylenol arthritis 500 mg - 2 every 8 hrs    3) FSOC consult    4) med check soon, recheck BP soon    All diagnosis above reviewed and noted above, otherwise stable.  See [facility] orders for further details.     Return in about 3 months (around 4/27/2020), or if symptoms worsen or fail to improve, for Medication Recheck Visit, Follow Up Chronic.    Health Maintenance Due   Topic Date Due     PHQ-2  01/01/2020       See Patient Instructions             Chance Espinosa MD, FAAFP     [facility]  [address] [place] [id number]  [email or web address]   Office: [phone]  Fax: [phone]  Pager: [phone]

## 2020-01-30 ENCOUNTER — ANCILLARY PROCEDURE (OUTPATIENT)
Dept: GENERAL RADIOLOGY | Facility: CLINIC | Age: 85
End: 2020-01-30
Attending: FAMILY MEDICINE
Payer: COMMERCIAL

## 2020-01-30 ENCOUNTER — TELEPHONE (OUTPATIENT)
Dept: FAMILY MEDICINE | Facility: CLINIC | Age: 85
End: 2020-01-30

## 2020-01-30 ENCOUNTER — OFFICE VISIT (OUTPATIENT)
Dept: ORTHOPEDICS | Facility: CLINIC | Age: 85
End: 2020-01-30
Payer: COMMERCIAL

## 2020-01-30 VITALS
BODY MASS INDEX: 31.4 KG/M2 | DIASTOLIC BLOOD PRESSURE: 78 MMHG | WEIGHT: 212 LBS | SYSTOLIC BLOOD PRESSURE: 134 MMHG | HEIGHT: 69 IN

## 2020-01-30 DIAGNOSIS — M25.551 CHRONIC HIP PAIN, BILATERAL: ICD-10-CM

## 2020-01-30 DIAGNOSIS — M70.61 TROCHANTERIC BURSITIS OF BOTH HIPS: ICD-10-CM

## 2020-01-30 DIAGNOSIS — M25.551 CHRONIC HIP PAIN, BILATERAL: Primary | ICD-10-CM

## 2020-01-30 DIAGNOSIS — G89.29 CHRONIC HIP PAIN, BILATERAL: ICD-10-CM

## 2020-01-30 DIAGNOSIS — M79.642 PAIN OF LEFT HAND: ICD-10-CM

## 2020-01-30 DIAGNOSIS — G89.29 CHRONIC HIP PAIN, BILATERAL: Primary | ICD-10-CM

## 2020-01-30 DIAGNOSIS — M25.552 CHRONIC HIP PAIN, BILATERAL: Primary | ICD-10-CM

## 2020-01-30 DIAGNOSIS — M70.62 TROCHANTERIC BURSITIS OF BOTH HIPS: ICD-10-CM

## 2020-01-30 DIAGNOSIS — M25.552 CHRONIC HIP PAIN, BILATERAL: ICD-10-CM

## 2020-01-30 DIAGNOSIS — M79.641 PAIN OF RIGHT HAND: ICD-10-CM

## 2020-01-30 PROCEDURE — 20610 DRAIN/INJ JOINT/BURSA W/O US: CPT | Mod: 50 | Performed by: FAMILY MEDICINE

## 2020-01-30 PROCEDURE — 99203 OFFICE O/P NEW LOW 30 MIN: CPT | Mod: 25 | Performed by: FAMILY MEDICINE

## 2020-01-30 PROCEDURE — 73522 X-RAY EXAM HIPS BI 3-4 VIEWS: CPT

## 2020-01-30 RX ORDER — METHYLPREDNISOLONE ACETATE 40 MG/ML
40 INJECTION, SUSPENSION INTRA-ARTICULAR; INTRALESIONAL; INTRAMUSCULAR; SOFT TISSUE
Status: DISCONTINUED | OUTPATIENT
Start: 2020-01-30 | End: 2021-01-06

## 2020-01-30 RX ADMIN — METHYLPREDNISOLONE ACETATE 40 MG: 40 INJECTION, SUSPENSION INTRA-ARTICULAR; INTRALESIONAL; INTRAMUSCULAR; SOFT TISSUE at 11:31

## 2020-01-30 ASSESSMENT — MIFFLIN-ST. JEOR: SCORE: 1637.01

## 2020-01-30 NOTE — TELEPHONE ENCOUNTER
Calixto returned call, could not get a hold of a nurse. Informed Pt of note below and canceled BP check appt for 2/5/20. Thanks!

## 2020-01-30 NOTE — LETTER
1/30/2020         RE: Kevin Fierro  [address] [address]        Dear Colleague,    Thank you for referring your patient, Kevin Fierro, to the [facility]. Please see a copy of my visit note below.    ASSESSMENT & PLAN    1. Chronic hip pain, bilateral    2. Pain of right hand    3. Pain of left hand    4. Trochanteric bursitis of both hips      Seen in consultation for multiple complaints - chronic bilateral lateral hip pain and subacute bilateral hand pain/stiffness  Steroid injection of the bilateral hip: trochanteric bursae was performed today in clinic  Continue your home exercises  Has no pain over his proximal left thigh, negative fulcrum. Denies any systemic symptoms (fever, chills, weight loss). Will highlight MRI finding with PCP to follow as indicated.  Mild arthritis in fingers based on my review of his xrays.  Referral to hand therapy placed in the event he'd like to pursue  Recommend Turmeric ([employer] - Omega Curcumin or [employer] - Curcumin Elite). Can also  turmeric at the [employer] on [address].  Ok to take Tylenol, 2 tablets (1,000mg) three times a day    -----    SUBJECTIVE  Kevin Fierro is a/an 85 year old male who is seen in consultation at the request of  Chance Espinosa M.D. for evaluation of bilateral hip and hand pain. The patient is seen by themselves.    Onset: 4 years(s) ago. Reports insidious onset without acute precipitating event.  Location of Pain: bilateral lateral hip  Rating of Pain at worst: 7/10  Rating of Pain Currently: 3/10  Worsened by: prolonged walking, stairs, sleeping  Better with: Tylenol - moderate short term relief  Treatments tried: ice, Tylenol, home exercises, physical therapy (4 visits) and corticosteroid injection (most recent date: 05/31/2019) that provided 2 week(s) of relief  Quality: sharp, radiating  Red flags: Weakness: No, bowel/bladder loss: Yes, foot drop: No  Associated  "symptoms: no distal numbness or tingling; denies swelling or warmth and intermittent sharp lateral hip pain  Orthopedic history: YES - Lumbar DDD, bilateral trochanteric bursitis  Relevant surgical history: NO  Patient Social History: retired    Patient's past medical, surgical, social, and family histories were reviewed today and no changes are noted.    REVIEW OF SYSTEMS:  10 point ROS is negative other than symptoms noted above in HPI, Past Medical History or as stated below  Constitutional: NEGATIVE for fever, chills, change in weight  Skin: NEGATIVE for worrisome rashes, moles or lesions  GI/: NEGATIVE for bowel or bladder changes  Neuro: NEGATIVE for weakness, dizziness or paresthesias    OBJECTIVE:  /78   Ht 1.753 m (5' 9\")   Wt 96.2 kg (212 lb)   BMI 31.31 kg/m      General: healthy, alert and in no distress  HEENT: no scleral icterus or conjunctival erythema  Skin: no suspicious lesions or rash. No jaundice.  CV: no pedal edema  Resp: normal respiratory effort without conversational dyspnea   Psych: normal mood and affect  Gait: slow to rise from chair, fair coordination and balance  Neuro: normal light touch sensory exam of the bilateral lower extremities.  MSK:  BILATERAL HIP  Palpation:    Very milldy tender about the greater trochanteric region and gluteus medius insertion. Otherwise all other landmarks are nontender.  Active Range of Motion:     Flexion full, IR limited but no painful  Strength:    Flexion 5-/5  Special Tests:    Negative: anterior impingement (FADIR), fulcrum with left thigh    Independent visualization of the below image:  Recent Results (from the past 24 hour(s))   XR Pelvis w bilateral hips 1 View    Narrative    PELVIS AND HIP BILATERAL ONE VIEW   1/30/2020 10:17 AM     HISTORY:  Chronic hip pain, bilateral.     FINDINGS: Lower lumbar spine degenerative change. No fracture  identified.      Impression    IMPRESSION: Within the left femoral proximal diaphyseal region, " there  is a question of a small lytic   lesion with endosteal scalloping. This is of unknown significance, but  myeloma or metastasis cannot be excluded. If indicated clinically, MRI  could be performed for further evaluation.   My comments: mild joint space narrowing    Large Joint Injection/Arthocentesis: bilateral greater trochanteric bursa  Date/Time: 1/30/2020 11:31 AM  Performed by: Jimmy West DO  Authorized by: Jimmy West DO     Indications:  Pain  Needle Size:  22 G  Guidance: landmark guided    Approach:  Lateral  Location:  Hip  Laterality:  Bilateral      Site:  Bilateral greater trochanteric bursa  Medications (Right):  40 mg methylPREDNISolone 40 MG/ML  Medications (Left):  40 mg methylPREDNISolone 40 MG/ML  Outcome:  Tolerated well, no immediate complications  Procedure discussed: discussed risks, benefits, and alternatives    Consent Given by:  Patient  Timeout: timeout called immediately prior to procedure    Prep: patient was prepped and draped in usual sterile fashion            Jimmy West DO [facility]    Again, thank you for allowing me to participate in the care of your patient.        Sincerely,        Jimmy West DO

## 2020-01-30 NOTE — TELEPHONE ENCOUNTER
BP Readings from Last 6 Encounters:   01/30/20 134/78   01/27/20 (!) 156/94   11/20/19 121/79   08/28/19 125/62   07/26/19 116/66   09/20/18 128/76         Okay to cancel nurse only BP check as today's BP was WNL.       Attempt # 1    Left non-detailed VM for patient to call back and speak with any triage nurse.    MELVIN Polk, RN, [facility]  Office: [phone]  Fax: [phone]          MELVIN Polk, RN, [facility]  Office: [phone]  Fax: [phone]

## 2020-01-30 NOTE — Clinical Note
Amilcar Fletcher,The scalloped lesion on his MRI in my opinion does not warrant MRI at this time. Did discuss with Calixto that if he develops more thigh pain or systemic symptoms that advanced imaging would be indicated.Jimmy

## 2020-01-30 NOTE — TELEPHONE ENCOUNTER
Reason for Call:  Other appointment    Detailed comments: Patient walked in, he had an appointment today at [facility] and had his BP checked there. Patient would like to know if he still needs to come in for his nurse only BP check appt on 2/5/20.    Phone Number Patient can be reached at: Home number on file [phone] (home)    Best Time: anytime    Can we leave a detailed message on this number? NO    Call taken on 1/30/2020 at 12:54 PM by Aimee ELDRIDGE

## 2020-01-30 NOTE — PROGRESS NOTES
ASSESSMENT & PLAN    1. Chronic hip pain, bilateral    2. Pain of right hand    3. Pain of left hand    4. Trochanteric bursitis of both hips      Seen in consultation for multiple complaints - chronic bilateral lateral hip pain and subacute bilateral hand pain/stiffness  Steroid injection of the bilateral hip: trochanteric bursae was performed today in clinic  Continue your home exercises  Has no pain over his proximal left thigh, negative fulcrum. Denies any systemic symptoms (fever, chills, weight loss). Will highlight MRI finding with PCP to follow as indicated.  Mild arthritis in fingers based on my review of his xrays.  Referral to hand therapy placed in the event he'd like to pursue  Recommend Turmeric ([employer] - Omega Curcumin or [employer] - Curcumin Elite). Can also  turmeric at the [employer] on [address].  Ok to take Tylenol, 2 tablets (1,000mg) three times a day    -----    SUBJECTIVE  Kevin Fierro is a/an 85 year old male who is seen in consultation at the request of  Chance Espinosa M.D. for evaluation of bilateral hip and hand pain. The patient is seen by themselves.    Onset: 4 years(s) ago. Reports insidious onset without acute precipitating event.  Location of Pain: bilateral lateral hip  Rating of Pain at worst: 7/10  Rating of Pain Currently: 3/10  Worsened by: prolonged walking, stairs, sleeping  Better with: Tylenol - moderate short term relief  Treatments tried: ice, Tylenol, home exercises, physical therapy (4 visits) and corticosteroid injection (most recent date: 05/31/2019) that provided 2 week(s) of relief  Quality: sharp, radiating  Red flags: Weakness: No, bowel/bladder loss: Yes, foot drop: No  Associated symptoms: no distal numbness or tingling; denies swelling or warmth and intermittent sharp lateral hip pain  Orthopedic history: YES - Lumbar DDD, bilateral trochanteric bursitis  Relevant surgical history: NO  Patient Social History: retired    Patient's past  "medical, surgical, social, and family histories were reviewed today and no changes are noted.    REVIEW OF SYSTEMS:  10 point ROS is negative other than symptoms noted above in HPI, Past Medical History or as stated below  Constitutional: NEGATIVE for fever, chills, change in weight  Skin: NEGATIVE for worrisome rashes, moles or lesions  GI/: NEGATIVE for bowel or bladder changes  Neuro: NEGATIVE for weakness, dizziness or paresthesias    OBJECTIVE:  /78   Ht 1.753 m (5' 9\")   Wt 96.2 kg (212 lb)   BMI 31.31 kg/m     General: healthy, alert and in no distress  HEENT: no scleral icterus or conjunctival erythema  Skin: no suspicious lesions or rash. No jaundice.  CV: no pedal edema  Resp: normal respiratory effort without conversational dyspnea   Psych: normal mood and affect  Gait: slow to rise from chair, fair coordination and balance  Neuro: normal light touch sensory exam of the bilateral lower extremities.  MSK:  BILATERAL HIP  Palpation:    Very milldy tender about the greater trochanteric region and gluteus medius insertion. Otherwise all other landmarks are nontender.  Active Range of Motion:     Flexion full, IR limited but no painful  Strength:    Flexion 5-/5  Special Tests:    Negative: anterior impingement (FADIR), fulcrum with left thigh    Independent visualization of the below image:  Recent Results (from the past 24 hour(s))   XR Pelvis w bilateral hips 1 View    Narrative    PELVIS AND HIP BILATERAL ONE VIEW   1/30/2020 10:17 AM     HISTORY:  Chronic hip pain, bilateral.     FINDINGS: Lower lumbar spine degenerative change. No fracture  identified.      Impression    IMPRESSION: Within the left femoral proximal diaphyseal region, there  is a question of a small lytic   lesion with endosteal scalloping. This is of unknown significance, but  myeloma or metastasis cannot be excluded. If indicated clinically, MRI  could be performed for further evaluation.   My comments: mild joint space " narrowing    Large Joint Injection/Arthocentesis: bilateral greater trochanteric bursa  Date/Time: 1/30/2020 11:31 AM  Performed by: Jimmy West DO  Authorized by: Jimmy West DO     Indications:  Pain  Needle Size:  22 G  Guidance: landmark guided    Approach:  Lateral  Location:  Hip  Laterality:  Bilateral      Site:  Bilateral greater trochanteric bursa  Medications (Right):  40 mg methylPREDNISolone 40 MG/ML  Medications (Left):  40 mg methylPREDNISolone 40 MG/ML  Outcome:  Tolerated well, no immediate complications  Procedure discussed: discussed risks, benefits, and alternatives    Consent Given by:  Patient  Timeout: timeout called immediately prior to procedure    Prep: patient was prepped and draped in usual sterile fashion            Jimmy West DO [facility]

## 2020-01-30 NOTE — PATIENT INSTRUCTIONS
1. Chronic hip pain, bilateral    2. Pain of right hand    3. Pain of left hand    4. Trochanteric bursitis of both hips      Steroid injection of the bilateral hip: trochanteric bursae was performed today in clinic  Continue your home exercises  Mild arthritis in your fingers.  Referral to hand therapy placed in the event you'd like to pursue  Recommend Turmeric ([employer] - Omega Curcumin or [employer] - Curcumin Elite). Can also  turmeric at the [employer] on [address].  Ok to take Tylenol, 2 tablets (1,000mg) three times a day    - Would not soak in a hot tub, bath or swimming pool for 48 hours  - Ok to shower  - Ice today and only do your normal amounts of activity  - The lidocaine (what is giving you pain relief right now) will likely stop working in 1-2 hours.  You will then have pain again, similar to before you received the injection. The corticosteroid will not start working until approximately 1-2 weeks from now.  In a small percentage of people, cortisone can cause flushing/redness in the face. This usually lasts for 1-3 days and resolves. Cool compress and Ibuprofen/Tylenol can help if this happens.

## 2020-03-09 ENCOUNTER — TELEPHONE (OUTPATIENT)
Dept: FAMILY MEDICINE | Facility: CLINIC | Age: 85
End: 2020-03-09

## 2020-03-09 NOTE — TELEPHONE ENCOUNTER
Reason for Call:  Other call back    Detailed comments: pt spouse Leticia called and stated that her and Kevin  is currently at [facility] and, she is thinking they might stay there for a while due to the viruses(COVID-19) going on, so pt is wondering if Dr. Espinosa  has any recommendation of provider in [facility] during there stay.       Phone Number Patient can be reached at: Other phone number:  [phone]    Best Time: anytime     Can we leave a detailed message on this number? YES    Call taken on 3/9/2020 at 9:38 AM by RINA HUANG

## 2020-03-09 NOTE — TELEPHONE ENCOUNTER
Routing to  to review and advise.    MELVIN Polk, RN, PHN  [facility]  Office: [phone]  Fax: [phone]

## 2020-03-10 NOTE — TELEPHONE ENCOUNTER
Attempt # 1  Called #   Telephone Information:   mobile  [phone]     Pt advised of note below.     Gume Park RN   [facility]

## 2020-04-22 ENCOUNTER — VIRTUAL VISIT (OUTPATIENT)
Dept: FAMILY MEDICINE | Facility: CLINIC | Age: 85
End: 2020-04-22
Payer: COMMERCIAL

## 2020-04-22 VITALS — DIASTOLIC BLOOD PRESSURE: 81 MMHG | SYSTOLIC BLOOD PRESSURE: 130 MMHG | HEART RATE: 72 BPM

## 2020-04-22 DIAGNOSIS — H90.3 SENSORINEURAL HEARING LOSS (SNHL) OF BOTH EARS: ICD-10-CM

## 2020-04-22 DIAGNOSIS — E78.5 HYPERLIPIDEMIA LDL GOAL <130: ICD-10-CM

## 2020-04-22 DIAGNOSIS — G47.10 HYPERSOMNIA WITH SLEEP APNEA: ICD-10-CM

## 2020-04-22 DIAGNOSIS — I10 HYPERTENSION GOAL BP (BLOOD PRESSURE) < 140/90: ICD-10-CM

## 2020-04-22 DIAGNOSIS — B35.1 DERMATOPHYTOSIS OF NAIL: ICD-10-CM

## 2020-04-22 DIAGNOSIS — B35.6 JOCK ITCH: ICD-10-CM

## 2020-04-22 DIAGNOSIS — L21.9 SEBORRHEIC DERMATITIS, UNSPECIFIED: ICD-10-CM

## 2020-04-22 DIAGNOSIS — J98.4 RESTRICTIVE LUNG DISEASE: ICD-10-CM

## 2020-04-22 DIAGNOSIS — N40.0 HYPERTROPHY OF PROSTATE WITHOUT URINARY OBSTRUCTION: ICD-10-CM

## 2020-04-22 DIAGNOSIS — N18.30 CKD (CHRONIC KIDNEY DISEASE) STAGE 3, GFR 30-59 ML/MIN (H): Primary | ICD-10-CM

## 2020-04-22 DIAGNOSIS — Z91.09 ENVIRONMENTAL ALLERGIES: ICD-10-CM

## 2020-04-22 DIAGNOSIS — K21.9 GASTROESOPHAGEAL REFLUX DISEASE WITHOUT ESOPHAGITIS: ICD-10-CM

## 2020-04-22 DIAGNOSIS — G47.30 HYPERSOMNIA WITH SLEEP APNEA: ICD-10-CM

## 2020-04-22 DIAGNOSIS — Z51.81 MEDICATION MONITORING ENCOUNTER: ICD-10-CM

## 2020-04-22 PROCEDURE — 99214 OFFICE O/P EST MOD 30 MIN: CPT | Mod: 95 | Performed by: FAMILY MEDICINE

## 2020-04-22 RX ORDER — ATENOLOL 50 MG/1
50 TABLET ORAL DAILY
Qty: 90 TABLET | Refills: 1 | Status: ON HOLD | OUTPATIENT
Start: 2020-04-22 | End: 2021-01-08

## 2020-04-22 RX ORDER — SELENIUM SULFIDE 2.5 MG/100ML
LOTION TOPICAL
Qty: 360 ML | Refills: 3 | Status: ON HOLD | OUTPATIENT
Start: 2020-04-22 | End: 2021-01-06

## 2020-04-22 RX ORDER — CLOTRIMAZOLE AND BETAMETHASONE DIPROPIONATE 10; .64 MG/G; MG/G
CREAM TOPICAL
Qty: 45 G | Refills: 3 | Status: ON HOLD | OUTPATIENT
Start: 2020-04-22 | End: 2021-01-06

## 2020-04-22 RX ORDER — NICOTINE POLACRILEX 4 MG/1
20 GUM, CHEWING ORAL DAILY
Qty: 90 TABLET | Refills: 3 | Status: ON HOLD | OUTPATIENT
Start: 2020-04-22 | End: 2021-01-06

## 2020-04-22 RX ORDER — ATORVASTATIN CALCIUM 10 MG/1
10 TABLET, FILM COATED ORAL DAILY
Qty: 90 TABLET | Refills: 3 | Status: ON HOLD | OUTPATIENT
Start: 2020-04-22 | End: 2021-01-08

## 2020-04-22 RX ORDER — FINASTERIDE 5 MG/1
5 TABLET, FILM COATED ORAL DAILY
Qty: 90 TABLET | Refills: 3 | Status: SHIPPED | OUTPATIENT
Start: 2020-04-22 | End: 2020-10-15

## 2020-04-22 NOTE — PROGRESS NOTES
[facility]    Telephone visit  - [phone]    Subjective    Kevin Fierro is a 85 year old male who is being evaluated via a billable telephone visit.      Chief Complaint   Patient presents with     Recheck Medication     CKD/Htn    BP Readings from Last 3 Encounters:   04/22/20 130/81   01/30/20 134/78   01/27/20 (!) 156/94     Creatinine   Date Value Ref Range Status   07/22/2019 1.38 (H) 0.66 - 1.25 mg/dL Final     Lipids    Recent Labs   Lab Test 07/22/19  0738 09/17/18  0857  05/14/15  0822 05/15/14  0753   CHOL 192 166   < > 159 180   HDL 45 57   < > 49 33*   LDL 98 78   < > 83 87   TRIG 243* 155*   < > 137 303*   CHOLHDLRATIO  --   --   --  3.2 5.5*    < > = values in this interval not displayed.     MIMI - using CPAP every night with out issues - [facility]    Environmental allergies - no issues    GERD - no issues    Seb Derm/Tinea pedis - rash controlled - chronic    Notes no med issues    No other concerns    4/22    Joint Pain - bilateral hands R>L - MCP/PIP - pain, clicking, no swelling, no warmth, ibuprofen ? 2 tabs, twice a day       Onset: 1 month    Description:   Location: bilateral hands - right is worst  Character: Dull ache, cramping with clicking in the mornings    Intensity: 3/10    Progression of Symptoms: worse    Accompanying Signs & Symptoms:  Other symptoms: none    History:   Previous similar pain: no       Precipitating factors:   Trauma or overuse: no     Alleviating factors:  Improved by: ibuprofen?     Therapies Tried and outcome: ibuprofen with some relief     CKD/Htn           Creatinine   Date Value Ref Range Status   07/22/2019 1.38 (H) 0.66 - 1.25 mg/dL Final         BP Readings from Last 3 Encounters:   01/27/20 (!) 156/94   11/20/19 121/79   08/28/19 125/62     Recent urology consult - possible to use flomax??, not with terazosin    PMH    Past Medical History:   Diagnosis Date     Arthritis .     BCC (basal cell carcinoma of skin) 10/2018, 11/2019     right jaw, rt parietal,11/2019- Dr Limon     Choroidal nevus of left eye     Dr Connor     CKD (chronic kidney disease) stage 3, GFR 30-59 ml/min (H)      Colon polyps      ED (erectile dysfunction)      Family history of cardiovascular disease      GERD (gastroesophageal reflux disease) 2013     Hematuria 1999    dr scott     Hernia, abdominal      Hyperlipidemia LDL goal <130 1990     Hypertension goal BP (blood pressure) < 140/90 1990     Hypertrophy of prostate without urinary obstruction and other lower urinary tract symptoms (LUTS)      Lumbar stenosis 2017    mild to moderate foraminal and central     Lung nodules 11/10    CT scan stable     obstructive SLEEP APNEA 5/2007    CPAP     Other and unspecified 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 5/25/2005     Palpitations      PSVT 11/2017    few runs, rare PVC     Restrictive lung disease      Seasonal allergies      Unspecified hearing loss 5/2005    hearing aids, L>R - Dr Hernandez       [facility]    Past Surgical History:   Procedure Laterality Date     CYSTOSCOPY       HC COLONOSCOPY THRU STOMA, DIAGNOSTIC  2005, 5/10, 5/11    Polyps-> due 2015 - Ct Colonography negative     HC REPAIR INCISIONAL HERNIA,REDUCIBLE  1960    Hernia Repair, Incisional, Unilateral     STRESS ECHO (METRO)  7/09, 5/12, 7/17    Negative     TONSILLECTOMY & ADENOIDECTOMY  1946       Medications    Current Outpatient Medications   Medication Sig Dispense Refill     ASPIRIN 81 MG OR TABS 1 tab po QD (Once per day)       atenolol (TENORMIN) 50 MG tablet Take 1 tablet (50 mg) by mouth daily 90 tablet 1     atorvastatin (LIPITOR) 10 MG tablet Take 1 tablet (10 mg) by mouth daily 90 tablet 3     CALCIUM 600 + D 600-200 MG-UNIT OR TABS  3 MONTHS 3     clotrimazole-betamethasone (LOTRISONE) 1-0.05 % external cream Apply  topically 2 times daily. 45 g 3     finasteride (PROSCAR) 5 MG tablet Take 1 tablet (5 mg) by mouth daily 90 tablet 3     MULTI-VITAMIN OR TABS 1  tablet daily       omeprazole 20 MG tablet Take 1 tablet (20 mg) by mouth daily Take 30-60 minutes before a meal. 90 tablet 3     oxymetazoline (AFRIN) 0.05 % nasal spray Spray 2 sprays into both nostrils as needed.       selenium sulfide (SELSUN) 2.5 % external lotion Apply daily to every other day - lather, wait 10 minutes and rinse 360 mL 3     sildenafil (VIAGRA) 50 MG tablet Take 1 tablet (50 mg) by mouth daily as needed for erectile dysfunction 18 tablet 3     terazosin (HYTRIN) 10 MG capsule TAKE ONE CAPSULE BY MOUTH AT BEDTIME 90 capsule 3       Allergies    Pollen extract    Family History    Family History   Problem Relation Age of Onset     C.A.D. Father         age 50's     Hypertension Father      C.A.D. Brother      C.A.D. Brother         mid 40's     Diabetes Brother      Cancer Brother         pancreatic CA     Coronary Artery Disease Brother      Cancer - colorectal No family hx of      Prostate Cancer No family hx of        Social History    Social History     Socioeconomic History     Marital status:      Spouse name: Leticia     Number of children: 3     Years of education: 18     Highest education level: Not on file   Occupational History     Employer: RETIRED   Social Needs     Financial resource strain: Not on file     Food insecurity     Worry: Not on file     Inability: Not on file     Transportation needs     Medical: Not on file     Non-medical: Not on file   Tobacco Use     Smoking status: Never Smoker     Smokeless tobacco: Never Used   Substance and Sexual Activity     Alcohol use: Yes     Alcohol/week: 4.0 - 5.0 standard drinks     Types: 4 - 5 Standard drinks or equivalent per week     Comment: 4-5 drinks per week avg     Drug use: No     Sexual activity: Yes     Partners: Female   Lifestyle     Physical activity     Days per week: Not on file     Minutes per session: Not on file     Stress: Not on file   Relationships     Social connections     Talks on phone: Not on file      Gets together: Not on file     Attends Congregational service: Not on file     Active member of club or organization: Not on file     Attends meetings of clubs or organizations: Not on file     Relationship status: Not on file     Intimate partner violence     Fear of current or ex partner: Not on file     Emotionally abused: Not on file     Physically abused: Not on file     Forced sexual activity: Not on file   Other Topics Concern      Service Not Asked     Blood Transfusions No     Caffeine Concern Yes     Comment: 1 serv/day, rare pop, occas chocolate (3-4/yr)     Occupational Exposure Not Asked     Hobby Hazards Not Asked     Sleep Concern Yes     Comment: MIMI, wakes feeling rested     Stress Concern Not Asked     Weight Concern Not Asked     Special Diet Not Asked     Back Care Not Asked     Exercise Yes     Comment: 30-45' 2-3 x/wk     Bike Helmet Not Asked     Seat Belt Yes     Self-Exams Not Asked     Parent/sibling w/ CABG, MI or angioplasty before 65F 55M? Yes   Social History Narrative     Not on file       Reviewed and updated as needed this visit by Provider           Review of Systems     ROS COMP: CONSTITUTIONAL: NEGATIVE for fever, chills, change in weight  INTEGUMENTARY/SKIN: NEGATIVE for worrisome rashes, moles or lesions  EYES: NEGATIVE for vision changes or irritation  ENT/MOUTH: NEGATIVE for ear, mouth and throat problems  RESP: NEGATIVE for significant cough or SOB  CV: NEGATIVE for chest pain, palpitations or peripheral edema  GI: NEGATIVE for nausea, abdominal pain, heartburn, or change in bowel habits  : NEGATIVE for frequency, dysuria, or hematuria  MUSCULOSKELETAL: NEGATIVE for significant arthralgias or myalgia  NEURO: NEGATIVE for weakness, dizziness or paresthesias  ENDOCRINE: NEGATIVE for temperature intolerance, skin/hair changes  HEME: NEGATIVE for bleeding problems  PSYCHIATRIC: NEGATIVE for changes in mood or affect    Objective    Reported vitals:  /81      healthy,  alert and no distress  Psych: Alert and oriented times 3; coherent speech, normal   rate and volume, able to articulate logical thoughts, able   to abstract reason, no tangential thoughts, no hallucinations   or delusions  His affect is normal     Diagnostic Test Results:  Labs reviewed in Epic    Assessment/Plan:      ICD-10-CM    1. CKD (chronic kidney disease) stage 3, GFR 30-59 ml/min (H)  N18.3 atenolol (TENORMIN) 50 MG tablet     Comprehensive metabolic panel     CBC with platelets     TSH with free T4 reflex     UA reflex to Microscopic and Culture     Albumin Random Urine Quantitative with Creat Ratio   2. Hypertension goal BP (blood pressure) < 140/90  I10 atenolol (TENORMIN) 50 MG tablet     Comprehensive metabolic panel     TSH with free T4 reflex     UA reflex to Microscopic and Culture     Albumin Random Urine Quantitative with Creat Ratio   3. Hyperlipidemia LDL goal <130  E78.5 atorvastatin (LIPITOR) 10 MG tablet     Comprehensive metabolic panel     Lipid panel reflex to direct LDL Fasting     CK total   4. Hypersomnia with sleep apnea  G47.10     G47.30    5. Restrictive lung disease  J98.4    6. Environmental allergies  Z91.09    7. Gastroesophageal reflux disease without esophagitis  K21.9 omeprazole 20 MG tablet     CBC with platelets   8. Hypertrophy of prostate without urinary obstruction  N40.0 finasteride (PROSCAR) 5 MG tablet   9. Sensorineural hearing loss (SNHL) of both ears  H90.3    10. Dermatophytosis of nail  B35.1    11. Jock itch  B35.6 clotrimazole-betamethasone (LOTRISONE) 1-0.05 % external cream   12. Seborrheic dermatitis, unspecified  L21.9 selenium sulfide (SELSUN) 2.5 % external lotion   13. Medication monitoring encounter  Z51.81 Comprehensive metabolic panel     Lipid panel reflex to direct LDL Fasting     CK total     CBC with platelets     TSH with free T4 reflex     UA reflex to Microscopic and Culture     Albumin Random Urine Quantitative with Creat Ratio     meds  "refilled [facility]  Fasting labs when able, follow up in 3-4 months    Return in about 4 months (around 8/22/2020), or if symptoms worsen or fail to improve, for Medication Recheck Visit, Follow Up Chronic.    Phone call duration:  15 minutes    The patient has been notified of following:     \"This telephone visit will be conducted via a call between you and your physician/provider. We have found that certain health care needs can be provided without the need for a physical exam.  This service lets us provide the care you need with a short phone conversation.  If a prescription is necessary we can send it directly to your pharmacy.  If lab work is needed we can place an order for that and you can then stop by our lab to have the test done at a later time.    Telephone visits are billed at different rates depending on your insurance coverage. During this emergency period, for some insurers they may be billed the same as an in-person visit.  Please reach out to your insurance provider with any questions.    If during the course of the call the physician/provider feels a telephone visit is not appropriate, you will not be charged for this service.\"    Patient has given verbal consent for Telephone visit?  Yes    How would you like to obtain your AVS? Sherrie Espinosa MD, FAAFP     [facility]  [address] [place] [address]  [email or web address]   Office: [phone]  Fax: [phone]  Pager: [phone]     "

## 2020-07-08 DIAGNOSIS — I10 HYPERTENSION GOAL BP (BLOOD PRESSURE) < 140/90: ICD-10-CM

## 2020-07-08 DIAGNOSIS — N18.30 CKD (CHRONIC KIDNEY DISEASE) STAGE 3, GFR 30-59 ML/MIN (H): ICD-10-CM

## 2020-07-08 RX ORDER — TERAZOSIN 10 MG/1
CAPSULE ORAL
Qty: 90 CAPSULE | Refills: 1 | OUTPATIENT
Start: 2020-07-08

## 2020-10-14 ENCOUNTER — MYC MEDICAL ADVICE (OUTPATIENT)
Dept: FAMILY MEDICINE | Facility: CLINIC | Age: 85
End: 2020-10-14

## 2020-10-14 DIAGNOSIS — N40.0 HYPERTROPHY OF PROSTATE WITHOUT URINARY OBSTRUCTION: ICD-10-CM

## 2020-10-15 RX ORDER — FINASTERIDE 5 MG/1
5 TABLET, FILM COATED ORAL DAILY
Qty: 90 TABLET | Refills: 3 | Status: ON HOLD | OUTPATIENT
Start: 2020-10-15 | End: 2021-01-06

## 2020-10-15 NOTE — TELEPHONE ENCOUNTER
Rx sent to requested pharmacy to check pricing.     Will await response of Pt.    Roseline sent to advise.    Gume RENTERIA RN   [facility]

## 2020-11-14 ENCOUNTER — HEALTH MAINTENANCE LETTER (OUTPATIENT)
Age: 85
End: 2020-11-14

## 2020-12-27 ENCOUNTER — MYC MEDICAL ADVICE (OUTPATIENT)
Dept: FAMILY MEDICINE | Facility: CLINIC | Age: 85
End: 2020-12-27

## 2020-12-29 ENCOUNTER — MYC MEDICAL ADVICE (OUTPATIENT)
Dept: FAMILY MEDICINE | Facility: CLINIC | Age: 85
End: 2020-12-29

## 2020-12-29 ENCOUNTER — TELEPHONE (OUTPATIENT)
Dept: FAMILY MEDICINE | Facility: CLINIC | Age: 85
End: 2020-12-29

## 2020-12-29 NOTE — TELEPHONE ENCOUNTER
Reason for Call:  Other      Detailed comments: wife is now telling me she left a message yesterday and no call back    Phone Number Patient can be reached at: Home number on file [phone] (home)    Best Time: anytime    Can we leave a detailed message on this number? YES    Call taken on 12/29/2020 at 1:21 PM by Yvonne Poon

## 2020-12-29 NOTE — TELEPHONE ENCOUNTER
Reason for Call:  Other call back    Detailed comments: patient has a cyst like thing is on his back and it is getting worse today. Also has a rash on his sides    Phone Number Patient can be reached at: Home number on file [phone] (home)    Best Time: anytime    Can we leave a detailed message on this number? YES    Call taken on 12/29/2020 at 1:11 PM by Yvonne Poon

## 2020-12-29 NOTE — TELEPHONE ENCOUNTER
Roseline sent, awaiting reply.    Gume RENTERIA RN   [facility]

## 2020-12-29 NOTE — TELEPHONE ENCOUNTER
[employer] message sent, awaiting reply.    Gume RENTERIA RN   [facility] [place][note 2]

## 2020-12-30 ENCOUNTER — OFFICE VISIT (OUTPATIENT)
Dept: FAMILY MEDICINE | Facility: CLINIC | Age: 85
End: 2020-12-30
Payer: COMMERCIAL

## 2020-12-30 VITALS
HEIGHT: 69 IN | SYSTOLIC BLOOD PRESSURE: 156 MMHG | WEIGHT: 212 LBS | BODY MASS INDEX: 31.4 KG/M2 | OXYGEN SATURATION: 95 % | HEART RATE: 92 BPM | TEMPERATURE: 97.7 F | DIASTOLIC BLOOD PRESSURE: 86 MMHG

## 2020-12-30 DIAGNOSIS — I10 HYPERTENSION GOAL BP (BLOOD PRESSURE) < 140/90: ICD-10-CM

## 2020-12-30 DIAGNOSIS — L72.3 SEBACEOUS CYST: Primary | ICD-10-CM

## 2020-12-30 DIAGNOSIS — L82.0 INFLAMED SEBORRHEIC KERATOSIS: ICD-10-CM

## 2020-12-30 DIAGNOSIS — L30.9 ECZEMA, UNSPECIFIED TYPE: ICD-10-CM

## 2020-12-30 DIAGNOSIS — N18.31 STAGE 3A CHRONIC KIDNEY DISEASE (H): ICD-10-CM

## 2020-12-30 DIAGNOSIS — L70.0 COMEDONE: ICD-10-CM

## 2020-12-30 DIAGNOSIS — Z51.81 MEDICATION MONITORING ENCOUNTER: ICD-10-CM

## 2020-12-30 PROCEDURE — 99213 OFFICE O/P EST LOW 20 MIN: CPT | Mod: 25 | Performed by: FAMILY MEDICINE

## 2020-12-30 PROCEDURE — 10060 I&D ABSCESS SIMPLE/SINGLE: CPT | Performed by: FAMILY MEDICINE

## 2020-12-30 PROCEDURE — 17110 DESTRUCTION B9 LES UP TO 14: CPT | Performed by: FAMILY MEDICINE

## 2020-12-30 RX ORDER — TRIAMCINOLONE ACETONIDE 1 MG/G
CREAM TOPICAL 2 TIMES DAILY
Qty: 80 G | Refills: 3 | Status: ON HOLD | OUTPATIENT
Start: 2020-12-30 | End: 2021-01-06

## 2020-12-30 RX ORDER — CEPHALEXIN 500 MG/1
500 CAPSULE ORAL 3 TIMES DAILY
Qty: 30 CAPSULE | Refills: 0 | Status: SHIPPED | OUTPATIENT
Start: 2020-12-30 | End: 2021-05-06

## 2020-12-30 RX ORDER — CEPHALEXIN 500 MG/1
500 CAPSULE ORAL 3 TIMES DAILY
Qty: 30 CAPSULE | Refills: 0 | Status: SHIPPED | OUTPATIENT
Start: 2020-12-30 | End: 2020-12-30

## 2020-12-30 RX ORDER — TRIAMCINOLONE ACETONIDE 1 MG/G
CREAM TOPICAL 2 TIMES DAILY
Qty: 80 G | Refills: 3 | Status: SHIPPED | OUTPATIENT
Start: 2020-12-30 | End: 2020-12-30

## 2020-12-30 ASSESSMENT — MIFFLIN-ST. JEOR: SCORE: 1632.01

## 2020-12-30 NOTE — PROGRESS NOTES
[facility]  [place]    SUBJECTIVE    Kevin Fierro is a 86 year old male who presents to clinic today for the following health issues:    Skin on the back:    Boil on back 1 week - left mid back, 1.5 cm, with redness noted, probable recent discharge, local reaction from bandaid - no f/c/s    Multiple seb k    Multiple comedones - black    Rash 2-3 days on abdomen - both left and right sides.  Very itchy - diffuse papular    Reviewed and updated as needed this visit by Provider                   BP Readings from Last 3 Encounters:   12/30/20 (!) 156/86   04/22/20 130/81   01/30/20 134/78       body mass index is 31.31 kg/m .    Wt Readings from Last 4 Encounters:   12/30/20 96.2 kg (212 lb)   01/30/20 96.2 kg (212 lb)   01/27/20 96.2 kg (212 lb)   11/20/19 90.7 kg (200 lb)       Health Maintenance    Health Maintenance Due   Topic Date Due     ANNUAL REVIEW OF HM ORDERS  05/11/1934     BMP  07/22/2020     LIPID  07/22/2020     MICROALBUMIN  07/22/2020     PSA  07/22/2020     MEDICARE ANNUAL WELLNESS VISIT  07/26/2020     FALL RISK ASSESSMENT  07/26/2020       Current Problem List    Patient Active Problem List   Diagnosis     Hypertension goal BP (blood pressure) < 140/90     Hypertrophy of prostate without urinary obstruction     Hearing loss     Seborrheic dermatitis     Other and unspecified 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     Hypersomnia with sleep apnea     Family history of cardiovascular disease     Seasonal allergies     ED (erectile dysfunction)     Hyperlipidemia LDL goal <130     Lung nodules     Advanced directives, counseling/discussion     GERD (gastroesophageal reflux disease)     CKD (chronic kidney disease) stage 3, GFR 30-59 ml/min     Environmental allergies     Lumbar stenosis     Restrictive lung disease     BCC (basal cell carcinoma of skin)       Past Medical History    Past Medical History:   Diagnosis Date     Arthritis .     BCC (basal cell  carcinoma of skin) 10/2018, 11/2019    right jaw, rt parietal,11/2019- Dr Limon     Choroidal nevus of left eye     Dr Connor     CKD (chronic kidney disease) stage 3, GFR 30-59 ml/min      Colon polyps      ED (erectile dysfunction)      Family history of cardiovascular disease      GERD (gastroesophageal reflux disease) 2013     Hematuria 1999    dr scott     Hernia, abdominal      Hyperlipidemia LDL goal <130 1990     Hypertension goal BP (blood pressure) < 140/90 1990     Hypertrophy of prostate without urinary obstruction and other lower urinary tract symptoms (LUTS)      Lumbar stenosis 2017    mild to moderate foraminal and central     Lung nodules 11/10    CT scan stable     obstructive SLEEP APNEA 5/2007    CPAP     Other and unspecified 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 5/25/2005     Palpitations      PSVT 11/2017    few runs, rare PVC     Restrictive lung disease      Seasonal allergies      Unspecified hearing loss 5/2005    hearing aids, L>R - Dr Hernandez       Past Surgical History    Past Surgical History:   Procedure Laterality Date     CYSTOSCOPY       HC COLONOSCOPY THRU STOMA, DIAGNOSTIC  2005, 5/10, 5/11    Polyps-> due 2015 - Ct Colonography negative     HC REPAIR INCISIONAL HERNIA,REDUCIBLE  1960    Hernia Repair, Incisional, Unilateral     STRESS ECHO (METRO)  7/09, 5/12, 7/17    Negative     TONSILLECTOMY & ADENOIDECTOMY  1946       Current Medications    Current Outpatient Medications   Medication Sig Dispense Refill     ASPIRIN 81 MG OR TABS 1 tab po QD (Once per day)       atenolol (TENORMIN) 50 MG tablet Take 1 tablet (50 mg) by mouth daily 90 tablet 1     atorvastatin (LIPITOR) 10 MG tablet Take 1 tablet (10 mg) by mouth daily 90 tablet 3     CALCIUM 600 + D 600-200 MG-UNIT OR TABS  3 MONTHS 3     cephALEXin (KEFLEX) 500 MG capsule Take 1 capsule (500 mg) by mouth 3 times daily 30 capsule 0     clotrimazole-betamethasone (LOTRISONE) 1-0.05 % external  cream Apply  topically 2 times daily. 45 g 3     finasteride (PROSCAR) 5 MG tablet Take 1 tablet (5 mg) by mouth daily 90 tablet 3     MULTI-VITAMIN OR TABS 1 tablet daily       omeprazole 20 MG tablet Take 1 tablet (20 mg) by mouth daily Take 30-60 minutes before a meal. 90 tablet 3     oxymetazoline (AFRIN) 0.05 % nasal spray Spray 2 sprays into both nostrils as needed.       selenium sulfide (SELSUN) 2.5 % external lotion Apply daily to every other day - lather, wait 10 minutes and rinse 360 mL 3     sildenafil (VIAGRA) 50 MG tablet Take 1 tablet (50 mg) by mouth daily as needed for erectile dysfunction 18 tablet 3     terazosin (HYTRIN) 10 MG capsule TAKE ONE CAPSULE BY MOUTH AT BEDTIME 90 capsule 3     triamcinolone (KENALOG) 0.1 % external cream Apply topically 2 times daily 80 g 3       Allergies    Allergies   Allergen Reactions     Pollen Extract      Tree pollen         Immunizations    Immunization History   Administered Date(s) Administered     Influenza (H1N1) 01/20/2010     Influenza (High Dose) 3 valent vaccine 10/03/2011, 09/20/2012, 10/15/2013, 10/03/2014, 10/15/2015, 09/30/2016, 10/10/2017, 09/17/2018, 10/07/2019     Influenza (IIV3) PF 10/18/2004, 10/12/2005, 10/23/2006, 10/17/2007, 10/15/2009, 09/22/2010     Influenza, Quad, High Dose, Pf, 65yr + 09/04/2020     Pneumo Conj 13-V (2010&after) 05/20/2015     Pneumococcal 23 valent 01/04/2007     TD (ADULT, 7+) 04/20/2007     TDAP Vaccine (Boostrix) 05/23/2012     Zoster vaccine recombinant adjuvanted (SHINGRIX) 09/17/2018, 11/16/2018     Zoster vaccine, live 01/01/2008       Family History    Family History   Problem Relation Age of Onset     C.A.D. Father         age 50's     Hypertension Father      C.A.MANE. Brother      C.A.MANE. Brother         mid 40's     Diabetes Brother      Cancer Brother         pancreatic CA     Coronary Artery Disease Brother      Cancer - colorectal No family hx of      Prostate Cancer No family hx of        Social  History    Social History     Socioeconomic History     Marital status:      Spouse name: Leticia     Number of children: 3     Years of education: 18     Highest education level: Not on file   Occupational History     Employer: RETIRED   Social Needs     Financial resource strain: Not on file     Food insecurity     Worry: Not on file     Inability: Not on file     Transportation needs     Medical: Not on file     Non-medical: Not on file   Tobacco Use     Smoking status: Never Smoker     Smokeless tobacco: Never Used   Substance and Sexual Activity     Alcohol use: Yes     Alcohol/week: 4.0 - 5.0 standard drinks     Types: 4 - 5 Standard drinks or equivalent per week     Comment: 4-5 drinks per week avg     Drug use: No     Sexual activity: Yes     Partners: Female   Lifestyle     Physical activity     Days per week: Not on file     Minutes per session: Not on file     Stress: Not on file   Relationships     Social connections     Talks on phone: Not on file     Gets together: Not on file     Attends Mosque service: Not on file     Active member of club or organization: Not on file     Attends meetings of clubs or organizations: Not on file     Relationship status: Not on file     Intimate partner violence     Fear of current or ex partner: Not on file     Emotionally abused: Not on file     Physically abused: Not on file     Forced sexual activity: Not on file   Other Topics Concern      Service Not Asked     Blood Transfusions No     Caffeine Concern Yes     Comment: 1 serv/day, rare pop, occas chocolate (3-4/yr)     Occupational Exposure Not Asked     Hobby Hazards Not Asked     Sleep Concern Yes     Comment: MIMI, wakes feeling rested     Stress Concern Not Asked     Weight Concern Not Asked     Special Diet Not Asked     Back Care Not Asked     Exercise Yes     Comment: 30-45' 2-3 x/wk     Bike Helmet Not Asked     Seat Belt Yes     Self-Exams Not Asked     Parent/sibling w/ CABG, MI or  "angioplasty before 65F 55M? Yes   Social History Narrative     Not on file       All above reviewed and updated, all stable unless otherwise noted    Recent labs reviewed    ROS    CONSTITUTIONAL: NEGATIVE for fever, chills, change in weight  INTEGUMENTARY/SKIN: NEGATIVE for worrisome rashes, moles or lesions  EYES: NEGATIVE for vision changes or irritation  ENT/MOUTH: NEGATIVE for ear, mouth and throat problems  RESP: NEGATIVE for significant cough or SOB  CV: NEGATIVE for chest pain, palpitations or peripheral edema  GI: NEGATIVE for nausea, abdominal pain, heartburn, or change in bowel habits  : NEGATIVE for frequency, dysuria, or hematuria  MUSCULOSKELETAL: NEGATIVE for significant arthralgias or myalgia  NEURO: NEGATIVE for weakness, dizziness or paresthesias  ENDOCRINE: NEGATIVE for temperature intolerance, skin/hair changes  HEME: NEGATIVE for bleeding problems  PSYCHIATRIC: NEGATIVE for changes in mood or affect    OBJECTIVE    BP (!) 156/86   Pulse 92   Temp 97.7  F (36.5  C) (Tympanic)   Ht 1.753 m (5' 9\")   Wt 96.2 kg (212 lb)   SpO2 95%   BMI 31.31 kg/m    Body mass index is 31.31 kg/m .  GENERAL: healthy, alert and no distress  EYES: Eyes grossly normal to inspection, extraocular movements - intact, and PERRL  RESP: lungs clear to auscultation - no rales, no rhonchi, no wheezes  CV: regular rates and rhythm, normal S1 S2, no S3 or S4 and no murmur, no click or rub -  ABDOMEN: soft, no tenderness, no  hepatosplenomegaly, no masses, normal bowel sounds  MS: extremities- no gross deformities noted, no edema  SKIN: see above  NEURO: Normal strength and tone, sensory exam grossly normal, mentation intact, speech normal and non focal  BACK: no CVA tenderness, no paralumbar tenderness  PSYCH: Alert and oriented times 3; speech- coherent , normal rate and volume; able to articulate logical thoughts, able to abstract reason, no tangential thoughts, no hallucinations or delusions, affect- " normal    DIAGNOSTICS/PROCEDURE    Risks/benefits reviewed - consent signed    Hibiclens, 1% lidocaine with epi, 0.5 cm incision with #15 blade, extensive purulent discharge removed, irrigated clear, left to heal by secondary intent    Multiple comedones removed by dull currette    Few seb k, zachery as well       ASSESSMENT      ICD-10-CM    1. Sebaceous cyst  L72.3 DRAIN SKIN ABSCESS SIMPLE/SINGLE     cephALEXin (KEFLEX) 500 MG capsule     DISCONTINUED: cephALEXin (KEFLEX) 500 MG capsule     DISCONTINUED: cephALEXin (KEFLEX) 500 MG capsule   2. Comedone  L70.0    3. Inflamed seborrheic keratosis  L82.0    4. Eczema, unspecified type  L30.9 triamcinolone (KENALOG) 0.1 % external cream     DISCONTINUED: triamcinolone (KENALOG) 0.1 % external cream   5. Stage 3a chronic kidney disease  N18.31    6. Hypertension goal BP (blood pressure) < 140/90  I10    7. Medication monitoring encounter  Z51.81        PLAN    Discussed treatment/modality options, including risk and benefits he desires:    1) keflex    2) wound cares reviewed    3) recheck BP soon    4) triamcinolone/moisturizers to bilateral flank eczema    5) med check fasting when able    All diagnosis above reviewed and noted above, otherwise stable.  See [facility] orders for further details.     Return in about 1 week (around 1/6/2021), or if symptoms worsen or fail to improve, for Wound check.    Health Maintenance Due   Topic Date Due     ANNUAL REVIEW OF  ORDERS  05/11/1934     BMP  07/22/2020     LIPID  07/22/2020     MICROALBUMIN  07/22/2020     PSA  07/22/2020     MEDICARE ANNUAL WELLNESS VISIT  07/26/2020     FALL RISK ASSESSMENT  07/26/2020       See Patient Instructions             Chance Espinosa MD, FAAFP     [facility]  [address] [place] [id number]  [email or web address]   Office: [phone]  Fax: [phone]  Pager: [phone]

## 2021-01-05 ENCOUNTER — MYC MEDICAL ADVICE (OUTPATIENT)
Dept: FAMILY MEDICINE | Facility: CLINIC | Age: 86
End: 2021-01-05

## 2021-01-05 NOTE — TELEPHONE ENCOUNTER
Lab Results   Component Value Date    CR 1.38 07/22/2019     GFR Estimate   Date Value Ref Range Status   07/22/2019 46 (L) >60 mL/min/[1.73_m2] Final     Comment:     Non  GFR Calc  Starting 12/18/2018, serum creatinine based estimated GFR (eGFR) will be   calculated using the Chronic Kidney Disease Epidemiology Collaboration   (CKD-EPI) equation.       BP Readings from Last 3 Encounters:   12/30/20 (!) 156/86   04/22/20 130/81   01/30/20 134/78     Please add low dose lisinopril hydrochlorothiazide 10/12.5 mg, 1/2 tab with close up in clinic (#45, r x 1)    Please complete antibiotics, if no discharge ok to stop bandage    I have heard no protocol for COVID vaccinations

## 2021-01-05 NOTE — TELEPHONE ENCOUNTER
Please see my chart message below     Please review and advise     Thank you     Danielle Mills RN, BSN  [place] [facility]

## 2021-01-06 ENCOUNTER — TRANSFERRED RECORDS (OUTPATIENT)
Dept: HEALTH INFORMATION MANAGEMENT | Facility: CLINIC | Age: 86
End: 2021-01-06

## 2021-01-06 ENCOUNTER — HOSPITAL ENCOUNTER (INPATIENT)
Facility: CLINIC | Age: 86
LOS: 2 days | Discharge: HOME | DRG: 282 | End: 2021-01-08
Attending: INTERNAL MEDICINE | Admitting: HOSPITALIST
Payer: COMMERCIAL

## 2021-01-06 DIAGNOSIS — I10 HYPERTENSION GOAL BP (BLOOD PRESSURE) < 140/90: ICD-10-CM

## 2021-01-06 DIAGNOSIS — I21.4 NSTEMI (NON-ST ELEVATED MYOCARDIAL INFARCTION) (H): Primary | ICD-10-CM

## 2021-01-06 DIAGNOSIS — N18.31 STAGE 3A CHRONIC KIDNEY DISEASE (H): ICD-10-CM

## 2021-01-06 LAB
CHOLEST SERPL-MCNC: 174 MG/DL
CREAT SERPL-MCNC: 1.57 MG/DL (ref 0.72–1.25)
GFR SERPL CREATININE-BSD FRML MDRD: 42 ML/MIN/1.73M2
GLUCOSE SERPL-MCNC: 125 MG/DL (ref 65–100)
HBA1C MFR BLD: 5.7 % (ref 0–5.6)
HDLC SERPL-MCNC: 57 MG/DL
LDLC SERPL CALC-MCNC: 106 MG/DL
NONHDLC SERPL-MCNC: 117 MG/DL
POTASSIUM SERPL-SCNC: 4.6 MMOL/L (ref 3.5–5)
TRIGL SERPL-MCNC: 53 MG/DL
TROPONIN I SERPL-MCNC: 1.38 UG/L (ref 0–0.04)

## 2021-01-06 PROCEDURE — 85520 HEPARIN ASSAY: CPT | Performed by: HOSPITALIST

## 2021-01-06 PROCEDURE — 80061 LIPID PANEL: CPT | Performed by: HOSPITALIST

## 2021-01-06 PROCEDURE — 99223 1ST HOSP IP/OBS HIGH 75: CPT | Mod: AI | Performed by: HOSPITALIST

## 2021-01-06 PROCEDURE — 36415 COLL VENOUS BLD VENIPUNCTURE: CPT | Performed by: HOSPITALIST

## 2021-01-06 PROCEDURE — 84484 ASSAY OF TROPONIN QUANT: CPT | Performed by: HOSPITALIST

## 2021-01-06 PROCEDURE — 210N000002 HC R&B HEART CARE

## 2021-01-06 PROCEDURE — 250N000011 HC RX IP 250 OP 636: Performed by: HOSPITALIST

## 2021-01-06 PROCEDURE — 85730 THROMBOPLASTIN TIME PARTIAL: CPT | Performed by: HOSPITALIST

## 2021-01-06 PROCEDURE — 93005 ELECTROCARDIOGRAM TRACING: CPT

## 2021-01-06 PROCEDURE — 83036 HEMOGLOBIN GLYCOSYLATED A1C: CPT | Performed by: HOSPITALIST

## 2021-01-06 PROCEDURE — 93010 ELECTROCARDIOGRAM REPORT: CPT | Performed by: INTERNAL MEDICINE

## 2021-01-06 RX ORDER — ALBUTEROL SULFATE 90 UG/1
2 AEROSOL, METERED RESPIRATORY (INHALATION) 4 TIMES DAILY PRN
COMMUNITY
End: 2021-05-06

## 2021-01-06 RX ORDER — TRIAMCINOLONE ACETONIDE 1 MG/G
CREAM TOPICAL 2 TIMES DAILY PRN
COMMUNITY

## 2021-01-06 RX ORDER — AMOXICILLIN 250 MG
1 CAPSULE ORAL 2 TIMES DAILY PRN
Status: DISCONTINUED | OUTPATIENT
Start: 2021-01-06 | End: 2021-01-08 | Stop reason: HOSPADM

## 2021-01-06 RX ORDER — LIDOCAINE 40 MG/G
CREAM TOPICAL
Status: DISCONTINUED | OUTPATIENT
Start: 2021-01-06 | End: 2021-01-08 | Stop reason: HOSPADM

## 2021-01-06 RX ORDER — LISINOPRIL/HYDROCHLOROTHIAZIDE 10-12.5 MG
TABLET ORAL
Qty: 45 TABLET | Refills: 1 | Status: CANCELLED | OUTPATIENT
Start: 2021-01-06

## 2021-01-06 RX ORDER — NITROGLYCERIN 0.4 MG/1
0.4 TABLET SUBLINGUAL EVERY 5 MIN PRN
Status: DISCONTINUED | OUTPATIENT
Start: 2021-01-06 | End: 2021-01-08 | Stop reason: HOSPADM

## 2021-01-06 RX ORDER — ONDANSETRON 4 MG/1
4 TABLET, ORALLY DISINTEGRATING ORAL EVERY 6 HOURS PRN
Status: DISCONTINUED | OUTPATIENT
Start: 2021-01-06 | End: 2021-01-08 | Stop reason: HOSPADM

## 2021-01-06 RX ORDER — ASPIRIN 81 MG/1
81 TABLET ORAL AT BEDTIME
COMMUNITY

## 2021-01-06 RX ORDER — ACETAMINOPHEN 325 MG/1
650 TABLET ORAL EVERY 4 HOURS PRN
Status: DISCONTINUED | OUTPATIENT
Start: 2021-01-06 | End: 2021-01-07

## 2021-01-06 RX ORDER — HEPARIN SODIUM 10000 [USP'U]/100ML
0-5000 INJECTION, SOLUTION INTRAVENOUS CONTINUOUS
Status: DISCONTINUED | OUTPATIENT
Start: 2021-01-06 | End: 2021-01-07

## 2021-01-06 RX ORDER — AMOXICILLIN 250 MG
2 CAPSULE ORAL 2 TIMES DAILY PRN
Status: DISCONTINUED | OUTPATIENT
Start: 2021-01-06 | End: 2021-01-08 | Stop reason: HOSPADM

## 2021-01-06 RX ORDER — PROCHLORPERAZINE MALEATE 5 MG
5 TABLET ORAL EVERY 6 HOURS PRN
Status: DISCONTINUED | OUTPATIENT
Start: 2021-01-06 | End: 2021-01-08 | Stop reason: HOSPADM

## 2021-01-06 RX ORDER — PROCHLORPERAZINE 25 MG
12.5 SUPPOSITORY, RECTAL RECTAL EVERY 12 HOURS PRN
Status: DISCONTINUED | OUTPATIENT
Start: 2021-01-06 | End: 2021-01-08 | Stop reason: HOSPADM

## 2021-01-06 RX ORDER — ONDANSETRON 2 MG/ML
4 INJECTION INTRAMUSCULAR; INTRAVENOUS EVERY 6 HOURS PRN
Status: DISCONTINUED | OUTPATIENT
Start: 2021-01-06 | End: 2021-01-08 | Stop reason: HOSPADM

## 2021-01-06 RX ORDER — MULTIVITAMIN,THERAPEUTIC
1 TABLET ORAL AT BEDTIME
COMMUNITY

## 2021-01-06 RX ADMIN — HEPARIN SODIUM 1600 UNITS/HR: 10000 INJECTION, SOLUTION INTRAVENOUS at 20:45

## 2021-01-06 ASSESSMENT — MIFFLIN-ST. JEOR: SCORE: 1601.16

## 2021-01-06 ASSESSMENT — ACTIVITIES OF DAILY LIVING (ADL): ADLS_ACUITY_SCORE: 19

## 2021-01-07 ENCOUNTER — APPOINTMENT (OUTPATIENT)
Dept: CARDIOLOGY | Facility: CLINIC | Age: 86
DRG: 282 | End: 2021-01-07
Attending: INTERNAL MEDICINE
Payer: COMMERCIAL

## 2021-01-07 ENCOUNTER — APPOINTMENT (OUTPATIENT)
Dept: CT IMAGING | Facility: CLINIC | Age: 86
DRG: 282 | End: 2021-01-07
Attending: INTERNAL MEDICINE
Payer: COMMERCIAL

## 2021-01-07 LAB
ANION GAP SERPL CALCULATED.3IONS-SCNC: 6 MMOL/L (ref 3–14)
APTT PPP: >240 SEC (ref 22–37)
BUN SERPL-MCNC: 30 MG/DL (ref 7–30)
CALCIUM SERPL-MCNC: 8.8 MG/DL (ref 8.5–10.1)
CHLORIDE SERPL-SCNC: 110 MMOL/L (ref 94–109)
CO2 SERPL-SCNC: 24 MMOL/L (ref 20–32)
CREAT SERPL-MCNC: 1.42 MG/DL (ref 0.66–1.25)
ERYTHROCYTE [DISTWIDTH] IN BLOOD BY AUTOMATED COUNT: 13.2 % (ref 10–15)
GFR SERPL CREATININE-BSD FRML MDRD: 44 ML/MIN/{1.73_M2}
GLUCOSE SERPL-MCNC: 95 MG/DL (ref 70–99)
HCT VFR BLD AUTO: 39.6 % (ref 40–53)
HGB BLD-MCNC: 12.9 G/DL (ref 13.3–17.7)
KCT BLD-ACNC: 156 SEC (ref 75–150)
MCH RBC QN AUTO: 32.2 PG (ref 26.5–33)
MCHC RBC AUTO-ENTMCNC: 32.6 G/DL (ref 31.5–36.5)
MCV RBC AUTO: 99 FL (ref 78–100)
PLATELET # BLD AUTO: 183 10E9/L (ref 150–450)
POTASSIUM SERPL-SCNC: 4.3 MMOL/L (ref 3.4–5.3)
RBC # BLD AUTO: 4.01 10E12/L (ref 4.4–5.9)
SODIUM SERPL-SCNC: 140 MMOL/L (ref 133–144)
TROPONIN I SERPL-MCNC: 0.54 UG/L (ref 0–0.04)
TSH SERPL DL<=0.005 MIU/L-ACNC: 1.9 MU/L (ref 0.4–4)
UFH PPP CHRO-ACNC: 0.27 IU/ML
UFH PPP CHRO-ACNC: >1.1 IU/ML
UFH PPP CHRO-ACNC: >1.1 IU/ML
WBC # BLD AUTO: 10.8 10E9/L (ref 4–11)

## 2021-01-07 PROCEDURE — 71275 CT ANGIOGRAPHY CHEST: CPT

## 2021-01-07 PROCEDURE — 210N000002 HC R&B HEART CARE

## 2021-01-07 PROCEDURE — 85027 COMPLETE CBC AUTOMATED: CPT | Performed by: HOSPITALIST

## 2021-01-07 PROCEDURE — 36415 COLL VENOUS BLD VENIPUNCTURE: CPT | Performed by: HOSPITALIST

## 2021-01-07 PROCEDURE — 250N000011 HC RX IP 250 OP 636: Performed by: INTERNAL MEDICINE

## 2021-01-07 PROCEDURE — 85347 COAGULATION TIME ACTIVATED: CPT

## 2021-01-07 PROCEDURE — 99232 SBSQ HOSP IP/OBS MODERATE 35: CPT | Performed by: HOSPITALIST

## 2021-01-07 PROCEDURE — 93306 TTE W/DOPPLER COMPLETE: CPT | Mod: 26 | Performed by: INTERNAL MEDICINE

## 2021-01-07 PROCEDURE — 99222 1ST HOSP IP/OBS MODERATE 55: CPT | Mod: 25 | Performed by: INTERNAL MEDICINE

## 2021-01-07 PROCEDURE — 84443 ASSAY THYROID STIM HORMONE: CPT | Performed by: HOSPITALIST

## 2021-01-07 PROCEDURE — 99153 MOD SED SAME PHYS/QHP EA: CPT

## 2021-01-07 PROCEDURE — 80048 BASIC METABOLIC PNL TOTAL CA: CPT | Performed by: HOSPITALIST

## 2021-01-07 PROCEDURE — 250N000013 HC RX MED GY IP 250 OP 250 PS 637: Performed by: HOSPITALIST

## 2021-01-07 PROCEDURE — 255N000002 HC RX 255 OP 636: Performed by: INTERNAL MEDICINE

## 2021-01-07 PROCEDURE — 85520 HEPARIN ASSAY: CPT | Performed by: INTERNAL MEDICINE

## 2021-01-07 PROCEDURE — 99152 MOD SED SAME PHYS/QHP 5/>YRS: CPT | Performed by: INTERNAL MEDICINE

## 2021-01-07 PROCEDURE — 258N000003 HC RX IP 258 OP 636: Performed by: INTERNAL MEDICINE

## 2021-01-07 PROCEDURE — B2111ZZ FLUOROSCOPY OF MULTIPLE CORONARY ARTERIES USING LOW OSMOLAR CONTRAST: ICD-10-PCS | Performed by: INTERNAL MEDICINE

## 2021-01-07 PROCEDURE — 93458 L HRT ARTERY/VENTRICLE ANGIO: CPT | Performed by: INTERNAL MEDICINE

## 2021-01-07 PROCEDURE — 84484 ASSAY OF TROPONIN QUANT: CPT | Performed by: HOSPITALIST

## 2021-01-07 PROCEDURE — 250N000009 HC RX 250: Performed by: INTERNAL MEDICINE

## 2021-01-07 PROCEDURE — 85520 HEPARIN ASSAY: CPT | Performed by: HOSPITALIST

## 2021-01-07 PROCEDURE — 999N000208 ECHOCARDIOGRAM COMPLETE

## 2021-01-07 PROCEDURE — 272N000001 HC OR GENERAL SUPPLY STERILE: Performed by: INTERNAL MEDICINE

## 2021-01-07 PROCEDURE — 250N000011 HC RX IP 250 OP 636: Performed by: HOSPITALIST

## 2021-01-07 PROCEDURE — 36415 COLL VENOUS BLD VENIPUNCTURE: CPT | Performed by: INTERNAL MEDICINE

## 2021-01-07 RX ORDER — ACETAMINOPHEN 325 MG/1
650 TABLET ORAL EVERY 4 HOURS PRN
Status: DISCONTINUED | OUTPATIENT
Start: 2021-01-07 | End: 2021-01-08 | Stop reason: HOSPADM

## 2021-01-07 RX ORDER — ATROPINE SULFATE 0.1 MG/ML
0.5 INJECTION INTRAVENOUS
Status: ACTIVE | OUTPATIENT
Start: 2021-01-07 | End: 2021-01-07

## 2021-01-07 RX ORDER — IOPAMIDOL 755 MG/ML
74 INJECTION, SOLUTION INTRAVASCULAR ONCE
Status: COMPLETED | OUTPATIENT
Start: 2021-01-07 | End: 2021-01-07

## 2021-01-07 RX ORDER — IOPAMIDOL 755 MG/ML
INJECTION, SOLUTION INTRAVASCULAR
Status: DISCONTINUED | OUTPATIENT
Start: 2021-01-07 | End: 2021-01-07 | Stop reason: HOSPADM

## 2021-01-07 RX ORDER — TERAZOSIN 10 MG/1
10 CAPSULE ORAL AT BEDTIME
Status: DISCONTINUED | OUTPATIENT
Start: 2021-01-07 | End: 2021-01-08 | Stop reason: HOSPADM

## 2021-01-07 RX ORDER — SODIUM CHLORIDE 9 MG/ML
INJECTION, SOLUTION INTRAVENOUS CONTINUOUS
Status: DISCONTINUED | OUTPATIENT
Start: 2021-01-07 | End: 2021-01-08

## 2021-01-07 RX ORDER — LIDOCAINE 40 MG/G
CREAM TOPICAL
Status: DISCONTINUED | OUTPATIENT
Start: 2021-01-07 | End: 2021-01-07 | Stop reason: HOSPADM

## 2021-01-07 RX ORDER — LORAZEPAM 0.5 MG/1
0.5 TABLET ORAL
Status: DISCONTINUED | OUTPATIENT
Start: 2021-01-07 | End: 2021-01-07 | Stop reason: HOSPADM

## 2021-01-07 RX ORDER — NALOXONE HYDROCHLORIDE 0.4 MG/ML
0.4 INJECTION, SOLUTION INTRAMUSCULAR; INTRAVENOUS; SUBCUTANEOUS
Status: DISCONTINUED | OUTPATIENT
Start: 2021-01-07 | End: 2021-01-08 | Stop reason: HOSPADM

## 2021-01-07 RX ORDER — NALOXONE HYDROCHLORIDE 0.4 MG/ML
0.2 INJECTION, SOLUTION INTRAMUSCULAR; INTRAVENOUS; SUBCUTANEOUS
Status: DISCONTINUED | OUTPATIENT
Start: 2021-01-07 | End: 2021-01-08 | Stop reason: HOSPADM

## 2021-01-07 RX ORDER — POTASSIUM CHLORIDE 1500 MG/1
20 TABLET, EXTENDED RELEASE ORAL
Status: DISCONTINUED | OUTPATIENT
Start: 2021-01-07 | End: 2021-01-07 | Stop reason: HOSPADM

## 2021-01-07 RX ORDER — FENTANYL CITRATE 50 UG/ML
25-50 INJECTION, SOLUTION INTRAMUSCULAR; INTRAVENOUS
Status: ACTIVE | OUTPATIENT
Start: 2021-01-07 | End: 2021-01-07

## 2021-01-07 RX ORDER — FENTANYL CITRATE 50 UG/ML
INJECTION, SOLUTION INTRAMUSCULAR; INTRAVENOUS
Status: DISCONTINUED | OUTPATIENT
Start: 2021-01-07 | End: 2021-01-07 | Stop reason: HOSPADM

## 2021-01-07 RX ORDER — SODIUM CHLORIDE 9 MG/ML
INJECTION, SOLUTION INTRAVENOUS CONTINUOUS
Status: DISCONTINUED | OUTPATIENT
Start: 2021-01-07 | End: 2021-01-07 | Stop reason: HOSPADM

## 2021-01-07 RX ORDER — ATORVASTATIN CALCIUM 40 MG/1
40 TABLET, FILM COATED ORAL AT BEDTIME
Status: DISCONTINUED | OUTPATIENT
Start: 2021-01-07 | End: 2021-01-08 | Stop reason: HOSPADM

## 2021-01-07 RX ORDER — FLUMAZENIL 0.1 MG/ML
0.2 INJECTION, SOLUTION INTRAVENOUS
Status: ACTIVE | OUTPATIENT
Start: 2021-01-07 | End: 2021-01-07

## 2021-01-07 RX ORDER — ATORVASTATIN CALCIUM 10 MG/1
10 TABLET, FILM COATED ORAL AT BEDTIME
Status: DISCONTINUED | OUTPATIENT
Start: 2021-01-07 | End: 2021-01-07

## 2021-01-07 RX ORDER — ASPIRIN 81 MG/1
81 TABLET ORAL AT BEDTIME
Status: DISCONTINUED | OUTPATIENT
Start: 2021-01-07 | End: 2021-01-08 | Stop reason: HOSPADM

## 2021-01-07 RX ORDER — LORAZEPAM 2 MG/ML
0.5 INJECTION INTRAMUSCULAR
Status: DISCONTINUED | OUTPATIENT
Start: 2021-01-07 | End: 2021-01-07 | Stop reason: HOSPADM

## 2021-01-07 RX ORDER — ATENOLOL 50 MG/1
50 TABLET ORAL AT BEDTIME
Status: DISCONTINUED | OUTPATIENT
Start: 2021-01-07 | End: 2021-01-08

## 2021-01-07 RX ADMIN — HEPARIN SODIUM 600 UNITS/HR: 10000 INJECTION, SOLUTION INTRAVENOUS at 09:49

## 2021-01-07 RX ADMIN — HUMAN ALBUMIN MICROSPHERES AND PERFLUTREN 9 ML: 10; .22 INJECTION, SOLUTION INTRAVENOUS at 11:30

## 2021-01-07 RX ADMIN — SODIUM CHLORIDE 97 ML: 9 INJECTION, SOLUTION INTRAVENOUS at 17:18

## 2021-01-07 RX ADMIN — ASPIRIN 81 MG: 81 TABLET, DELAYED RELEASE ORAL at 20:53

## 2021-01-07 RX ADMIN — SODIUM CHLORIDE: 9 INJECTION, SOLUTION INTRAVENOUS at 11:35

## 2021-01-07 RX ADMIN — IOPAMIDOL 74 ML: 755 INJECTION, SOLUTION INTRAVENOUS at 17:18

## 2021-01-07 RX ADMIN — ATENOLOL 50 MG: 50 TABLET ORAL at 20:53

## 2021-01-07 RX ADMIN — TERAZOSIN HYDROCHLORIDE 10 MG: 10 CAPSULE ORAL at 20:53

## 2021-01-07 RX ADMIN — ATORVASTATIN CALCIUM 40 MG: 40 TABLET, FILM COATED ORAL at 20:53

## 2021-01-07 ASSESSMENT — ACTIVITIES OF DAILY LIVING (ADL)
ADLS_ACUITY_SCORE: 21
ADLS_ACUITY_SCORE: 20
ADLS_ACUITY_SCORE: 20
ADLS_ACUITY_SCORE: 21
ADLS_ACUITY_SCORE: 18
ADLS_ACUITY_SCORE: 21

## 2021-01-07 ASSESSMENT — EJECTION FRACTION: EF_VALUE: .3

## 2021-01-07 NOTE — PRE-PROCEDURE
GENERAL PRE-PROCEDURE:   Procedure:  Coronary angiogram and possible percutaneous intervention  Date/Time:  1/7/2021 12:16 PM    Written consent obtained?: Yes    Risks and benefits: Risks, benefits and alternatives were discussed    Consent given by:  Patient  Patient states understanding of procedure being performed: Yes    Patient's understanding of procedure matches consent: Yes    Procedure consent matches procedure scheduled: Yes    Expected level of sedation:  Moderate  Appropriately NPO:  Yes  ASA Class:  Class 4- Severe systemic disease, acute unstable problems  Mallampati  :  Grade 3- soft palate visible, posterior pharyngeal wall not visible  Lungs:  Lungs clear with good breath sounds bilaterally  Heart:  Normal heart sounds and rate  History & Physical reviewed:  History and physical reviewed and no updates needed  Statement of review:  I have reviewed the lab findings, diagnostic data, medications, and the plan for sedation    NSTEMI  No bleeding issues  No contraindications to dual antiplatelet therapy if indicated

## 2021-01-07 NOTE — PROVIDER NOTIFICATION
"01/06/21  [id number]      Admitting hospitalist text paged with the following message:      \"FYI: troponin 1.383    No chest pain, heparin infusing    thx, nursing\"          "

## 2021-01-07 NOTE — PHARMACY-ADMISSION MEDICATION HISTORY
Pharmacy Medication History  Admission medication history interview status for the 1/6/2021  admission is complete. See EPIC admission navigator for prior to admission medications       Medication history sources: Surescript, Care Everywhere and med history per med rec done at [facility] earlier today by [facility].  Location of interview:   Adherence Assessment:     Significant changes made to the medication list:  -Deleted finasteride, oxymetazoline prn nasal spray      Additional medication history information:       Medication reconciliation completed by provider prior to medication history? No    Time spent in this activity: 15 min      Prior to Admission medications    Medication Sig Last Dose Taking? Auth Provider   albuterol (PROAIR HFA/PROVENTIL HFA/VENTOLIN HFA) 108 (90 Base) MCG/ACT inhaler Inhale 2 puffs into the lungs 4 times daily as needed for shortness of breath / dyspnea or wheezing prn Yes Unknown, Entered By History   aspirin 81 MG EC tablet Take 81 mg by mouth At Bedtime 1/5/2021 at Unknown time Yes Unknown, Entered By History   atenolol (TENORMIN) 50 MG tablet Take 1 tablet (50 mg) by mouth daily  Patient taking differently: Take 50 mg by mouth At Bedtime  1/5/2021 at hs Yes Chance Espinosa MD   atorvastatin (LIPITOR) 10 MG tablet Take 1 tablet (10 mg) by mouth daily  Patient taking differently: Take 10 mg by mouth At Bedtime  1/5/2021 at hs Yes Chance Espinosa MD   cephALEXin (KEFLEX) 500 MG capsule Take 1 capsule (500 mg) by mouth 3 times daily 1/6/2021 at am Yes Chance Espinosa MD   multivitamin, therapeutic (THERA-VIT) TABS tablet Take 1 tablet by mouth At Bedtime 1/5/2021 at Unknown time Yes Unknown, Entered By History   omeprazole (PRILOSEC) 20 MG DR capsule Take 20 mg by mouth daily as needed  Yes Unknown, Entered By History   terazosin (HYTRIN) 10 MG capsule TAKE ONE CAPSULE BY MOUTH AT BEDTIME 1/5/2021 at Unknown time Yes Chance Espinosa MD   triamcinolone (KENALOG) 0.1 % external cream Apply  topically 2 times daily as needed for irritation  Yes Unknown, Entered By History       The information provided in this note is only as accurate as the sources available at the time of the update(s).

## 2021-01-07 NOTE — PLAN OF CARE
"BP (!) 147/76 (BP Location: Left arm)   Pulse 75   Temp 98.3  F (36.8  C) (Oral)   Resp 20   Ht 1.753 m (5' 9\")   Wt 93.1 kg (205 lb 3.2 oz)   SpO2 95%   BMI 30.30 kg/m      Patient is alert and oriented though forgetful. Denies chest pain and shortness of breath. Heparin drip running-See MAR for dose. NPO since midnight. Plan for cardiology consult today.   "

## 2021-01-07 NOTE — CONSULTS
[facility]    Cardiology Consultation     Date of Admission:  1/6/2021    Assessment & Plan   Kevin Fierro is a 86 year old male who was admitted on 1/6/2021.    1.  Chest pressure, shortness of breath, and left arm numbness, consistent with unstable angina.    2.  Nontransmural myocardial infarction with peak troponin of 1.3    3.  Systolic ejection murmur, suggestive of aortic stenosis, possibly severe.    4.  Stage III chronic kidney disease with a stable creatinine of 1.4    5.  Dyslipidemia on atorvastatin    6.  Hypertension on atenolol    Recommendations:    Echocardiogram stat for assessment of valvular heart disease.    Coronary angiography for evaluation of possible coronary artery disease. Risks and benefits of left heart catheterization and coronary angiogram were discussed with the patient in detail. Risk estimated at 0.1-0.3% for diagnostic angio and 1-2% for PCI, including risk of stroke, MI, death, emergent bypass, contrast induced allergic reaction, renal dysfunction, and vascular complications (including bleeding and transfusion) were discussed. Patient understands and wishes to proceed.    I will increase atenolol to 50 mg p.o. twice daily for additional blood pressure and heart rate control.    Continue aspirin, heparin, and atorvastatin.    CYNTHIA JUÁREZ MD    Primary Care Physician   Chance Espinosa    Reason for Consult   Reason for consult: I was asked by Dr. Bartholomew to evaluate this patient for chest discomfort and elevated troponin.    History of Present Illness   Kevin Fierro is a 86 year old male who presents with exertional shortness of breath, chest pressure and left arm numbness    He tells me that he went for his usual morning walk yesterday and was more short of breath than usual.  He had to stop several times to catch his breath which is unusual.  With some prompting, he admitted to mild tightness or pressure in his chest and some left arm numbness.   He reported the symptoms to his family and his son-in-law brought him to the emergency room at [facility] in [place].  His troponin was noted to be elevated although his chest discomfort had resolved.  He was transferred to [facility] for further evaluation.  His first troponin here was 1.38 upon presentation and decreased to 0.54 this morning.  He has not had further chest discomfort symptoms.  He has mild chronic kidney disease with a creatinine of 1.4.    His cardiac risk factors include hypertension and dyslipidemia on medical therapy.  He does not have any recollection of recent cardiac testing although believes he may have had a stress test years ago.    Patient Active Problem List   Diagnosis     Hypertension goal BP (blood pressure) < 140/90     Hypertrophy of prostate without urinary obstruction     Hearing loss     Seborrheic dermatitis     Other and unspecified 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     Hypersomnia with sleep apnea     Family history of cardiovascular disease     Seasonal allergies     ED (erectile dysfunction)     Hyperlipidemia LDL goal <130     Lung nodules     Advanced directives, counseling/discussion     GERD (gastroesophageal reflux disease)     CKD (chronic kidney disease) stage 3, GFR 30-59 ml/min     Environmental allergies     Lumbar stenosis     Restrictive lung disease     BCC (basal cell carcinoma of skin)     NSTEMI (non-ST elevated myocardial infarction) (H)       Past Medical History   I have reviewed this patient's medical history and updated it with pertinent information if needed.   Past Medical History:   Diagnosis Date     Arthritis .     BCC (basal cell carcinoma of skin) 10/2018, 11/2019    right jaw, rt parietal,11/2019- Dr Limon     Choroidal nevus of left eye     Dr Connor     CKD (chronic kidney disease) stage 3, GFR 30-59 ml/min      Colon polyps      ED (erectile dysfunction)      Family history  of cardiovascular disease      GERD (gastroesophageal reflux disease) 2013     Hematuria 1999    dr scott     Hernia, abdominal      Hyperlipidemia LDL goal <130 1990     Hypertension goal BP (blood pressure) < 140/90 1990     Hypertrophy of prostate without urinary obstruction and other lower urinary tract symptoms (LUTS)      Lumbar stenosis 2017    mild to moderate foraminal and central     Lung nodules 11/10    CT scan stable     obstructive SLEEP APNEA 5/2007    CPAP     Other and unspecified 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 5/25/2005     Palpitations      PSVT 11/2017    few runs, rare PVC     Restrictive lung disease      Seasonal allergies      Unspecified hearing loss 5/2005    hearing aids, L>R - Dr Hernandez       Past Surgical History   I have reviewed this patient's surgical history and updated it with pertinent information if needed.  Past Surgical History:   Procedure Laterality Date     CYSTOSCOPY       HC COLONOSCOPY THRU STOMA, DIAGNOSTIC  2005, 5/10, 5/11    Polyps-> due 2015 - Ct Colonography negative     HC REPAIR INCISIONAL HERNIA,REDUCIBLE  1960    Hernia Repair, Incisional, Unilateral     STRESS ECHO (METRO)  7/09, 5/12, 7/17    Negative     TONSILLECTOMY & ADENOIDECTOMY  1946       Prior to Admission Medications   Prior to Admission Medications   Prescriptions Last Dose Informant Patient Reported? Taking?   albuterol (PROAIR HFA/PROVENTIL HFA/VENTOLIN HFA) 108 (90 Base) MCG/ACT inhaler prn  Yes Yes   Sig: Inhale 2 puffs into the lungs 4 times daily as needed for shortness of breath / dyspnea or wheezing   aspirin 81 MG EC tablet 1/5/2021 at Unknown time  Yes Yes   Sig: Take 81 mg by mouth At Bedtime   atenolol (TENORMIN) 50 MG tablet 1/5/2021 at hs  No Yes   Sig: Take 1 tablet (50 mg) by mouth daily   Patient taking differently: Take 50 mg by mouth At Bedtime    atorvastatin (LIPITOR) 10 MG tablet 1/5/2021 at hs  No Yes   Sig: Take 1 tablet (10 mg) by mouth  daily   Patient taking differently: Take 10 mg by mouth At Bedtime    cephALEXin (KEFLEX) 500 MG capsule 1/6/2021 at am  No Yes   Sig: Take 1 capsule (500 mg) by mouth 3 times daily   multivitamin, therapeutic (THERA-VIT) TABS tablet 1/5/2021 at Unknown time  Yes Yes   Sig: Take 1 tablet by mouth At Bedtime   omeprazole (PRILOSEC) 20 MG DR capsule   Yes Yes   Sig: Take 20 mg by mouth daily as needed   terazosin (HYTRIN) 10 MG capsule 1/5/2021 at Unknown time  No Yes   Sig: TAKE ONE CAPSULE BY MOUTH AT BEDTIME   triamcinolone (KENALOG) 0.1 % external cream   Yes Yes   Sig: Apply topically 2 times daily as needed for irritation      Facility-Administered Medications: None     Current Facility-Administered Medications   Medication Dose Route Frequency     aspirin  81 mg Oral At Bedtime     atenolol  50 mg Oral At Bedtime     atorvastatin  40 mg Oral At Bedtime     sodium chloride (PF)  3 mL Intracatheter Q8H     terazosin  10 mg Oral At Bedtime     Current Facility-Administered Medications   Medication Last Rate     heparin 600 Units/hr (01/07/21 0949)     - MEDICATION INSTRUCTIONS -       Allergies   Allergies   Allergen Reactions     Pollen Extract      Tree pollen         Social History    reports that he has never smoked. He has never used smokeless tobacco. He reports current alcohol use of about 4.0 - 5.0 standard drinks of alcohol per week. He reports that he does not use drugs.    Family History   Family History   Problem Relation Age of Onset     C.A.D. Father         age 50's     Hypertension Father      C.A.D. Brother      C.A.D. Brother         mid 40's     Diabetes Brother      Cancer Brother         pancreatic CA     Coronary Artery Disease Brother      Cancer - colorectal No family hx of      Prostate Cancer No family hx of        Review of Systems   The comprehensive 10 point Review of Systems is negative other than noted in the HPI or here.     Physical Exam   Vital Signs with Ranges  Temp:  [97.6  F  "(36.4  C)-98.3  F (36.8  C)] 97.6  F (36.4  C)  Pulse:  [75-80] 76  Resp:  [18-20] 18  BP: (147-158)/(76-82) 147/78  SpO2:  [95 %-96 %] 96 %  Wt Readings from Last 4 Encounters:   01/06/21 93.1 kg (205 lb 3.2 oz)   12/30/20 96.2 kg (212 lb)   01/30/20 96.2 kg (212 lb)   01/27/20 96.2 kg (212 lb)     I/O last 3 completed shifts:  In: -   Out: 400 [Urine:400]      Vitals: BP (!) 147/78 (BP Location: Right arm)   Pulse 76   Temp 97.6  F (36.4  C) (Oral)   Resp 18   Ht 1.753 m (5' 9\")   Wt 93.1 kg (205 lb 3.2 oz)   SpO2 96%   BMI 30.30 kg/m      Constitutional: Awake, alert, cooperative, no apparent distress, and appears stated age.  Eyes: Lids and lashes normal, pupils equal, round and reactive to light, extra ocular muscles intact, sclera clear, conjunctiva normal.  ENT: Normocephalic, without obvious abnormality, atramatic, external ears without lesions, oral pharynx with moist mucus membranes,  gums normal and good dentition.  Neck: Supple, symmetrical, trachea midline, no adenopathy,  skin normal.  Lungs: No increased work of breathing, good air exchange, clear to auscultation bilaterally, no crackles or wheezing.  Cardiovascular: Regular rate and rhythm, normal S1 and S2, no S3 or S4, and 2/6 mid peaking, high pitched systolic murmur noted.  Abdomen: No scars, normal bowel sounds, soft, non-distended, non-tender, no masses palpated, no hepatosplenomegally.  Musculoskeletal: No redness, warmth, or swelling of the joints.  Full range of motion noted.   Tone is normal.  Neurologic: Awake, alert, oriented to name, place and time.    Neuropsychiatric: Normal affect, mood, orientation, memory and insight.  Skin: No rashes, erythema, pallor, petechia or purpura.    Recent Labs   Lab 01/07/21  0730 01/06/21 2129   TROPI 0.544* 1.383*       Recent Labs   Lab 01/07/21  0730 01/06/21 2129   WBC 10.8  --    HGB 12.9*  --    MCV 99  --      --      --    POTASSIUM 4.3  --    CHLORIDE 110*  --    CO2 24  " --    BUN 30  --    CR 1.42*  --    GFRESTIMATED 44*  --    GFRESTBLACK 51*  --    ANIONGAP 6  --    TRAN 8.8  --    GLC 95  --    TROPI 0.544* 1.383*     Recent Labs   Lab Test 01/06/21 2129 07/22/19  0738 05/14/15  0822 05/14/15  0822 05/15/14  0753   CHOL 174 192   < > 159 180   HDL 57 45   < > 49 33*   * 98   < > 83 87   TRIG 53 243*   < > 137 303*   CHOLHDLRATIO  --   --   --  3.2 5.5*    < > = values in this interval not displayed.     Recent Labs   Lab 01/07/21  0730   WBC 10.8   HGB 12.9*   HCT 39.6*   MCV 99        No results for input(s): PH, PHV, PO2, PO2V, SAT, PCO2, PCO2V, HCO3, HCO3V in the last 168 hours.  No results for input(s): NTBNPI, NTBNP in the last 168 hours.  No results for input(s): DD in the last 168 hours.  No results for input(s): SED, CRP in the last 168 hours.  Recent Labs   Lab 01/07/21  0730        No results for input(s): TSH in the last 168 hours.  No results for input(s): COLOR, APPEARANCE, URINEGLC, URINEBILI, URINEKETONE, SG, UBLD, URINEPH, PROTEIN, UROBILINOGEN, NITRITE, LEUKEST, RBCU, WBCU in the last 168 hours.    Imaging:  No results found for this or any previous visit (from the past 48 hour(s)).    Echo:  No results found for this or any previous visit (from the past 4320 hour(s)).

## 2021-01-07 NOTE — PROGRESS NOTES
[facility]    Medicine Progress Note - Hospitalist Service       Date of Admission:  1/6/2021  Assessment & Plan       Kevin Fierro is a 86 year old male with a history of hypertension, hyperlipidemia, chronic kidney disease stage III, GERD, BPH, and obstructive sleep apnea who presented to an outside facility with chest pressure.  After evaluation, there was concern for an NSTEMI.  He has been transferred to [facility] for further evaluation and management.    NSTEMI  Cardiac murmur not previously noted  - EKG non ischemic  - troponins trending up  - Started on IV heparin at outside facility, will continue the same.  - Continue PTA aspirin and atenolol.  - Increase PTA atorvastatin to 40 mg daily.  - Continue telemetry monitoring. Get TTE.  - Consult cardiology regarding further evaluation and management, appreciate their assistance.   - anticipate angio as patient denies prior PCI. Currently NPO.  - lipid panel (LDL okay), Hgb A1C (wnl) and TSH (added on - pending) checked for reversible risk factors.    COVID-19 PCR negative    Hypertension  - Continue PTA atenolol for now. Anticipate cardiology may adjust meds.    Hyperlipidemia  - Lipid panel shows total cholesterol 174, triglycerides 53, , and HDL 57.  - Increase PTA atorvastatin to 40 mg daily.    Chronic kidney disease stage III  - creat seems around baseline (similar to 2019). monitor    GERD  - PTA as needed omeprazole can be restarted if needed.    BPH  - Continue PTA Terazosin.    Obstructive sleep apnea  - Continue CPAP per home settings.       Diet: NPO per Anesthesia Guidelines for Procedure/Surgery Except for: Meds    DVT Prophylaxis: heparin gtt  Caicedo Catheter: not present  Code Status: Full Code           Disposition Plan   Expected discharge: 1-3 days, recommended to prior living arrangement once cardiology work up and likely intervention.  Entered: Nicky Solares MD 01/07/2021, 10:46 AM       The  patient's care was discussed with the Bedside Nurse and Patient.    Nicky Solares MD  Hospitalist Service  [facility]  Contact information available via [facility] Paging/Directory    ______________________________________________________________________    Interval History   Patient is comfortable. Denies chest pain and shortness of breath this morning. Waiting on cardiology visit, but anxious to hear what they have to say.    Data reviewed today: I reviewed all medications, new labs and imaging results over the last 24 hours. I personally reviewed the EKG tracing showing sinus rhythm with PACs.    Physical Exam   Vital Signs: Temp: 97.6  F (36.4  C) Temp src: Oral BP: (!) 147/78 Pulse: 76   Resp: 18 SpO2: 96 % O2 Device: None (Room air)    Weight: 205 lbs 3.2 oz  General Appearance: Alert, oriented, NAD, obese  Respiratory: CTAB  Cardiovascular: significant systolic murmur noted  GI: soft, NT, protuberant  Skin: no edema. Palpable pulses  Other: Non focal, moving all extremities.    Data   Recent Labs   Lab 01/07/21 0730 01/06/21 2129   WBC 10.8  --    HGB 12.9*  --    MCV 99  --      --      --    POTASSIUM 4.3  --    CHLORIDE 110*  --    CO2 24  --    BUN 30  --    CR 1.42*  --    ANIONGAP 6  --    TRAN 8.8  --    GLC 95  --    TROPI 0.544* 1.383*     No results found for this or any previous visit (from the past 24 hour(s)).  Medications     heparin 600 Units/hr (01/07/21 0949)     - MEDICATION INSTRUCTIONS -         aspirin  81 mg Oral At Bedtime     atenolol  50 mg Oral At Bedtime     atorvastatin  40 mg Oral At Bedtime     sodium chloride (PF)  3 mL Intracatheter Q8H     terazosin  10 mg Oral At Bedtime

## 2021-01-07 NOTE — TELEPHONE ENCOUNTER
Called # [phone]     Pt called, Pt noted he was currently admitted with non-STEMI  Writer advised will readdress medication once discharged.      [place] RN   [facility]

## 2021-01-07 NOTE — H&P
[facility]    History and Physical  Hospitalist       Date of Admission:  1/6/2021    Assessment & Plan   Kevin Fierro is a 86 year old male with a history of hypertension, hyperlipidemia, chronic kidney disease stage III, GERD, BPH, and obstructive sleep apnea who presented to an outside facility with chest pressure.  After evaluation, there was concern for an NSTEMI.  He has been transferred to [facility] for further evaluation and management.    NSTEMI    EKG reportedly shows no acute changes, will repeat here at [facility].    Initial troponin elevated at 0.139, trended up here.  Will recheck in a.m.    Started on IV heparin at outside facility, will continue the same.    Continue PTA aspirin and atenolol.    Increase PTA atorvastatin to 40 mg daily.    Cardiac monitoring.    Consult cardiology regarding further evaluation and management, appreciate their assistance.    COVID-19 PCR negative    Asymptomatic COVID-19 PCR testing was obtained in the ER at [place].    I spoke with the lab at [facility] and they reported that his COVID-19 test was negative.  Will try and obtain a copy of the test result.    Hypertension    Continue PTA atenolol and Terazosin.    Hyperlipidemia    Lipid panel shows total cholesterol 174, triglycerides 53, , and HDL 57.    Increase PTA atorvastatin to 40 mg daily.    Chronic kidney disease stage III    Check creatinine in a.m.    GERD    PTA as needed omeprazole can be restarted if needed.    BPH    Continue PTA Terazosin.    Obstructive sleep apnea    Continue CPAP per home settings.    DVT Prophylaxis: IV heparin  Code Status: Full Code  Expected discharge: Admit to inpatient.    Austyn Crews MD    Primary Care Physician   Chance Espinosa    Chief Complaint   Chest pressure    History is obtained from the patient    History of Present Illness   Kevin Fierro is a 86 year old male with a history of hypertension,  hyperlipidemia, chronic kidney disease stage III, GERD, BPH, and obstructive sleep apnea who presented to an outside facility with chest pressure.  He went for a walk on the day of admission and got somewhat short of breath.  Later during the walk he developed some left-sided chest pressure.  The chest pressure resolved with rest after about 20 minutes.  He states that he exercises fairly regularly and last went for a walk a couple days ago and did not have any symptoms at that time.  The chest pain and shortness of breath are out of the ordinary for him.  Due to the chest pain he went into the [place] ER for evaluation.  Troponin was noted to be elevated.  He was transferred to [facility] for further evaluation and management, including cardiology consultation.    He denies any recent fevers, chills, sweats.  No cough.  No nausea, abdominal pain, diarrhea, or constipation.  No urinary symptoms.  No numbness, tingling, or focal weakness.  No skin changes.  He denies any recent changes to his medications.  No recent sick contacts or known exposures to COVID-19.  He currently feels better and denies any chest pain or shortness of breath at the moment.    Past Medical History    I have reviewed this patient's medical history and updated it with pertinent information if needed.   Past Medical History:   Diagnosis Date     Arthritis .     BCC (basal cell carcinoma of skin) 10/2018, 11/2019    right jaw, rt parietal,11/2019- Dr Limon     Choroidal nevus of left eye     Dr Connor     CKD (chronic kidney disease) stage 3, GFR 30-59 ml/min      Colon polyps      ED (erectile dysfunction)      Family history of cardiovascular disease      GERD (gastroesophageal reflux disease) 2013     Hematuria 1999    dr scott     Hernia, abdominal      Hyperlipidemia LDL goal <130 1990     Hypertension goal BP (blood pressure) < 140/90 1990     Hypertrophy of prostate without urinary obstruction and other lower urinary tract  symptoms (LUTS)      Lumbar stenosis 2017    mild to moderate foraminal and central     Lung nodules 11/10    CT scan stable     obstructive SLEEP APNEA 5/2007    CPAP     Other and unspecified 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 5/25/2005     Palpitations      PSVT 11/2017    few runs, rare PVC     Restrictive lung disease      Seasonal allergies      Unspecified hearing loss 5/2005    hearing aids, L>R - Dr Hernandez     Past Surgical History   I have reviewed this patient's surgical history and updated it with pertinent information if needed.  Past Surgical History:   Procedure Laterality Date     CYSTOSCOPY       HC COLONOSCOPY THRU STOMA, DIAGNOSTIC  2005, 5/10, 5/11    Polyps-> due 2015 - Ct Colonography negative     HC REPAIR INCISIONAL HERNIA,REDUCIBLE  1960    Hernia Repair, Incisional, Unilateral     STRESS ECHO (METRO)  7/09, 5/12, 7/17    Negative     TONSILLECTOMY & ADENOIDECTOMY  1946     Prior to Admission Medications   Prior to Admission Medications   Prescriptions Last Dose Informant Patient Reported? Taking?   albuterol (PROAIR HFA/PROVENTIL HFA/VENTOLIN HFA) 108 (90 Base) MCG/ACT inhaler prn  Yes Yes   Sig: Inhale 2 puffs into the lungs 4 times daily as needed for shortness of breath / dyspnea or wheezing   aspirin 81 MG EC tablet 1/5/2021 at Unknown time  Yes Yes   Sig: Take 81 mg by mouth At Bedtime   atenolol (TENORMIN) 50 MG tablet 1/5/2021 at hs  No Yes   Sig: Take 1 tablet (50 mg) by mouth daily   Patient taking differently: Take 50 mg by mouth At Bedtime    atorvastatin (LIPITOR) 10 MG tablet 1/5/2021 at hs  No Yes   Sig: Take 1 tablet (10 mg) by mouth daily   Patient taking differently: Take 10 mg by mouth At Bedtime    cephALEXin (KEFLEX) 500 MG capsule 1/6/2021 at am  No Yes   Sig: Take 1 capsule (500 mg) by mouth 3 times daily   multivitamin, therapeutic (THERA-VIT) TABS tablet 1/5/2021 at Unknown time  Yes Yes   Sig: Take 1 tablet by mouth At Bedtime    omeprazole (PRILOSEC) 20 MG DR capsule   Yes Yes   Sig: Take 20 mg by mouth daily as needed   terazosin (HYTRIN) 10 MG capsule 1/5/2021 at Unknown time  No Yes   Sig: TAKE ONE CAPSULE BY MOUTH AT BEDTIME   triamcinolone (KENALOG) 0.1 % external cream   Yes Yes   Sig: Apply topically 2 times daily as needed for irritation      Facility-Administered Medications: None     Allergies   Allergies   Allergen Reactions     Pollen Extract      Tree pollen       Social History   I have reviewed this patient's social history and updated it with pertinent information if needed. Kevin Fierro  reports that he has never smoked. He has never used smokeless tobacco. He reports current alcohol use of about 4.0 - 5.0 standard drinks of alcohol per week. He reports that he does not use drugs.    Family History   I have reviewed this patient's family history and updated it with pertinent information if needed.   Family History   Problem Relation Age of Onset     C.A.D. Father         age 50's     Hypertension Father      C.A.D. Brother      C.A.D. Brother         mid 40's     Diabetes Brother      Cancer Brother         pancreatic CA     Coronary Artery Disease Brother      Cancer - colorectal No family hx of      Prostate Cancer No family hx of      Review of Systems   The 10 point Review of Systems is negative other than noted in the HPI or here.    Physical Exam   Temp: 98.3  F (36.8  C) Temp src: Oral BP: (!) 157/82 Pulse: 80   Resp: 20 SpO2: 96 % O2 Device: None (Room air)    Vital Signs with Ranges  Temp:  [98.3  F (36.8  C)] 98.3  F (36.8  C)  Pulse:  [79-80] 80  Resp:  [20] 20  BP: (157-158)/(79-82) 157/82  SpO2:  [96 %] 96 %  205 lbs 3.2 oz    Constitutional: awake, alert, cooperative, no apparent distress  Eyes: sclera clear, conjunctiva normal  ENT: oral pharynx with moist mucous membranes  Respiratory: clear to auscultation bilaterally, no crackles or wheezing  Cardiovascular: regular rate and rhythm, normal S1 and  S2, no murmur noted  GI: normal bowel sounds, soft, non-distended, non-tender  Skin: warm, dry  Musculoskeletal: no lower extremity pitting edema present  Neurologic: awake, alert, answered questions appropriately but seemed forgetful at times, motor is 5 out of 5 bilaterally, sensory is intact    Data   Data reviewed today:  I personally reviewed the EKG tracing showing sinus rhythm, no acute ischemic changes.    Recent Labs   Lab 01/06/21 2127   TROPI 1.383*     No results found for this or any previous visit (from the past 24 hour(s)).

## 2021-01-08 VITALS
DIASTOLIC BLOOD PRESSURE: 83 MMHG | RESPIRATION RATE: 16 BRPM | TEMPERATURE: 97.5 F | HEART RATE: 71 BPM | BODY MASS INDEX: 30.14 KG/M2 | WEIGHT: 203.5 LBS | OXYGEN SATURATION: 94 % | SYSTOLIC BLOOD PRESSURE: 157 MMHG | HEIGHT: 69 IN

## 2021-01-08 PROBLEM — I25.10 CORONARY ARTERY DISEASE INVOLVING NATIVE CORONARY ARTERY OF NATIVE HEART WITHOUT ANGINA PECTORIS: Status: ACTIVE | Noted: 2021-01-01

## 2021-01-08 LAB
ANION GAP SERPL CALCULATED.3IONS-SCNC: 6 MMOL/L (ref 3–14)
BUN SERPL-MCNC: 29 MG/DL (ref 7–30)
CALCIUM SERPL-MCNC: 8.8 MG/DL (ref 8.5–10.1)
CHLORIDE SERPL-SCNC: 110 MMOL/L (ref 94–109)
CO2 SERPL-SCNC: 24 MMOL/L (ref 20–32)
CREAT SERPL-MCNC: 1.33 MG/DL (ref 0.66–1.25)
ERYTHROCYTE [DISTWIDTH] IN BLOOD BY AUTOMATED COUNT: 13.2 % (ref 10–15)
GFR SERPL CREATININE-BSD FRML MDRD: 48 ML/MIN/{1.73_M2}
GLUCOSE SERPL-MCNC: 100 MG/DL (ref 70–99)
HCT VFR BLD AUTO: 39.5 % (ref 40–53)
HGB BLD-MCNC: 12.7 G/DL (ref 13.3–17.7)
MCH RBC QN AUTO: 32.2 PG (ref 26.5–33)
MCHC RBC AUTO-ENTMCNC: 32.2 G/DL (ref 31.5–36.5)
MCV RBC AUTO: 100 FL (ref 78–100)
PLATELET # BLD AUTO: 186 10E9/L (ref 150–450)
POTASSIUM SERPL-SCNC: 4.4 MMOL/L (ref 3.4–5.3)
RBC # BLD AUTO: 3.94 10E12/L (ref 4.4–5.9)
SODIUM SERPL-SCNC: 140 MMOL/L (ref 133–144)
WBC # BLD AUTO: 9.2 10E9/L (ref 4–11)

## 2021-01-08 PROCEDURE — 80048 BASIC METABOLIC PNL TOTAL CA: CPT | Performed by: HOSPITALIST

## 2021-01-08 PROCEDURE — 250N000013 HC RX MED GY IP 250 OP 250 PS 637: Performed by: PHYSICIAN ASSISTANT

## 2021-01-08 PROCEDURE — 85027 COMPLETE CBC AUTOMATED: CPT | Performed by: HOSPITALIST

## 2021-01-08 PROCEDURE — 250N000013 HC RX MED GY IP 250 OP 250 PS 637: Performed by: INTERNAL MEDICINE

## 2021-01-08 PROCEDURE — 99232 SBSQ HOSP IP/OBS MODERATE 35: CPT | Performed by: INTERNAL MEDICINE

## 2021-01-08 PROCEDURE — 36415 COLL VENOUS BLD VENIPUNCTURE: CPT | Performed by: HOSPITALIST

## 2021-01-08 PROCEDURE — 99239 HOSP IP/OBS DSCHRG MGMT >30: CPT | Performed by: HOSPITALIST

## 2021-01-08 RX ORDER — METOPROLOL SUCCINATE 100 MG/1
100 TABLET, EXTENDED RELEASE ORAL DAILY
Qty: 30 TABLET | Refills: 1 | Status: SHIPPED | OUTPATIENT
Start: 2021-01-09 | End: 2021-03-02

## 2021-01-08 RX ORDER — METOPROLOL SUCCINATE 100 MG/1
100 TABLET, EXTENDED RELEASE ORAL DAILY
Status: DISCONTINUED | OUTPATIENT
Start: 2021-01-08 | End: 2021-01-08 | Stop reason: HOSPADM

## 2021-01-08 RX ORDER — AMLODIPINE BESYLATE 5 MG/1
5 TABLET ORAL DAILY
Qty: 30 TABLET | Refills: 1 | Status: SHIPPED | OUTPATIENT
Start: 2021-01-08 | End: 2021-03-02

## 2021-01-08 RX ORDER — AMLODIPINE BESYLATE 5 MG/1
5 TABLET ORAL DAILY
Status: DISCONTINUED | OUTPATIENT
Start: 2021-01-08 | End: 2021-01-08 | Stop reason: HOSPADM

## 2021-01-08 RX ORDER — ATORVASTATIN CALCIUM 40 MG/1
40 TABLET, FILM COATED ORAL AT BEDTIME
Qty: 30 TABLET | Refills: 1 | Status: SHIPPED | OUTPATIENT
Start: 2021-01-08 | End: 2021-04-21

## 2021-01-08 RX ORDER — CEPHALEXIN 500 MG/1
500 CAPSULE ORAL 3 TIMES DAILY
Status: DISCONTINUED | OUTPATIENT
Start: 2021-01-08 | End: 2021-01-08 | Stop reason: HOSPADM

## 2021-01-08 RX ADMIN — AMLODIPINE BESYLATE 5 MG: 5 TABLET ORAL at 13:46

## 2021-01-08 RX ADMIN — METOPROLOL SUCCINATE 100 MG: 100 TABLET, EXTENDED RELEASE ORAL at 10:02

## 2021-01-08 ASSESSMENT — MIFFLIN-ST. JEOR: SCORE: 1593.45

## 2021-01-08 ASSESSMENT — ACTIVITIES OF DAILY LIVING (ADL)
ADLS_ACUITY_SCORE: 17
ADLS_ACUITY_SCORE: 21

## 2021-01-08 NOTE — PROGRESS NOTES
"       Assessment and Plan:     Kevin Fierro is a 86 year old male who was admitted on 1/6/2021.     1.  Chest pressure, shortness of breath, and left arm numbness, concerning for unstable angina. Coronary angiogram showed no significant CAD.     2.  Nontransmural myocardial infarction with peak troponin of 1.3. This is probably due to minor, diffuse subendocardial injury caused by uncontrolled hypertension, moderate aortic stenosis, and exertion. CT pulmonary angiogram done with no evidence of pulmonary embolism.     3.  Systolic ejection murmur with evidence of moderate aortic stenosis, JAILENE 1.4 cm2, MG 20 mmHg.     4.  Stage III chronic kidney disease with a stable creatinine of 1.4     5.  Dyslipidemia on atorvastatin     6.  Hypertension on atenolol, not well controlled.     Recommendations:    Blood pressure control. I stopped atenolol 50 mg daily and started metoprolol  mg daily today.   BP remains high at 157/64. I will add amlodipine 5 mg daily.    Outpatient follow up with primary care for BP treatment in 1-2 weeks.  Outpatient cardiology follow up in 2-3 months for monitoring of aortic stenosis, hypertensin and dyspnea.    Discharge today.     CYNTHIA JUÁREZ MD        Interval History:   doing well; no cp, sob, n/v/d, or abd pain.          Medications:       aspirin  81 mg Oral At Bedtime     atorvastatin  40 mg Oral At Bedtime     cephALEXin  500 mg Oral TID     metoprolol succinate ER  100 mg Oral Daily     sodium chloride (PF)  3 mL Intracatheter Q8H     terazosin  10 mg Oral At Bedtime            Physical Exam:   Blood pressure (!) 164/80, pulse 71, temperature 97.5  F (36.4  C), temperature source Oral, resp. rate 16, height 1.753 m (5' 9\"), weight 92.3 kg (203 lb 8 oz), SpO2 94 %.    Vitals:    01/06/21 2039 01/08/21 0256   Weight: 93.1 kg (205 lb 3.2 oz) 92.3 kg (203 lb 8 oz)         Intake/Output Summary (Last 24 hours) at 1/8/2021 1227  Last data filed at 1/8/2021 0815  Gross per 24 " hour   Intake 225 ml   Output 650 ml   Net -425 ml       01/03 0700 - 01/08 0659  In: -   Out: 1000 [Urine:1000]  Net: -1000    Exam:  GENERAL APPEARANCE ADULT: Alert, in no acute distress  RESP: lungs clear to auscultation   CV: RRR with 2/6 systolic murmur  ABDOMEN: normal bowel sounds, soft, nontender, no hepatosplenomegaly or other masses  EXTREMITIES: No edema         Data:   LABS (Last four results)  CMP  Recent Labs   Lab 01/08/21  0530 01/07/21  0730    140   POTASSIUM 4.4 4.3   CHLORIDE 110* 110*   CO2 24 24   ANIONGAP 6 6   * 95   BUN 29 30   CR 1.33* 1.42*   GFRESTIMATED 48* 44*   GFRESTBLACK 55* 51*   TRAN 8.8 8.8     CBC  Recent Labs   Lab 01/08/21  0530 01/07/21  0730   WBC 9.2 10.8   RBC 3.94* 4.01*   HGB 12.7* 12.9*   HCT 39.5* 39.6*    99   MCH 32.2 32.2   MCHC 32.2 32.6   RDW 13.2 13.2    183     INRNo lab results found in last 7 days.  TROPONINS   Lab Results   Component Value Date    TROPI 0.544 () 01/07/2021    TROPI 1.383 () 01/06/2021                                                                                                               CYNTHIA JUÁREZ MD

## 2021-01-08 NOTE — PLAN OF CARE
BP slightly elevated and cards initiated new BP meds with MD follow up.  Right groin, cdi, no hematoma or bruit, pt given verbal and written instructions on monitoring site.  New medications given from pharmacy and pt denies questions.  Follow up appointments gone over with pt.  IV and tele removed, belongings sent with patient.  Discharge off unit via wheelchair with staff and wife will provide transportation home.

## 2021-01-08 NOTE — DISCHARGE SUMMARY
[facility]  Hospitalist Discharge Summary      Date of Admission:  1/6/2021  Date of Discharge:  1/8/2021  Discharging Provider: Nicky Solares MD      Discharge Diagnoses   NSTEMI  Systolic heart murmur  Aortic stenosis, mild to moderate  COVID-19 PCR negative  Hypertension  Hyperlipidemia  Chronic kidney disease stage III  GERD  BPH  Obstructive sleep apnea      Follow-ups Needed After Discharge   Follow-up Appointments     Follow-up and recommended labs and tests       Follow up with primary care provider, Chance Espinosa, within 7-14 days for   hospital follow- up and blood pressure check.  Outpatient cardiology   follow up in 2-3 months for monitoring of aortic stenosis, hypertensin and   dyspnea.              Discharge Disposition   Discharged to home  Condition at discharge: Stable    Hospital Course         Kevin Fierro is a 86 year old male with a history of hypertension, hyperlipidemia, chronic kidney disease stage III, GERD, BPH, and obstructive sleep apnea who presented to an outside facility with chest pressure.  After evaluation, there was concern for an NSTEMI.  He has been transferred to [facility] for further evaluation and management.    NSTEMI  Cardiac murmur not previously noted  - EKG non ischemic  - troponins trending up  - Started on IV heparin at outside facility, will continue the same.  - Continue PTA aspirin and atenolol.  - Increase PTA atorvastatin to 40 mg daily.  - Continue telemetry monitoring. Get TTE.  - Consult cardiology regarding further evaluation and management, appreciate their assistance.   - anticipate angio as patient denies prior PCI. Currently NPO.  - lipid panel (LDL okay), Hgb A1C (wnl) and TSH (wnl) checked for reversible risk factors.    COVID-19 PCR negative    Hypertension  - Continue PTA atenolol for now. Anticipate cardiology may adjust meds.    Hyperlipidemia  - Lipid panel shows total cholesterol 174, triglycerides 53, LDL  106, and HDL 57.  - Increase PTA atorvastatin to 40 mg daily.    Chronic kidney disease stage III  - creat seems around baseline (similar to 2019). monitor    GERD  - PTA as needed omeprazole can be restarted if needed.    BPH  - Continue PTA Terazosin.    Obstructive sleep apnea  - Continue CPAP per home settings.      Consultations This Hospital Stay   CARDIOLOGY IP CONSULT  PHARMACY IP CONSULT  PHARMACY IP CONSULT  PHARMACY IP CONSULT  PHARMACY IP CONSULT  SMOKING CESSATION PROGRAM IP CONSULT    Code Status   Full Code    Time Spent on this Encounter   I, Nicky Solares MD, personally saw the patient today and spent greater than 30 minutes discharging this patient.       Nicky Solares MD  [facility]  [address] [place] [address], [address]  [facility] [address]  Phone: [phone]  ______________________________________________________________________    Physical Exam   Vital Signs: Temp: 97.5  F (36.4  C) Temp src: Oral BP: (!) 164/80 Pulse: 71   Resp: 16 SpO2: 94 % O2 Device: None (Room air) Oxygen Delivery: 2 LPM  Weight: 203 lbs 8 oz         Primary Care Physician   Chance Espinosa    Discharge Orders      Reason for your hospital stay    Chest pain     Follow-up and recommended labs and tests     Follow up with primary care provider, Chance Epsinosa, within 7-14 days for hospital follow- up and blood pressure check.  Outpatient cardiology follow up in 2-3 months for monitoring of aortic stenosis, hypertensin and dyspnea.     Activity    Your activity upon discharge: activity as tolerated     Discharge Instructions    Avoid alcohol. Recommend NO MORE than one drink daily AT MOST.     Full Code     Diet    Follow this diet upon discharge: Orders Placed This Encounter      Combination Diet Regular Diet Adult; No Caffeine Diet       Significant Results and Procedures   Most Recent 3 CBC's:  Recent Labs   Lab Test 01/08/21  0530 01/07/21  0730 07/22/19  0738   WBC 9.2 10.8 9.2   HGB 12.7*  12.9* 13.6    99 99    183 176     Most Recent 3 BMP's:  Recent Labs   Lab Test 01/08/21  0530 01/07/21  0730 07/22/19  0738    140 142   POTASSIUM 4.4 4.3 4.4   CHLORIDE 110* 110* 108   CO2 24 24 24   BUN 29 30 28   CR 1.33* 1.42* 1.38*   ANIONGAP 6 6 10   TRAN 8.8 8.8 9.6   * 95 96     Most Recent 3 Troponin's:  Recent Labs   Lab Test 01/07/21  0730 01/06/21  2129   TROPI 0.544* 1.383*   ,   Results for orders placed or performed during the hospital encounter of 01/06/21   CT Chest Pulmonary Embolism w Contrast    Narrative    CT CHEST PULMONARY EMBOLISM W CONTRAST 1/7/2021 5:32 PM    CLINICAL HISTORY: Acute dyspnea with elevated troponin.  TECHNIQUE: CT angiogram chest during arterial phase injection IV  contrast. 2D and 3D MIP reconstructions were performed by the CT  technologist. Dose reduction techniques were used.     CONTRAST: 74 mL [id number]    COMPARISON: Chest radiograph 7/2/2018 and chest CT 4/24/2013    FINDINGS:  ANGIOGRAM CHEST: Pulmonary arteries are normal caliber and negative  for pulmonary emboli. Thoracic aorta is negative for dissection. No CT  evidence of right heart strain.    LUNGS AND PLEURA: Mild groundglass opacities in the peripheral right  upper lobe (series 7, image 121) and in the lingula (series 7, image  152). Mild bibasilar groundglass opacity/atelectasis. No pleural  effusions. Stable nodule along the left major fissure at the left lung  base measuring 5 mm, benign due to stability since 2013. No new or  enlarging pulmonary nodules.    MEDIASTINUM/AXILLAE: No lymphadenopathy. Normal heart size. Trace  pericardial fluid. Mild coronary artery calcifications. Small hiatal  hernia.    UPPER ABDOMEN: The visualized upper abdomen is unremarkable.    MUSCULOSKELETAL: No suspicious lesions in the bones.      Impression    IMPRESSION:  1.  No pulmonary emboli.  2.  Mild scattered foci of groundglass opacities in the lungs may be  due to mild edema or developing  infection. No pleural effusions.    RUFINO AMIN MD   Echocardiogram Complete    Narrative    [id number]  [id number]  [id number]  [id number]^MARQUITA^CYNTHIA^ROLAND           [facility]  Echocardiography Laboratory  [address] [place] [address]        Name: JAYLYN REDMAN  MRN: [id number]  : 1934  Study Date: 2021 10:55 AM  Age: 86 yrs  Gender: Male  Patient Location: [facility]  Reason For Study: Aortic Valve Disorder  Ordering Physician: CYNTHIA JUÁREZ  Referring Physician: Chance Espinosa  Performed By: Danielle Winchester     BSA: 2.1 m2  Height: 69 in  Weight: 205 lb  HR: 74  BP: 147/78 mmHg  _____________________________________________________________________________  __        Procedure  Complete Portable Echo Adult. Optison (NDC #[id number]) given intravenously.  _____________________________________________________________________________  __        Interpretation Summary     Grade I or early diastolic dysfunction.  The left atrium is mildly dilated.  Mild aortic root dilatation.  The ascending aorta is Mildly dilated.  The visual ejection fraction is estimated at 65-70%.  Mild to moderate valvular aortic stenosis, JAILENE 1.4 cm2, mean gradient 20 mmHg.  Contrast was used without apparent complications.  _____________________________________________________________________________  __        Left Ventricle  The left ventricle is normal in size. There is normal left ventricular wall  thickness. Grade I or early diastolic dysfunction. The visual ejection  fraction is estimated at 65-70%.     Right Ventricle  The right ventricle is normal in structure, function and size.     Atria  The left atrium is mildly dilated. Right atrium not well visualized.     Mitral Valve  There is mild mitral annular calcification. The mitral valve leaflets appear  thickened, but open well.        Tricuspid Valve  The tricuspid valve is not well visualized, but is grossly normal.     Aortic Valve  Mild to moderate  valvular aortic stenosis.     Pulmonic Valve  The pulmonic valve is not well visualized.     Vessels  Mild aortic root dilatation. The ascending aorta is Mildly dilated. Inferior  vena cava not well visualized for estimation of right atrial pressure.     Pericardium  Trivial anterior pericardial effusion. There are no echocardiographic  indications of cardiac tamponade.        Rhythm  Sinus rhythm was noted.  _____________________________________________________________________________  __  MMode/2D Measurements & Calculations  IVSd: 0.89 cm     LVIDd: 5.4 cm  LVIDs: 3.6 cm  LVPWd: 0.79 cm  FS: 33.8 %  LV mass(C)d: 167.8 grams  LV mass(C)dI: 80.4 grams/m2  Ao root diam: 4.1 cm  asc Aorta Diam: 3.9 cm  LVOT diam: 2.3 cm  LVOT area: 4.1 cm2  LA Volume (BP): 79.1 ml  LA Volume Index (BP): 37.8 ml/m2  RWT: 0.29           Doppler Measurements & Calculations  MV E max rocky: 82.0 cm/sec  MV A max rocky: 118.8 cm/sec  MV E/A: 0.69  MV dec slope: 291.4 cm/sec2  Ao V2 max: 273.7 cm/sec  Ao max P.0 mmHg  Ao V2 mean: 215.6 cm/sec  Ao mean P.8 mmHg  Ao V2 VTI: 60.5 cm  JAILENE(I,D): 1.5 cm2  JAILENE(V,D): 1.4 cm2  LV V1 max PG: 3.6 mmHg  LV V1 max: 94.7 cm/sec  LV V1 VTI: 22.4 cm  SV(LVOT): 91.3 ml  SI(LVOT): 43.8 ml/m2  PA acc time: 0.11 sec  AV Rocky Ratio (DI): 0.35  JAILENE Index (cm2/m2): 0.72  E/E' avg: 15.3  Lateral E/e': 14.0  Medial E/e': 16.6              _____________________________________________________________________________  __        Report approved by: Karmen Carrillo 2021 03:06 PM      Cardiac Catheterization    Addendum: 2021        Prox LAD lesion is 50% stenosed.    Prox RCA lesion is 50% stenosed.     1. Mild/moderate Lad and RCA lesions. Case discussed with Dr Jha and we did not feel iFR or OCT indicated to assess lesions. In light of mild troponin and normal wall motion (echo) could consider empiric medical trial and plavix. But would consider work up for possible Pulmonary embolism (given no cp,  +WALKER and mild troponin).  If continues with symptoms a nuclear GXT could be done to look for ischemia. This patient has renal insufficiency and we did not want to give additional dye for OCT (to look for plaque erosion/rupture) but rather use dye for possible lung CT exam  2 Mild/mod aortic stenosis with mean gradient on cath 14mm, Hg. There is systemic HTN and mildly elevated LVEDP        Narrative      Prox LAD lesion is 50% stenosed.    Prox RCA lesion is 50% stenosed.     1. Mild/moderate Lad and RCA lesions. Case discussed with Dr Jha and we   did not feel iFR or OCT indicated to assess lesions. In light of mild   troponin and normal wall motion (echo) could consider empiric medical   trial and plavix. But would consider work up for possible Pulmonary   embolism (given no cp, +WALKER and mild troponin).  If continues with   symptoms a nuclear GXT could be done to look for ischemia. This patient   has renal insufficiency and we did not want to give additional dye for OCT   (to look for plaque erosion/rupture) but rather use dye for possible lung   CT exam  2 Mild/mod aortic stenosis with mean gradient on cath 18mm, Hg. There is   systemic HTN and mildly elevated LVEDP           Discharge Medications   Current Discharge Medication List      START taking these medications    Details   amLODIPine (NORVASC) 5 MG tablet Take 1 tablet (5 mg) by mouth daily  Qty: 30 tablet, Refills: 1    Associated Diagnoses: NSTEMI (non-ST elevated myocardial infarction) (H); Hypertension goal BP (blood pressure) < 140/90; Stage 3a chronic kidney disease      metoprolol succinate ER (TOPROL-XL) 100 MG 24 hr tablet Take 1 tablet (100 mg) by mouth daily  Qty: 30 tablet, Refills: 1    Associated Diagnoses: NSTEMI (non-ST elevated myocardial infarction) (H); Hypertension goal BP (blood pressure) < 140/90; Stage 3a chronic kidney disease         CONTINUE these medications which have CHANGED    Details   atorvastatin (LIPITOR) 40 MG tablet  Take 1 tablet (40 mg) by mouth At Bedtime  Qty: 30 tablet, Refills: 1    Associated Diagnoses: NSTEMI (non-ST elevated myocardial infarction) (H); Hypertension goal BP (blood pressure) < 140/90; Stage 3a chronic kidney disease         CONTINUE these medications which have NOT CHANGED    Details   albuterol (PROAIR HFA/PROVENTIL HFA/VENTOLIN HFA) 108 (90 Base) MCG/ACT inhaler Inhale 2 puffs into the lungs 4 times daily as needed for shortness of breath / dyspnea or wheezing    Comments: Pharmacy may dispense brand covered by insurance (Proair, or proventil or ventolin or generic albuterol inhaler)      aspirin 81 MG EC tablet Take 81 mg by mouth At Bedtime      cephALEXin (KEFLEX) 500 MG capsule Take 1 capsule (500 mg) by mouth 3 times daily  Qty: 30 capsule, Refills: 0    Associated Diagnoses: Sebaceous cyst      multivitamin, therapeutic (THERA-VIT) TABS tablet Take 1 tablet by mouth At Bedtime      omeprazole (PRILOSEC) 20 MG DR capsule Take 20 mg by mouth daily as needed      terazosin (HYTRIN) 10 MG capsule TAKE ONE CAPSULE BY MOUTH AT BEDTIME  Qty: 90 capsule, Refills: 3    Associated Diagnoses: CKD (chronic kidney disease) stage 3, GFR 30-59 ml/min; Hypertension goal BP (blood pressure) < 140/90      triamcinolone (KENALOG) 0.1 % external cream Apply topically 2 times daily as needed for irritation         STOP taking these medications       atenolol (TENORMIN) 50 MG tablet Comments:   Reason for Stopping:             Allergies   Allergies   Allergen Reactions     Pollen Extract      Tree pollen

## 2021-01-08 NOTE — PLAN OF CARE
A/Ox4 and forgetful, VSS. Denies chest pain and SOB. [place], hearing aids at bedside. Lung sounds are clear. Right groin site soft with CMS intact. Chest CT done. Up with SBA. Tele SR.

## 2021-01-08 NOTE — PLAN OF CARE
Shift Summary [id number]:    Neuro: A&Ox4    Cardiac: SR    Respiratory: Home Cpap at night.    GI: reg diet    : up to bathroom     Integumentary: boil removal upper back.    Activity: SBA, uses call light appropriately.    Continue to monitor.     Thelma Martinez RN

## 2021-01-10 LAB — INTERPRETATION ECG - MUSE: NORMAL

## 2021-01-11 ENCOUNTER — TELEPHONE (OUTPATIENT)
Dept: CARDIOLOGY | Facility: CLINIC | Age: 86
End: 2021-01-11

## 2021-01-11 ENCOUNTER — TELEPHONE (OUTPATIENT)
Dept: FAMILY MEDICINE | Facility: CLINIC | Age: 86
End: 2021-01-11

## 2021-01-11 NOTE — TELEPHONE ENCOUNTER
"Called patient @ [phone] (home)     ED/Discharge Protocol    \"Hi, my name is Viviane Garrett RN, a registered nurse, and I am calling on behalf of Dr. Espinosa's office at [place].  I am calling to follow up and see how things are going for you after your recent visit.\"    \"I see that you were in the (ER/UC/IP) on [facility] for inpatient hospital stay on 1/8 for Nstemi (Non-ST Elevated Myocardial Infarction).    How are you doing now that you are home?\" Doing fine. No sx at this time.     Is patient experiencing symptoms that may require a hospital visit?  No    Discharge Instructions    \"Let's review your discharge instructions.  What is/are the follow-up recommendations?  Pt. Response: Follow-up with PCP    Follow-up Appointments     Follow-up and recommended labs and tests       Follow up with primary care provider, Chance Espinosa, within 7-14 days for   hospital follow- up and blood pressure check.  Outpatient cardiology   follow up in 2-3 months for monitoring of aortic stenosis, hypertensin and   dyspnea.       \"Were you instructed to make a follow-up appointment?\"  Pt. Response: Yes.  Has appointment been made?   Yes      \"When you see the provider, I would recommend that you bring your discharge instructions with you.    Medications    \"How many new medications are you on since your hospitalization/ED visit?\"    2 or more - Epic MTM referral needed  \"How many of your current medicines changed (dose, timing, name, etc.) while you were in the hospital/ED visit?\"   0-1  \"Do you have questions about your medications?\"   No  \"Were you newly diagnosed with heart failure, COPD, diabetes or did you have a heart attack?\"   No  For patients on insulin: \"Did you start on insulin in the hospital or did you have your insulin dose changed?\"   No  Post Discharge Medication Reconciliation Status: discharge medications reconciled, continue medications without change.    Was MTM referral placed (*Make sure to " "put transitions as reason for referral)?   No    Call Summary    \"Do you have any questions or concerns about your condition or care plan at the moment?\"    No  Triage nurse advice given: Advised patient that if new or worsening symptoms appear (reviewed new & worsening symptoms) to call the clinic or be seen in the the ER  Patient stated an understanding and agreed with plan.    Patient was in ER 1 in the past year (assess appropriateness of ER visits.)      \"If you have questions or things don't continue to improve, we encourage you contact us through the main clinic number,  [phone].  Even if the clinic is not open, triage nurses are available 24/7 to help you.     We would like you to know that our clinic has extended hours (provide information).  We also have urgent care (provide details on closest location and hours/contact info)\"      \"Thank you for your time and take care!\"        Viviane Garrett RN  [facility]  "

## 2021-01-11 NOTE — TELEPHONE ENCOUNTER
Patient was evaluated by cardiology while inpatient for chest pressure, SOB, elevated troponin-NSTEMI, uncontrolled HTN. PMH: CKD III, HLD, HTN, MIMI, GERD, moderate aortic stenosis. 1/7/21: Coronary angiogram via RFA showed 50% non flow limiting stenosis to LAD and RCA. Medical management. PTA Atenolol discontinued and started on Metoprolol and Norvasc. Writer attempted to call patient to discuss any post hospital d/c questions he may have, review medication changes, and confirm f/u appts, but no answer. VM left to call back if he has any cardiac related or medication questions. Reminded that the RFA cardiac cath site should be healing without bleeding, swelling, redness or tenderness. RN left reminder for patient that he has an OV scheduled on 3/2/21 at 1445 with Dr. Carlson at our [place] Office. Patient advised to call clinic with any cardiac related questions or concerns prior to this talon't. HALEIGH Argueta RN.

## 2021-01-11 NOTE — TELEPHONE ENCOUNTER
Patient discharged from [facility] for inpatient hospital stay on 1/8 for Nstemi (Non-ST Elevated Myocardial Infarction).    Please contact patient to follow up; no appointment scheduled at this time.    ER / IP:  0/1    Care Coordination:  WANG Montemayor

## 2021-01-14 ENCOUNTER — VIRTUAL VISIT (OUTPATIENT)
Dept: FAMILY MEDICINE | Facility: CLINIC | Age: 86
End: 2021-01-14
Payer: COMMERCIAL

## 2021-01-14 VITALS — BODY MASS INDEX: 29.53 KG/M2 | DIASTOLIC BLOOD PRESSURE: 70 MMHG | SYSTOLIC BLOOD PRESSURE: 129 MMHG | WEIGHT: 200 LBS

## 2021-01-14 DIAGNOSIS — E78.5 HYPERLIPIDEMIA LDL GOAL <70: ICD-10-CM

## 2021-01-14 DIAGNOSIS — I10 HYPERTENSION GOAL BP (BLOOD PRESSURE) < 140/90: ICD-10-CM

## 2021-01-14 DIAGNOSIS — N18.31 STAGE 3A CHRONIC KIDNEY DISEASE (H): ICD-10-CM

## 2021-01-14 DIAGNOSIS — G47.10 HYPERSOMNIA WITH SLEEP APNEA: ICD-10-CM

## 2021-01-14 DIAGNOSIS — G47.30 HYPERSOMNIA WITH SLEEP APNEA: ICD-10-CM

## 2021-01-14 DIAGNOSIS — Z51.81 MEDICATION MONITORING ENCOUNTER: ICD-10-CM

## 2021-01-14 DIAGNOSIS — K21.9 GASTROESOPHAGEAL REFLUX DISEASE WITHOUT ESOPHAGITIS: ICD-10-CM

## 2021-01-14 DIAGNOSIS — N40.0 HYPERTROPHY OF PROSTATE WITHOUT URINARY OBSTRUCTION: ICD-10-CM

## 2021-01-14 DIAGNOSIS — Z82.49 FAMILY HISTORY OF CARDIOVASCULAR DISEASE: ICD-10-CM

## 2021-01-14 DIAGNOSIS — I21.4 NSTEMI (NON-ST ELEVATED MYOCARDIAL INFARCTION) (H): Primary | ICD-10-CM

## 2021-01-14 DIAGNOSIS — J98.4 RESTRICTIVE LUNG DISEASE: ICD-10-CM

## 2021-01-14 DIAGNOSIS — I25.10 CORONARY ARTERY DISEASE INVOLVING NATIVE CORONARY ARTERY OF NATIVE HEART WITHOUT ANGINA PECTORIS: ICD-10-CM

## 2021-01-14 PROCEDURE — 99214 OFFICE O/P EST MOD 30 MIN: CPT | Performed by: FAMILY MEDICINE

## 2021-01-14 RX ORDER — FINASTERIDE 5 MG/1
5 TABLET, FILM COATED ORAL DAILY
Qty: 90 TABLET | Refills: 3
Start: 2021-01-14 | End: 2022-11-30

## 2021-01-14 NOTE — PROGRESS NOTES
[facility] [place]    Video visit [phone]    Subjective    Kevin Fierro is a 86 year old male who is being evaluated via a billable video visit, with wife Leticia    Patient would like the video invitation sent by: Text to cell phone: [phone] - Doximity    Chief Complaint   Patient presents with     RECHECK     Recent sebaceous cyst removed, healing well    Follow up from NSTEMI, elevated BP- Stopped atenolol, started metoprolol, amlodpine, increased atorvastatin - BP average 120/70 - overall feeling good, 85 % - sleep very well, appetite wonderful, bowels normal, leaky bladder, no cp, no sob, no edema, weight stable    Follows with Dr Jha/Gretchen - angiogram negative - CT Chest negative    CKD/Htn    Creatinine   Date Value Ref Range Status   01/08/2021 1.33 (H) 0.66 - 1.25 mg/dL Final     BP Readings from Last 3 Encounters:   01/14/21 129/70   01/08/21 (!) 157/83   12/30/20 (!) 156/86     GFR Estimate   Date Value Ref Range Status   01/08/2021 48 (L) >60 mL/min/[1.73_m2] Final     Comment:     Non  GFR Calc  Starting 12/18/2018, serum creatinine based estimated GFR (eGFR) will be   calculated using the Chronic Kidney Disease Epidemiology Collaboration   (CKD-EPI) equation.       [facility]   Hospitalist Discharge Summary     Date of Admission: 1/6/2021  Date of Discharge: 1/8/2021     Discharge Diagnoses     NSTEMI   Systolic heart murmur   Aortic stenosis, mild to moderate   COVID-19 PCR negative   Hypertension   Hyperlipidemia   Chronic kidney disease stage III   GERD   BPH   Obstructive sleep apnea     Follow up with primary care provider, Chance Espinosa, within 7-14 days for   hospital follow- up and blood pressure check. Outpatient cardiology   follow up in 2-3 months for monitoring of aortic stenosis, hypertensin and   dyspnea.     Discharge Disposition   Hospital Course   Kevin iFerro is a 86 year old male with a history of  hypertension, hyperlipidemia, chronic kidney disease stage III, GERD, BPH, and obstructive sleep apnea who presented to an outside facility with chest pressure. After evaluation, there was concern for an NSTEMI. He has been transferred to [facility] for further evaluation and management.     NSTEMI   Cardiac murmur not previously noted   - EKG non ischemic   - troponins trending up   - Started on IV heparin at outside facility, will continue the same.   - Continue PTA aspirin and atenolol.   - Increase PTA atorvastatin to 40 mg daily.   - Continue telemetry monitoring. Get TTE.   - Consult cardiology regarding further evaluation and management, appreciate their assistance.   - anticipate angio as patient denies prior PCI. Currently NPO.   - lipid panel (LDL okay), Hgb A1C (wnl) and TSH (wnl) checked for reversible risk factors.   COVID-19 PCR negative   Hypertension   - Stop atenolol, start metoprolol/amlodipine  Hyperlipidemia   - Lipid panel shows total cholesterol 174, triglycerides 53, , and HDL 57.   - Increase PTA atorvastatin to 40 mg daily.   Chronic kidney disease stage III   - creat seems around baseline (similar to 2019). monitor   GERD   - PTA as needed omeprazole can be restarted if needed.   BPH   - Continue PTA Terazosin.   Obstructive sleep apnea   - Continue CPAP per home settings.   Consultations This Hospital Stay         Follow-up and recommended labs and tests     Follow up with primary care provider, Chance Espinosa, within 7-14 days for hospital follow- up and blood pressure check. Outpatient cardiology follow up in 2-3 months for monitoring of aortic stenosis, hypertensin and dyspnea.         Activity    Your activity upon discharge: activity as tolerated         Discharge Instructions    Avoid alcohol. Recommend NO MORE than one drink daily AT MOST.         Diet    Follow this diet upon discharge: Orders Placed This Encounter   Combination Diet Regular Diet Adult; No Caffeine Diet      Significant Results and Procedures   Most Recent 3 CBC's:         Recent Labs   Lab Test 01/08/21   0530 01/07/21   0730 07/22/19   0738   WBC 9.2 10.8 9.2   HGB 12.7* 12.9* 13.6    99 99    183 176     Most Recent 3 BMP's:         Recent Labs   Lab Test 01/08/21   0530 01/07/21   0730 07/22/19   0738    140 142   POTASSIUM 4.4 4.3 4.4   CHLORIDE 110* 110* 108   CO2 24 24 24   BUN 29 30 28   CR 1.33* 1.42* 1.38*   ANIONGAP 6 6 10   TRAN 8.8 8.8 9.6   * 95 96     Most Recent 3 Troponin's:        Recent Labs   Lab Test 01/07/21   0730 01/06/21   2129   TROPI 0.544* 1.383*   ,       Results for orders placed or performed during the hospital encounter of 01/06/21   CT Chest Pulmonary Embolism w Contrast    Narrative    CT CHEST PULMONARY EMBOLISM W CONTRAST 1/7/2021 5:32 PM   CLINICAL HISTORY: Acute dyspnea with elevated troponin.   TECHNIQUE: CT angiogram chest during arterial phase injection IV   contrast. 2D and 3D MIP reconstructions were performed by the CT   technologist. Dose reduction techniques were used.   CONTRAST: 74 mL Isovue-370   COMPARISON: Chest radiograph 7/2/2018 and chest CT 4/24/2013   FINDINGS:   ANGIOGRAM CHEST: Pulmonary arteries are normal caliber and negative   for pulmonary emboli. Thoracic aorta is negative for dissection. No CT   evidence of right heart strain.   LUNGS AND PLEURA: Mild groundglass opacities in the peripheral right   upper lobe (series 7, image 121) and in the lingula (series 7, image   152). Mild bibasilar groundglass opacity/atelectasis. No pleural   effusions. Stable nodule along the left major fissure at the left lung   base measuring 5 mm, benign due to stability since 2013. No new or   enlarging pulmonary nodules.   MEDIASTINUM/AXILLAE: No lymphadenopathy. Normal heart size. Trace   pericardial fluid. Mild coronary artery calcifications. Small hiatal   hernia.   UPPER ABDOMEN: The visualized upper abdomen is unremarkable.   MUSCULOSKELETAL:  No suspicious lesions in the bones.     Impression    IMPRESSION:   1. No pulmonary emboli.   2. Mild scattered foci of groundglass opacities in the lungs may be   due to mild edema or developing infection. No pleural effusions.   RUFINO AMIN MD   Echocardiogram Complete    Narrative    [id number]   [id number]   [id number]   [id number]^MARQUITA^CYNTHIA^ROLAND   [facility]   Echocardiography Laboratory   [address] [place] [address]   Name: JAYLYN REDMAN   MRN: [id number]   : 1934   Study Date: 2021 10:55 AM   Age: 86 yrs   Gender: Male   Patient Location: [facility]   Reason For Study: Aortic Valve Disorder   Ordering Physician: CYNTHIA JUÁREZ   Referring Physician: Chance Espinosa   Performed By: Danielle Winchester   BSA: 2.1 m2   Height: 69 in   Weight: 205 lb   HR: 74   BP: 147/78 mmHg   _____________________________________________________________________________   __   Procedure   Complete Portable Echo Adult. Optison (NDC #[id number]) given intravenously.   _____________________________________________________________________________   __   Interpretation Summary   Grade I or early diastolic dysfunction.   The left atrium is mildly dilated.   Mild aortic root dilatation.   The ascending aorta is Mildly dilated.   The visual ejection fraction is estimated at 65-70%.   Mild to moderate valvular aortic stenosis, JAILENE 1.4 cm2, mean gradient 20 mmHg.   Contrast was used without apparent complications.   _____________________________________________________________________________   __   Left Ventricle   The left ventricle is normal in size. There is normal left ventricular wall   thickness. Grade I or early diastolic dysfunction. The visual ejection   fraction is estimated at 65-70%.   Right Ventricle   The right ventricle is normal in structure, function and size.   Atria   The left atrium is mildly dilated. Right atrium not well visualized.   Mitral Valve   There is mild mitral annular  calcification. The mitral valve leaflets appear   thickened, but open well.   Tricuspid Valve   The tricuspid valve is not well visualized, but is grossly normal.   Aortic Valve   Mild to moderate valvular aortic stenosis.   Pulmonic Valve   The pulmonic valve is not well visualized.   Vessels   Mild aortic root dilatation. The ascending aorta is Mildly dilated. Inferior   vena cava not well visualized for estimation of right atrial pressure.   Pericardium   Trivial anterior pericardial effusion. There are no echocardiographic   indications of cardiac tamponade.   Rhythm   Sinus rhythm was noted.   _____________________________________________________________________________   __   MMode/2D Measurements & Calculations   IVSd: 0.89 cm   LVIDd: 5.4 cm   LVIDs: 3.6 cm   LVPWd: 0.79 cm   FS: 33.8 %   LV mass(C)d: 167.8 grams   LV mass(C)dI: 80.4 grams/m2   Ao root diam: 4.1 cm   asc Aorta Diam: 3.9 cm   LVOT diam: 2.3 cm   LVOT area: 4.1 cm2   LA Volume (BP): 79.1 ml   LA Volume Index (BP): 37.8 ml/m2   RWT: 0.29   Doppler Measurements & Calculations   MV E max rocky: 82.0 cm/sec   MV A max rocky: 118.8 cm/sec   MV E/A: 0.69   MV dec slope: 291.4 cm/sec2   Ao V2 max: 273.7 cm/sec   Ao max P.0 mmHg   Ao V2 mean: 215.6 cm/sec   Ao mean P.8 mmHg   Ao V2 VTI: 60.5 cm   JAILENE(I,D): 1.5 cm2   JAILENE(V,D): 1.4 cm2   LV V1 max PG: 3.6 mmHg   LV V1 max: 94.7 cm/sec   LV V1 VTI: 22.4 cm   SV(LVOT): 91.3 ml   SI(LVOT): 43.8 ml/m2   PA acc time: 0.11 sec   AV Rocky Ratio (DI): 0.35   JAILENE Index (cm2/m2): 0.72   E/E' avg: 15.3   Lateral E/e': 14.0   Medial E/e': 16.6   _____________________________________________________________________________   __   Report approved by: Karmen Carrillo 2021 03:06 PM    Cardiac Catheterization    Addendum: 2021    Prox LAD lesion is 50% stenosed.   Prox RCA lesion is 50% stenosed.  1. Mild/moderate Lad and RCA lesions. Case discussed with Dr Jha and we did not feel iFR or OCT indicated to  assess lesions. In light of mild troponin and normal wall motion (echo) could consider empiric medical trial and plavix. But would consider work up for possible Pulmonary embolism (given no cp, +WALKER and mild troponin). If continues with symptoms a nuclear GXT could be done to look for ischemia. This patient has renal insufficiency and we did not want to give additional dye for OCT (to look for plaque erosion/rupture) but rather use dye for possible lung CT exam   2 Mild/mod aortic stenosis with mean gradient on cath 14mm, Hg. There is systemic HTN and mildly elevated LVEDP     Narrative      Prox LAD lesion is 50% stenosed.     Prox RCA lesion is 50% stenosed.   1. Mild/moderate Lad and RCA lesions. Case discussed with Dr Jha and we   did not feel iFR or OCT indicated to assess lesions. In light of mild   troponin and normal wall motion (echo) could consider empiric medical   trial and plavix. But would consider work up for possible Pulmonary   embolism (given no cp, +WALKER and mild troponin). If continues with   symptoms a nuclear GXT could be done to look for ischemia. This patient   has renal insufficiency and we did not want to give additional dye for OCT   (to look for plaque erosion/rupture) but rather use dye for possible lung   CT exam   2 Mild/mod aortic stenosis with mean gradient on cath 18mm, Hg. There is   systemic HTN and mildly elevated LVEDP    Discharge Medications         Current Discharge Medication List              START taking these medications    Details   amLODIPine (NORVASC) 5 MG tablet Take 1 tablet (5 mg) by mouth daily   Qty: 30 tablet, Refills: 1    Associated Diagnoses: NSTEMI (non-ST elevated myocardial infarction) (H); Hypertension goal BP (blood pressure) < 140/90; Stage 3a chronic kidney disease      metoprolol succinate ER (TOPROL-XL) 100 MG 24 hr tablet Take 1 tablet (100 mg) by mouth daily   Qty: 30 tablet, Refills: 1    Associated Diagnoses: NSTEMI (non-ST elevated myocardial  infarction) (H); Hypertension goal BP (blood pressure) < 140/90; Stage 3a chronic kidney disease                 CONTINUE these medications which have CHANGED    Details   atorvastatin (LIPITOR) 40 MG tablet Take 1 tablet (40 mg) by mouth At Bedtime   Qty: 30 tablet, Refills: 1    Associated Diagnoses: NSTEMI (non-ST elevated myocardial infarction) (H); Hypertension goal BP (blood pressure) < 140/90; Stage 3a chronic kidney disease                 CONTINUE these medications which have NOT CHANGED    Details   albuterol (PROAIR HFA/PROVENTIL HFA/VENTOLIN HFA) 108 (90 Base) MCG/ACT inhaler Inhale 2 puffs into the lungs 4 times daily as needed for shortness of breath / dyspnea or wheezing    Comments: Pharmacy may dispense brand covered by insurance (Proair, or proventil or ventolin or generic albuterol inhaler)      aspirin 81 MG EC tablet Take 81 mg by mouth At Bedtime      cephALEXin (KEFLEX) 500 MG capsule Take 1 capsule (500 mg) by mouth 3 times daily   Qty: 30 capsule, Refills: 0    Associated Diagnoses: Sebaceous cyst      multivitamin, therapeutic (THERA-VIT) TABS tablet Take 1 tablet by mouth At Bedtime      omeprazole (PRILOSEC) 20 MG DR capsule Take 20 mg by mouth daily as needed      terazosin (HYTRIN) 10 MG capsule TAKE ONE CAPSULE BY MOUTH AT BEDTIME   Qty: 90 capsule, Refills: 3    Associated Diagnoses: CKD (chronic kidney disease) stage 3, GFR 30-59 ml/min; Hypertension goal BP (blood pressure) < 140/90      triamcinolone (KENALOG) 0.1 % external cream Apply topically 2 times daily as needed for irritation                STOP taking these medications       atenolol (TENORMIN) 50 MG tablet Comments:   Reason for Stopping:                 Reviewed and updated as needed this visit by Provider                   PMMEG    Past Medical History:   Diagnosis Date     Arthritis .     BCC (basal cell carcinoma of skin) 10/2018, 11/2019    right jaw, rt parietal,11/2019- Dr Limon     Choroidal nevus of left eye      Dr Connor     CKD (chronic kidney disease) stage 3, GFR 30-59 ml/min      Colon polyps      Coronary artery disease involving native coronary artery of native heart without angina pectoris 01/2021     ED (erectile dysfunction)      Family history of cardiovascular disease      GERD (gastroesophageal reflux disease) 2013     Hematuria 1999    dr scott     Hernia, abdominal      Hyperlipidemia LDL goal <130 1990     Hypertension goal BP (blood pressure) < 140/90 1990     Hypertrophy of prostate without urinary obstruction and other lower urinary tract symptoms (LUTS)      Lumbar stenosis 2017    mild to moderate foraminal and central     Lung nodules 11/2010    CT scan stable     Nonrheumatic aortic valve stenosis 01/2021    mild to moderate     obstructive SLEEP APNEA 05/2007    CPAP     Other and unspecified 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 05/25/2005     Palpitations      PSVT 11/2017    few runs, rare PVC     Restrictive lung disease      Seasonal allergies      Unspecified hearing loss 05/2005    hearing aids, L>R - Dr Hernandez       [facility]    Past Surgical History:   Procedure Laterality Date     CV HEART CATHETERIZATION WITH POSSIBLE INTERVENTION N/A 1/7/2021    Procedure: Heart Catheterization with Possible Intervention;  Surgeon: Raúl Rich MD;  Location:  HEART CARDIAC CATH LAB     CV LEFT HEART CATH N/A 1/7/2021    Procedure: Left Heart Cath;  Surgeon: Raúl Rich MD;  Location:  HEART CARDIAC CATH LAB     CYSTOSCOPY       HC COLONOSCOPY THRU STOMA, DIAGNOSTIC  2005, 5/10, 5/11    Polyps-> due 2015 - Ct Colonography negative     HC REPAIR INCISIONAL HERNIA,REDUCIBLE  1960    Hernia Repair, Incisional, Unilateral     STRESS ECHO (METRO)  7/09, 5/12, 7/17    Negative     TONSILLECTOMY & ADENOIDECTOMY  1946       Medications    Current Outpatient Medications   Medication Sig Dispense Refill     finasteride (PROSCAR) 5 MG tablet Take 1 tablet (5 mg) by mouth  daily 90 tablet 3     albuterol (PROAIR HFA/PROVENTIL HFA/VENTOLIN HFA) 108 (90 Base) MCG/ACT inhaler Inhale 2 puffs into the lungs 4 times daily as needed for shortness of breath / dyspnea or wheezing       amLODIPine (NORVASC) 5 MG tablet Take 1 tablet (5 mg) by mouth daily 30 tablet 1     aspirin 81 MG EC tablet Take 81 mg by mouth At Bedtime       atorvastatin (LIPITOR) 40 MG tablet Take 1 tablet (40 mg) by mouth At Bedtime 30 tablet 1     cephALEXin (KEFLEX) 500 MG capsule Take 1 capsule (500 mg) by mouth 3 times daily 30 capsule 0     metoprolol succinate ER (TOPROL-XL) 100 MG 24 hr tablet Take 1 tablet (100 mg) by mouth daily 30 tablet 1     multivitamin, therapeutic (THERA-VIT) TABS tablet Take 1 tablet by mouth At Bedtime       omeprazole (PRILOSEC) 20 MG DR capsule Take 20 mg by mouth daily as needed       terazosin (HYTRIN) 10 MG capsule TAKE ONE CAPSULE BY MOUTH AT BEDTIME 90 capsule 3     triamcinolone (KENALOG) 0.1 % external cream Apply topically 2 times daily as needed for irritation         Allergies    Pollen extract    Family History    Family History   Problem Relation Age of Onset     C.A.D. Father         age 50's     Hypertension Father      C.A.D. Brother      C.A.D. Brother         mid 40's     Diabetes Brother      Cancer Brother         pancreatic CA     Coronary Artery Disease Brother      Cancer - colorectal No family hx of      Prostate Cancer No family hx of        Social History    Social History     Socioeconomic History     Marital status:      Spouse name: Leticia     Number of children: 3     Years of education: 18     Highest education level: Not on file   Occupational History     Employer: RETIRED   Social Needs     Financial resource strain: Not on file     Food insecurity     Worry: Not on file     Inability: Not on file     Transportation needs     Medical: Not on file     Non-medical: Not on file   Tobacco Use     Smoking status: Never Smoker     Smokeless tobacco:  Never Used   Substance and Sexual Activity     Alcohol use: Yes     Alcohol/week: 4.0 - 5.0 standard drinks     Types: 4 - 5 Standard drinks or equivalent per week     Comment: 4-5 drinks per week avg     Drug use: No     Sexual activity: Yes     Partners: Female   Lifestyle     Physical activity     Days per week: Not on file     Minutes per session: Not on file     Stress: Not on file   Relationships     Social connections     Talks on phone: Not on file     Gets together: Not on file     Attends Evangelical service: Not on file     Active member of club or organization: Not on file     Attends meetings of clubs or organizations: Not on file     Relationship status: Not on file     Intimate partner violence     Fear of current or ex partner: Not on file     Emotionally abused: Not on file     Physically abused: Not on file     Forced sexual activity: Not on file   Other Topics Concern      Service Not Asked     Blood Transfusions No     Caffeine Concern Yes     Comment: 1 serv/day, rare pop, occas chocolate (3-4/yr)     Occupational Exposure Not Asked     Hobby Hazards Not Asked     Sleep Concern Yes     Comment: MIMI, wakes feeling rested     Stress Concern Not Asked     Weight Concern Not Asked     Special Diet Not Asked     Back Care Not Asked     Exercise Yes     Comment: 30-45' 2-3 x/wk     Bike Helmet Not Asked     Seat Belt Yes     Self-Exams Not Asked     Parent/sibling w/ CABG, MI or angioplasty before 65F 55M? Yes   Social History Narrative     Not on file       Reviewed and updated as needed this visit by Provider                   Review of Systems     CONSTITUTIONAL: NEGATIVE for fever, chills, change in weight  INTEGUMENTARY/SKIN: NEGATIVE for worrisome rashes, moles or lesions  EYES: NEGATIVE for vision changes or irritation  ENT/MOUTH: NEGATIVE for ear, mouth and throat problems  RESP: NEGATIVE for significant cough or SOB  CV: NEGATIVE for chest pain, palpitations or peripheral edema  GI:  NEGATIVE for nausea, abdominal pain, heartburn, or change in bowel habits  : NEGATIVE for frequency, dysuria, or hematuria  MUSCULOSKELETAL: NEGATIVE for significant arthralgias or myalgia  NEURO: NEGATIVE for weakness, dizziness or paresthesias  ENDOCRINE: NEGATIVE for temperature intolerance, skin/hair changes  HEME: NEGATIVE for bleeding problems  PSYCHIATRIC: NEGATIVE for changes in mood or affect    Objective    Reported vitals:  /70   Wt 90.7 kg (200 lb)   BMI 29.53 kg/m       Physical Exam     healthy, alert and no distress  Psych: Alert and oriented times 3; coherent speech, normal   rate and volume, able to articulate logical thoughts, able   to abstract reason, no tangential thoughts, no hallucinations   or delusions  His affect is normal     GENERAL: Healthy, alert and no distress  EYES: Eyes grossly normal to inspection.  No discharge or erythema, or obvious scleral/conjunctival abnormalities.  RESP: No audible wheeze, cough, or visible cyanosis.  No visible retractions or increased work of breathing.    SKIN: Visible skin clear. No significant rash, abnormal pigmentation or lesions.  NEURO: Cranial nerves grossly intact.  Mentation and speech appropriate for age.  PSYCH: Mentation appears normal, affect normal/bright, judgement and insight intact, normal speech and appearance well-groomed.    Diagnostic Test Results:  Labs reviewed in Epic    Assessment/Plan:      ICD-10-CM    1. NSTEMI (non-ST elevated myocardial infarction) (H)  I21.4    2. Hypertrophy of prostate without urinary obstruction  N40.0 finasteride (PROSCAR) 5 MG tablet   3. Coronary artery disease involving native coronary artery of native heart without angina pectoris  I25.10    4. Stage 3a chronic kidney disease  N18.31    5. Hypertension goal BP (blood pressure) < 140/90  I10    6. Hyperlipidemia LDL goal <70  E78.5    7. Family history of cardiovascular disease  Z82.49    8. Hypersomnia with sleep apnea  G47.10     G47.30     9. Restrictive lung disease  J98.4    10. Gastroesophageal reflux disease without esophagitis  K21.9    11. Medication monitoring encounter  Z51.81      Follow up soon, await cardiology    Return in about 1 month (around 2/14/2021), or if symptoms worsen or fail to improve, for Medication Recheck Visit, Follow Up Chronic.      Video-Visit Details    Type of service:  Video Visit, audio only on doximity - 30 minutes    Video Start Time: 11:30 am    Video End Time:12:01 PM    Originating Location (pt. Location): Home    Distant Location (provider location):  [facility]     Platform used for Video Visit: [employer] - ended up with audio only after initial video      If patient encounters technical issues they should call [phone] :[id number]}         Chance Espinosa MD, FAAFP     [facility]  [address] [place] [id number]  [email or web address]   Office: [phone]  Fax: [phone]  Pager: [phone]

## 2021-01-15 DIAGNOSIS — I10 HYPERTENSION GOAL BP (BLOOD PRESSURE) < 140/90: ICD-10-CM

## 2021-01-15 DIAGNOSIS — N18.31 STAGE 3A CHRONIC KIDNEY DISEASE (H): ICD-10-CM

## 2021-01-15 RX ORDER — TERAZOSIN 10 MG/1
CAPSULE ORAL
Qty: 90 CAPSULE | Refills: 3 | Status: SHIPPED | OUTPATIENT
Start: 2021-01-15 | End: 2022-01-03

## 2021-01-15 NOTE — TELEPHONE ENCOUNTER
Creatinine   Date Value Ref Range Status   01/08/2021 1.33 (H) 0.66 - 1.25 mg/dL Final     Routing refill request to provider for review/approval because:  Labs out of range:  Creatinine - results from 1/8 not reviewed yet by ordering provider (Dr. Solares)      Viviane Garrett RN  [facility]

## 2021-03-02 ENCOUNTER — OFFICE VISIT (OUTPATIENT)
Dept: CARDIOLOGY | Facility: CLINIC | Age: 86
End: 2021-03-02
Payer: COMMERCIAL

## 2021-03-02 VITALS
DIASTOLIC BLOOD PRESSURE: 72 MMHG | BODY MASS INDEX: 30.96 KG/M2 | WEIGHT: 209 LBS | SYSTOLIC BLOOD PRESSURE: 121 MMHG | HEART RATE: 87 BPM | HEIGHT: 69 IN

## 2021-03-02 DIAGNOSIS — I21.4 NSTEMI (NON-ST ELEVATED MYOCARDIAL INFARCTION) (H): Primary | ICD-10-CM

## 2021-03-02 DIAGNOSIS — N18.31 STAGE 3A CHRONIC KIDNEY DISEASE (H): ICD-10-CM

## 2021-03-02 DIAGNOSIS — I35.0 NONRHEUMATIC AORTIC VALVE STENOSIS: ICD-10-CM

## 2021-03-02 DIAGNOSIS — I10 HYPERTENSION GOAL BP (BLOOD PRESSURE) < 140/90: ICD-10-CM

## 2021-03-02 PROCEDURE — 99214 OFFICE O/P EST MOD 30 MIN: CPT | Performed by: INTERNAL MEDICINE

## 2021-03-02 RX ORDER — AMLODIPINE BESYLATE 5 MG/1
5 TABLET ORAL DAILY
Qty: 90 TABLET | Refills: 3 | Status: SHIPPED | OUTPATIENT
Start: 2021-03-02 | End: 2022-02-09

## 2021-03-02 RX ORDER — METOPROLOL SUCCINATE 100 MG/1
100 TABLET, EXTENDED RELEASE ORAL DAILY
Qty: 90 TABLET | Refills: 3 | Status: SHIPPED | OUTPATIENT
Start: 2021-03-02 | End: 2022-02-09

## 2021-03-02 ASSESSMENT — MIFFLIN-ST. JEOR: SCORE: 1618.4

## 2021-03-02 NOTE — PROGRESS NOTES
HISTORY:    Kevin Fierro is a pleasant 86-year-old gentleman accompanied by his wife today he has a history of palpitations and dyspnea on exertion for which she was seen in 2017 with a negative work-up.  He was recently admitted after developing chest discomfort.  His troponin mary to 1.5 and he underwent coronary angiogram showing no significant disease.  He was also noted to have an audible murmur and underwent an echocardiogram.  That study showed a moderate degree of aortic stenosis with a mean gradient of 20 mmHg and a calculated valve area of 1.4 cm .    Since his discharge in early January Kevin reports that he has done well.  He has experienced no further chest discomfort.  He was placed on amlodipine and metoprolol at that visit and has tolerated these medications without side effects.  He denies any sense of palpitations, PND/orthopnea, syncope or near syncope, strokelike symptoms, peripheral edema, or claudication.  He feels that his stamina is normal for his age and he has no cardiovascular concerns.  He reports that he checks his blood pressure periodically at home and typically it nearly always in the 110s to 120s systolic.    ASSESSMENT/PLAN:    1.  Recent non-STEMI.  Normal coronary angiogram.  Possible small vessel disease.  No ongoing symptoms.  I note that he was not placed on Plavix on discharge but given his advanced age I think this is reasonable.  I did not change his medications today.  2.  Hypertension.  Patient has had problems with high blood pressure but he checks his blood pressure at home and it has been very well controlled.  Today's blood pressure in the office is also excellent at 121/72.  Continue current medications.  I described the benefit of each of the medications he is using.  3.  Hyperlipidemia.  Lipid profile is acceptable with a LDL of 106.  Given his recent normal angiogram I do not think we need to be any more aggressive than this.  4.  Aortic stenosis.  This is  mild to moderate in severity and is asymptomatic.  We will continue to monitor.    Thank you for inviting me to participate in your patient's care.  Today I reviewed his recent admission notes including discharge summary and cardiology consult as well as his angiogram echocardiogram laboratory studies including lipid profile and troponin.  I will set him up for a 1 year visit with echocardiogram to monitor his newly discovered aortic stenosis.   Please do not hesitate to call if I can be of further assistance.    Chart documentation was completed, in part, with [employer] voice-recognition software. Even though reviewed, some grammatical, spelling, and word errors may remain.       Orders Placed This Encounter   Procedures     Follow-Up with Cardiac Advanced Practice Provider     Echocardiogram Complete     Orders Placed This Encounter   Medications     metoprolol succinate ER (TOPROL-XL) 100 MG 24 hr tablet     Sig: Take 1 tablet (100 mg) by mouth daily     Dispense:  90 tablet     Refill:  3     amLODIPine (NORVASC) 5 MG tablet     Sig: Take 1 tablet (5 mg) by mouth daily     Dispense:  90 tablet     Refill:  3     Medications Discontinued During This Encounter   Medication Reason     amLODIPine (NORVASC) 5 MG tablet Reorder     metoprolol succinate ER (TOPROL-XL) 100 MG 24 hr tablet Reorder       10 year ASCVD risk: The ASCVD Risk score ([place] DC Jr., et al., 2013) failed to calculate for the following reasons:    The 2013 ASCVD risk score is only valid for ages 40 to 79    The patient has a prior MI or stroke diagnosis    Encounter Diagnoses   Name Primary?     NSTEMI (non-ST elevated myocardial infarction) (H)      Hypertension goal BP (blood pressure) < 140/90      Stage 3a chronic kidney disease      Nonrheumatic aortic valve stenosis Yes       CURRENT MEDICATIONS:  Current Outpatient Medications   Medication Sig Dispense Refill     albuterol (PROAIR HFA/PROVENTIL HFA/VENTOLIN HFA) 108 (90 Base) MCG/ACT inhaler  Inhale 2 puffs into the lungs 4 times daily as needed for shortness of breath / dyspnea or wheezing       amLODIPine (NORVASC) 5 MG tablet Take 1 tablet (5 mg) by mouth daily 90 tablet 3     aspirin 81 MG EC tablet Take 81 mg by mouth At Bedtime       atorvastatin (LIPITOR) 40 MG tablet Take 1 tablet (40 mg) by mouth At Bedtime 30 tablet 1     cephALEXin (KEFLEX) 500 MG capsule Take 1 capsule (500 mg) by mouth 3 times daily 30 capsule 0     finasteride (PROSCAR) 5 MG tablet Take 1 tablet (5 mg) by mouth daily 90 tablet 3     metoprolol succinate ER (TOPROL-XL) 100 MG 24 hr tablet Take 1 tablet (100 mg) by mouth daily 90 tablet 3     multivitamin, therapeutic (THERA-VIT) TABS tablet Take 1 tablet by mouth At Bedtime       omeprazole (PRILOSEC) 20 MG DR capsule Take 20 mg by mouth daily as needed       terazosin (HYTRIN) 10 MG capsule TAKE ONE CAPSULE BY MOUTH AT BEDTIME 90 capsule 3     triamcinolone (KENALOG) 0.1 % external cream Apply topically 2 times daily as needed for irritation         ALLERGIES     Allergies   Allergen Reactions     Pollen Extract      Tree pollen         PAST MEDICAL HISTORY:  Past Medical History:   Diagnosis Date     Arthritis .     BCC (basal cell carcinoma of skin) 10/2018, 11/2019    right jaw, rt parietal,11/2019- Dr Limon     Choroidal nevus of left eye     Dr Connor     CKD (chronic kidney disease) stage 3, GFR 30-59 ml/min      Colon polyps      Coronary artery disease involving native coronary artery of native heart without angina pectoris 01/2021     ED (erectile dysfunction)      Family history of cardiovascular disease      GERD (gastroesophageal reflux disease) 2013     Hematuria 1999     [place]     Hernia, abdominal      Hyperlipidemia LDL goal <130 1990     Hypertension goal BP (blood pressure) < 140/90 1990     Hypertrophy of prostate without urinary obstruction and other lower urinary tract symptoms (LUTS)      Lumbar stenosis 2017    mild to moderate foraminal and  central     Lung nodules 11/2010    CT scan stable     Nonrheumatic aortic valve stenosis 01/2021    mild to moderate     obstructive SLEEP APNEA 05/2007    CPAP     Other and unspecified 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 05/25/2005     Palpitations      PSVT 11/2017    few runs, rare PVC     Restrictive lung disease      Seasonal allergies      Unspecified hearing loss 05/2005    hearing aids, L>R - Dr Hernandez       PAST SURGICAL HISTORY:  Past Surgical History:   Procedure Laterality Date     CV HEART CATHETERIZATION WITH POSSIBLE INTERVENTION N/A 1/7/2021    Procedure: Heart Catheterization with Possible Intervention;  Surgeon: Raúl Rich MD;  Location:  HEART CARDIAC CATH LAB     CV LEFT HEART CATH N/A 1/7/2021    Procedure: Left Heart Cath;  Surgeon: Raúl Rich MD;  Location:  HEART CARDIAC CATH LAB     CYSTOSCOPY       HC COLONOSCOPY THRU STOMA, DIAGNOSTIC  2005, 5/10, 5/11    Polyps-> due 2015 - Ct Colonography negative     HC REPAIR INCISIONAL HERNIA,REDUCIBLE  1960    Hernia Repair, Incisional, Unilateral     STRESS ECHO ([facility])  7/09, 5/12, 7/17    Negative     TONSILLECTOMY & ADENOIDECTOMY  1946       FAMILY HISTORY:  Family History   Problem Relation Age of Onset     C.A.D. Father         age 50's     Hypertension Father      C.A.D. Brother      C.A.D. Brother         mid 40's     Diabetes Brother      Cancer Brother         pancreatic CA     Coronary Artery Disease Brother      Cancer - colorectal No family hx of      Prostate Cancer No family hx of        SOCIAL HISTORY:  Social History     Socioeconomic History     Marital status:      Spouse name: Leticia     Number of children: 3     Years of education: 18     Highest education level: None   Occupational History     Employer: RETIRED   Social Needs     Financial resource strain: None     Food insecurity     Worry: None     Inability: None     Transportation needs     Medical: None      Non-medical: None   Tobacco Use     Smoking status: Never Smoker     Smokeless tobacco: Never Used   Substance and Sexual Activity     Alcohol use: Yes     Alcohol/week: 4.0 - 5.0 standard drinks     Types: 4 - 5 Standard drinks or equivalent per week     Comment: 4-5 drinks per week avg     Drug use: No     Sexual activity: Yes     Partners: Female   Lifestyle     Physical activity     Days per week: None     Minutes per session: None     Stress: None   Relationships     Social connections     Talks on phone: None     Gets together: None     Attends Taoism service: None     Active member of club or organization: None     Attends meetings of clubs or organizations: None     Relationship status: None     Intimate partner violence     Fear of current or ex partner: None     Emotionally abused: None     Physically abused: None     Forced sexual activity: None   Other Topics Concern      Service Not Asked     Blood Transfusions No     Caffeine Concern Yes     Comment: 1 serv/day, rare pop, occas chocolate (3-4/yr)     Occupational Exposure Not Asked     Hobby Hazards Not Asked     Sleep Concern Yes     Comment: MIMI, wakes feeling rested     Stress Concern Not Asked     Weight Concern Not Asked     Special Diet Not Asked     Back Care Not Asked     Exercise Yes     Comment: 30-45' 2-3 x/wk     Bike Helmet Not Asked     Seat Belt Yes     Self-Exams Not Asked     Parent/sibling w/ CABG, MI or angioplasty before 65F 55M? Yes   Social History Narrative     None       Review of Systems:  Skin:  Negative     Eyes:  Negative    ENT:  Positive for hearing loss  Respiratory:  Positive for dyspnea on exertion;shortness of breath  Cardiovascular:  Negative    Gastroenterology: Negative    Genitourinary:  not assessed    Musculoskeletal:  Positive for back pain  Neurologic:  Negative    Psychiatric:  Negative    Heme/Lymph/Imm:  Negative    Endocrine:  Negative      Physical Exam:  Vitals: /72   Pulse 87   Ht  "1.753 m (5' 9\")   Wt 94.8 kg (209 lb)   BMI 30.86 kg/m      Constitutional:  cooperative, alert and oriented, well developed, well nourished, in no acute distress        Skin:  warm and dry to the touch        Head:  normocephalic        Eyes:  sclera white;no xanthalasma;no nystagmus        ENT:  no pallor or cyanosis   masked    Neck:  carotid pulses are full and equal bilaterally, JVP normal, no carotid bruit        Chest:  normal breath sounds, clear to auscultation, normal A-P diameter, normal symmetry, normal respiratory excursion, no use of accessory muscles        Cardiac: regular rhythm;normal S1 and S2;no S3 or S4;apical impulse not displaced       grade 1;grade 2;systolic ejection murmur;RUSB radiation to the carotid        Abdomen:  abdomen soft;BS normoactive        Vascular: pulses full and equal                                      Extremities and Muscular Skeletal:  no edema           Neurological:  no gross motor deficits        Psych:  affect appropriate, oriented to time, person and place     Recent Lab Results:  LIPID RESULTS:  Lab Results   Component Value Date    CHOL 174 01/06/2021    HDL 57 01/06/2021     (H) 01/06/2021    TRIG 53 01/06/2021    CHOLHDLRATIO 3.2 05/14/2015       LIVER ENZYME RESULTS:  Lab Results   Component Value Date    AST 27 07/22/2019    ALT 31 07/22/2019       CBC RESULTS:  Lab Results   Component Value Date    WBC 9.2 01/08/2021    RBC 3.94 (L) 01/08/2021    HGB 12.7 (L) 01/08/2021    HCT 39.5 (L) 01/08/2021     01/08/2021    MCH 32.2 01/08/2021    MCHC 32.2 01/08/2021    RDW 13.2 01/08/2021     01/08/2021       BMP RESULTS:  Lab Results   Component Value Date     01/08/2021    POTASSIUM 4.4 01/08/2021    CHLORIDE 110 (H) 01/08/2021    CO2 24 01/08/2021    ANIONGAP 6 01/08/2021     (H) 01/08/2021    BUN 29 01/08/2021    CR 1.33 (H) 01/08/2021    GFRESTIMATED 48 (L) 01/08/2021    GFRESTBLACK 55 (L) 01/08/2021    TRAN 8.8 01/08/2021    "     A1C RESULTS:  Lab Results   Component Value Date    A1C 5.7 (H) 01/06/2021       INR RESULTS:  No results found for: INR      Dariel Carlson MD, FACC    CC  No referring provider defined for this encounter.

## 2021-03-02 NOTE — LETTER
3/2/2021    Chance Espinosa MD  [address] [facility] [address]    RE: Kevin Fierro       Dear Colleague,    I had the pleasure of seeing Kevin Fierro in the [facility].    HISTORY:    Kevin Fierro is a pleasant 86-year-old gentleman accompanied by his wife today he has a history of palpitations and dyspnea on exertion for which she was seen in 2017 with a negative work-up.  He was recently admitted after developing chest discomfort.  His troponin mary to 1.5 and he underwent coronary angiogram showing no significant disease.  He was also noted to have an audible murmur and underwent an echocardiogram.  That study showed a moderate degree of aortic stenosis with a mean gradient of 20 mmHg and a calculated valve area of 1.4 cm .    Since his discharge in early January Kevin reports that he has done well.  He has experienced no further chest discomfort.  He was placed on amlodipine and metoprolol at that visit and has tolerated these medications without side effects.  He denies any sense of palpitations, PND/orthopnea, syncope or near syncope, strokelike symptoms, peripheral edema, or claudication.  He feels that his stamina is normal for his age and he has no cardiovascular concerns.  He reports that he checks his blood pressure periodically at home and typically it nearly always in the 110s to 120s systolic.    ASSESSMENT/PLAN:    1.  Recent non-STEMI.  Normal coronary angiogram.  Possible small vessel disease.  No ongoing symptoms.  I note that he was not placed on Plavix on discharge but given his advanced age I think this is reasonable.  I did not change his medications today.  2.  Hypertension.  Patient has had problems with high blood pressure but he checks his blood pressure at home and it has been very well controlled.  Today's blood pressure in the office is also excellent at 121/72.  Continue current medications.  I described  the benefit of each of the medications he is using.  3.  Hyperlipidemia.  Lipid profile is acceptable with a LDL of 106.  Given his recent normal angiogram I do not think we need to be any more aggressive than this.  4.  Aortic stenosis.  This is mild to moderate in severity and is asymptomatic.  We will continue to monitor.    Thank you for inviting me to participate in your patient's care.  Today I reviewed his recent admission notes including discharge summary and cardiology consult as well as his angiogram echocardiogram laboratory studies including lipid profile and troponin.  I will set him up for a 1 year visit with echocardiogram to monitor his newly discovered aortic stenosis.   Please do not hesitate to call if I can be of further assistance.    Chart documentation was completed, in part, with "[employer] voice-recognition software. Even though reviewed, some grammatical, spelling, and word errors may remain.       Orders Placed This Encounter   Procedures     Follow-Up with Cardiac Advanced Practice Provider     Echocardiogram Complete     Orders Placed This Encounter   Medications     metoprolol succinate ER (TOPROL-XL) 100 MG 24 hr tablet     Sig: Take 1 tablet (100 mg) by mouth daily     Dispense:  90 tablet     Refill:  3     amLODIPine (NORVASC) 5 MG tablet     Sig: Take 1 tablet (5 mg) by mouth daily     Dispense:  90 tablet     Refill:  3     Medications Discontinued During This Encounter   Medication Reason     amLODIPine (NORVASC) 5 MG tablet Reorder     metoprolol succinate ER (TOPROL-XL) 100 MG 24 hr tablet Reorder       10 year ASCVD risk: The ASCVD Risk score ([place] LYN Jr., et al., 2013) failed to calculate for the following reasons:    The 2013 ASCVD risk score is only valid for ages 40 to 79    The patient has a prior MI or stroke diagnosis    Encounter Diagnoses   Name Primary?     NSTEMI (non-ST elevated myocardial infarction) (H)      Hypertension goal BP (blood pressure) < 140/90      Stage  3a chronic kidney disease      Nonrheumatic aortic valve stenosis Yes       CURRENT MEDICATIONS:  Current Outpatient Medications   Medication Sig Dispense Refill     albuterol (PROAIR HFA/PROVENTIL HFA/VENTOLIN HFA) 108 (90 Base) MCG/ACT inhaler Inhale 2 puffs into the lungs 4 times daily as needed for shortness of breath / dyspnea or wheezing       amLODIPine (NORVASC) 5 MG tablet Take 1 tablet (5 mg) by mouth daily 90 tablet 3     aspirin 81 MG EC tablet Take 81 mg by mouth At Bedtime       atorvastatin (LIPITOR) 40 MG tablet Take 1 tablet (40 mg) by mouth At Bedtime 30 tablet 1     cephALEXin (KEFLEX) 500 MG capsule Take 1 capsule (500 mg) by mouth 3 times daily 30 capsule 0     finasteride (PROSCAR) 5 MG tablet Take 1 tablet (5 mg) by mouth daily 90 tablet 3     metoprolol succinate ER (TOPROL-XL) 100 MG 24 hr tablet Take 1 tablet (100 mg) by mouth daily 90 tablet 3     multivitamin, therapeutic (THERA-VIT) TABS tablet Take 1 tablet by mouth At Bedtime       omeprazole (PRILOSEC) 20 MG DR capsule Take 20 mg by mouth daily as needed       terazosin (HYTRIN) 10 MG capsule TAKE ONE CAPSULE BY MOUTH AT BEDTIME 90 capsule 3     triamcinolone (KENALOG) 0.1 % external cream Apply topically 2 times daily as needed for irritation         ALLERGIES     Allergies   Allergen Reactions     Pollen Extract      Tree pollen         PAST MEDICAL HISTORY:  Past Medical History:   Diagnosis Date     Arthritis .     BCC (basal cell carcinoma of skin) 10/2018, 11/2019    right jaw, rt parietal,11/2019- Dr Limon     Choroidal nevus of left eye     Dr Connor     CKD (chronic kidney disease) stage 3, GFR 30-59 ml/min      Colon polyps      Coronary artery disease involving native coronary artery of native heart without angina pectoris 01/2021     ED (erectile dysfunction)      Family history of cardiovascular disease      GERD (gastroesophageal reflux disease) 2013     Hematuria 1999    dr scott     Hernia, abdominal       Hyperlipidemia LDL goal <130 1990     Hypertension goal BP (blood pressure) < 140/90 1990     Hypertrophy of prostate without urinary obstruction and other lower urinary tract symptoms (LUTS)      Lumbar stenosis 2017    mild to moderate foraminal and central     Lung nodules 11/2010    CT scan stable     Nonrheumatic aortic valve stenosis 01/2021    mild to moderate     obstructive SLEEP APNEA 05/2007    CPAP     Other and unspecified 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 05/25/2005     Palpitations      PSVT 11/2017    few runs, rare PVC     Restrictive lung disease      Seasonal allergies      Unspecified hearing loss 05/2005    hearing aids, L>R - Dr Hernandez       PAST SURGICAL HISTORY:  Past Surgical History:   Procedure Laterality Date     CV HEART CATHETERIZATION WITH POSSIBLE INTERVENTION N/A 1/7/2021    Procedure: Heart Catheterization with Possible Intervention;  Surgeon: Raúl Rich MD;  Location:  HEART CARDIAC CATH LAB     CV LEFT HEART CATH N/A 1/7/2021    Procedure: Left Heart Cath;  Surgeon: Raúl Rich MD;  Location:  HEART CARDIAC CATH LAB     CYSTOSCOPY       HC COLONOSCOPY THRU STOMA, DIAGNOSTIC  2005, 5/10, 5/11    Polyps-> due 2015 - Ct Colonography negative     HC REPAIR INCISIONAL HERNIA,REDUCIBLE  1960    Hernia Repair, Incisional, Unilateral     STRESS ECHO ([facility])  7/09, 5/12, 7/17    Negative     TONSILLECTOMY & ADENOIDECTOMY  1946       FAMILY HISTORY:  Family History   Problem Relation Age of Onset     C.A.D. Father         age 50's     Hypertension Father      C.A.D. Brother      C.A.D. Brother         mid 40's     Diabetes Brother      Cancer Brother         pancreatic CA     Coronary Artery Disease Brother      Cancer - colorectal No family hx of      Prostate Cancer No family hx of        SOCIAL HISTORY:  Social History     Socioeconomic History     Marital status:      Spouse name: Leticia     Number of children: 3     Years of  education: 18     Highest education level: None   Occupational History     Employer: RETIRED   Social Needs     Financial resource strain: None     Food insecurity     Worry: None     Inability: None     Transportation needs     Medical: None     Non-medical: None   Tobacco Use     Smoking status: Never Smoker     Smokeless tobacco: Never Used   Substance and Sexual Activity     Alcohol use: Yes     Alcohol/week: 4.0 - 5.0 standard drinks     Types: 4 - 5 Standard drinks or equivalent per week     Comment: 4-5 drinks per week avg     Drug use: No     Sexual activity: Yes     Partners: Female   Lifestyle     Physical activity     Days per week: None     Minutes per session: None     Stress: None   Relationships     Social connections     Talks on phone: None     Gets together: None     Attends [employer] service: None     Active member of club or organization: None     Attends meetings of clubs or organizations: None     Relationship status: None     Intimate partner violence     Fear of current or ex partner: None     Emotionally abused: None     Physically abused: None     Forced sexual activity: None   Other Topics Concern      Service Not Asked     Blood Transfusions No     Caffeine Concern Yes     Comment: 1 serv/day, rare pop, occas chocolate (3-4/yr)     Occupational Exposure Not Asked     Hobby Hazards Not Asked     Sleep Concern Yes     Comment: MIMI, wakes feeling rested     Stress Concern Not Asked     Weight Concern Not Asked     Special Diet Not Asked     Back Care Not Asked     Exercise Yes     Comment: 30-45' 2-3 x/wk     Bike Helmet Not Asked     Seat Belt Yes     Self-Exams Not Asked     Parent/sibling w/ CABG, MI or angioplasty before 65F 55M? Yes   Social History Narrative     None       Review of Systems:  Skin:  Negative     Eyes:  Negative    ENT:  Positive for hearing loss  Respiratory:  Positive for dyspnea on exertion;shortness of breath  Cardiovascular:  Negative    Gastroenterology:  "Negative    Genitourinary:  not assessed    Musculoskeletal:  Positive for back pain  Neurologic:  Negative    Psychiatric:  Negative    Heme/Lymph/Imm:  Negative    Endocrine:  Negative      Physical Exam:  Vitals: /72   Pulse 87   Ht 1.753 m (5' 9\")   Wt 94.8 kg (209 lb)   BMI 30.86 kg/m      Constitutional:  cooperative, alert and oriented, well developed, well nourished, in no acute distress        Skin:  warm and dry to the touch        Head:  normocephalic        Eyes:  sclera white;no xanthalasma;no nystagmus        ENT:  no pallor or cyanosis   masked    Neck:  carotid pulses are full and equal bilaterally, JVP normal, no carotid bruit        Chest:  normal breath sounds, clear to auscultation, normal A-P diameter, normal symmetry, normal respiratory excursion, no use of accessory muscles        Cardiac: regular rhythm;normal S1 and S2;no S3 or S4;apical impulse not displaced       grade 1;grade 2;systolic ejection murmur;RUSB radiation to the carotid        Abdomen:  abdomen soft;BS normoactive        Vascular: pulses full and equal                                      Extremities and Muscular Skeletal:  no edema           Neurological:  no gross motor deficits        Psych:  affect appropriate, oriented to time, person and place     Recent Lab Results:  LIPID RESULTS:  Lab Results   Component Value Date    CHOL 174 01/06/2021    HDL 57 01/06/2021     (H) 01/06/2021    TRIG 53 01/06/2021    CHOLHDLRATIO 3.2 05/14/2015       LIVER ENZYME RESULTS:  Lab Results   Component Value Date    AST 27 07/22/2019    ALT 31 07/22/2019       CBC RESULTS:  Lab Results   Component Value Date    WBC 9.2 01/08/2021    RBC 3.94 (L) 01/08/2021    HGB 12.7 (L) 01/08/2021    HCT 39.5 (L) 01/08/2021     01/08/2021    MCH 32.2 01/08/2021    MCHC 32.2 01/08/2021    RDW 13.2 01/08/2021     01/08/2021       BMP RESULTS:  Lab Results   Component Value Date     01/08/2021    POTASSIUM 4.4 01/08/2021 "    CHLORIDE 110 (H) 01/08/2021    CO2 24 01/08/2021    ANIONGAP 6 01/08/2021     (H) 01/08/2021    BUN 29 01/08/2021    CR 1.33 (H) 01/08/2021    GFRESTIMATED 48 (L) 01/08/2021    GFRESTBLACK 55 (L) 01/08/2021    TRAN 8.8 01/08/2021        A1C RESULTS:  Lab Results   Component Value Date    A1C 5.7 (H) 01/06/2021       INR RESULTS:  No results found for: INR      Dariel Carlson MD, [facility]    CC  No referring provider defined for this encounter.

## 2021-03-15 NOTE — TELEPHONE ENCOUNTER
DIAGNOSIS: Degen Disc Disease/ Low back pain years / Dr Chance Espinosa/Lisa wanting to discuss cortisone injection injection/ no recent imaging   APPOINTMENT DATE:    NOTES STATUS DETAILS   OFFICE NOTE from referring provider     OFFICE NOTE from other specialist Internal    DISCHARGE SUMMARY from hospital     DISCHARGE REPORT from the ER     OPERATIVE REPORT     EMG report     MEDICATION LIST INTERNAL    MRI INTERNAL    DEXA (osteoporosis/bone health)     CT SCAN INTERNAL    XRAYS (IMAGES & REPORTS) INTERNAL

## 2021-03-16 ENCOUNTER — PRE VISIT (OUTPATIENT)
Dept: ORTHOPEDICS | Facility: CLINIC | Age: 86
End: 2021-03-16

## 2021-03-18 ENCOUNTER — MYC MEDICAL ADVICE (OUTPATIENT)
Dept: FAMILY MEDICINE | Facility: CLINIC | Age: 86
End: 2021-03-18

## 2021-03-22 ENCOUNTER — OFFICE VISIT (OUTPATIENT)
Dept: PALLIATIVE MEDICINE | Facility: CLINIC | Age: 86
End: 2021-03-22
Payer: COMMERCIAL

## 2021-03-22 VITALS — DIASTOLIC BLOOD PRESSURE: 68 MMHG | HEART RATE: 84 BPM | OXYGEN SATURATION: 96 % | SYSTOLIC BLOOD PRESSURE: 112 MMHG

## 2021-03-22 DIAGNOSIS — M48.061 SPINAL STENOSIS OF LUMBAR REGION WITHOUT NEUROGENIC CLAUDICATION: Primary | ICD-10-CM

## 2021-03-22 PROCEDURE — 99204 OFFICE O/P NEW MOD 45 MIN: CPT | Performed by: PHYSICAL MEDICINE & REHABILITATION

## 2021-03-22 PROCEDURE — 99207 PR CDG-MDM COMPONENT: MEETS MODERATE - UP CODED: CPT | Performed by: PHYSICAL MEDICINE & REHABILITATION

## 2021-03-22 ASSESSMENT — PAIN SCALES - GENERAL: PAINLEVEL: MODERATE PAIN (4)

## 2021-03-22 NOTE — PATIENT INSTRUCTIONS
1. Call [phone] to schedule your lumbar MRI.    2. Once I have the results, I'll call you and we'll discuss the injection.    Take care,    DO [facility]            ----------------------------------------------------------------  Clinic Number:  [phone]     Call with any questions about your care and for scheduling assistance.     Calls are returned Monday through Friday between 8 AM and 4:30 PM. We usually get back to you within 2 business days depending on the issue/request.    If we are prescribing your medications:    For opioid medication refills, call the clinic or send a SureDone message 7 days in advance.  Please include:    Name of requested medication    Name of the pharmacy.    For non-opioid medications, call your pharmacy directly to request a refill. Please allow 3-4 days to be processed.     Per [place] State Law:    All controlled substance prescriptions must be filled within 30 days of being written.      For those controlled substances allowing refills, pickup must occur within 30 days of last fill.      We believe regular attendance is key to your success in our program!      Any time you are unable to keep your appointment we ask that you call us at least 24 hours in advance to cancel.This will allow us to offer the appointment time to another patient.     Multiple missed appointments may lead to dismissal from the clinic.

## 2021-03-22 NOTE — PROGRESS NOTES
[facility] Medical Spine Consultation    Date of visit: 3/22/2021        Assessment:  Kevin Fierro is a 86 year old male with a past medical history significant for NSTEMI, aortic stenosis, Hypertension, Hyperlipidemia, CKD stage 3, GERD, BPH, MIMI who presents with complaints of chronic low back pain.     1. Chronic low back pain: Patient reports a gradually worsening 5 year history of bilateral low back pain. Exam shows moderately reduced lumbar spine ROM in all planes, no pain with extension/rotation, neurological exam was normal, SLR negative bilaterally. There is some right sided paraspinal tenderness but not consistently reproduced. MRI from 2017 shows moderate central and foraminal stenosis at L4-5 as well as widespread degenerative disc disease and mild-mod facet arthropathy. Based on patient's history, exam and MRI, differential includes: Lumbar spinal stenosis, lumbar ddd, facet arthropathy. Will order a repeat MRI and proceed with interventional options based on the results, likely an lumbar epidural steroid injection.    2. Bilateral hip bursitis: Improvement noted with injection in jan 2020 which has tapered off. Will address after potential procedure for chronic back pain.    Screening tools:  Oswestry score is 19 corresponding to a 38% disability          Plan:  Diagnosis reviewed, treatment options addressed, and risks/benefits discussed. The patient was involved in shared decision making regarding the plan as laid out below.    1. Education: The patient was educated as to the natural history of their disorder. Reassurance was given and the patient was encouraged to engage in activity and movement as able.  2. Physical Therapy: Will refer based on response to lumbar epidural steroid injection.  3. Diagnostic Studies:  Lumbar MRI ordered  4. Medication Management:  No changes made today.   1. Continue tylenol 1000mg qid prn  5. Further procedures recommended: based on MRI results.  1. Can  repeat bilateral GT bursa injections after lumbar pain is addressed  6. Recommendations/follow-up for PCP:  none  7. Follow up: 1 month after lumbar epidural steroid injection.      John Leal DO  [facility]            Primary Care Provider: Dr. Chance Espinosa    Reason for consultation:    Kevin Fierro is a 86 year old male who is seen in consultation today at the request of his primary care physician, Chance Espinosa .     Consultation and Evaluation for: Medical spine evaluation    Review of [place] Prescription Monitoring Program (): Today I have also reviewed the patient's history of controlled substance use, as provided by [place] licensed pharmacies and prescriber dispensers.       Review of Electronic Chart: Today I have also reviewed available medical information in the patient's medical record at [facility] ([facility]), including relevant provider notes, laboratory work, and imaging.       Chief Complaint:    Back pain    Pain history:  Kevin Fierro is a 86 year old who first started having problems with low pain about 5 years ago. The pain is across his low back and radiates to both hips. Being in the same position too long seems to flare up his back pain. It doesn't make a difference whether he is using a cane or a walking stick. The symptoms seem to have been gradually worsening in the past 2-3 years. He denies any pain radiating into his lower extremities but does have hip bursitis on the left which causes left lateral thigh pain. He denies any focal weakness, denies any numbness, pain or paresthesias in the lower extremities. He denies any bowel/bladder changes. He presents today for interventional options for chronic low back pain    He also has a history of bilateral hip bursitis and saw Dr. West last year for injections which helped significantly. The hip pain has been coming back on the left side now.     He saw Dr. West for hip bursitis last year.    Pain is localized  to low back  Radiation of pain: bilateral hip  Other associated symptoms: denies  History of cancer or weight loss: denies    Pain is described as Aching and Burning   Quality and timing of pain: constant  Pain rating: averages 7/10 on a 0-10 scale.  Aggravating factors include: walking, standing and sitting  Relieving factors include: rest  Denies red flags including: bowel or bladder symptoms, fever, chills, saddle anesthesia, profound motor loss, history of cancer, history of immune compromise, weight loss.     Impact of Pain: There is significant limitation with mobility.    Current medication treatments include:  -Tylenol  Pain medications are being prescribed by none.    Previous medication treatments included:  none    Other treatments have included:  Kevin Fierro has not been seen at a pain clinic in the past.    Behavioral interventions: hasn't tried  PT: years ago  Manual Medicine: hasn't tried recently    Interventional:  Acupuncture: hasn't tried  TENs Unit: hasn't tried  Injections: none for back pain.   Bursa injections were helpful for hip pain - 1/30/20 - Dr. West.      Past Medical History:  Past Medical History:   Diagnosis Date     Arthritis .     BCC (basal cell carcinoma of skin) 10/2018, 11/2019    right jaw, rt parietal,11/2019- Dr Limon     Choroidal nevus of left eye     Dr Connor     CKD (chronic kidney disease) stage 3, GFR 30-59 ml/min      Colon polyps      Coronary artery disease involving native coronary artery of native heart without angina pectoris 01/2021     ED (erectile dysfunction)      Family history of cardiovascular disease      GERD (gastroesophageal reflux disease) 2013     Hematuria 1999    dr scott     Hernia, abdominal      Hyperlipidemia LDL goal <130 1990     Hypertension goal BP (blood pressure) < 140/90 1990     Hypertrophy of prostate without urinary obstruction and other lower urinary tract symptoms (LUTS)      Lumbar stenosis 2017    mild to moderate  foraminal and central     Lung nodules 11/2010    CT scan stable     Nonrheumatic aortic valve stenosis 01/2021    mild to moderate     obstructive SLEEP APNEA 05/2007    CPAP     Other and unspecified 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 05/25/2005     Palpitations      PSVT 11/2017    few runs, rare PVC     Restrictive lung disease      Seasonal allergies      Unspecified hearing loss 05/2005    hearing aids, L>R - Dr Hernandez     Past Surgical History:  Past Surgical History:   Procedure Laterality Date     CV HEART CATHETERIZATION WITH POSSIBLE INTERVENTION N/A 1/7/2021    Procedure: Heart Catheterization with Possible Intervention;  Surgeon: Raúl Rich MD;  Location:  HEART CARDIAC CATH LAB     CV LEFT HEART CATH N/A 1/7/2021    Procedure: Left Heart Cath;  Surgeon: Raúl Rich MD;  Location:  HEART CARDIAC CATH LAB     CYSTOSCOPY       HC COLONOSCOPY THRU STOMA, DIAGNOSTIC  2005, 5/10, 5/11    Polyps-> due 2015 - Ct Colonography negative     HC REPAIR INCISIONAL HERNIA,REDUCIBLE  1960    Hernia Repair, Incisional, Unilateral     STRESS ECHO (METRO)  7/09, 5/12, 7/17    Negative     TONSILLECTOMY & ADENOIDECTOMY  1946     Medications:  Current Outpatient Medications   Medication Sig Dispense Refill     amLODIPine (NORVASC) 5 MG tablet Take 1 tablet (5 mg) by mouth daily 90 tablet 3     aspirin 81 MG EC tablet Take 81 mg by mouth At Bedtime       atorvastatin (LIPITOR) 40 MG tablet Take 1 tablet (40 mg) by mouth At Bedtime 30 tablet 1     cephALEXin (KEFLEX) 500 MG capsule Take 1 capsule (500 mg) by mouth 3 times daily 30 capsule 0     finasteride (PROSCAR) 5 MG tablet Take 1 tablet (5 mg) by mouth daily 90 tablet 3     metoprolol succinate ER (TOPROL-XL) 100 MG 24 hr tablet Take 1 tablet (100 mg) by mouth daily 90 tablet 3     multivitamin, therapeutic (THERA-VIT) TABS tablet Take 1 tablet by mouth At Bedtime       omeprazole (PRILOSEC) 20 MG DR capsule Take 20 mg  by mouth daily as needed       terazosin (HYTRIN) 10 MG capsule TAKE ONE CAPSULE BY MOUTH AT BEDTIME 90 capsule 3     triamcinolone (KENALOG) 0.1 % external cream Apply topically 2 times daily as needed for irritation       albuterol (PROAIR HFA/PROVENTIL HFA/VENTOLIN HFA) 108 (90 Base) MCG/ACT inhaler Inhale 2 puffs into the lungs 4 times daily as needed for shortness of breath / dyspnea or wheezing       Allergies:     Allergies   Allergen Reactions     Pollen Extract      Tree pollen         Social History:  Home situation: lives in [place]  Occupation/Schooling:retired  Tobacco use: denies  Alcohol use: denies  Drug use: denies    Chemical dependency: The patient denies any history of treatment for alcohol or drug abuse.denies    Family history:  Family History   Problem Relation Age of Onset     C.A.D. Father         age 50's     Hypertension Father      C.A.D. Brother      C.A.D. Brother         mid 40's     Diabetes Brother      Cancer Brother         pancreatic CA     Coronary Artery Disease Brother      Cancer - colorectal No family hx of      Prostate Cancer No family hx of      MRI L-spine from 7/14/17 shows;                                                                   IMPRESSION:   1. Multilevel degenerative disc disease throughout the lumbar spine  without significant acute disc protrusion.  2. Mild-to-moderate central stenosis at L4-L5 related to multiple  factors. No other significant central stenosis.  3. Multilevel foraminal stenosis bilaterally as described above, most  prominent at L4-L5.     NAVNEET WASHBURN MD      Review of Systems:    POSTIVE IN BOLD  GENERAL: fever/chills, fatigue, general unwell feeling, weight gain/loss.  HEAD/EYES:  headache, dizziness, or vision changes.    EARS/NOSE/THROAT:  Nosebleeds, hearing loss, sinus infection, earache, tinnitus.  IMMUNE:  Allergies, cancer, immune deficiency, or infections.  SKIN:  Urticaria, rash, hives  HEME/Lymphatic:   anemia, easy  bruising, easy bleeding.  RESPIRATORY:  cough, wheezing, or shortness of breath  CARDIOVASCULAR/Circulation:  Extremity edema, syncope, hypertension, tachycardia, or angina.  GASTROINTESTINAL:  abdominal pain, nausea/emesis, diarrhea, constipation,  hematochezia, or melena.  ENDOCRINE:  Diabetes, steroid use,  thyroid disease or osteoporosis.  MUSCULOSKELETAL: neck pain, back pain, arthralgia, arthritis, or gout.  GENITOURINARY:  frequency, urgency, dysuria, difficulty voiding, hematuria or incontinence.  NEUROLOGIC:  weakness, numbness, paresthesias, seizure, tremor, stroke or memory loss.  PSYCHIATRIC:  depression, anxiety, stress, suicidal thoughts or mood swings.     Physical Exam:  Vitals:    03/22/21 0946   BP: 112/68   Pulse: 84   SpO2: 96%     Exam:  Constitutional: healthy, alert and no distress  Head: normocephalic. Atraumatic.   Eyes: no redness or jaundice noted   Skin: no suspicious lesions or rashes  Psychiatric: mentation appears normal and affect normal/bright    Musculoskeletal exam:  Gait/Station/Posture: Gait is antalgic.  Cervical spine: normal  Lumbar spine: moderately reduced ROM in all planes. Right sided paraspinal tenderness  Myofascial tenderness:  Right sided paraspinal tenderness  Straight leg exam: neg  Bunny's maneuver: neg  Sacroiliac (SI) joint tenderness: neg  Greater trochanteric tenderness: bilateral tenderness, worse on the left  Piriformis tenderness: neg      Neurologic exam:  Motor:  5/5 UE and LE strength  Reflexes:     Biceps:     R:  2/4 L: 2/4   Brachioradialis   R:  2/4 L: 2/4   Triceps:  R:  2/4 L: 2/4   Patella:  R:  2/4 L: 2/4   Achilles:  R:  2/4 L: 2/4   Sensory:  (upper and lower extremities):   Light touch: normal        BILLING TIME DOCUMENTATION:   The total TIME spent on this patient on the date of the encounter/appointment was 34 minutes.      TOTAL TIME includes:   Time spent preparing to see the patient (reviewing records and tests)   Time spent face to face  (or over the phone) with the patient   Time spent ordering tests, medications, procedures and referrals   Time spent Referring and communicating with other healthcare professionals   Time spent documenting clinical information in Epic         John Leal D.O.  Medical Spine Specialist  [facility]

## 2021-03-22 NOTE — Clinical Note
Will obtain updated MRI and plan a procedure based on the results. Likely lumbar epidural steroid injection for lumbar spinal stenosis.    Thanks for the referral,    DO [facility]

## 2021-03-27 ENCOUNTER — HOSPITAL ENCOUNTER (OUTPATIENT)
Dept: MRI IMAGING | Facility: CLINIC | Age: 86
Discharge: HOME | End: 2021-03-27
Attending: PHYSICAL MEDICINE & REHABILITATION | Admitting: PHYSICAL MEDICINE & REHABILITATION
Payer: COMMERCIAL

## 2021-03-27 DIAGNOSIS — M48.061 SPINAL STENOSIS OF LUMBAR REGION WITHOUT NEUROGENIC CLAUDICATION: ICD-10-CM

## 2021-03-27 PROCEDURE — 72148 MRI LUMBAR SPINE W/O DYE: CPT

## 2021-03-31 DIAGNOSIS — M48.061 SPINAL STENOSIS OF LUMBAR REGION WITHOUT NEUROGENIC CLAUDICATION: Primary | ICD-10-CM

## 2021-03-31 NOTE — PROGRESS NOTES
MRI results reviewed with patient. There is wide spread ddd and lumbar facet arthropathy. Moderate L4-5 central canal stenosis likely to be causing their symptoms. Lumbar epidural steroid injection was discussed and ordered.    DO [facility]

## 2021-04-12 ENCOUNTER — RADIOLOGY INJECTION OFFICE VISIT (OUTPATIENT)
Dept: PALLIATIVE MEDICINE | Facility: CLINIC | Age: 86
End: 2021-04-12
Attending: PHYSICAL MEDICINE & REHABILITATION
Payer: COMMERCIAL

## 2021-04-12 VITALS — OXYGEN SATURATION: 95 % | HEART RATE: 84 BPM | DIASTOLIC BLOOD PRESSURE: 70 MMHG | SYSTOLIC BLOOD PRESSURE: 124 MMHG

## 2021-04-12 DIAGNOSIS — M48.062 SPINAL STENOSIS OF LUMBAR REGION WITH NEUROGENIC CLAUDICATION: Primary | ICD-10-CM

## 2021-04-12 PROCEDURE — 62323 NJX INTERLAMINAR LMBR/SAC: CPT | Performed by: PHYSICAL MEDICINE & REHABILITATION

## 2021-04-12 NOTE — NURSING NOTE
Pre-procedure Intake    Have you been fasting? NA    If yes, for how long?     Are you taking a prescribed blood thinner such as coumadin, Plavix, Xarelto?    No    If yes, when did you take your last dose?     Do you take aspirin?  Yes -   ASA    If cervical procedure, have you held aspirin for 6 days?   NA    Do you have any allergies to contrast dye, iodine, steroid and/or numbing medications?  NO    Are you currently taking antibiotics or have an active infection?  NO    Have you had a fever/elevated temperature within the past week? NO    Are you currently taking oral steroids? NO    Do you have a ? Yes       Are you pregnant or breastfeeding?  Not Applicable    Have you received the COVID-19 vaccine? Yes       If yes, was it your 1st, 2nd or only dose needed? 2nd    Date of most recent vaccine: 02/2021    Are the vital signs normal?  Yes

## 2021-04-12 NOTE — PATIENT INSTRUCTIONS
[facility] Procedure Discharge Instructions    Today you saw:     Dr. John eLal    Your procedure:  Epidural steroid injection      Medications used:  Lidocaine (anesthetic)       Kenalog (steroid)  Omnipaque (contrast)   Normal Saline             Be cautious when walking as numbness and/or weakness in the legs may occur up to 6-8 hours after the procedure due to effect of the local anesthetic    Do not drive for 6 hours. The effect of the local anesthetic could slow your reflexes.     Avoid strenuous activity for the first 24 hours. You may resume your regular activities after that.     You may shower, however avoid swimming, tub baths or hot tubs for 24 hours following your procedure    You may have a mild to moderate increase in pain for several days following the injection.      You may use ice packs for 10-15 minutes, 3 to 4 times a day at the injection site for comfort    Do not use heat to painful areas for 6 to 8 hours. This will give the local anesthetic time to wear off and prevent you from accidentally burning your skin.    Unless you have been directed to avoid the use of anti-inflammatory medications (NSAIDS-ibuprofen, Aleve, Motrin), you may use these medications or Tylenol for pain control if needed.     With diabetes, check your blood sugar more frequently than usual as your blood sugar may be higher than normal for 10-14 days following a steroid injection. Contact your doctor who manages your diabetes if your blood sugar is higher than usual    Possible side effects of steroids that you may experience include flushing, elevated blood pressure, increased appetite, mild headaches and restlessness.  All of these symptoms will get better with time.    It may take up to 14 days for the steroid medication to start working although you may feel the effect as early as a few days after the procedure.     Follow up with your referring provider in 2-3 weeks      If you experience  any of the following, call the pain center line during work hours at [phone] or on-call physician after hours at [phone]:  -Fever over 100 degree F  -Swelling, bleeding, redness, drainage, warmth at the injection site  -Progressive weakness or numbness in your legs   -Loss of bowel or bladder function  -Unusual headache that is not relieved by Tylenol or your regular headache medication  -Unusual new onset of pain that is not improving

## 2021-04-12 NOTE — PROGRESS NOTES
[facility] - Procedure Note    Date of Visit: 4/12/2021    Procedure performed: Lumbar 4-5 interlaminar epidural steroid injection  Diagnosis: Lumbar spondylosis; Lumbar radiculitis/radiculopathy  : John Leal DO   Anesthesia: none    Indications: Kevin Fierro is a 86 year old male who is seen for a lumbar epidural steroid injection. The patient describes pain across his low back with intermittent lower extremity radiation when he is active. The patient has been exhibiting symptoms consistent with lumbar intraspinal inflammation and radiculopathy. Symptoms have been persistent, disabling, and intermittently severe. The patient reports minimal improvement with conservative treatment, including oral medications and exercises.    Lumbar MRI was done on 3/27/21 which showed   IMPRESSION:  1. Interval increase in now moderate to severe degenerative right  facet joint arthropathy at L5-S1 with right L5-S1 facet joint  effusion.  2. Otherwise, no significant change in multilevel degenerative disc  disease and facet arthropathy of the lumbar spine, as described.  3. Mild/mild to moderate central spinal canal and bilateral lateral  recess stenosis at L4-L5. No significant central spinal canal stenosis  elsewhere. Mild to moderate right L4-L5 neural foraminal stenosis with  lesser degrees of neural foraminal stenosis elsewhere, as described.  Please see the body of the report for additional details.     EFE FRANKLIN MD    Allergies:      Allergies   Allergen Reactions     Pollen Extract      Tree pollen          Vitals:  /70 (BP Location: Left arm, Cuff Size: Adult Large)   Pulse 84   SpO2 95%     Review of Systems: The patient denies recent fever, chills, illness, use of antibiotics or anticoagulants. All other 10-point review of systems negative.     Procedure: The procedure and risks were explained, and informed written consent was obtained from the patient.  Risks include but are not limited to: infection, bleeding, increased pain, and damage to soft tissue, nerve, muscle, and vasculature structures. After getting informed consent, patient was brought into the procedure suite and was placed in a prone position on the procedure table. A Pause for the Cause was performed. Patient was prepped and draped in sterile fashion.     The L4-5 interspace was identified with use of fluoroscopy in AP view. A 25-gauge, 1.5 inch needle was used to anesthetize the skin and subcutaneous tissue entry site with a total of 2 ml of 1% lidocaine. Under fluoroscopic visualization, a 22-gauge, 4.5 inch Tuohy epidural needle was slowly advanced towards the epidural space a few millimeters left-sided of midline. The latter part of the needle advancement was guided with fluoroscopy in the lateral view. The epidural space was identified using loss of resistance technique. After negative aspiration for heme and cerebrospinal fluid, a total of 1 mL of non-ionic contrast was injected to confirm needle placement with 9 mL of contrast wasted. Epidurogram confirmed spread within the posterior epidural space. 1 ml of 40mg/ml of triamcinolone, 1 ml of 1% lidocaine, and 3 ml of preservative free saline was injected. The needle was removed.  Images were saved to PACS.    The patient tolerated the procedure well, and there was no evidence of procedural complications. No new sensory or motor deficits were noted following the procedure. The patient was stable and able to ambulate on discharge home. Post-procedure instructions were provided.     Pre-procedure pain score: 4/10 in the back, 4/10 in the leg  Post-procedure pain score: 0/10 in the back, 0/10 in the leg    Assessment/Plan: Kevin Fierro is a 86 year old male s/p lumbar interlaminar epidural steroid injection today for lumbar spondylosis and radiculitis/radiculopathy.     1. Following today's procedure, the patient was advised to contact the  [facility] [place] for any of the following:   Fever, chills, or night sweats   New onset of pain, numbness, or weakness   Any questions/concerns regarding the procedure  If unable to contact the Pain Center, the patient was instructed to go to a local Emergency Room for any complications.   2. The patient will receive a follow-up call in 1 week.   3. Follow-up with the referring provider in 2 weeks for post-procedure evaluation.        John Leal DO  [facility]

## 2021-04-12 NOTE — NURSING NOTE
Discharge Information    IV Discontiued Time:  NA    Amount of Fluid Infused:  NA    Discharge Criteria = When patient returns to baseline or as per MD order    Consciousness:  Pt is fully awake    Circulation:  BP +/- 20% of pre-procedure level    Respiration:  Patient is able to breathe deeply    O2 Sat:  Patient is able to maintain O2 Sat >92% on room air    Activity:  Moves 4 extremities on command    Ambulation:  Patient is able to stand and walk or stand and pivot into wheelchair    Dressing:  Clean/dry or No Dressing    Notes:   Discharge instructions and AVS given to patient    Patient meets criteria for discharge?  YES    Admitted to PCU?  No    Responsible adult present to accompany patient home?  Yes    Signature/Title:    April Javier RN Care Coordinator  [facility] [place]

## 2021-04-19 DIAGNOSIS — I10 HYPERTENSION GOAL BP (BLOOD PRESSURE) < 140/90: ICD-10-CM

## 2021-04-19 DIAGNOSIS — N18.31 STAGE 3A CHRONIC KIDNEY DISEASE (H): ICD-10-CM

## 2021-04-19 DIAGNOSIS — I21.4 NSTEMI (NON-ST ELEVATED MYOCARDIAL INFARCTION) (H): ICD-10-CM

## 2021-04-21 RX ORDER — ATORVASTATIN CALCIUM 40 MG/1
TABLET, FILM COATED ORAL
Qty: 30 TABLET | Refills: 1 | Status: SHIPPED | OUTPATIENT
Start: 2021-04-21 | End: 2021-06-16

## 2021-04-23 ENCOUNTER — TELEPHONE (OUTPATIENT)
Dept: PALLIATIVE MEDICINE | Facility: CLINIC | Age: 86
End: 2021-04-23

## 2021-04-23 NOTE — TELEPHONE ENCOUNTER
Reason for call:  Other   Patient called regarding (reason for call): Letter  Additional comments: Pt calling to state that [facility] needs [facility] of recommendation in order for him to get a new mattress like Dr. Leal had suggested for his back pain. The letter can be faxed to [phone].    Phone number to reach patient:  Home number on file [phone] (home)    Best Time:  anytime    Can we leave a detailed message on this number?  YES    Travel screening: Not Applicable     Dianna MUJICA    [facility]

## 2021-04-23 NOTE — TELEPHONE ENCOUNTER
See message below.    Routing to provider to see if they will write this letter for patient    April Javier RN  Care Coordinator  [facility]

## 2021-04-27 NOTE — TELEPHONE ENCOUNTER
Called pt. Advised per below. He did not know if any program through [facility], he thought he would inquire to see if it would be possible. No further questions    Kristal GIL, RN Care Coordinator  [facility]  [facility]

## 2021-04-30 ENCOUNTER — MYC MEDICAL ADVICE (OUTPATIENT)
Dept: FAMILY MEDICINE | Facility: CLINIC | Age: 86
End: 2021-04-30

## 2021-05-03 NOTE — TELEPHONE ENCOUNTER
My chart message sent     Appointment made     Danielle Mills RN, BSN  [facility]

## 2021-05-03 NOTE — TELEPHONE ENCOUNTER
LOV 1/14/2021    My chart message sent     Awaiting reply     Danielle Mills RN, BSN  [facility]

## 2021-05-06 ENCOUNTER — OFFICE VISIT (OUTPATIENT)
Dept: FAMILY MEDICINE | Facility: CLINIC | Age: 86
End: 2021-05-06
Payer: COMMERCIAL

## 2021-05-06 VITALS
DIASTOLIC BLOOD PRESSURE: 74 MMHG | WEIGHT: 208 LBS | BODY MASS INDEX: 30.81 KG/M2 | SYSTOLIC BLOOD PRESSURE: 124 MMHG | TEMPERATURE: 97.5 F | HEIGHT: 69 IN | RESPIRATION RATE: 20 BRPM | HEART RATE: 94 BPM | OXYGEN SATURATION: 95 %

## 2021-05-06 DIAGNOSIS — I25.10 CORONARY ARTERY DISEASE INVOLVING NATIVE CORONARY ARTERY OF NATIVE HEART WITHOUT ANGINA PECTORIS: Primary | ICD-10-CM

## 2021-05-06 DIAGNOSIS — H61.23 BILATERAL IMPACTED CERUMEN: ICD-10-CM

## 2021-05-06 DIAGNOSIS — J98.4 RESTRICTIVE LUNG DISEASE: ICD-10-CM

## 2021-05-06 DIAGNOSIS — M25.552 BILATERAL HIP PAIN: ICD-10-CM

## 2021-05-06 DIAGNOSIS — Z82.49 FAMILY HISTORY OF CARDIOVASCULAR DISEASE: ICD-10-CM

## 2021-05-06 DIAGNOSIS — Z51.81 MEDICATION MONITORING ENCOUNTER: ICD-10-CM

## 2021-05-06 DIAGNOSIS — R79.9 ABNORMAL FINDING OF BLOOD CHEMISTRY, UNSPECIFIED: ICD-10-CM

## 2021-05-06 DIAGNOSIS — K21.9 GASTROESOPHAGEAL REFLUX DISEASE WITHOUT ESOPHAGITIS: ICD-10-CM

## 2021-05-06 DIAGNOSIS — M48.061 SPINAL STENOSIS OF LUMBAR REGION, UNSPECIFIED WHETHER NEUROGENIC CLAUDICATION PRESENT: ICD-10-CM

## 2021-05-06 DIAGNOSIS — N18.31 STAGE 3A CHRONIC KIDNEY DISEASE (H): ICD-10-CM

## 2021-05-06 DIAGNOSIS — E78.5 HYPERLIPIDEMIA LDL GOAL <70: ICD-10-CM

## 2021-05-06 DIAGNOSIS — M25.551 BILATERAL HIP PAIN: ICD-10-CM

## 2021-05-06 DIAGNOSIS — I10 HYPERTENSION GOAL BP (BLOOD PRESSURE) < 140/90: ICD-10-CM

## 2021-05-06 LAB
ALBUMIN SERPL-MCNC: 3.5 G/DL (ref 3.4–5)
ALBUMIN UR-MCNC: NEGATIVE MG/DL
ALP SERPL-CCNC: 46 U/L (ref 40–150)
ALT SERPL W P-5'-P-CCNC: 42 U/L (ref 0–70)
AMORPH CRY #/AREA URNS HPF: ABNORMAL /HPF
ANION GAP SERPL CALCULATED.3IONS-SCNC: 5 MMOL/L (ref 3–14)
APPEARANCE UR: CLEAR
AST SERPL W P-5'-P-CCNC: 29 U/L (ref 0–45)
BILIRUB SERPL-MCNC: 0.4 MG/DL (ref 0.2–1.3)
BILIRUB UR QL STRIP: NEGATIVE
BUN SERPL-MCNC: 29 MG/DL (ref 7–30)
CALCIUM SERPL-MCNC: 9.2 MG/DL (ref 8.5–10.1)
CHLORIDE SERPL-SCNC: 111 MMOL/L (ref 94–109)
CHOLEST SERPL-MCNC: 139 MG/DL
CK SERPL-CCNC: 74 U/L (ref 30–300)
CO2 SERPL-SCNC: 24 MMOL/L (ref 20–32)
COLOR UR AUTO: YELLOW
CREAT SERPL-MCNC: 1.45 MG/DL (ref 0.66–1.25)
ERYTHROCYTE [DISTWIDTH] IN BLOOD BY AUTOMATED COUNT: 13 % (ref 10–15)
GFR SERPL CREATININE-BSD FRML MDRD: 43 ML/MIN/{1.73_M2}
GLUCOSE SERPL-MCNC: 98 MG/DL (ref 70–99)
GLUCOSE UR STRIP-MCNC: NEGATIVE MG/DL
HBA1C MFR BLD: 5.9 % (ref 0–5.6)
HCT VFR BLD AUTO: 40.6 % (ref 40–53)
HDLC SERPL-MCNC: 55 MG/DL
HGB BLD-MCNC: 13.4 G/DL (ref 13.3–17.7)
HGB UR QL STRIP: ABNORMAL
KETONES UR STRIP-MCNC: NEGATIVE MG/DL
LDLC SERPL CALC-MCNC: 59 MG/DL
LEUKOCYTE ESTERASE UR QL STRIP: NEGATIVE
MCH RBC QN AUTO: 32.8 PG (ref 26.5–33)
MCHC RBC AUTO-ENTMCNC: 33 G/DL (ref 31.5–36.5)
MCV RBC AUTO: 99 FL (ref 78–100)
MUCOUS THREADS #/AREA URNS LPF: PRESENT /LPF
NITRATE UR QL: NEGATIVE
NON-SQ EPI CELLS #/AREA URNS LPF: ABNORMAL /LPF
NONHDLC SERPL-MCNC: 84 MG/DL
PH UR STRIP: 6 PH (ref 5–7)
PLATELET # BLD AUTO: 181 10E9/L (ref 150–450)
POTASSIUM SERPL-SCNC: 4.5 MMOL/L (ref 3.4–5.3)
PROT SERPL-MCNC: 6.9 G/DL (ref 6.8–8.8)
RBC # BLD AUTO: 4.09 10E12/L (ref 4.4–5.9)
RBC #/AREA URNS AUTO: ABNORMAL /HPF
SODIUM SERPL-SCNC: 140 MMOL/L (ref 133–144)
SOURCE: ABNORMAL
SP GR UR STRIP: >1.03 (ref 1–1.03)
TRIGL SERPL-MCNC: 126 MG/DL
TSH SERPL DL<=0.005 MIU/L-ACNC: 2 MU/L (ref 0.4–4)
UROBILINOGEN UR STRIP-ACNC: 0.2 EU/DL (ref 0.2–1)
WBC # BLD AUTO: 8 10E9/L (ref 4–11)
WBC #/AREA URNS AUTO: ABNORMAL /HPF

## 2021-05-06 PROCEDURE — 36415 COLL VENOUS BLD VENIPUNCTURE: CPT | Performed by: FAMILY MEDICINE

## 2021-05-06 PROCEDURE — 80053 COMPREHEN METABOLIC PANEL: CPT | Performed by: FAMILY MEDICINE

## 2021-05-06 PROCEDURE — 82550 ASSAY OF CK (CPK): CPT | Performed by: FAMILY MEDICINE

## 2021-05-06 PROCEDURE — 82043 UR ALBUMIN QUANTITATIVE: CPT | Performed by: FAMILY MEDICINE

## 2021-05-06 PROCEDURE — 84443 ASSAY THYROID STIM HORMONE: CPT | Performed by: FAMILY MEDICINE

## 2021-05-06 PROCEDURE — 80061 LIPID PANEL: CPT | Performed by: FAMILY MEDICINE

## 2021-05-06 PROCEDURE — 81001 URINALYSIS AUTO W/SCOPE: CPT | Performed by: FAMILY MEDICINE

## 2021-05-06 PROCEDURE — 83036 HEMOGLOBIN GLYCOSYLATED A1C: CPT | Performed by: FAMILY MEDICINE

## 2021-05-06 PROCEDURE — 85027 COMPLETE CBC AUTOMATED: CPT | Performed by: FAMILY MEDICINE

## 2021-05-06 PROCEDURE — 99214 OFFICE O/P EST MOD 30 MIN: CPT | Performed by: FAMILY MEDICINE

## 2021-05-06 ASSESSMENT — MIFFLIN-ST. JEOR: SCORE: 1613.86

## 2021-05-06 NOTE — PROGRESS NOTES
Assessment & Plan     ICD-10-CM    1. Coronary artery disease involving native coronary artery of native heart without angina pectoris  I25.10 NM Lexiscan stress test     REVIEW OF HEALTH MAINTENANCE PROTOCOL ORDERS     Comprehensive metabolic panel     Lipid panel reflex to direct LDL Fasting     CK total     CBC with platelets     TSH with free T4 reflex     UA reflex to Microscopic and Culture     Albumin Random Urine Quantitative with Creat Ratio     Hemoglobin A1c   2. Stage 3a chronic kidney disease  N18.31 REVIEW OF HEALTH MAINTENANCE PROTOCOL ORDERS     Comprehensive metabolic panel     CBC with platelets     TSH with free T4 reflex     UA reflex to Microscopic and Culture     Albumin Random Urine Quantitative with Creat Ratio     Hemoglobin A1c   3. Hypertension goal BP (blood pressure) < 140/90  I10 REVIEW OF HEALTH MAINTENANCE PROTOCOL ORDERS     Comprehensive metabolic panel     TSH with free T4 reflex     UA reflex to Microscopic and Culture     Albumin Random Urine Quantitative with Creat Ratio   4. Hyperlipidemia LDL goal <70  E78.5 REVIEW OF HEALTH MAINTENANCE PROTOCOL ORDERS     Comprehensive metabolic panel     Lipid panel reflex to direct LDL Fasting     CK total   5. Family history of cardiovascular disease  Z82.49 REVIEW OF HEALTH MAINTENANCE PROTOCOL ORDERS     Lipid panel reflex to direct LDL Fasting     UA reflex to Microscopic and Culture     Albumin Random Urine Quantitative with Creat Ratio     Hemoglobin A1c   6. Restrictive lung disease  J98.4 REVIEW OF HEALTH MAINTENANCE PROTOCOL ORDERS   7. Spinal stenosis of lumbar region, unspecified whether neurogenic claudication present  M48.061 REVIEW OF HEALTH MAINTENANCE PROTOCOL ORDERS   8. Bilateral hip pain  M25.551 REVIEW OF HEALTH MAINTENANCE PROTOCOL ORDERS    M25.552    9. Gastroesophageal reflux disease without esophagitis  K21.9 REVIEW OF HEALTH MAINTENANCE PROTOCOL ORDERS     CBC with platelets   10. Bilateral impacted cerumen   "H61.23    11. Medication monitoring encounter  Z51.81 NM Lexiscan stress test     REVIEW OF HEALTH MAINTENANCE PROTOCOL ORDERS     Comprehensive metabolic panel     Lipid panel reflex to direct LDL Fasting     CK total     CBC with platelets     TSH with free T4 reflex     UA reflex to Microscopic and Culture     Albumin Random Urine Quantitative with Creat Ratio     Hemoglobin A1c   12. Abnormal finding of blood chemistry, unspecified   R79.9 Hemoglobin A1c       Discussed treatment/modality options, including risk and benefits, he desires:    1) Lexiscan    2) fasting labs    3) Consider Dr Carlson    4) Dr Leal next week    5) irrigate cerumen    All diagnosis above reviewed and noted above, otherwise stable.      See [facility] orders for further details.      BMI:   Estimated body mass index is 30.72 kg/m  as calculated from the following:    Height as of this encounter: 1.753 m (5' 9\").    Weight as of this encounter: 94.3 kg (208 lb).   Weight management plan: diet and exercise      Return in about 1 month (around 6/6/2021), or if symptoms worsen or fail to improve, for Medication Recheck Visit, Follow Up Acute, Follow Up Chronic.    No LOS data to display    Doing chart review, history and exam, documentation and further activities as noted.           Chance Espinosa MD, FAAFP     [facility]  [address] [place], [place] [email or web address]   Office: [phone]  Fax: [phone]  Pager: [phone]       Subjective   Calixto is a 86 year old who presents for the following health issues     Dizziness/weakness/SOB - same symptoms that sent him to ER for NSTEMI - notes went out for walks x 2 in the last 7-10 days, noted LH, Fatigued, SOB, no nausea, no radiation, better with rest, took aspirin    Angiogram 1/6/2021    Conclusion         Prox LAD lesion is 50% stenosed.    Prox RCA lesion is 50% stenosed.     1. " Mild/moderate Lad and RCA lesions. Case discussed with Dr Jha and we did not feel iFR or OCT indicated to assess lesions. In light of mild troponin and normal wall motion (echo) could consider empiric medical trial and plavix. But would consider work up for possible Pulmonary embolism (given no cp, +WALKER and mild troponin).  If continues with symptoms a nuclear GXT could be done to look for ischemia. This patient has renal insufficiency and we did not want to give additional dye for OCT (to look for plaque erosion/rupture) but rather use dye for possible lung CT exam  2 Mild/mod aortic stenosis with mean gradient on cath 14mm, Hg. There is systemic HTN and mildly elevated LVEDP     Onset/Duration: 1 week  Description:   Do you feel faint: YES  Does it feel like the surroundings (bed, room) are moving: YES  Unsteady/off balance: YES  Have you passed out or fallen: no  Intensity: moderate  Progression of Symptoms: same  Accompanying Signs & Symptoms:  Heart palpitations or chest pain: no  Nausea, vomiting: no  Weakness or lack of coordination in arms or legs: no  Vision or speech changes: no  SOB:  YES  Numbness or tingling: no  Fatigue:  YES  Ringing in ears (Tinnitus): no  Hearing Loss: no  History:   Head trauma/concussion history: no  Previous similar symptoms: YES  Recent bleeding history: no  Any new medications (BP?): no  Precipitating factors:   Worse with activity: YES  Worse with head movement: YES  Alleviating factors:   Does staying in a fixed position give relief: YES  Therapies tried and outcome: None    Recent Dr Carlson visit    1.  Recent non-STEMI.  Normal coronary angiogram.  Possible small vessel disease.  No ongoing symptoms.  I note that he was not placed on Plavix on discharge but given his advanced age I think this is reasonable.  I did not change his medications today.  2.  Hypertension.  Patient has had problems with high blood pressure but he checks his blood pressure at home and it has  "been very well controlled.  Today's blood pressure in the office is also excellent at 121/72.  Continue current medications.  I described the benefit of each of the medications he is using.  3.  Hyperlipidemia.  Lipid profile is acceptable with a LDL of 106.  Given his recent normal angiogram I do not think we need to be any more aggressive than this.  4.  Aortic stenosis.  This is mild to moderate in severity and is asymptomatic.  We will continue to monitor.    Follow up with Dr Leal on 5/11/2021    Review of Systems   CONSTITUTIONAL: NEGATIVE for fever, chills, change in weight  INTEGUMENTARY/SKIN: NEGATIVE for worrisome rashes, moles or lesions  EYES: NEGATIVE for vision changes or irritation  ENT/MOUTH: NEGATIVE for ear, mouth and throat problems  RESP: NEGATIVE for significant cough or SOB  BREAST: NEGATIVE for masses, tenderness or discharge  GI: NEGATIVE for nausea, abdominal pain, heartburn, or change in bowel habits  : NEGATIVE for frequency, dysuria, or hematuria  MUSCULOSKELETAL: NEGATIVE for significant arthralgias or myalgia  NEURO: NEGATIVE for weakness, dizziness or paresthesias  ENDOCRINE: NEGATIVE for temperature intolerance, skin/hair changes  HEME: NEGATIVE for bleeding problems  PSYCHIATRIC: NEGATIVE for changes in mood or affect      Objective    /74   Pulse 94   Temp 97.5  F (36.4  C) (Tympanic)   Resp 20   Ht 1.753 m (5' 9\")   Wt 94.3 kg (208 lb)   SpO2 95%   BMI 30.72 kg/m    Body mass index is 30.72 kg/m .  Physical Exam   GENERAL: healthy, alert and no distress  EYES: Eyes grossly normal to inspection, PERRL and conjunctivae and sclerae normal  HENT: ear canals and TM's normal, nose and mouth without ulcers or lesions, R>L cerumen  NECK: no adenopathy, no asymmetry, masses, or scars and thyroid normal to palpation  RESP: lungs clear to auscultation - no rales, rhonchi or wheezes  CV: regular rate and rhythm, normal S1 S2, no S3 or S4, no murmur, click or rub, no peripheral " edema and peripheral pulses strong  ABDOMEN: soft, nontender, no hepatosplenomegaly, no masses and bowel sounds normal  MS: no gross musculoskeletal defects noted, no edema  SKIN: no suspicious lesions or rashes  NEURO: Normal strength and tone, mentation intact and speech normal  PSYCH: mentation appears normal, affect normal/bright    Reviewed labs/imaging

## 2021-05-06 NOTE — LETTER
[facility]  [address], [place] [address] [phone]                    May 10, 2021    Kevin Fierro  [address] [address]      Dear Kevin,    Here is a summary of your recent test results:    Labs are overall stable/improved     We advise:     Continue current cares.   Balanced low cholesterol diet.   Regular exercise.   Weight loss.     For additional lab test information, [email or web address] is an excellent reference.     Your test results are enclosed.      Please contact me if you have any questions.    In addition, here is a list of due or overdue Health Maintenance reminders.    Health Maintenance Due   Topic Date Due     Annual Wellness Visit  07/26/2020     PHQ-2  01/01/2021       Please call us at [phone] (or use [employer]) to address the above recommendations.            Thank you very much for trusting [facility].     Healthy regards,          Chance Espinosa M.D.        Results for orders placed or performed in visit on 05/06/21   Comprehensive metabolic panel     Status: Abnormal   Result Value Ref Range    Sodium 140 133 - 144 mmol/L    Potassium 4.5 3.4 - 5.3 mmol/L    Chloride 111 (H) 94 - 109 mmol/L    Carbon Dioxide 24 20 - 32 mmol/L    Anion Gap 5 3 - 14 mmol/L    Glucose 98 70 - 99 mg/dL    Urea Nitrogen 29 7 - 30 mg/dL    Creatinine 1.45 (H) 0.66 - 1.25 mg/dL    GFR Estimate 43 (L) >60 mL/min/[1.73_m2]    GFR Estimate If Black 50 (L) >60 mL/min/[1.73_m2]    Calcium 9.2 8.5 - 10.1 mg/dL    Bilirubin Total 0.4 0.2 - 1.3 mg/dL    Albumin 3.5 3.4 - 5.0 g/dL    Protein Total 6.9 6.8 - 8.8 g/dL    Alkaline Phosphatase 46 40 - 150 U/L    ALT 42 0 - 70 U/L    AST 29 0 - 45 U/L   Lipid panel reflex to direct LDL Fasting     Status: None   Result Value Ref Range    Cholesterol 139 <200 mg/dL    Triglycerides 126 <150 mg/dL    HDL Cholesterol 55 >39 mg/dL    LDL Cholesterol Calculated 59 <100 mg/dL    Non HDL  Cholesterol 84 <130 mg/dL   CK total     Status: None   Result Value Ref Range    CK Total 74 30 - 300 U/L   CBC with platelets     Status: Abnormal   Result Value Ref Range    WBC 8.0 4.0 - 11.0 10e9/L    RBC Count 4.09 (L) 4.4 - 5.9 10e12/L    Hemoglobin 13.4 13.3 - 17.7 g/dL    Hematocrit 40.6 40.0 - 53.0 %    MCV 99 78 - 100 fl    MCH 32.8 26.5 - 33.0 pg    MCHC 33.0 31.5 - 36.5 g/dL    RDW 13.0 10.0 - 15.0 %    Platelet Count 181 150 - 450 10e9/L   TSH with free T4 reflex     Status: None   Result Value Ref Range    TSH 2.00 0.40 - 4.00 mU/L   UA reflex to Microscopic and Culture     Status: Abnormal    Specimen: Midstream Urine   Result Value Ref Range    Color Urine Yellow     Appearance Urine Clear     Glucose Urine Negative NEG^Negative mg/dL    Bilirubin Urine Negative NEG^Negative    Ketones Urine Negative NEG^Negative mg/dL    Specific Gravity Urine >1.030 1.003 - 1.035    Blood Urine Trace (A) NEG^Negative    pH Urine 6.0 5.0 - 7.0 pH    Protein Albumin Urine Negative NEG^Negative mg/dL    Urobilinogen Urine 0.2 0.2 - 1.0 EU/dL    Nitrite Urine Negative NEG^Negative    Leukocyte Esterase Urine Negative NEG^Negative    Source Midstream Urine    Albumin Random Urine Quantitative with Creat Ratio     Status: Abnormal   Result Value Ref Range    Creatinine Urine 162 mg/dL    Albumin Urine mg/L 79 mg/L    Albumin Urine mg/g Cr 48.77 (H) 0 - 17 mg/g Cr   Hemoglobin A1c     Status: Abnormal   Result Value Ref Range    Hemoglobin A1C 5.9 (H) 0 - 5.6 %   Urine Microscopic     Status: Abnormal   Result Value Ref Range    WBC Urine 0 - 5 OTO5^0 - 5 /HPF    RBC Urine O - 2 OTO2^O - 2 /HPF    Squamous Epithelial /LPF Urine Few FEW^Few /LPF    Amorphous Crystals Few (A) NEG^Negative /HPF    Mucous Urine Present (A) NEG^Negative /LPF

## 2021-05-07 LAB
CREAT UR-MCNC: 162 MG/DL
MICROALBUMIN UR-MCNC: 79 MG/L
MICROALBUMIN/CREAT UR: 48.77 MG/G CR (ref 0–17)

## 2021-05-10 NOTE — PROGRESS NOTES
Calixto is a 87 year old who is being evaluated via a billable telephone visit.      What phone number would you like to be contacted at? [phone]  How would you like to obtain your AVS? GAURAV Campuzano [facility]                            [facility]    Date of visit: 5/11/2021      Assessment:  Kevin Fierro is a 86 year old male with a past medical history significant for NSTEMI, aortic stenosis, Hypertension, Hyperlipidemia, CKD stage 3, GERD, BPH, MIMI who presents with complaints of chronic low back pain.      1. Chronic low back pain: Patient reports a gradually worsening 5 year history of bilateral low back pain. Exam shows moderately reduced lumbar spine ROM in all planes, neurological exam was normal, SLR negative bilaterally. There is some right sided paraspinal tenderness but not consistently reproduced. MRI showed moderate central and foraminal stenosis at L4-5 as well as widespread mod-sev degenerative disc disease and mod facet arthropathy in the L4-5 levels. Based on patient's history, exam and MRI, differential includes: Lumbar spinal stenosis, lumbar ddd, facet arthropathy. No improvement noted with lumbar epidural steroid injection. Discussed targeting the facet joints today and patient agreed to trying lumbar facet injections.     2. Bilateral hip bursitis: Improvement noted with injection in jan 2020 which has tapered off. Can repeat bilateral bursa injections every 3-4 months as needed.                Plan:  Diagnosis reviewed, treatment options addressed, and risks/benefits discussed. The patient was involved in shared decision making regarding the plan as laid out below.     1. Education: The patient was educated as to the natural history of their disorder. Reassurance was given and the patient was encouraged to engage in activity and movement as able.  2. Physical Therapy: Consider based on response to facet  injections  3. Diagnostic Studies:  Lumbar MRI ordered  4. Medication Management:  No changes made today.   1. Continue tylenol 1000mg qid prn  5. Further procedures recommended: Bilateral lumbar facet injections ordered.  1. Can repeat bilateral GT bursa injections after lumbar pain is addressed  6. Recommendations/follow-up for PCP:  none  7. Follow up: 1 month after facet injections.      DO [facility]        Chief complaint:   Chief Complaint   Patient presents with     Pain       Interval history:  Kevin Fierro is a 86 year old male last seen by me on 4/12/21.        Since his last visit, Kevin Fierro reports:  -He had an L4-5 walter on 4/12/21 with no improvement in his symptoms.    -He continues to have pain across the low back that is worse with activity.    -Intersted in procedural options to treat his pain.    -Denies any lower extremity symptoms, no change in ambulation or balance.    Pain scores:  Pain intensity on average is 4 on a scale of 0-10.        Current medication treatments include:  -Tylenol  Pain medications are being prescribed by none.     Previous medication treatments included:  none     Other treatments have included:  Kevin Fierro has not been seen at a pain clinic in the past.    Behavioral interventions: hasn't tried  PT: years ago  Manual Medicine: hasn't tried recently     Interventional:  Acupuncture: hasn't tried  TENs Unit: hasn't tried  Injections: -no improvement with lumbar epidural steroid injection    Bursa injections were helpful for hip pain - 1/30/20 - Dr. West.       Side Effects: no side effect    Medications:  Current Outpatient Medications   Medication Sig Dispense Refill     amLODIPine (NORVASC) 5 MG tablet Take 1 tablet (5 mg) by mouth daily 90 tablet 3     aspirin 81 MG EC tablet Take 81 mg by mouth At Bedtime       atorvastatin (LIPITOR) 40 MG tablet TAKE ONE TABLET BY MOUTH AT BEDTIME 30 tablet 1     finasteride  (PROSCAR) 5 MG tablet Take 1 tablet (5 mg) by mouth daily 90 tablet 3     metoprolol succinate ER (TOPROL-XL) 100 MG 24 hr tablet Take 1 tablet (100 mg) by mouth daily 90 tablet 3     multivitamin, therapeutic (THERA-VIT) TABS tablet Take 1 tablet by mouth At Bedtime       omeprazole (PRILOSEC) 20 MG DR capsule Take 20 mg by mouth daily as needed       terazosin (HYTRIN) 10 MG capsule TAKE ONE CAPSULE BY MOUTH AT BEDTIME 90 capsule 3     triamcinolone (KENALOG) 0.1 % external cream Apply topically 2 times daily as needed for irritation         Medical History: any changes in medical history since they were last seen? No    Review of Systems:  The 14 system ROS was reviewed from the intake questionnaire, and is positive for: back pain, arthritis, stiffness    Phone call duration: 10 minutes    BILLING TIME DOCUMENTATION:   The total TIME spent on this patient on the date of the encounter/appointment was 20 minutes.      TOTAL TIME includes:   Time spent preparing to see the patient (reviewing records and tests) -  Time spent face to face (or over the phone) with the patient  Time spent ordering tests, medications, procedures and referrals  Time spent documenting clinical information in Epic         John Leal DO  [facility]  5/10/2021

## 2021-05-11 ENCOUNTER — VIRTUAL VISIT (OUTPATIENT)
Dept: PALLIATIVE MEDICINE | Facility: CLINIC | Age: 86
End: 2021-05-11
Payer: COMMERCIAL

## 2021-05-11 DIAGNOSIS — M47.816 LUMBAR FACET ARTHROPATHY: Primary | ICD-10-CM

## 2021-05-11 DIAGNOSIS — M48.061 SPINAL STENOSIS OF LUMBAR REGION WITHOUT NEUROGENIC CLAUDICATION: ICD-10-CM

## 2021-05-11 PROCEDURE — 99213 OFFICE O/P EST LOW 20 MIN: CPT | Mod: 95 | Performed by: PHYSICAL MEDICINE & REHABILITATION

## 2021-05-11 ASSESSMENT — PAIN SCALES - GENERAL: PAINLEVEL: SEVERE PAIN (6)

## 2021-05-11 NOTE — PATIENT INSTRUCTIONS
1 I ordered lumbar facet injections for your low back pain, you'll be called to schedule.    Take care,    DO [facility]        ----------------------------------------------------------------  Clinic Number:  [phone]     Call with any questions about your care and for scheduling assistance.     Calls are returned Monday through Friday between 8 AM and 4:30 PM. We usually get back to you within 2 business days depending on the issue/request.    If we are prescribing your medications:    For opioid medication refills, call the clinic or send a [employer] message 7 days in advance.  Please include:    Name of requested medication    Name of the pharmacy.    For non-opioid medications, call your pharmacy directly to request a refill. Please allow 3-4 days to be processed.     Per [place] State Law:    All controlled substance prescriptions must be filled within 30 days of being written.      For those controlled substances allowing refills, pickup must occur within 30 days of last fill.      We believe regular attendance is key to your success in our program!      Any time you are unable to keep your appointment we ask that you call us at least 24 hours in advance to cancel.This will allow us to offer the appointment time to another patient.     Multiple missed appointments may lead to dismissal from the clinic.

## 2021-05-26 ENCOUNTER — TELEPHONE (OUTPATIENT)
Dept: FAMILY MEDICINE | Facility: CLINIC | Age: 86
End: 2021-05-26

## 2021-05-26 NOTE — TELEPHONE ENCOUNTER
Called # [phone] (home)     Advised pts wife on the the information below     Wife stated that she dicussed this with a hospital that is doing the test ans they said pt can take this med as this test is medication induced not exercise.     Danielle Mills RN, BSN  [facility]

## 2021-05-26 NOTE — TELEPHONE ENCOUNTER
"Patient is having a nuclear stress test tomorrow and he was told to as his PCP if he should stop taking a \"beta blocker\". Please call patient back to advise.  -Dianna Mcnamara    "

## 2021-05-27 ENCOUNTER — HOSPITAL ENCOUNTER (OUTPATIENT)
Dept: NUCLEAR MEDICINE | Facility: CLINIC | Age: 86
Setting detail: NUCLEAR MEDICINE
End: 2021-05-27
Attending: FAMILY MEDICINE
Payer: COMMERCIAL

## 2021-05-27 ENCOUNTER — HOSPITAL ENCOUNTER (OUTPATIENT)
Dept: CARDIOLOGY | Facility: CLINIC | Age: 86
End: 2021-05-27
Attending: FAMILY MEDICINE
Payer: COMMERCIAL

## 2021-05-27 VITALS — DIASTOLIC BLOOD PRESSURE: 85 MMHG | SYSTOLIC BLOOD PRESSURE: 141 MMHG | HEART RATE: 85 BPM

## 2021-05-27 DIAGNOSIS — I25.10 CORONARY ARTERY DISEASE INVOLVING NATIVE CORONARY ARTERY OF NATIVE HEART WITHOUT ANGINA PECTORIS: ICD-10-CM

## 2021-05-27 DIAGNOSIS — Z51.81 MEDICATION MONITORING ENCOUNTER: ICD-10-CM

## 2021-05-27 LAB
CV STRESS MAX HR HE: 94
RATE PRESSURE PRODUCT: NORMAL
STRESS ECHO BASELINE DIASTOLIC HE: 85
STRESS ECHO BASELINE HR: 82
STRESS ECHO BASELINE SYSTOLIC BP: 141
STRESS ECHO CALCULATED PERCENT HR: 71 %
STRESS ECHO LAST STRESS DIASTOLIC BP: 73
STRESS ECHO LAST STRESS SYSTOLIC BP: 142
STRESS ECHO TARGET HR: 133

## 2021-05-27 PROCEDURE — 343N000001 HC RX 343: Performed by: FAMILY MEDICINE

## 2021-05-27 PROCEDURE — 93017 CV STRESS TEST TRACING ONLY: CPT

## 2021-05-27 PROCEDURE — 78452 HT MUSCLE IMAGE SPECT MULT: CPT | Mod: 26 | Performed by: INTERNAL MEDICINE

## 2021-05-27 PROCEDURE — 93016 CV STRESS TEST SUPVJ ONLY: CPT | Performed by: INTERNAL MEDICINE

## 2021-05-27 PROCEDURE — 93018 CV STRESS TEST I&R ONLY: CPT | Performed by: INTERNAL MEDICINE

## 2021-05-27 PROCEDURE — 78452 HT MUSCLE IMAGE SPECT MULT: CPT

## 2021-05-27 PROCEDURE — 250N000011 HC RX IP 250 OP 636

## 2021-05-27 PROCEDURE — A9502 TC99M TETROFOSMIN: HCPCS | Performed by: FAMILY MEDICINE

## 2021-05-27 RX ORDER — REGADENOSON 0.08 MG/ML
INJECTION, SOLUTION INTRAVENOUS
Status: COMPLETED
Start: 2021-05-27 | End: 2021-05-27

## 2021-05-27 RX ADMIN — REGADENOSON 0.4 MG: 0.08 INJECTION, SOLUTION INTRAVENOUS at 13:32

## 2021-05-27 RX ADMIN — TETROFOSMIN 32 MCI.: 1.38 INJECTION, POWDER, LYOPHILIZED, FOR SOLUTION INTRAVENOUS at 13:27

## 2021-05-27 RX ADMIN — TETROFOSMIN 10.2 MCI.: 1.38 INJECTION, POWDER, LYOPHILIZED, FOR SOLUTION INTRAVENOUS at 12:00

## 2021-05-28 ENCOUNTER — MYC MEDICAL ADVICE (OUTPATIENT)
Dept: FAMILY MEDICINE | Facility: CLINIC | Age: 86
End: 2021-05-28

## 2021-05-28 NOTE — TELEPHONE ENCOUNTER
Letter sent, mychart sent.    Gume RENTERIA RN   [facility]

## 2021-05-28 NOTE — LETTER
[facility]  [address], [place] [address] [phone]                    May 28, 2021    Kevin Fierro  [address] [facility] [address]      Dear Kevin,    Here is a summary of your recent test results:    Your test results are enclosed.      Please contact me if you have any questions.    In addition, here is a list of due or overdue Health Maintenance reminders.    Health Maintenance Due   Topic Date Due     Annual Wellness Visit  07/26/2020     PHQ-2  01/01/2021       Please call us at [phone] (or use [employer]) to address the above recommendations.            Thank you very much for trusting [facility].     Healthy regards,          Chance Espinosa M.D.

## 2021-06-04 ENCOUNTER — OFFICE VISIT (OUTPATIENT)
Dept: FAMILY MEDICINE | Facility: CLINIC | Age: 86
End: 2021-06-04
Payer: COMMERCIAL

## 2021-06-04 ENCOUNTER — TELEPHONE (OUTPATIENT)
Dept: PALLIATIVE MEDICINE | Facility: CLINIC | Age: 86
End: 2021-06-04

## 2021-06-04 VITALS
WEIGHT: 210 LBS | TEMPERATURE: 99.2 F | BODY MASS INDEX: 31.1 KG/M2 | DIASTOLIC BLOOD PRESSURE: 60 MMHG | HEART RATE: 90 BPM | SYSTOLIC BLOOD PRESSURE: 98 MMHG | OXYGEN SATURATION: 96 % | RESPIRATION RATE: 16 BRPM | HEIGHT: 69 IN

## 2021-06-04 DIAGNOSIS — Z82.49 FAMILY HISTORY OF CARDIOVASCULAR DISEASE: ICD-10-CM

## 2021-06-04 DIAGNOSIS — Z51.81 MEDICATION MONITORING ENCOUNTER: ICD-10-CM

## 2021-06-04 DIAGNOSIS — J98.4 RESTRICTIVE LUNG DISEASE: ICD-10-CM

## 2021-06-04 DIAGNOSIS — I35.0 NONRHEUMATIC AORTIC VALVE STENOSIS: ICD-10-CM

## 2021-06-04 DIAGNOSIS — G47.10 HYPERSOMNIA WITH SLEEP APNEA: ICD-10-CM

## 2021-06-04 DIAGNOSIS — Z91.09 ENVIRONMENTAL ALLERGIES: ICD-10-CM

## 2021-06-04 DIAGNOSIS — E78.5 HYPERLIPIDEMIA LDL GOAL <70: ICD-10-CM

## 2021-06-04 DIAGNOSIS — I25.10 CORONARY ARTERY DISEASE INVOLVING NATIVE CORONARY ARTERY OF NATIVE HEART WITHOUT ANGINA PECTORIS: Primary | ICD-10-CM

## 2021-06-04 DIAGNOSIS — N18.31 STAGE 3A CHRONIC KIDNEY DISEASE (H): ICD-10-CM

## 2021-06-04 DIAGNOSIS — I10 HYPERTENSION GOAL BP (BLOOD PRESSURE) < 140/90: ICD-10-CM

## 2021-06-04 DIAGNOSIS — G47.30 HYPERSOMNIA WITH SLEEP APNEA: ICD-10-CM

## 2021-06-04 DIAGNOSIS — K21.9 GASTROESOPHAGEAL REFLUX DISEASE WITHOUT ESOPHAGITIS: ICD-10-CM

## 2021-06-04 DIAGNOSIS — M48.062 SPINAL STENOSIS OF LUMBAR REGION WITH NEUROGENIC CLAUDICATION: ICD-10-CM

## 2021-06-04 PROBLEM — I21.4 NSTEMI (NON-ST ELEVATED MYOCARDIAL INFARCTION) (H): Status: RESOLVED | Noted: 2021-01-06 | Resolved: 2021-06-04

## 2021-06-04 PROCEDURE — 99213 OFFICE O/P EST LOW 20 MIN: CPT | Performed by: FAMILY MEDICINE

## 2021-06-04 ASSESSMENT — MIFFLIN-ST. JEOR: SCORE: 1617.93

## 2021-06-04 NOTE — TELEPHONE ENCOUNTER
LVM, reminded patient of date, time and location of appointment.      Reminded patient to eat and drink as usual.    Remained to hold any blood thinners or pain medications that they were asked to hold per nurse.     Reminded patient they will need a  for this appointment.      Reminded patient that both patient and  must wear a mask during their visit.        Zoila Mon MA  [facility]

## 2021-06-04 NOTE — PROGRESS NOTES
Assessment & Plan       ICD-10-CM    1. Coronary artery disease involving native coronary artery of native heart without angina pectoris  I25.10    2. Nonrheumatic aortic valve stenosis  I35.0    3. Stage 3a chronic kidney disease  N18.31    4. Hypersomnia with sleep apnea  G47.10     G47.30    5. Hypertension goal BP (blood pressure) < 140/90  I10    6. Hyperlipidemia LDL goal <70  E78.5    7. Restrictive lung disease  J98.4    8. Family history of cardiovascular disease  Z82.49    9. Gastroesophageal reflux disease without esophagitis  K21.9    10. Spinal stenosis of lumbar region with neurogenic claudication  M48.062    11. Environmental allergies  Z91.09    12. Medication monitoring encounter  Z51.81        Discussed treatment/modality options, including risk and benefits, he desires:    1) continue current cares    2) ECHO/Dr Carlson in 1 year    3) Dr Leal next week    4) exercise/weight loss    5) tylenol 1000 mg every 8 hrs    All diagnosis above reviewed and noted above, otherwise stable.      See [facility] orders for further details.        Return in about 4 months (around 10/4/2021) for Complete Physical, Medication Recheck Visit, Follow Up Chronic, and as needed.      No LOS data to display    Doing chart review, history and exam, documentation and further activities as noted.           Chance Espinosa MD, FAAFP     [facility]  [address] [place] [id number]  [email or web address]   Office: [phone]  Fax: [phone]  Pager: [phone]       Subjective   Calixto is a 87 year old who presents for the following health issues  accompanied by his spouse:    HPI     Discuss back Pain -Dr. Leal, pain clinic recommended 1000 QID- questions on dosage.     CAD - recent NSTEMI - 1/2021 - Dr Carlson    Discuss Lexiscan       The nuclear stress test is negative for inducible myocardial ischemia or infarction.     Left  ventricular function is normal.     There is no prior study for comparison.    Hyperlipidemia Follow-Up      Are you regularly taking any medication or supplement to lower your cholesterol?   Yes- atorvastatin    Are you having muscle aches or other side effects that you think could be caused by your cholesterol lowering medication?  No     Recent Labs   Lab Test 05/06/21  0908 01/06/21 2129 05/14/15  0822 05/14/15  0822 05/15/14  0753   CHOL 139 174   < > 159 180   HDL 55 57   < > 49 33*   LDL 59 106*   < > 83 87   TRIG 126 53   < > 137 303*   CHOLHDLRATIO  --   --   --  3.2 5.5*    < > = values in this interval not displayed.     CKD 3A/Hypertension Follow-up      Do you check your blood pressure regularly outside of the clinic? No     Are you following a low salt diet? Yes    Are your blood pressures ever more than 140 on the top number (systolic) OR more   than 90 on the bottom number (diastolic), for example 140/90? No      How many servings of fruits and vegetables do you eat daily?  2-3    On average, how many sweetened beverages do you drink each day (Examples: soda, juice, sweet tea, etc.  Do NOT count diet or artificially sweetened beverages)?   0    How many days per week do you exercise enough to make your heart beat faster? 3 or less    How many minutes a day do you exercise enough to make your heart beat faster? 9 or less    How many days per week do you miss taking your medication? 0    BP Readings from Last 3 Encounters:   06/04/21 98/60   05/27/21 (!) 141/85   05/06/21 124/74     Creatinine   Date Value Ref Range Status   05/06/2021 1.45 (H) 0.66 - 1.25 mg/dL Final     GFR Estimate   Date Value Ref Range Status   05/06/2021 43 (L) >60 mL/min/[1.73_m2] Final     Comment:     Non  GFR Calc  Starting 12/18/2018, serum creatinine based estimated GFR (eGFR) will be   calculated using the Chronic Kidney Disease Epidemiology Collaboration   (CKD-EPI) equation.       GERD - no issues    LBP  "- Dr Leal - recent epidural - no improvement - another scheduled    RLD - stable    Environmental allergies - eyes starting to itch - uses OTC eye drops    Large Umbilical hernia    Review of Systems   CONSTITUTIONAL: NEGATIVE for fever, chills, change in weight  INTEGUMENTARY/SKIN: NEGATIVE for worrisome rashes, moles or lesions  EYES: NEGATIVE for vision changes or irritation  ENT/MOUTH: NEGATIVE for ear, mouth and throat problems  RESP: NEGATIVE for significant cough or SOB  CV: NEGATIVE for chest pain, palpitations or peripheral edema  GI: NEGATIVE for nausea, abdominal pain, heartburn, or change in bowel habits  : NEGATIVE for frequency, dysuria, or hematuria  MUSCULOSKELETAL: NEGATIVE for significant arthralgias or myalgia  NEURO: NEGATIVE for weakness, dizziness or paresthesias  ENDOCRINE: NEGATIVE for temperature intolerance, skin/hair changes  HEME: NEGATIVE for bleeding problems  PSYCHIATRIC: NEGATIVE for changes in mood or affect      Objective    BP 98/60   Pulse 90   Temp 99.2  F (37.3  C)   Resp 16   Ht 1.753 m (5' 9\")   Wt 95.3 kg (210 lb)   SpO2 96%   BMI 31.01 kg/m    Body mass index is 31.01 kg/m .  Physical Exam   GENERAL: healthy, alert and no distress  EYES: Eyes grossly normal to inspection, PERRL and conjunctivae and sclerae normal  HENT: ear canals and TM's normal, nose and mouth without ulcers or lesions  NECK: no adenopathy, no asymmetry, masses, or scars and thyroid normal to palpation  RESP: lungs clear to auscultation - no rales, rhonchi or wheezes  CV: regular rate and rhythm, normal S1 S2, no S3 or S4, no murmur, click or rub, no peripheral edema and peripheral pulses strong  ABDOMEN: soft, nontender, no hepatosplenomegaly, no masses and bowel sounds normal  MS: no gross musculoskeletal defects noted, no edema  SKIN: no suspicious lesions or rashes  NEURO: Normal strength and tone, mentation intact and speech normal  PSYCH: mentation appears normal, affect " normal/bright    Labs/imaging

## 2021-06-07 ENCOUNTER — RADIOLOGY INJECTION OFFICE VISIT (OUTPATIENT)
Dept: PALLIATIVE MEDICINE | Facility: CLINIC | Age: 86
End: 2021-06-07
Payer: COMMERCIAL

## 2021-06-07 VITALS — OXYGEN SATURATION: 94 % | SYSTOLIC BLOOD PRESSURE: 110 MMHG | DIASTOLIC BLOOD PRESSURE: 66 MMHG | HEART RATE: 82 BPM

## 2021-06-07 DIAGNOSIS — M47.816 FACET ARTHROPATHY, LUMBAR: Primary | ICD-10-CM

## 2021-06-07 PROCEDURE — 64494 INJ PARAVERT F JNT L/S 2 LEV: CPT | Mod: 50 | Performed by: PHYSICAL MEDICINE & REHABILITATION

## 2021-06-07 PROCEDURE — 64493 INJ PARAVERT F JNT L/S 1 LEV: CPT | Mod: 50 | Performed by: PHYSICAL MEDICINE & REHABILITATION

## 2021-06-07 NOTE — PATIENT INSTRUCTIONS
[facility] Procedure Discharge Instructions    Today you saw:    Dr. John Leal    Your procedure:  Facet joint injection      Medications used:  Lidocaine (anesthetic)  Bupivacaine (anesthetic)  Kenalog (steroid)  Omnipaque (contrast)          Be cautious when walking as numbness and/or weakness in the legs may occur up to 6-8 hours after the procedure due to effect of the local anesthetic    Do not drive for 6 hours. The effect of the local anesthetic could slow your reflexes.     Avoid strenuous activity for the first 24 hours. You may resume your regular activities after that.     You may shower, however avoid swimming, tub baths or hot tubs for 24 hours following your procedure    You may have a mild to moderate increase in pain for several days following the injection.      You may use ice packs for 10-15 minutes, 3 to 4 times a day at the injection site for comfort    Do not use heat to painful areas for 6 to 8 hours. This will give the local anesthetic time to wear off and prevent you from accidentally burning your skin.    Unless you have been directed to avoid the use of anti-inflammatory medications (NSAIDS-ibuprofen, Aleve, Motrin), you may use these medications or Tylenol for pain control if needed.     With diabetes, check your blood sugar more frequently than usual as your blood sugar may be higher than normal for 10-14 days following a steroid injection. Contact your doctor who manages your diabetes if your blood sugar is higher than usual    Possible side effects of steroids that you may experience include flushing, elevated blood pressure, increased appetite, mild headaches and restlessness.  All of these symptoms will get better with time.    It may take up to 14 days for the steroid medication to start working although you may feel the effect as early as a few days after the procedure.     Follow up with your referring provider in 2-3 weeks      If you experience any of  the following, call the pain center line during work hours at [phone] or on-call physician after hours at [phone]:  -Fever over 100 degree F  -Swelling, bleeding, redness, drainage, warmth at the injection site  -Progressive weakness or numbness in your legs   -Loss of bowel or bladder function  -Unusual headache that is not relieved by Tylenol or your regular headache medication  -Unusual new onset of pain that is not improving

## 2021-06-07 NOTE — NURSING NOTE
Pre-procedure Intake    Have you been fasting? NA    If yes, for how long? NA    Are you taking a prescribed blood thinner such as coumadin, Plavix, Xarelto?    No    If yes, when did you take your last dose? NA    Do you take aspirin?  Yes -   ASA    If cervical procedure, have you held aspirin for 6 days?   NA    Do you have any allergies to contrast dye, iodine, steroid and/or numbing medications?  NO    Are you currently taking antibiotics or have an active infection?  NO    Have you had a fever/elevated temperature within the past week? NO    Are you currently taking oral steroids? NO    Do you have a ? Yes       Are you pregnant or breastfeeding?  Not Applicable    Have you received the COVID-19 vaccine? Yes       If yes, was it your 1st, 2nd or only dose needed? Both    Date of most recent vaccine: February    Notify provider and RNs if systolic BP >170, diastolic BP >100, P >100 or O2 sats < 90%

## 2021-06-07 NOTE — PROGRESS NOTES
[facility] - Procedure Note    Date of Visit: 6/7/2021    Pre procedure Diagnosis: Lumbar facet arthropathy   Post procedure Diagnosis: Same  Procedure performed: Bilateral lumbar facet joint injections  Anesthesia: none  Complications: none  Operators: John Leal DO    Indications:   Kevin Fierro is a 87 year old male who is seen for lumbar facet injectiosn.  They have a history of chronic low back pain.  Exam shows pain with ext/rotation bilaterally and they have tried conservative treatment including oral medications and exercises.    Options/alternatives, benefits and risks were discussed with the patient including bleeding, infection, flared pain, tissue trauma, exposure to radiation, reaction to medications including seizure, spinal cord injury, paralysis, weakness, numbness and headache.    Questions were answered to his satisfaction and he agrees to proceed. Voluntary informed consent was obtained and signed.     Vitals were reviewed: Yes  /66 (BP Location: Left arm, Cuff Size: Adult Large)   Pulse 82   SpO2 94%   Allergies were reviewed:  Yes  Medications were reviewed:  Yes   Pre-procedure pain score: 4/10    Imaging:   Lumbar MRI from 3/27/21 showed:     L2-L3: Disc desiccation with mild to moderate disc height loss.  Symmetric disc bulge with small posterolateral endplate osteophytes  bilaterally. Mild facet arthropathy. No spinal canal stenosis. No  significant neural foraminal stenosis. No significant change.     L3-L4: Disc desiccation with moderate disc height loss. Symmetric disc  bulge with posterolateral endplate osteophytes and mild to moderate  facet arthropathy. No spinal canal stenosis. Mild bilateral neural  foraminal stenosis. Overall, no change.     L4-L5: Moderate to severe disc height loss. Symmetric disc bulge with  posterolateral endplate osteophytes and moderate/moderate to severe  facet arthropathy. As before, there is  mild-to-moderate/moderate  bilateral lateral recess stenosis with contact of and at least mild  mass effect upon the traversing bilateral L5 nerve roots by the disc  bulge. There is mild/mild to moderate overall central spinal canal  stenosis. Mild-to-moderate right and mild left neural foraminal  stenosis. Overall, no significant change.     L5-S1: Disc desiccation with mild disc height loss. Symmetric disc  bulge. Small posterolateral endplate osteophytes and severe right and  mild to moderate left facet arthropathy. There is a right facet joint  effusion with a small posteriorly-directed presumed synovial cyst  along the posterior aspect of the right L5-S1 facet joint measuring 5  mm without mass effect on the neural structures. The degree of right  facet arthropathy appears increased compared to the prior exam and the  right facet joint effusion is new. There is no central spinal canal  stenosis. There is mild right neural foraminal stenosis that appears  minimally increased compared to prior study. There is no significant  left neural foraminal stenosis.                                                                      IMPRESSION:  1. Interval increase in now moderate to severe degenerative right  facet joint arthropathy at L5-S1 with right L5-S1 facet joint  effusion.  2. Otherwise, no significant change in multilevel degenerative disc  disease and facet arthropathy of the lumbar spine, as described.  3. Mild/mild to moderate central spinal canal and bilateral lateral  recess stenosis at L4-L5. No significant central spinal canal stenosis  elsewhere. Mild to moderate right L4-L5 neural foraminal stenosis with  lesser degrees of neural foraminal stenosis elsewhere, as described.  Please see the body of the report for additional details.     EFE FRANKLIN MD    Procedure:  After getting informed consent, patient was brought into the procedure suite and was placed in a prone position on the procedure table.    A Pause for the Cause was performed.  Patient was prepped and draped in sterile fashion.     Under AP fluoroscopic guidance the L4-5, L5-S1 facet joints on the right side were identified, and the C-arm was rotated obliquely to the affected side to open the joint space. A total of 3 ml of 1% lidocaine was injected at the needle entry point and needle tract. Then a 22 gauge 5 inch quincke type spinal needle was inserted and advanced under fluoroscopic guidance targeting the superior articular pillar of each joint. Once the needle made a contact with SAP, it was rotated and was then advanced into the joint.    AP fluoroscopic views were obtained to confirm the needle placement. Then,  Omnipaque 300 contrast dye was injected after negative aspiration for heme and CSF in each joint, confirming appropriate placement.     The exact procedure was then repeated on the left.    The injection was then accomplished using a solution containing 2.5ml of 0.5% bupivacaine mixed with 60mg of kenolog, divided between the four joints. The needles were removed.       A total of 1mL of Omnipaque was used and 9mL was wasted.     Hemostasis was achieved, the area was cleaned, and bandaids were placed when appropriate.  The patient tolerated the procedure well, and was taken to the recovery room.    Images were saved to PACS.        Post-procedure pain score: 0/10      Assessment/Plan: Kevin Fierro is a 87 year old male s/p bilateral lumbar facet injections for lumbar facet arthropathy/chronic low back pain.     1. Following today's procedure, the patient was advised to contact the [facility] for any of the following:   Fever, chills, or night sweats   New onset of pain, numbness, or weakness   Any questions/concerns regarding the procedure  If unable to contact the Pain Center, the patient was instructed to go to a local Emergency Room for any complications.   2. Follow-up with the referring provider in 2-4 weeks  for post-procedure evaluation.      John Leal [facility] [place]       patient

## 2021-06-07 NOTE — NURSING NOTE
Discharge Information    IV Discontiued Time:  NA    Amount of Fluid Infused:  NA    Discharge Criteria = When patient returns to baseline or as per MD order    Consciousness:  Pt is fully awake    Circulation:  BP +/- 20% of pre-procedure level    Respiration:  Patient is able to breathe deeply    O2 Sat:  Patient is able to maintain O2 Sat >92% on room air    Activity:  Moves 4 extremities on command    Ambulation:  Patient is able to stand and walk or stand and pivot into wheelchair    Dressing:  Clean/dry or No Dressing    Notes:   Discharge instructions and AVS given to patient    Patient meets criteria for discharge?  YES    Admitted to PCU?  No    Responsible adult present to accompany patient home?  Yes    Signature/Title:    April Javier RN Care Coordinator  [facility] [place]

## 2021-06-21 DIAGNOSIS — L21.9 SEBORRHEIC DERMATITIS, UNSPECIFIED: ICD-10-CM

## 2021-06-23 RX ORDER — SELENIUM SULFIDE 2.5 MG/100ML
LOTION TOPICAL
Qty: 118 ML | Refills: 3 | Status: SHIPPED | OUTPATIENT
Start: 2021-06-23 | End: 2023-10-19

## 2021-06-23 NOTE — TELEPHONE ENCOUNTER
selenium sulfide (SELSUN) 2.5 % external lotion (Discontinued) 360 mL 3 4/22/2020 1/6/2021 No   Sig: Apply daily to every other day - lather, wait 10 minutes and rinse       Last office visit: 6/4/2021 with prescribing provider:    Future Office Visit:          Routing refill request to provider for review/approval because:  Drug not on the [facility] refill protocol   Not on current med list     Danielle Mills RN, BSN  [facility]

## 2021-07-12 ENCOUNTER — TELEPHONE (OUTPATIENT)
Dept: PALLIATIVE MEDICINE | Facility: CLINIC | Age: 86
End: 2021-07-12

## 2021-07-12 DIAGNOSIS — M54.50 CHRONIC BILATERAL LOW BACK PAIN WITHOUT SCIATICA: ICD-10-CM

## 2021-07-12 DIAGNOSIS — G89.29 CHRONIC BILATERAL LOW BACK PAIN WITHOUT SCIATICA: ICD-10-CM

## 2021-07-12 DIAGNOSIS — M48.061 SPINAL STENOSIS OF LUMBAR REGION WITHOUT NEUROGENIC CLAUDICATION: ICD-10-CM

## 2021-07-12 DIAGNOSIS — M47.816 FACET ARTHROPATHY, LUMBAR: Primary | ICD-10-CM

## 2021-07-12 NOTE — TELEPHONE ENCOUNTER
Reason for call:  Other   Patient called regarding (reason for call): call back  Additional comments: pt calling to speak with a nurse to follow up after his injection Per pt he would like to know the next steps.    Phone number to reach patient:  Home number on file [phone] (home)    Can we leave a detailed message on this number?  YES    Travel screening: Not Applicable         Farhat AMADOR    [facility] [place]

## 2021-07-12 NOTE — TELEPHONE ENCOUNTER
External PT referral placed. Please print and fax to [facility].    John Leal DO  [facility]

## 2021-07-12 NOTE — TELEPHONE ENCOUNTER
Called pt. He states that facet injection were not helpful at all. States that Tylenol is ineffective as well. States that his brother that he got back pain relief with manual physical therapist- they do more hands on massage. There are several in his area-  [facility]-prior [facility]  [facility]  [facility]-    He is inquiring about possible referral to one of these places, he does not have any preferences on either but they were wondering if you had any information manual PT and/or any of the above.     **routing for review and recommendation    06/07/21- Lumbar facet injections    Per 05/11/21  Plan:  Diagnosis reviewed, treatment options addressed, and risks/benefits discussed. The patient was involved in shared decision making regarding the plan as laid out below.     1. Education: The patient was educated as to the natural history of their disorder. Reassurance was given and the patient was encouraged to engage in activity and movement as able.  2. Physical Therapy: Consider based on response to facet injections  3. Diagnostic Studies:  Lumbar MRI ordered  4. Medication Management:  No changes made today.   1. Continue tylenol 1000mg qid prn  5. Further procedures recommended: Bilateral lumbar facet injections ordered.  1. Can repeat bilateral GT bursa injections after lumbar pain is addressed  6. Recommendations/follow-up for PCP:  none  7. Follow up: 1 month after facet injections.

## 2021-07-13 NOTE — TELEPHONE ENCOUNTER
Called pt and spouse, advised per below. They will call back with exactly where they would like to go    Kristal GIL, RN Care Coordinator  [facility]  Pain [facility]

## 2021-07-13 NOTE — TELEPHONE ENCOUNTER
Called Marcelle Chow asking for a return call.    April Javier RN  Care Coordinator  [facility]

## 2021-07-16 NOTE — TELEPHONE ENCOUNTER
"Letter received in mail from patient's wife Leticia.    Letter states:  \"Calixto would like to try manual physical therapy to see if that would help his back pain. We could not get through to your office, so please send a referral to the following agency\"    [facility]  [place]  (Partnered with [employer])  [address] [place]  Office: [phone]  Fax: [phone]    Routing to provider to advise.     Laura Mary [facility]    "

## 2021-07-20 NOTE — TELEPHONE ENCOUNTER
Referral was previously faxed to [facility]. New referral placed for [facility] and old referral was cancelled. Please remind patient that their insurance will only cover services at one location.    John Leal DO  [facility]

## 2021-07-21 NOTE — TELEPHONE ENCOUNTER
Referral faxed to     [facility]  [place]  (Partnered with [employer])  [address] [place]  Office: [phone]  Fax: [phone]    Pt notified      Jesús Murrieta RN  Patient Care Supervisor   [facility] [place]

## 2021-07-26 ENCOUNTER — VIRTUAL VISIT (OUTPATIENT)
Dept: FAMILY MEDICINE | Facility: CLINIC | Age: 86
End: 2021-07-26
Payer: COMMERCIAL

## 2021-07-26 DIAGNOSIS — Z20.822 EXPOSURE TO COVID-19 VIRUS: Primary | ICD-10-CM

## 2021-07-26 LAB — SARS-COV-2 RNA RESP QL NAA+PROBE: NEGATIVE

## 2021-07-26 PROCEDURE — 99441 PR PHYSICIAN TELEPHONE EVALUATION 5-10 MIN: CPT | Mod: 95 | Performed by: FAMILY MEDICINE

## 2021-07-26 PROCEDURE — U0005 INFEC AGEN DETEC AMPLI PROBE: HCPCS | Performed by: FAMILY MEDICINE

## 2021-07-26 PROCEDURE — U0003 INFECTIOUS AGENT DETECTION BY NUCLEIC ACID (DNA OR RNA); SEVERE ACUTE RESPIRATORY SYNDROME CORONAVIRUS 2 (SARS-COV-2) (CORONAVIRUS DISEASE [COVID-19]), AMPLIFIED PROBE TECHNIQUE, MAKING USE OF HIGH THROUGHPUT TECHNOLOGIES AS DESCRIBED BY CMS-2020-01-R: HCPCS | Performed by: FAMILY MEDICINE

## 2021-07-26 NOTE — PATIENT INSTRUCTIONS
"  Instructions for Patients:   Patient Education   Sign Up for GetWell Loop  COVID-19 Symptoms  We know it's scary to hear you might have COVID-19. We want to track your symptoms to make sure you're OK over the next 2 weeks.    Please look for an email from Pull. This is a free, online program that we'll use to keep in touch. To sign up, click the link in the email you get.    If you don't get an email, please call your [facility] clinic. Ask them to sign you up for GetWell Loop.    You can learn more at [email or web address].  For informational purposes only. Not to replace the advice of your health care provider.   Copyright   2020 [facility]. Clinically reviewed by Viviane Berry. All rights reserved. [employer] [id number] - 04/20.             How can I protect others?  If you have symptoms (fever, cough, body aches or trouble breathing): Stay home and away from others (self-isolate) until:    At least 10 days have passed since your symptoms started, And     You ve had no fever--and no medicine that reduces fever--for 1 full day (24 hours), And      Your other symptoms have resolved (gotten better).     If you don t have symptoms, but a test showed that you have COVID-19 (you tested positive):    Stay home and away from others (self-isolate). Follow the tips under \"How do I self-isolate?\" below for 10 days (20 days if you have a weak immune system).    You don't need to be retested for COVID-19 before going back to school or work. As long as you're fever-free and feeling better, you can go back to school, work and other activities after waiting the 10 or 20 days.     How do I self-isolate?    Stay in your own room, even for meals. Use your own bathroom if you can.     Stay away from others in your home. No hugging, kissing or shaking hands. No visitors.    Don t go to work, school or anywhere else.     Clean  high touch  surfaces often (doorknobs, counters, handles, etc.). " Use a household cleaning spray or wipes. You ll find a full list on the EPA website:  www.epa.gov/pesticide-registration/list-n-disinfectants-use-against-sars-cov-2.    Cover your mouth and nose with a mask, tissue or washcloth to avoid spreading germs.    Wash your hands and face often. Use soap and water.    Caregivers in these groups are at risk for severe illness due to COVID-19:  o People 65 years and older  o People who live in a nursing home or long-term care facility  o People with chronic disease (lung, heart, cancer, diabetes, kidney, liver, immunologic)  o People who have a weakened immune system, including those who:  - Are in cancer treatment  - Take medicine that weakens the immune system, such as corticosteroids  - Had a bone marrow or organ transplant  - Have an immune deficiency  - Have poorly controlled HIV or AIDS  - Are obese (body mass index of 40 or higher)  - Smoke regularly    Caregivers should wear gloves while washing dishes, handling laundry and cleaning bedrooms and bathrooms.    Use caution when washing and drying laundry: Don t shake dirty laundry, and use the warmest water setting that you can.    For more tips, go to www.cdc.gov/coronavirus/2019-ncov/downloads/10Things.pdf.    How can I take care of myself?  1. Get lots of rest. Drink extra fluids (unless a doctor has told you not to).     2. Take Tylenol (acetaminophen) for fever or pain. If you have liver or kidney problems, ask your family doctor if it s okay to take Tylenol.     Adults can take either:     650 mg (two 325 mg pills) every 4 to 6 hours, or     1,000 mg (two 500 mg pills) every 8 hours as needed.     Note: Don t take more than 3,000 mg in one day.   Acetaminophen is found in many medicines (both prescribed and over-the-counter medicines). Read all labels to be sure you don t take too much.     For children, check the Tylenol bottle for the right dose. The dose is based on the child s age or weight.    3. If you have  other health problems (like cancer, heart failure, an organ transplant or severe kidney disease): Call your specialty clinic if you don t feel better in the next 2 days.    4. Know when to call 911: Emergency warning signs include:    Trouble breathing or shortness of breath    Pain or pressure in the chest that doesn t go away    Feeling confused like you haven t felt before, or not being able to wake up    Bluish-colored lips or face    What are the symptoms of COVID-19?     The most common symptoms are cough, fever and trouble breathing.     Less common symptoms include body aches, chills, diarrhea (loose, watery poops), fatigue (feeling very tired), headache, runny nose, sore throat and loss of smell.    COVID-19 can cause severe coughing (bronchitis) and lung infection (pneumonia).    How does it spread?     The virus may spread when a person coughs or sneezes into the air. The virus can travel about 6 feet this way, and it can live on surfaces.      Common  (household disinfectants) will kill the virus.    Who is at risk?  Anyone can catch COVID-19 if they re around someone who has the virus.    How can others protect themselves?     Stay away from people who have COVID-19 (or symptoms of COVID-19).    Wash hands often with soap and water. Or, use hand  with at least 60% alcohol.    Avoid touching the eyes, nose or mouth.     Wear a face mask when you go out in public, when sick or when caring for a sick person.    Where can I get more information?    [facility]: About COVID-19: [email or web address]    CDC: What to Do If You re Sick: www.cdc.gov/coronavirus/2019-ncov/about/steps-when-sick.html    CDC: Ending Home Isolation: www.cdc.gov/coronavirus/2019-ncov/hcp/disposition-in-home-patients.html     CDC: Caring for Someone: www.cdc.gov/coronavirus/2019-ncov/if-you-are-sick/care-for-someone.html     MD: Interim Guidance for Hospital Discharge to Home:  www.health.[facility].[place]/diseases/coronavirus/hcp/hospdischarge.pdf    [facility] clinical trials (COVID-19 research studies): clinicalaffairs.[facility]/n-clinical-trials     Below are the COVID-19 hotlines at the [place] Department of Health ([facility]). Interpreters are available.   o For health questions: Call 042-936-2758 or 1-636.683.2019 (7 a.m. to 7 p.m.)  o For questions about schools and childcare: Call 772-109-0163 or 1-365.572.3246 (7 a.m. to 7 p.m.)          Thank you for limiting contact with others, wearing a simple mask to cover your cough, practice good hand hygiene habits and accessing our virtual services where possible to limit the spread of this virus.    For more information about COVID19 and options for caring for yourself at home, please visit the CDC website at https://www.cdc.gov/coronavirus/2019-ncov/about/steps-when-sick.html  For more options for care at [facility], please visit our website at [email or web address]

## 2021-07-26 NOTE — PROGRESS NOTES
Calixto is a 87 year old who is being evaluated via a billable telephone visit.      What phone number would you like to be contacted at? [phone]    How would you like to obtain your AVS? MyChart    Assessment & Plan       ICD-10-CM    1. Exposure to COVID-19 virus  Z20.822 Asymptomatic COVID-19 Virus (Coronavirus) by PCR Nasopharyngeal      Pt is 87 and would like to not have to drive to [place] for testing as he lives 2 blocks from our clinic.  He will come in for a lab only swab at 1:30pm per my instructions.  He will be masked door to door and I or my assistant will swab in and get him in and out.        Ok for wife, Leticia to be tested today as well - she is 84 and asymptomatic as well.  She has not had a direct exposure that she knows of.                     No follow-ups on file.    Agueda Crockett MD  [facility] [place]    Subjective :   Calixto is a 87 year old who presents for the following health issues     HPI       Concern for COVID-19 - Exposed x6 days ago : his  at [facility].   From wife's message: Calixto was exposed to Covid in a rehearsal with his ,, who had been vaccinated .  I called to try to get him a Covid test, as our family is coming from [place] this week, and was told I would need a doctors order. I hope you don t mind, but you had the only opening on Monday.  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  His doctor suggested that some of the players might be infected.  When I made the appointment, they suggested that he might be able to get the test right after the appointment, which would be great.    Pt has been fully vaccinated. The  was also fully vaccinated, but was mildly symptomatic at time of the above band rehearsal.      Immunization History   Administered Date(s) Administered     COVID-19,PF,Pfizer 01/26/2021, 02/18/2021     Influenza (H1N1) 01/20/2010     Influenza (High Dose) 3 valent  vaccine 10/03/2011, 09/20/2012, 10/15/2013, 10/03/2014, 10/15/2015, 09/30/2016, 10/10/2017, 09/17/2018, 10/07/2019     Influenza (IIV3) PF 10/18/2004, 10/12/2005, 10/23/2006, 10/17/2007, 10/15/2009, 09/22/2010     Influenza, Quad, High Dose, Pf, 65yr + 09/04/2020     Pneumo Conj 13-V (2010&after) 05/20/2015     Pneumococcal 23 valent 01/04/2007     TD (ADULT, 7+) 04/20/2007     TDAP Vaccine (Boostrix) 05/23/2012     Zoster vaccine recombinant adjuvanted (SHINGRIX) 09/17/2018, 11/16/2018     Zoster vaccine, live 01/01/2008         About how many days ago did these symptoms start? none  Is this your first visit for this illness? No     Pt has no symptoms - has family coming in later this week and would like to get tested this afternoon if possible.     Who do you live with? Wife , Leticia -- also exposed from .   Are you, or a household member, a healthcare worker or a ? No  Do you live in a nursing home, group home, or shelter? No  Do you have a way to get food/medications if quarantined? Yes, I have a friend or family member who can help me.      They have a lot of family coming into town and want to be sure they are ok.               Review of Systems :   Constitutional, HEENT, cardiovascular, pulmonary, GI, , musculoskeletal, neuro, skin, endocrine and psych systems are negative, except as otherwise noted.      Objective           Vitals:  No vitals were obtained today due to virtual visit.    Physical Exam   healthy, alert and no distress  PSYCH: Alert and oriented times 3; coherent speech, normal   rate and volume, able to articulate logical thoughts, able   to abstract reason, no tangential thoughts, no hallucinations   or delusions  His affect is normal  RESP: No cough, no audible wheezing, able to talk in full sentences  Remainder of exam unable to be completed due to telephone visits    No results found for this or any previous visit (from the past 24 hour(s)).            Phone call  duration: 10 minutes

## 2021-08-30 ENCOUNTER — MYC MEDICAL ADVICE (OUTPATIENT)
Dept: FAMILY MEDICINE | Facility: CLINIC | Age: 86
End: 2021-08-30

## 2021-08-30 ENCOUNTER — TELEPHONE (OUTPATIENT)
Dept: PALLIATIVE MEDICINE | Facility: CLINIC | Age: 86
End: 2021-08-30

## 2021-08-30 DIAGNOSIS — M25.562 ACUTE PAIN OF LEFT KNEE: Primary | ICD-10-CM

## 2021-08-30 NOTE — TELEPHONE ENCOUNTER
Called pt. States that the knee pain is new. PT helped with the low back pain. Was told to use recumbent bike and that made knee pain occur.     Advised that we would see him for chronic knee pain, if this was the case they can make an in clinic appointment for assessment. She states this is new knee pain.  Advised that they should request a referral to either Ortho or Sports medicine for assessment of new knee pain. She states that she will call PCP for referral     Kristal GIL, RN Care Coordinator  [facility]  Pain Management

## 2021-08-30 NOTE — TELEPHONE ENCOUNTER
Reason for call:  Other   Patient called regarding (reason for call): call back  Additional comments: Pt's wife calling to see if Dr. Leal would be able to treat the pt's knee pain or if they can get a referral to someone in the [place] area.    Phone number to reach patient:  Home number on file [phone] (home)    Best Time:  anytime    Can we leave a detailed message on this number?  YES    Travel screening: Not Applicable     Dianna MUJICA    [facility] Management

## 2021-08-31 NOTE — TELEPHONE ENCOUNTER
[employer] message was sent with the information below as well as the contact info to schedule if they do not hear within the next few days from ortho scheduling department.  Maricarmen GUO CMA ([facility]) 8/31/2021  5:47 PM

## 2021-08-31 NOTE — TELEPHONE ENCOUNTER
Please see my chart message below - see pended order     Please review and advise     Thank you     Danielle Mills RN, BSN  [facility]

## 2021-09-03 ENCOUNTER — MEDICAL CORRESPONDENCE (OUTPATIENT)
Dept: HEALTH INFORMATION MANAGEMENT | Facility: CLINIC | Age: 86
End: 2021-09-03

## 2021-09-03 ENCOUNTER — TELEPHONE (OUTPATIENT)
Dept: PALLIATIVE MEDICINE | Facility: CLINIC | Age: 86
End: 2021-09-03

## 2021-09-03 NOTE — TELEPHONE ENCOUNTER
Received form from [facility] Physical Therapy Plan of Care(Medicare requirement).     Form signed by provider and faxed to [phone].    Tram Salmeron [facility]   Pain Management [place]

## 2021-09-09 NOTE — TELEPHONE ENCOUNTER
Pt's wife calling to state the knee pain is still bothering the pt and they are unable to be seen until 9/24. Pt's wife would like to discuss other options.    Dianna MUJICA    [facility]

## 2021-09-10 ENCOUNTER — OFFICE VISIT (OUTPATIENT)
Dept: PALLIATIVE MEDICINE | Facility: CLINIC | Age: 86
End: 2021-09-10
Attending: PHYSICAL MEDICINE & REHABILITATION
Payer: COMMERCIAL

## 2021-09-10 ENCOUNTER — OFFICE VISIT (OUTPATIENT)
Dept: PALLIATIVE MEDICINE | Facility: CLINIC | Age: 86
End: 2021-09-10
Payer: COMMERCIAL

## 2021-09-10 ENCOUNTER — ANCILLARY PROCEDURE (OUTPATIENT)
Dept: GENERAL RADIOLOGY | Facility: CLINIC | Age: 86
End: 2021-09-10
Attending: PHYSICAL MEDICINE & REHABILITATION
Payer: COMMERCIAL

## 2021-09-10 VITALS
SYSTOLIC BLOOD PRESSURE: 113 MMHG | HEIGHT: 69 IN | BODY MASS INDEX: 31.1 KG/M2 | DIASTOLIC BLOOD PRESSURE: 75 MMHG | WEIGHT: 210 LBS | HEART RATE: 86 BPM | OXYGEN SATURATION: 96 %

## 2021-09-10 VITALS — SYSTOLIC BLOOD PRESSURE: 118 MMHG | DIASTOLIC BLOOD PRESSURE: 70 MMHG | HEART RATE: 87 BPM | OXYGEN SATURATION: 98 %

## 2021-09-10 DIAGNOSIS — M25.562 ACUTE PAIN OF LEFT KNEE: ICD-10-CM

## 2021-09-10 DIAGNOSIS — M17.12 ARTHRITIS OF LEFT KNEE: Primary | ICD-10-CM

## 2021-09-10 DIAGNOSIS — M25.562 ACUTE PAIN OF LEFT KNEE: Primary | ICD-10-CM

## 2021-09-10 PROCEDURE — 73562 X-RAY EXAM OF KNEE 3: CPT | Mod: LT | Performed by: RADIOLOGY

## 2021-09-10 PROCEDURE — 20611 DRAIN/INJ JOINT/BURSA W/US: CPT | Mod: LT | Performed by: PHYSICAL MEDICINE & REHABILITATION

## 2021-09-10 PROCEDURE — 99214 OFFICE O/P EST MOD 30 MIN: CPT | Mod: 25 | Performed by: PHYSICAL MEDICINE & REHABILITATION

## 2021-09-10 RX ORDER — BUPIVACAINE HYDROCHLORIDE 5 MG/ML
3 INJECTION, SOLUTION EPIDURAL; INTRACAUDAL ONCE
Status: COMPLETED | OUTPATIENT
Start: 2021-09-10 | End: 2021-09-10

## 2021-09-10 RX ORDER — TRIAMCINOLONE ACETONIDE 40 MG/ML
40 INJECTION, SUSPENSION INTRA-ARTICULAR; INTRAMUSCULAR ONCE
Status: COMPLETED | OUTPATIENT
Start: 2021-09-10 | End: 2021-09-10

## 2021-09-10 RX ADMIN — BUPIVACAINE HYDROCHLORIDE 15 MG: 5 INJECTION, SOLUTION EPIDURAL; INTRACAUDAL at 14:36

## 2021-09-10 RX ADMIN — TRIAMCINOLONE ACETONIDE 40 MG: 40 INJECTION, SUSPENSION INTRA-ARTICULAR; INTRAMUSCULAR at 14:36

## 2021-09-10 ASSESSMENT — MIFFLIN-ST. JEOR: SCORE: 1617.93

## 2021-09-10 ASSESSMENT — PAIN SCALES - GENERAL
PAINLEVEL: MODERATE PAIN (5)
PAINLEVEL: SEVERE PAIN (6)

## 2021-09-10 NOTE — PROGRESS NOTES
[facility]    Date of visit: 9/10/21      Assessment:  Kevni Fierro is a 86 year old male with a past medical history significant for NSTEMI, aortic stenosis, Hypertension, Hyperlipidemia, CKD stage 3, GERD, BPH, MIMI who presents with complaints of chronic low back pain.      1. Chronic low back pain: Patient reports a gradually worsening 5 year history of bilateral low back pain. Exam shows moderately reduced lumbar spine ROM in all planes, neurological exam was normal, SLR negative bilaterally. There is some right sided paraspinal tenderness but not consistently reproduced. MRI showed moderate central and foraminal stenosis at L4-5 as well as widespread mod-sev degenerative disc disease and mod facet arthropathy in the L4-5 levels. Based on patient's history, exam and MRI, differential includes: Lumbar spinal stenosis, lumbar ddd, facet arthropathy. No improvement with lumbar epidural steroid injection or facet injections, significant improvement with PT, they will continue these exercises.     2. Bilateral hip bursitis: Improvement noted with injection in jan 2020 which has tapered off. Can repeat bilateral bursa injections every 3-4 months as needed.    3. Acute left knee pain: Symptoms began after 1 session on a recumbent bicycle, they have tried some PT exercises without any relief. On exam they have diffuse tenderness around the left knee with a mild posterior effusion compared to the right.  Knee XR ordered to r/o fracture, left knee steroid injection ordered.           Plan:  Diagnosis reviewed, treatment options addressed, and risks/benefits discussed. The patient was involved in shared decision making regarding the plan as laid out below.     1. Education: The patient was educated as to the natural history of their disorder. Reassurance was given and the patient was encouraged to engage in activity and movement as able.  2. Physical Therapy: Continue  current PT.  3. Diagnostic Studies:  Left knee XR ordered  4. Medication Management:    1. Apply voltaren 1% gel 4 times daily to the left knee.  2. Continue tylenol 1000mg qid prn  5. Further procedures recommended: left knee injection ordered.  1. Can repeat bilateral GT bursa injections every 3+ months as needed.  6. Follow up: 1 month in clinic      DO [facility]        Chief complaint:   Chief Complaint   Patient presents with     Pain       Interval history:  Kevin Fierro is a 86 year old male last seen by me on 6/7/21.        Since his last visit, Kevin Fierro reports:  -He had bilateral facet injections on 6/7/21 with no significant improvement.    -They started PT for his back and this has improved their back pain.    -They started working on a recumbent bicycle as well but this flared up their left knee. They deny having significant knee pains in the past.    -They tried tylenol 1000mg but this didn't help.     -THey have been using ice and heat which help a little bit.    Pain scores:  Pain intensity on average is 4 on a scale of 0-10.        Current medication treatments include:  -Tylenol  Pain medications are being prescribed by none.     Previous medication treatments included:  none     Other treatments have included:  Kevin Fierro has not been seen at a pain clinic in the past.    Behavioral interventions: hasn't tried  PT: significantly helpful for back pain.  Manual Medicine: hasn't tried recently     Interventional:  Acupuncture: hasn't tried  TENs Unit: hasn't tried  Injections: -no improvement with lumbar epidural steroid injection or lumbar facet injections.  Bursa injections were helpful for hip pain - 1/30/20 - Dr. West.       Side Effects: no side effect    Medications:  Current Outpatient Medications   Medication Sig Dispense Refill     amLODIPine (NORVASC) 5 MG tablet Take 1 tablet (5 mg) by mouth daily 90 tablet 3     aspirin 81 MG EC  tablet Take 81 mg by mouth At Bedtime       atorvastatin (LIPITOR) 40 MG tablet TAKE ONE TABLET BY MOUTH EVERY NIGHT AT BEDTIME 90 tablet 0     finasteride (PROSCAR) 5 MG tablet Take 1 tablet (5 mg) by mouth daily 90 tablet 3     metoprolol succinate ER (TOPROL-XL) 100 MG 24 hr tablet Take 1 tablet (100 mg) by mouth daily 90 tablet 3     multivitamin, therapeutic (THERA-VIT) TABS tablet Take 1 tablet by mouth At Bedtime       omeprazole (PRILOSEC) 20 MG DR capsule Take 20 mg by mouth daily as needed       selenium sulfide (SELSUN) 2.5 % external lotion APPLY DAILY TO EVERY OTHER DAY, LATHER, WAIT 10 MINUTES AND RINSE 118 mL 3     terazosin (HYTRIN) 10 MG capsule TAKE ONE CAPSULE BY MOUTH AT BEDTIME 90 capsule 3     triamcinolone (KENALOG) 0.1 % external cream Apply topically 2 times daily as needed for irritation         Medical History: any changes in medical history since they were last seen? No    Review of Systems:  The 14 system ROS was reviewed from the intake questionnaire, and is positive for: back pain, arthritis, stiffness    /70   Pulse 87   SpO2 98%   Gen: NAD, pleasant  MSK: Left knee diffusely tender with deep palpation, worst at the medial and lateral joint lines. Valgus/varus stress test, lachman's, post drawer negative. Strength is 5/5 and symmetric in the lower extremities.      BILLING TIME DOCUMENTATION:   The total TIME spent on this patient on the date of the encounter/appointment was 35 minutes.      TOTAL TIME includes:   Time spent preparing to see the patient (reviewing records and tests) -  Time spent face to face (or over the phone) with the patient  Time spent ordering tests, medications, procedures and referrals  Time spent documenting clinical information in Epic         John Leal DO  [facility]

## 2021-09-10 NOTE — PATIENT INSTRUCTIONS
1. Apply voltaren 1% gel four times daily as needed to the left knee.      2. Try taking tylenol 1000mg every 6 hours consistently and see if this helps.    3. I ordered knee xrays, you can complete this before leaving today.    4. I'll talk to my schedulers to see if you can get you in for a knee injection this afternoon, otherwise you'll be scheduled for next week.    Take care,    John Leal [facility]        ----------------------------------------------------------------  Clinic Number:  [phone]     Call with any questions about your care and for scheduling assistance.     Calls are returned Monday through Friday between 8 AM and 4:30 PM. We usually get back to you within 2 business days depending on the issue/request.    If we are prescribing your medications:    For opioid medication refills, call the clinic or send a [employer] message 7 days in advance.  Please include:    Name of requested medication    Name of the pharmacy.    For non-opioid medications, call your pharmacy directly to request a refill. Please allow 3-4 days to be processed.     Per [place] State Law:    All controlled substance prescriptions must be filled within 30 days of being written.      For those controlled substances allowing refills, pickup must occur within 30 days of last fill.      We believe regular attendance is key to your success in our program!      Any time you are unable to keep your appointment we ask that you call us at least 24 hours in advance to cancel.This will allow us to offer the appointment time to another patient.     Multiple missed appointments may lead to dismissal from the clinic.

## 2021-09-10 NOTE — PROGRESS NOTES
"[facility] - Procedure Note    Date of Visit: 9/10/2021    Procedure performed:  Left Ultrasound guided intra-articular knee injection  Diagnosis: Left Knee osteoarthritis  : John Leal DO   Anesthesia: none  Complications: none    Indications: Kevin Fierro is a 87 year old male who is seen for a intraarticular knee injection. The patient describes acute left sided knee pain. Symptoms have been persistent, disabling, and intermittently severe. The patient reports minimal improvement with conservative treatment, including oral medications and exercises.    Allergies:      Allergies   Allergen Reactions     Pollen Extract      Tree pollen          Vitals:  /75   Pulse 86   Ht 1.753 m (5' 9\")   Wt 95.3 kg (210 lb)   SpO2 96%   BMI 31.01 kg/m      Review of Systems: The patient denies recent fever, chills, illness, use of antibiotics or anticoagulants. All other 10-point review of systems negative.       PROCEDURE:  Prior to the procedure, the Left knee joint was examined with a 12 MHz linear transducer to visualize the joint space and determine the approach for the procedure.  Procedure was carried out using sterile technique including Chloraprep scrub, a sterile transducer cover, and sterile transducer gel. A simple surgical tray was used.    PROCEDURAL PAUSE:  Procedural pause conducted to verify correct patient identity, procedure to be performed, and as applicable, correct side/site, correct patient position, availability of implants, special equipment, or special requirements.    Patient position:  Supine  Transducer type:  12 MHz linear array transducer  Approach: Lateral to medial parallel to long axis of transducer  Injection: After confirming needle tip position, syringe was replaced with one containing 1 ml of 40 mg/ml  Kenalog and 3 ml of 0.5% Bupivicaine which was injected and seen filling the joint space. Needle was removed bandage placed over " the wound.        Pre-procedure pain score: 6/10   Post-procedure pain score: 3/10      Assessment/Plan: Kevin Fierro is a 87 year old male s/p intra-articular knee steroid injection today for knee pain/osteoarthritis.     1. Following today's procedure, the patient was advised to contact the [facility] for any of the following:   Fever, chills, or night sweats   New onset of pain, swelling, redness, numbness, or weakness   Any questions/concerns regarding the procedure  If unable to contact the Pain Center, the patient was instructed to go to a local Emergency Room for any complications.   2. The patient was instructed to ice the knees and refrain from overuse over the next 3 days.    3. Follow-up with referring provider in 6 weeks for a post-procedure evaluation.      John Leal DO  [facility] [place]

## 2021-09-10 NOTE — PATIENT INSTRUCTIONS
[facility]   Post Procedure Instructions    Today you had:  Left knee steroid injection    Medications used:  bupivicaine   kenolog           Go to the emergency room if you develop any shortness of breath    Monitor the injection sites for signs and symptoms of infection-fever, chills, redness, swelling, warmth, or drainage to areas.    You may have soreness at injection sites for up to 24 hours.    You may apply ice to the painful areas to help minimize the discomfort of the needle pokes.    Do not apply heat to sites for at least 12 hours.    You may use anti-inflammatory medications or Tylenol for pain control if necessary    Pain Clinic phone number during work hours Monday-Friday:  [phone]    After hours provider line: [phone]

## 2021-09-12 ENCOUNTER — HEALTH MAINTENANCE LETTER (OUTPATIENT)
Age: 86
End: 2021-09-12

## 2021-09-13 NOTE — PROGRESS NOTES
ASSESSMENT & PLAN  Patient Instructions     1. Acute pain of left knee    2. Arthritis of left knee      -Patient has left knee pain due to aggravation of arthritis and likely degenerative meniscus tear  -We had an in-depth discussion today regarding his treatment options which include viscosupplementation injections, topical and oral pain medications, physical therapy, or nerve block and ablation.  We discussed all of his options at length.  -Patient decided to start with topical lidocaine patches to be applied to his knee for 12 hours a day.  Patient will continue icing and heating his knee as needed.  Patient may take 1 to 2 tablets of extra Tylenol every 6-8 hours as needed.  -Patient believes he aggravated his knee while using the recumbent bike.  Patient may try 5-minute sessions on no resistance, moving the seat back so that his knee does not bend beyond 90 degrees and rotate his feet to accommodate his natural gait pattern.  However, he may wait until he starts his therapy so that his therapist can teach him proper positioning without further aggravating his pain  -Patient will call us once he is ready to start formal physical therapy to have the order placed  -Call direct clinic number [[phone]] at any time with questions or concerns.    Albert Yeo MD [facility]  [facility]          -----    SUBJECTIVE  Kevin Fierro is a/an 87 year old male who is seen in consultation at the request of  John Wiseman M.D. for evaluation of left knee pain. The patient is seen by themselves.    He reports 3-4 months of left knee pain. He reports no injury. His pain in his knee has increased during physical therapy for his lower back pain. He states the recumbent bike he was riding at  has increased pain. He recently had a steroid had a steroid injection in the left knee on 9/10/21 with Dr Leal   Location of Pain: left anterior medial knee  Rating  of Pain at worst: 6/10  Rating of Pain Currently: 2/10  Worsened by: stairs and walking  Better with: ice and heat  Treatments tried: corticosteroid injection (most recent date: 9/10/2021 with Dr. Leal) that provided minimal pain relief   Associated symptoms: weakness of knee and feeling of instability  Orthopedic history: lower back pain  Relevant surgical history: NO  Social history: social history: retired    Past Medical History:   Diagnosis Date     Arthritis .     BCC (basal cell carcinoma of skin) 10/2018, 11/2019    right jaw, rt parietal,11/2019- Dr Limon     Choroidal nevus of left eye     Dr Connor     CKD (chronic kidney disease) stage 3, GFR 30-59 ml/min      Colon polyps      Coronary artery disease involving native coronary artery of native heart without angina pectoris 01/2021     ED (erectile dysfunction)      Family history of cardiovascular disease      GERD (gastroesophageal reflux disease) 2013     Hematuria 1999     [place]     Hernia, abdominal      Hyperlipidemia LDL goal <70 1990     Hypertension goal BP (blood pressure) < 140/90 1990     Hypertrophy of prostate without urinary obstruction and other lower urinary tract symptoms (LUTS)      Lumbar stenosis 2017    mild to moderate foraminal and central     Lung nodules 11/2010    CT scan stable     Nonrheumatic aortic valve stenosis 01/2021    mild to moderate     Nonrheumatic aortic valve stenosis     mild to moderate     obstructive SLEEP APNEA 05/2007    CPAP     Other and unspecified 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 05/25/2005     Palpitations      PSVT 11/2017    few runs, rare PVC     Restrictive lung disease      Seasonal allergies      Unspecified hearing loss 05/2005    hearing aids, L>R - Dr Hernandez     Social History     Socioeconomic History     Marital status:      Spouse name: Leticia     Number of children: 3     Years of education: 18     Highest education level: Not on file    Occupational History     Employer: RETIRED   Tobacco Use     Smoking status: Never Smoker     Smokeless tobacco: Never Used   Substance and Sexual Activity     Alcohol use: Yes     Alcohol/week: 4.0 - 5.0 standard drinks     Types: 4 - 5 Standard drinks or equivalent per week     Comment: 4-5 drinks per week avg     Drug use: No     Sexual activity: Yes     Partners: Female   Other Topics Concern      Service Not Asked     Blood Transfusions No     Caffeine Concern Yes     Comment: 1 serv/day, rare pop, occas chocolate (3-4/yr)     Occupational Exposure Not Asked     Hobby Hazards Not Asked     Sleep Concern Yes     Comment: MIMI, wakes feeling rested     Stress Concern Not Asked     Weight Concern Not Asked     Special Diet Not Asked     Back Care Not Asked     Exercise Yes     Comment: 30-45' 2-3 x/wk     Bike Helmet Not Asked     Seat Belt Yes     Self-Exams Not Asked     Parent/sibling w/ CABG, MI or angioplasty before 65F 55M? Yes   Social History Narrative     Not on file     Social Determinants of Health     Financial Resource Strain:      Difficulty of Paying Living Expenses:    Food Insecurity:      Worried About Running Out of Food in the Last Year:      Ran Out of Food in the Last Year:    Transportation Needs:      Lack of Transportation (Medical):      Lack of Transportation (Non-Medical):    Physical Activity:      Days of Exercise per Week:      Minutes of Exercise per Session:    Stress:      Feeling of Stress :    Social Connections:      Frequency of Communication with Friends and Family:      Frequency of Social Gatherings with Friends and Family:      Attends [employer] Services:      Active Member of Clubs or Organizations:      Attends Club or Organization Meetings:      Marital Status:    Intimate Partner Violence:      Fear of Current or Ex-Partner:      Emotionally Abused:      Physically Abused:      Sexually Abused:          Patient's past medical, surgical, social, and family  "histories were reviewed today and no changes are noted.    REVIEW OF SYSTEMS:  10 point ROS is negative other than symptoms noted above in HPI, Past Medical History or as stated below  Constitutional: NEGATIVE for fever, chills, change in weight  Skin: NEGATIVE for worrisome rashes, moles or lesions  GI/: NEGATIVE for bowel or bladder changes  Neuro: NEGATIVE for weakness, dizziness or paresthesias    OBJECTIVE:  /72   Ht 1.753 m (5' 9\")   Wt 95.3 kg (210 lb)   BMI 31.01 kg/m     General: healthy, alert and in no distress  HEENT: no scleral icterus or conjunctival erythema  Skin: no suspicious lesions or rash. No jaundice.  CV: no pedal edema  Resp: normal respiratory effort without conversational dyspnea   Psych: normal mood and affect  Gait: normal steady gait with appropriate coordination and balance  Neuro: Normal light sensory exam of lower extremity  MSK:  LEFT KNEE  Inspection:    normal alignment  Palpation:    Tender about the medial patellar facet and medial joint line. Remainder of bony and ligamentous landmarks are nontender.    No effusion is present    Patellofemoral crepitus is Present  Range of Motion:     00 extension to 900 flexion  Strength:    Quadriceps grossly intact    Extensor mechanism intact  Special Tests:    Positive: none    Negative: MCL/valgus stress (0 & 30 deg), LCL/varus stress (0 & 30 deg), Lachman's, anterior drawer, posterior drawer, Kya's    Independent visualization of the below image:  No results found for this or any previous visit (from the past 24 hour(s)).    KNEE LEFT THREE VIEWS   9/10/2021 10:10 AM      HISTORY:  Acute left knee pain after doing recumbent bike exercises,  swelling noted compared to the right. Acute pain of left knee.     COMPARISON: None.                                                                      IMPRESSION: Moderate-to-severe osteoarthrosis in the lateral aspect of  the patellofemoral compartment with full-thickness cartilage " loss and  moderate osteophytes. There is also moderate lateral subluxation and  tilt of the patella. Small effusion. No calcified intra-articular body  or fracture.     JUJU CERDA MD          Patient's conditions were thoroughly discussed during today's visit with total time spent face-to-face with the patient documentation being 35 minutes.    Albert Yeo MD [facility]

## 2021-09-22 ENCOUNTER — TELEPHONE (OUTPATIENT)
Dept: PALLIATIVE MEDICINE | Facility: CLINIC | Age: 86
End: 2021-09-22

## 2021-09-22 NOTE — TELEPHONE ENCOUNTER
Reason for call:  Other   Patient called regarding (reason for call): call back  Additional comments: Pt's wife calling to follow up on knee injections. Pt's wife states they have been helping some, but some days he is still in pain. They are wondering if an MRI is the next step?    Phone number to reach patient:  Home number on file [phone] (home)    Best Time:  anytime    Can we leave a detailed message on this number?  YES    Travel screening: Not Applicable     Dianna MUJICA    [facility]

## 2021-09-23 DIAGNOSIS — M25.562 ACUTE PAIN OF LEFT KNEE: Primary | ICD-10-CM

## 2021-09-23 DIAGNOSIS — M17.12 ARTHRITIS OF LEFT KNEE: ICD-10-CM

## 2021-09-23 NOTE — TELEPHONE ENCOUNTER
Ortho  referral placed to evaluate their knee pain. Please let them know they will be scheduled with a non-surgical specialist.    Thanks,  John Leal [facility]

## 2021-09-23 NOTE — TELEPHONE ENCOUNTER
Called pt and notified per below-    [facility]  will call you to coordinate your care as prescribed by your provider. A representative will call you within 1 business day to help schedule your appointment, or you may contact the  Representative at: [phone]

## 2021-09-23 NOTE — TELEPHONE ENCOUNTER
Will forward to Dr. Leal to review and advise on next steps?    Injections on left knee on 9/10/21, Post visit note stated that pt should follow up with referring provider.      Thanks    Jesús Murrieta, RN  Patient Care Supervisor   [facility] [place]

## 2021-09-24 ENCOUNTER — OFFICE VISIT (OUTPATIENT)
Dept: ORTHOPEDICS | Facility: CLINIC | Age: 86
End: 2021-09-24
Payer: COMMERCIAL

## 2021-09-24 VITALS
HEIGHT: 69 IN | SYSTOLIC BLOOD PRESSURE: 117 MMHG | BODY MASS INDEX: 31.1 KG/M2 | WEIGHT: 210 LBS | DIASTOLIC BLOOD PRESSURE: 72 MMHG

## 2021-09-24 DIAGNOSIS — M25.562 ACUTE PAIN OF LEFT KNEE: ICD-10-CM

## 2021-09-24 DIAGNOSIS — M17.12 ARTHRITIS OF LEFT KNEE: ICD-10-CM

## 2021-09-24 PROCEDURE — 99214 OFFICE O/P EST MOD 30 MIN: CPT | Performed by: FAMILY MEDICINE

## 2021-09-24 ASSESSMENT — MIFFLIN-ST. JEOR: SCORE: 1617.93

## 2021-09-24 NOTE — LETTER
9/24/2021         RE: Kevin Fierro  [address] [address]        Dear Colleague,    Thank you for referring your patient, Kevin Fierro, to the [facility] [place]. Please see a copy of my visit note below.    ASSESSMENT & PLAN  Patient Instructions     1. Acute pain of left knee    2. Arthritis of left knee      -Patient has left knee pain due to aggravation of arthritis and likely degenerative meniscus tear  -We had an in-depth discussion today regarding his treatment options which include viscosupplementation injections, topical and oral pain medications, physical therapy, or nerve block and ablation.  We discussed all of his options at length.  -Patient decided to start with topical lidocaine patches to be applied to his knee for 12 hours a day.  Patient will continue icing and heating his knee as needed.  Patient may take 1 to 2 tablets of extra Tylenol every 6-8 hours as needed.  -Patient believes he aggravated his knee while using the recumbent bike.  Patient may try 5-minute sessions on no resistance, moving the seat back so that his knee does not bend beyond 90 degrees and rotate his feet to accommodate his natural gait pattern.  However, he may wait until he starts his therapy so that his therapist can teach him proper positioning without further aggravating his pain  -Patient will call us once he is ready to start formal physical therapy to have the order placed  -Call direct clinic number [[phone]] at any time with questions or concerns.    Albert Yeo MD [facility] Orth[facility]ics and Sports Medicine  [facility] [place]          -----    SUBJECTIVE  Kevin Fierro is a/an 87 year old male who is seen in consultation at the request of  John Wiseman M.D. for evaluation of left knee pain. The patient is seen by themselves.    He reports 3-4 months of left knee pain. He reports no injury. His pain in his  knee has increased during physical therapy for his lower back pain. He states the recumbent bike he was riding at PT has increased pain. He recently had a steroid had a steroid injection in the left knee on 9/10/21 with Dr Leal   Location of Pain: left anterior medial knee  Rating of Pain at worst: 6/10  Rating of Pain Currently: 2/10  Worsened by: stairs and walking  Better with: ice and heat  Treatments tried: corticosteroid injection (most recent date: 9/10/2021 with Dr. Leal) that provided minimal pain relief   Associated symptoms: weakness of knee and feeling of instability  Orthopedic history: lower back pain  Relevant surgical history: NO  Social history: social history: retired    Past Medical History:   Diagnosis Date     Arthritis .     BCC (basal cell carcinoma of skin) 10/2018, 11/2019    right jaw, rt parietal,11/2019- Dr Limon     Choroidal nevus of left eye     Dr Connor     CKD (chronic kidney disease) stage 3, GFR 30-59 ml/min      Colon polyps      Coronary artery disease involving native coronary artery of native heart without angina pectoris 01/2021     ED (erectile dysfunction)      Family history of cardiovascular disease      GERD (gastroesophageal reflux disease) 2013     Hematuria 1999    dr scott     Hernia, abdominal      Hyperlipidemia LDL goal <70 1990     Hypertension goal BP (blood pressure) < 140/90 1990     Hypertrophy of prostate without urinary obstruction and other lower urinary tract symptoms (LUTS)      Lumbar stenosis 2017    mild to moderate foraminal and central     Lung nodules 11/2010    CT scan stable     Nonrheumatic aortic valve stenosis 01/2021    mild to moderate     Nonrheumatic aortic valve stenosis     mild to moderate     obstructive SLEEP APNEA 05/2007    CPAP     Other and unspecified 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 05/25/2005     Palpitations      PSVT 11/2017    few runs, rare PVC     Restrictive lung disease      Seasonal  allergies      Unspecified hearing loss 05/2005    hearing aids, L>R - Dr Hernandez     Social History     Socioeconomic History     Marital status:      Spouse name: Leticia     Number of children: 3     Years of education: 18     Highest education level: Not on file   Occupational History     Employer: RETIRED   Tobacco Use     Smoking status: Never Smoker     Smokeless tobacco: Never Used   Substance and Sexual Activity     Alcohol use: Yes     Alcohol/week: 4.0 - 5.0 standard drinks     Types: 4 - 5 Standard drinks or equivalent per week     Comment: 4-5 drinks per week avg     Drug use: No     Sexual activity: Yes     Partners: Female   Other Topics Concern      Service Not Asked     Blood Transfusions No     Caffeine Concern Yes     Comment: 1 serv/day, rare pop, occas chocolate (3-4/yr)     Occupational Exposure Not Asked     Hobby Hazards Not Asked     Sleep Concern Yes     Comment: MIMI, wakes feeling rested     Stress Concern Not Asked     Weight Concern Not Asked     Special Diet Not Asked     Back Care Not Asked     Exercise Yes     Comment: 30-45' 2-3 x/wk     Bike Helmet Not Asked     Seat Belt Yes     Self-Exams Not Asked     Parent/sibling w/ CABG, MI or angioplasty before 65F 55M? Yes   Social History Narrative     Not on file     Social Determinants of Health     Financial Resource Strain:      Difficulty of Paying Living Expenses:    Food Insecurity:      Worried About Running Out of Food in the Last Year:      Ran Out of Food in the Last Year:    Transportation Needs:      Lack of Transportation (Medical):      Lack of Transportation (Non-Medical):    Physical Activity:      Days of Exercise per Week:      Minutes of Exercise per Session:    Stress:      Feeling of Stress :    Social Connections:      Frequency of Communication with Friends and Family:      Frequency of Social Gatherings with Friends and Family:      Attends Hoahaoism Services:      Active Member of Clubs or  "Organizations:      Attends Club or Organization Meetings:      Marital Status:    Intimate Partner Violence:      Fear of Current or Ex-Partner:      Emotionally Abused:      Physically Abused:      Sexually Abused:          Patient's past medical, surgical, social, and family histories were reviewed today and no changes are noted.    REVIEW OF SYSTEMS:  10 point ROS is negative other than symptoms noted above in HPI, Past Medical History or as stated below  Constitutional: NEGATIVE for fever, chills, change in weight  Skin: NEGATIVE for worrisome rashes, moles or lesions  GI/: NEGATIVE for bowel or bladder changes  Neuro: NEGATIVE for weakness, dizziness or paresthesias    OBJECTIVE:  /72   Ht 1.753 m (5' 9\")   Wt 95.3 kg (210 lb)   BMI 31.01 kg/m     General: healthy, alert and in no distress  HEENT: no scleral icterus or conjunctival erythema  Skin: no suspicious lesions or rash. No jaundice.  CV: no pedal edema  Resp: normal respiratory effort without conversational dyspnea   Psych: normal mood and affect  Gait: normal steady gait with appropriate coordination and balance  Neuro: Normal light sensory exam of lower extremity  MSK:  LEFT KNEE  Inspection:    normal alignment  Palpation:    Tender about the medial patellar facet and medial joint line. Remainder of bony and ligamentous landmarks are nontender.    No effusion is present    Patellofemoral crepitus is Present  Range of Motion:     00 extension to 900 flexion  Strength:    Quadriceps grossly intact    Extensor mechanism intact  Special Tests:    Positive: none    Negative: MCL/valgus stress (0 & 30 deg), LCL/varus stress (0 & 30 deg), Lachman's, anterior drawer, posterior drawer, Kya's    Independent visualization of the below image:  No results found for this or any previous visit (from the past 24 hour(s)).    KNEE LEFT THREE VIEWS   9/10/2021 10:10 AM      HISTORY:  Acute left knee pain after doing recumbent bike exercises,  swelling " noted compared to the right. Acute pain of left knee.     COMPARISON: None.                                                                      IMPRESSION: Moderate-to-severe osteoarthrosis in the lateral aspect of  the patellofemoral compartment with full-thickness cartilage loss and  moderate osteophytes. There is also moderate lateral subluxation and  tilt of the patella. Small effusion. No calcified intra-articular body  or fracture.     JUJU CERDA MD          Patient's conditions were thoroughly discussed during today's visit with total time spent face-to-face with the patient documentation being 35 minutes.    Albert Yeo MD [facility]        Again, thank you for allowing me to participate in the care of your patient.        Sincerely,        Albert Yeo, MD

## 2021-09-24 NOTE — PATIENT INSTRUCTIONS
1. Acute pain of left knee    2. Arthritis of left knee      -Patient has left knee pain due to aggravation of arthritis and likely degenerative meniscus tear  -We had an in-depth discussion today regarding his treatment options which include viscosupplementation injections, topical and oral pain medications, physical therapy, or nerve block and ablation.  We discussed all of his options at length.  -Patient decided to start with topical lidocaine patches to be applied to his knee for 12 hours a day.  Patient will continue icing and heating his knee as needed.  Patient may take 1 to 2 tablets of extra Tylenol every 6-8 hours as needed.  -Patient believes he aggravated his knee while using the recumbent bike.  Patient may try 5-minute sessions on no resistance, moving the seat back so that his knee does not bend beyond 90 degrees and rotate his feet to accommodate his natural gait pattern.  However, he may wait until he starts his therapy so that his therapist can teach him proper positioning without further aggravating his pain  -Patient will call us once he is ready to start formal physical therapy to have the order placed  -Call direct clinic number [[phone]] at any time with questions or concerns.    Albert Yeo MD [facility] Orthopedics and Sports Medicine  [facility] [place]

## 2021-09-28 ENCOUNTER — DOCUMENTATION ONLY (OUTPATIENT)
Dept: LAB | Facility: CLINIC | Age: 86
End: 2021-09-28

## 2021-09-28 DIAGNOSIS — G47.10 HYPERSOMNIA WITH SLEEP APNEA: ICD-10-CM

## 2021-09-28 DIAGNOSIS — N18.31 STAGE 3A CHRONIC KIDNEY DISEASE (H): ICD-10-CM

## 2021-09-28 DIAGNOSIS — I10 HYPERTENSION GOAL BP (BLOOD PRESSURE) < 140/90: ICD-10-CM

## 2021-09-28 DIAGNOSIS — R79.9 ABNORMAL FINDING OF BLOOD CHEMISTRY, UNSPECIFIED: ICD-10-CM

## 2021-09-28 DIAGNOSIS — Z51.81 MEDICATION MONITORING ENCOUNTER: ICD-10-CM

## 2021-09-28 DIAGNOSIS — E78.5 HYPERLIPIDEMIA LDL GOAL <70: ICD-10-CM

## 2021-09-28 DIAGNOSIS — K21.9 GASTROESOPHAGEAL REFLUX DISEASE WITHOUT ESOPHAGITIS: ICD-10-CM

## 2021-09-28 DIAGNOSIS — G47.30 HYPERSOMNIA WITH SLEEP APNEA: ICD-10-CM

## 2021-09-28 DIAGNOSIS — I25.10 CORONARY ARTERY DISEASE INVOLVING NATIVE CORONARY ARTERY OF NATIVE HEART WITHOUT ANGINA PECTORIS: Primary | ICD-10-CM

## 2021-09-29 ENCOUNTER — TELEPHONE (OUTPATIENT)
Dept: ORTHOPEDICS | Facility: CLINIC | Age: 86
End: 2021-09-29
Payer: COMMERCIAL

## 2021-09-29 DIAGNOSIS — M17.12 PRIMARY OSTEOARTHRITIS OF LEFT KNEE: Primary | ICD-10-CM

## 2021-09-29 DIAGNOSIS — M25.562 ACUTE PAIN OF LEFT KNEE: ICD-10-CM

## 2021-09-29 DIAGNOSIS — M17.12 ARTHRITIS OF LEFT KNEE: ICD-10-CM

## 2021-09-29 NOTE — TELEPHONE ENCOUNTER
Plan per LOV with Dr. Yeo on 9/24/21:  -Patient has left knee pain due to aggravation of arthritis and likely degenerative meniscus tear  -We had an in-depth discussion today regarding his treatment options which include viscosupplementation injections, topical and oral pain medications, physical therapy, or nerve block and ablation.  We discussed all of his options at length.  -Patient decided to start with topical lidocaine patches to be applied to his knee for 12 hours a day.  Patient will continue icing and heating his knee as needed.  Patient may take 1 to 2 tablets of extra Tylenol every 6-8 hours as needed.  -Patient believes he aggravated his knee while using the recumbent bike.  Patient may try 5-minute sessions on no resistance, moving the seat back so that his knee does not bend beyond 90 degrees and rotate his feet to accommodate his natural gait pattern.  However, he may wait until he starts his therapy so that his therapist can teach him proper positioning without further aggravating his pain  -Patient will call us once he is ready to start formal physical therapy to have the order placed    Will call patient's wife to discuss further. Of note: no order for PT has been placed yet.    Jaymie Thrasher MBA, ATC

## 2021-09-29 NOTE — TELEPHONE ENCOUNTER
Reason for call:  Patient's wife stated her  is still in considerable pain. Nothing seems to be helping. They would like to know if it is still advised to proceed with physical therapy while he is having so much pain and is there anything else he can try?    Home number on file [phone] (home)

## 2021-09-29 NOTE — TELEPHONE ENCOUNTER
Return call to Leticia. Spoke with Leticia and [employer].    He notes some increased pain in the knee. They have tried lidocaine patches, voltaren gel, ice and Tylenol as directed with no relief. He notes some relief with heat.    Informed them that he can start PT as soon as they would like and that PT would work with him and his pain to make sure they do not do anything that aggravates it. They may also offer different modalities / exercises that would hopefully provide relief.    They also asked about visco injections and what that entails. Explained process and that it can take up to 4 weeks to see relief. Informed them this could be used in conjunction with PT.    They are currently working to apply with [employer] for help with transportation but as soon as this has been set up they will schedule PT.    They would like a referral PT and to start the PA process for visco.    Will call them once PA is approved.    Orders pending. Please advise.    Jaymie Thrasher MBA, ATC

## 2021-10-01 ENCOUNTER — TELEPHONE (OUTPATIENT)
Dept: FAMILY MEDICINE | Facility: CLINIC | Age: 86
End: 2021-10-01

## 2021-10-01 ENCOUNTER — TELEPHONE (OUTPATIENT)
Dept: PALLIATIVE MEDICINE | Facility: CLINIC | Age: 86
End: 2021-10-01

## 2021-10-01 NOTE — TELEPHONE ENCOUNTER
Patient calling asking to speak to a nurse about knee pain he has been having & getting a MRI        Ashly Ivy    [facility]

## 2021-10-01 NOTE — TELEPHONE ENCOUNTER
Forms/Letter Request    Name of form/letter:  [employer]    Have you been seen for this request: N/A    Do we have the form/letter: Yes: it does need Dr Campo signature    When is form/letter needed by:  Told it would be 5 to 7 days    How would you like the form/letter returned:     Patient Notified form requests are processed in 3-5 business days:Yes  Call home phone at [phone]    Okay to leave a detailed message? No Home number on file [phone] (home)

## 2021-10-01 NOTE — TELEPHONE ENCOUNTER
"Called pt-spoke to spouse and patient. They thought they were told they would get an MRI if the injection with Dr Leal was not helpful. They did follow up appointment with Dr Yeo. They states were given several options at this appointment- they have done ointments/patches with no relief, they reports they are working on starting PT along with \"rooster shot\" -(hylagon injection) -see 09/29/21 sports meds encounter. Advised PA is being worked on for this. They are wondering about doing an MRI as they would like to know if Hylagon and PT is a good idea and believe that an MRI would help them make this decision. Advised that provider is out of the office today and would review next week    Krsital GIL, RN Care Coordinator  [facility]  Pain Management    "

## 2021-10-04 ENCOUNTER — LAB (OUTPATIENT)
Dept: LAB | Facility: CLINIC | Age: 86
End: 2021-10-04
Payer: COMMERCIAL

## 2021-10-04 ENCOUNTER — TELEPHONE (OUTPATIENT)
Dept: LAB | Facility: CLINIC | Age: 86
End: 2021-10-04

## 2021-10-04 DIAGNOSIS — G47.30 HYPERSOMNIA WITH SLEEP APNEA: ICD-10-CM

## 2021-10-04 DIAGNOSIS — I10 HYPERTENSION GOAL BP (BLOOD PRESSURE) < 140/90: ICD-10-CM

## 2021-10-04 DIAGNOSIS — E78.5 HYPERLIPIDEMIA LDL GOAL <70: ICD-10-CM

## 2021-10-04 DIAGNOSIS — I25.10 CORONARY ARTERY DISEASE INVOLVING NATIVE CORONARY ARTERY OF NATIVE HEART WITHOUT ANGINA PECTORIS: ICD-10-CM

## 2021-10-04 DIAGNOSIS — G47.10 HYPERSOMNIA WITH SLEEP APNEA: ICD-10-CM

## 2021-10-04 DIAGNOSIS — Z51.81 MEDICATION MONITORING ENCOUNTER: ICD-10-CM

## 2021-10-04 DIAGNOSIS — R79.9 ABNORMAL FINDING OF BLOOD CHEMISTRY, UNSPECIFIED: ICD-10-CM

## 2021-10-04 DIAGNOSIS — N18.31 STAGE 3A CHRONIC KIDNEY DISEASE (H): ICD-10-CM

## 2021-10-04 PROCEDURE — 83036 HEMOGLOBIN GLYCOSYLATED A1C: CPT

## 2021-10-04 PROCEDURE — 36415 COLL VENOUS BLD VENIPUNCTURE: CPT

## 2021-10-04 PROCEDURE — 80053 COMPREHEN METABOLIC PANEL: CPT

## 2021-10-04 PROCEDURE — 84443 ASSAY THYROID STIM HORMONE: CPT

## 2021-10-04 PROCEDURE — 82550 ASSAY OF CK (CPK): CPT

## 2021-10-04 PROCEDURE — 80061 LIPID PANEL: CPT

## 2021-10-04 NOTE — TELEPHONE ENCOUNTER
Called pt and spouse- advised per below. Provided number for scheduling.    Kristal GIL, RN Care Coordinator  [facility]  Pain [facility]

## 2021-10-04 NOTE — TELEPHONE ENCOUNTER
Upon chart review, PA for Synvisc One for left knee approved.  .Insurance name: are Medicare Plans (A + B Replacement)   Location: [employer]  % covered: 100%  OOP$: 3000    Upon chart review, patient is scheduled for procedure appt with Dr. Yeo on 10/27 and for his initial PT appt on 10/22.    Return call to patient's wife Leticia. Spoke to her and confirmed patient's appointments and that we have received approval for Synvisc One. She verbalized understanding and was appreciative of call back. All questions answered.    Jaymie Thrasher MBA, ATC

## 2021-10-04 NOTE — TELEPHONE ENCOUNTER
I think PT and Hylagen/Synvisc is a good idea. I would recommend discussing the MRI with Dr. Yeo.    Sometimes insurance will not approve MRIs for a certain period of time after knee pain starts or if the patient has not worked with PT etc, so this may be one of the reasons this hasn't been done yet.    John Leal, [facility] Pain Management

## 2021-10-06 LAB
ALBUMIN SERPL-MCNC: 3.3 G/DL (ref 3.4–5)
ALP SERPL-CCNC: 36 U/L (ref 40–150)
ALT SERPL W P-5'-P-CCNC: 34 U/L (ref 0–70)
ANION GAP SERPL CALCULATED.3IONS-SCNC: 9 MMOL/L (ref 3–14)
AST SERPL W P-5'-P-CCNC: 23 U/L (ref 0–45)
BILIRUB SERPL-MCNC: 0.4 MG/DL (ref 0.2–1.3)
BUN SERPL-MCNC: 32 MG/DL (ref 7–30)
CALCIUM SERPL-MCNC: 8.7 MG/DL (ref 8.5–10.1)
CHLORIDE BLD-SCNC: 110 MMOL/L (ref 94–109)
CHOLEST SERPL-MCNC: 145 MG/DL
CK SERPL-CCNC: 65 U/L (ref 30–300)
CO2 SERPL-SCNC: 21 MMOL/L (ref 20–32)
CREAT SERPL-MCNC: 1.38 MG/DL (ref 0.66–1.25)
FASTING STATUS PATIENT QL REPORTED: YES
GFR SERPL CREATININE-BSD FRML MDRD: 46 ML/MIN/1.73M2
GLUCOSE BLD-MCNC: 87 MG/DL (ref 70–99)
HBA1C MFR BLD: 5.5 % (ref 0–5.6)
HDLC SERPL-MCNC: 54 MG/DL
LDLC SERPL CALC-MCNC: 65 MG/DL
NONHDLC SERPL-MCNC: 91 MG/DL
POTASSIUM BLD-SCNC: 4.3 MMOL/L (ref 3.4–5.3)
PROT SERPL-MCNC: 6.7 G/DL (ref 6.8–8.8)
SODIUM SERPL-SCNC: 140 MMOL/L (ref 133–144)
TRIGL SERPL-MCNC: 128 MG/DL
TSH SERPL DL<=0.005 MIU/L-ACNC: 1.83 MU/L (ref 0.4–4)

## 2021-10-13 ENCOUNTER — OFFICE VISIT (OUTPATIENT)
Dept: FAMILY MEDICINE | Facility: CLINIC | Age: 86
End: 2021-10-13
Payer: COMMERCIAL

## 2021-10-13 VITALS
BODY MASS INDEX: 29.62 KG/M2 | DIASTOLIC BLOOD PRESSURE: 83 MMHG | HEART RATE: 82 BPM | OXYGEN SATURATION: 98 % | RESPIRATION RATE: 22 BRPM | SYSTOLIC BLOOD PRESSURE: 123 MMHG | WEIGHT: 200 LBS | HEIGHT: 69 IN

## 2021-10-13 DIAGNOSIS — J98.4 RESTRICTIVE LUNG DISEASE: ICD-10-CM

## 2021-10-13 DIAGNOSIS — Z51.81 MEDICATION MONITORING ENCOUNTER: ICD-10-CM

## 2021-10-13 DIAGNOSIS — Z91.09 ENVIRONMENTAL ALLERGIES: ICD-10-CM

## 2021-10-13 DIAGNOSIS — Z82.49 FAMILY HISTORY OF CARDIOVASCULAR DISEASE: ICD-10-CM

## 2021-10-13 DIAGNOSIS — N18.31 STAGE 3A CHRONIC KIDNEY DISEASE (H): ICD-10-CM

## 2021-10-13 DIAGNOSIS — E78.5 HYPERLIPIDEMIA LDL GOAL <70: ICD-10-CM

## 2021-10-13 DIAGNOSIS — G47.10 HYPERSOMNIA WITH SLEEP APNEA: ICD-10-CM

## 2021-10-13 DIAGNOSIS — K21.9 GASTROESOPHAGEAL REFLUX DISEASE WITHOUT ESOPHAGITIS: ICD-10-CM

## 2021-10-13 DIAGNOSIS — G47.30 HYPERSOMNIA WITH SLEEP APNEA: ICD-10-CM

## 2021-10-13 DIAGNOSIS — I10 HYPERTENSION GOAL BP (BLOOD PRESSURE) < 140/90: ICD-10-CM

## 2021-10-13 DIAGNOSIS — Z00.00 ROUTINE GENERAL MEDICAL EXAMINATION AT A HEALTH CARE FACILITY: Primary | ICD-10-CM

## 2021-10-13 DIAGNOSIS — I25.10 CORONARY ARTERY DISEASE INVOLVING NATIVE CORONARY ARTERY OF NATIVE HEART WITHOUT ANGINA PECTORIS: ICD-10-CM

## 2021-10-13 DIAGNOSIS — M48.062 SPINAL STENOSIS OF LUMBAR REGION WITH NEUROGENIC CLAUDICATION: ICD-10-CM

## 2021-10-13 PROCEDURE — 99397 PER PM REEVAL EST PAT 65+ YR: CPT | Performed by: FAMILY MEDICINE

## 2021-10-13 ASSESSMENT — MIFFLIN-ST. JEOR: SCORE: 1572.57

## 2021-10-13 ASSESSMENT — ACTIVITIES OF DAILY LIVING (ADL)
CURRENT_FUNCTION: TRANSPORTATION REQUIRES ASSISTANCE
CURRENT_FUNCTION: HOUSEWORK REQUIRES ASSISTANCE
CURRENT_FUNCTION: SHOPPING REQUIRES ASSISTANCE

## 2021-10-13 NOTE — PROGRESS NOTES
"[facility]  [place]    SUBJECTIVE    Kevin Fierro is a 87 year old male who presents for Preventive Visit.    Patient has been advised of split billing requirements and indicates understanding: Yes   Are you in the first 12 months of your Medicare coverage?  No    Healthy Habits:    In general, how would you rate your overall health?  Good    Frequency of exercise:  None    Duration of exercise:  Other    Do you usually eat at least 4 servings of fruit and vegetables a day, include whole grains    & fiber and avoid regularly eating high fat or \"junk\" foods?  Yes    Taking medications regularly:  No    Barriers to taking medications:  None    Medication side effects:  None    Ability to successfully perform activities of daily living:  Transportation requires assistance, housework requires assistance and shopping requires assistance    Home Safety:  No safety concerns identified    Hearing Impairment:  Difficulty following a conversation in a noisy restaurant or crowded room    In the past 6 months, have you been bothered by leaking of urine? Yes    In general, how would you rate your overall mental or emotional health?  Good      PHQ-2 Total Score:    Additional concerns today:  No    Do you feel safe in your environment? Yes    Have you ever done Advance Care Planning? (For example, a Health Directive, POLST, or a discussion with a medical provider or your loved ones about your wishes): Yes, advance care planning is on file.    Fall risk     Fallen two or more times in the last year   No   Any fall with an injury in the last year  No    Cognitive Screening   1) Repeat 3 items (Leader, Season, Table)    2) Clock draw:   NORMAL  3) 3 item recall: Recalls 2 objects   Results: NORMAL clock, 1-2 items recalled: COGNITIVE IMPAIRMENT LESS LIKELY    Mini-CogTM Copyright LA Akers. Licensed by the author for use in [facility]; reprinted with permission (brennan@.[facility]). All rights reserved.      Do " you have sleep apnea, excessive snoring or daytime drowsiness?: yes    Reviewed and updated as needed this visit by clinical staff  Tobacco  Allergies  Meds            Reviewed and updated as needed this visit by Provider                Social History     Tobacco Use     Smoking status: Never Smoker     Smokeless tobacco: Never Used   Substance Use Topics     Alcohol use: Yes     Alcohol/week: 4.0 - 5.0 standard drinks     Types: 4 - 5 Standard drinks or equivalent per week     Comment: 4-5 drinks per week avg     Alcohol Use 7/26/2019   Prescreen: >3 drinks/day or >7 drinks/week? No     CAD/Hyperlipidemia Follow-Up      Are you regularly taking any medication or supplement to lower your cholesterol?   Yes- lilpitor  Are you having muscle aches or other side effects that you think could be caused by your cholesterol lowering medication?  No     Recent Labs   Lab Test 10/04/21  0920 05/06/21  0908 08/26/16  0850 05/14/15  0822 05/15/14  0753 05/15/14  0753   CHOL 145 139   < > 159   < > 180   HDL 54 55   < > 49   < > 33*   LDL 65 59   < > 83   < > 87   TRIG 128 126   < > 137   < > 303*   CHOLHDLRATIO  --   --   --  3.2  --  5.5*    < > = values in this interval not displayed.     CKD 3A/Hypertension Follow-up      Do you check your blood pressure regularly outside of the clinic? Yes     Are you following a low salt diet? Yes    Are your blood pressures ever more than 140 on the top number (systolic) OR more   than 90 on the bottom number (diastolic), for example 140/90? Yes    Creatinine   Date Value Ref Range Status   10/04/2021 1.38 (H) 0.66 - 1.25 mg/dL Final   05/06/2021 1.45 (H) 0.66 - 1.25 mg/dL Final     GFR Estimate   Date Value Ref Range Status   10/04/2021 46 (L) >60 mL/min/1.73m2 Final     Comment:     As of July 11, 2021, eGFR is calculated by the CKD-EPI creatinine equation, without race adjustment. eGFR can be influenced by muscle mass, exercise, and diet. The reported eGFR is an estimation only and  is only applicable if the renal function is stable.   05/06/2021 43 (L) >60 mL/min/[1.73_m2] Final     Comment:     Non  GFR Calc  Starting 12/18/2018, serum creatinine based estimated GFR (eGFR) will be   calculated using the Chronic Kidney Disease Epidemiology Collaboration   (CKD-EPI) equation.       BP Readings from Last 3 Encounters:   10/13/21 123/83   09/24/21 117/72   09/10/21 113/75     MIMI - stable    RLS - stable    GERD - stable    Current providers sharing in care for this patient include:   Patient Care Team:  Chance Espinosa MD as PCP - General  Chance Espinosa MD as Assigned PCP  Dariel Carlson MD as Assigned Heart and Vascular Provider    The following health maintenance items are reviewed in Epic and correct as of today:  There are no preventive care reminders to display for this patient.    [facility]    Completed forms for [employer] - feels safe with [employer] and using cane    CAD    CKD 3A    Htn    Lipids    MIMI - using CPAP    RLD    GERD    LBP - PT helped quite a bit - worse on recumbent bike, Dr Yeo - Lt knee pain - hyaluronic acid injection soon    Health Maintenance     Colonoscopy:  NA   FIT:  NA              PSA:  NA   DEXA:  NA    There are no preventive care reminders to display for this patient.    Current Problem List    Patient Active Problem List   Diagnosis     Hypertension goal BP (blood pressure) < 140/90     Hypertrophy of prostate without urinary obstruction     Hearing loss     Seborrheic dermatitis     Other and unspecified 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     Hypersomnia with sleep apnea     Family history of cardiovascular disease     Seasonal allergies     ED (erectile dysfunction)     Hyperlipidemia LDL goal <70     Lung nodules     Advanced directives, counseling/discussion     GERD (gastroesophageal reflux disease)     CKD (chronic kidney disease) stage 3, GFR 30-59  ml/min (H)     Environmental allergies     Lumbar stenosis     Restrictive lung disease     BCC (basal cell carcinoma of skin)     Coronary artery disease involving native coronary artery of native heart without angina pectoris     Nonrheumatic aortic valve stenosis       Past Medical History    Past Medical History:   Diagnosis Date     Arthritis .     BCC (basal cell carcinoma of skin) 10/2018, 11/2019    right jaw, rt parietal,11/2019- Dr Limon     Choroidal nevus of left eye     Dr Connor     Colon polyps      Coronary artery disease involving native coronary artery of native heart without angina pectoris 01/2021     ED (erectile dysfunction)      Family history of cardiovascular disease      GERD (gastroesophageal reflux disease) 2013     Hematuria 1999     sonia     Hernia, abdominal      Hyperlipidemia LDL goal <70 1990     Hypertension goal BP (blood pressure) < 140/90 1990     Hypertrophy of prostate without urinary obstruction and other lower urinary tract symptoms (LUTS)      Lumbar stenosis 2017    mild to moderate foraminal and central     Lung nodules 11/2010    CT scan stable     Nonrheumatic aortic valve stenosis 01/2021    mild to moderate     obstructive SLEEP APNEA 05/2007    CPAP     Other and unspecified 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 05/25/2005     Palpitations      PSVT 11/2017    few runs, rare PVC     Restrictive lung disease      Seasonal allergies      Stage 3a chronic kidney disease (H)      Unspecified hearing loss 05/2005    hearing aids, L>R - Dr Hernandez       Past Surgical History    Past Surgical History:   Procedure Laterality Date     CV HEART CATHETERIZATION WITH POSSIBLE INTERVENTION N/A 01/07/2021    Procedure: Heart Catheterization with Possible Intervention;  Surgeon: Raúl Rich MD;  Location:  HEART CARDIAC CATH LAB     CV LEFT HEART CATH N/A 01/07/2021    Procedure: Left Heart Cath;  Surgeon: Raúl Rich MD;  Location:  SH HEART CARDIAC CATH LAB     CYSTOSCOPY       HC REPAIR INCISIONAL HERNIA,REDUCIBLE  1960    Hernia Repair, Incisional, Unilateral     NM MPI WITH LEXISCAN  05/2021    normal     STRESS ECHO (MET[facility])  7/09, 5/12, 7/17    Negative     TONSILLECTOMY & ADENOIDECTOMY  1946     [facility]  [facility], 5/10, 5/11    Polyps-> due 2015 - Ct Colonography negative       Current Medications    Current Outpatient Medications   Medication Sig Dispense Refill     amLODIPine (NORVASC) 5 MG tablet Take 1 tablet (5 mg) by mouth daily 90 tablet 3     aspirin 81 MG EC tablet Take 81 mg by mouth At Bedtime       atorvastatin (LIPITOR) 40 MG tablet TAKE ONE TABLET BY MOUTH EVERY NIGHT AT BEDTIME 90 tablet 0     finasteride (PROSCAR) 5 MG tablet Take 1 tablet (5 mg) by mouth daily 90 tablet 3     metoprolol succinate ER (TOPROL-XL) 100 MG 24 hr tablet Take 1 tablet (100 mg) by mouth daily 90 tablet 3     multivitamin, therapeutic (THERA-VIT) TABS tablet Take 1 tablet by mouth At Bedtime       omeprazole (PRILOSEC) 20 MG DR capsule Take 20 mg by mouth daily as needed       selenium sulfide (SELSUN) 2.5 % external lotion APPLY DAILY TO EVERY OTHER DAY, LATHER, WAIT 10 MINUTES AND RINSE 118 mL 3     terazosin (HYTRIN) 10 MG capsule TAKE ONE CAPSULE BY MOUTH AT BEDTIME 90 capsule 3     triamcinolone (KENALOG) 0.1 % external cream Apply topically 2 times daily as needed for irritation         Allergies    Allergies   Allergen Reactions     Pollen Extract      Tree pollen         Immunizations    Immunization History   Administered Date(s) Administered     COVID-19,PF,Pfizer 01/26/2021, 02/18/2021, 09/27/2021     Influenza (H1N1) 01/20/2010     Influenza (High Dose) 3 valent vaccine 10/03/2011, 09/20/2012, 10/15/2013, 10/03/2014, 10/15/2015, 09/30/2016, 10/10/2017, 09/17/2018, 10/07/2019     Influenza (IIV3) PF 10/18/2004, 10/12/2005, 10/23/2006, 10/17/2007, 10/15/2009, 09/22/2010     Influenza, Quad, High Dose, Pf,  65yr+ (Fluzone HD) 09/04/2020, 09/22/2021     Pneumo Conj 13-V (2010&after) 05/20/2015     Pneumococcal 23 valent 01/04/2007     TD (ADULT, 7+) 04/20/2007     TDAP Vaccine (Boostrix) 05/23/2012     Zoster vaccine recombinant adjuvanted (SHINGRIX) 09/17/2018, 11/16/2018     Zoster vaccine, live 01/01/2008       Family History    Family History   Problem Relation Age of Onset     C.A.D. Father         age 50's     Hypertension Father      C.A.D. Brother      C.A.D. Brother         mid 40's     Diabetes Brother      Cancer Brother         pancreatic CA     Coronary Artery Disease Brother      Cancer - colorectal No family hx of      Prostate Cancer No family hx of        Social History    Social History     Socioeconomic History     Marital status:      Spouse name: Leticia     Number of children: 3     Years of education: 18     Highest education level: Not on file   Occupational History     Employer: RETIRED   Tobacco Use     Smoking status: Never Smoker     Smokeless tobacco: Never Used   Substance and Sexual Activity     Alcohol use: Yes     Alcohol/week: 4.0 - 5.0 standard drinks     Types: 4 - 5 Standard drinks or equivalent per week     Comment: 4-5 drinks per week avg     Drug use: No     Sexual activity: Yes     Partners: Female   Other Topics Concern      Service Not Asked     Blood Transfusions No     Caffeine Concern Yes     Comment: 1 serv/day, rare pop, occas chocolate (3-4/yr)     Occupational Exposure Not Asked     Hobby Hazards Not Asked     Sleep Concern Yes     Comment: MIMI, wakes feeling rested     Stress Concern Not Asked     Weight Concern Not Asked     Special Diet Not Asked     Back Care Not Asked     Exercise Yes     Comment: 30-45' 2-3 x/wk     Bike Helmet Not Asked     Seat Belt Yes     Self-Exams Not Asked     Parent/sibling w/ CABG, MI or angioplasty before 65F 55M? Yes   Social History Narrative     Not on file     Social Determinants of Health     Financial Resource Strain:  "     Difficulty of Paying Living Expenses:    Food Insecurity:      Worried About Running Out of Food in the Last Year:      Ran Out of Food in the Last Year:    Transportation Needs:      Lack of Transportation (Medical):      Lack of Transportation (Non-Medical):    Physical Activity:      Days of Exercise per Week:      Minutes of Exercise per Session:    Stress:      Feeling of Stress :    Social Connections:      Frequency of Communication with Friends and Family:      Frequency of Social Gatherings with Friends and Family:      Attends Taoism Services:      Active Member of Clubs or Organizations:      Attends Club or Organization Meetings:      Marital Status:    Intimate Partner Violence:      Fear of Current or Ex-Partner:      Emotionally Abused:      Physically Abused:      Sexually Abused:        ROS    CONSTITUTIONAL: NEGATIVE for fever, chills, change in weight  INTEGUMENTARY/SKIN: NEGATIVE for worrisome rashes, moles or lesions  EYES: NEGATIVE for vision changes or irritation  ENT/MOUTH: NEGATIVE for ear, mouth and throat problems  RESP: NEGATIVE for significant cough or SOB  CV: NEGATIVE for chest pain, palpitations or peripheral edema  GI: NEGATIVE for nausea, abdominal pain, heartburn, or change in bowel habits  : NEGATIVE for frequency, dysuria, or hematuria  MUSCULOSKELETAL: NEGATIVE for significant arthralgias or myalgia  NEURO: NEGATIVE for weakness, dizziness or paresthesias  ENDOCRINE: NEGATIVE for temperature intolerance, skin/hair changes  HEME: NEGATIVE for bleeding problems  PSYCHIATRIC: NEGATIVE for changes in mood or affect    OBJECTIVE    /83   Pulse 82   Resp 22   Ht 1.753 m (5' 9\")   Wt 90.7 kg (200 lb)   SpO2 98%   BMI 29.53 kg/m      EXAM:    GENERAL: healthy, alert and no distress  EYES: Eyes grossly normal to inspection, PERRL and conjunctivae and sclerae normal  HENT: ear canals and TM's normal, nose and mouth without ulcers or lesions  NECK: no adenopathy, no " asymmetry, masses, or scars and thyroid normal to palpation  RESP: lungs clear to auscultation - no rales, rhonchi or wheezes  CV: regular rate and rhythm, normal S1 S2, no S3 or S4, no murmur, click or rub, no peripheral edema and peripheral pulses strong  ABDOMEN: soft, nontender, no hepatosplenomegaly, no masses and bowel sounds normal   (male): pt declines  RECTAL: pt declines  MS: no gross musculoskeletal defects noted, no edema  SKIN: no suspicious lesions or rashes  NEURO: Normal strength and tone, mentation intact and speech normal  PSYCH: mentation appears normal, affect normal/bright  LYMPH: no cervical, supraclavicular, axillary, or inguinal adenopathy    DIAGNOSTICS/PROCEDURES    Reviewed labs completed to date  Pending    ASSESSMENT      ICD-10-CM    1. Routine general medical examination at a health care facility  Z00.00    2. Coronary artery disease involving native coronary artery of native heart without angina pectoris  I25.10    3. Stage 3a chronic kidney disease (H)  N18.31    4. Hypertension goal BP (blood pressure) < 140/90  I10    5. Hyperlipidemia LDL goal <70  E78.5    6. Hypersomnia with sleep apnea  G47.10     G47.30    7. Restrictive lung disease  J98.4    8. Family history of cardiovascular disease  Z82.49    9. Gastroesophageal reflux disease without esophagitis  K21.9    10. Spinal stenosis of lumbar region with neurogenic claudication  M48.062    11. Environmental allergies  Z91.09    12. Medication monitoring encounter  Z51.81        PLAN    Discussed treatment/modality options, including risk and benefits, he desires:    advised alcohol consumption 1oz per day or less, advised aspirin 81 mg po daily, advised 1 multivitamin per day, advised calcium 7995-4346 mg/d and Vitamin D 800-1200 IU/d, advised dentist every 6 months, advised diet, exercise, and weight loss, advised opthalmologist every 1-2 years, further health care maintenance, further lab(s), immunization(s), medication  "refill(s) and observation    Discussed controversies surrounding PSA. Specifically reviewed 2017 USPSTF findings recommending discussion of PSA testing for men ages 55-69.  Reviewed findings of the  Randomized Study of Screening for Prostate Cancer which showed a 30% reduction in advanced stage prostate cancer and a 20% reduction in death rate from prostate cancer in this age group. PSA-based screening may prevent up to 2 deaths and up to 3 cases of metastatic disease per 1,000 men screened over 13 years.    We've elected not to do PSA this year after discussing these controversies.    All diagnosis above reviewed and noted above, otherwise stable.      See Flirq orders for further details.      1) med refills    2) labs reviewed/ordered    3) immunizations UTD - Tdap by 5/2022    4) [employer] forms completed    Return in about 6 months (around 4/13/2022) for Medication Recheck Visit, Follow Up Chronic, and as needed.    There are no preventive care reminders to display for this patient.    COUNSELING    Reviewed preventive health counseling, as reflected in patient instructions    BP Readings from Last 1 Encounters:   10/13/21 123/83     Estimated body mass index is 29.53 kg/m  as calculated from the following:    Height as of this encounter: 1.753 m (5' 9\").    Weight as of this encounter: 90.7 kg (200 lb).           reports that he has never smoked. He has never used smokeless tobacco.      Counseling Resources:    ATP IV Guidelines  Pooled Cohorts Equation Calculator  FRAX Risk Assessment  ICSI Preventive Guidelines  Dietary Guidelines for Americans, 2010  USDA's MyPlate  ASA Prophylaxis  Lung CA Screening           Chance Espinosa MD, FAAFP     [facility]  [address] [place] [id number]  [email or web address]   Office: [phone]  Fax: [phone]  Pager: [phone]     Identified Health Risks:  "

## 2021-10-14 ENCOUNTER — LAB (OUTPATIENT)
Dept: LAB | Facility: CLINIC | Age: 86
End: 2021-10-14
Payer: COMMERCIAL

## 2021-10-14 DIAGNOSIS — Z51.81 MEDICATION MONITORING ENCOUNTER: ICD-10-CM

## 2021-10-14 DIAGNOSIS — I10 HYPERTENSION GOAL BP (BLOOD PRESSURE) < 140/90: ICD-10-CM

## 2021-10-14 DIAGNOSIS — N18.31 STAGE 3A CHRONIC KIDNEY DISEASE (H): ICD-10-CM

## 2021-10-14 DIAGNOSIS — K21.9 GASTROESOPHAGEAL REFLUX DISEASE WITHOUT ESOPHAGITIS: ICD-10-CM

## 2021-10-14 DIAGNOSIS — I25.10 CORONARY ARTERY DISEASE INVOLVING NATIVE CORONARY ARTERY OF NATIVE HEART WITHOUT ANGINA PECTORIS: ICD-10-CM

## 2021-10-14 LAB
ALBUMIN UR-MCNC: NEGATIVE MG/DL
APPEARANCE UR: CLEAR
BACTERIA #/AREA URNS HPF: ABNORMAL /HPF
BILIRUB UR QL STRIP: NEGATIVE
COLOR UR AUTO: YELLOW
ERYTHROCYTE [DISTWIDTH] IN BLOOD BY AUTOMATED COUNT: 12.9 % (ref 10–15)
GLUCOSE UR STRIP-MCNC: NEGATIVE MG/DL
HCT VFR BLD AUTO: 39.7 % (ref 40–53)
HGB BLD-MCNC: 13.2 G/DL (ref 13.3–17.7)
HGB UR QL STRIP: ABNORMAL
KETONES UR STRIP-MCNC: NEGATIVE MG/DL
LEUKOCYTE ESTERASE UR QL STRIP: ABNORMAL
MCH RBC QN AUTO: 33.2 PG (ref 26.5–33)
MCHC RBC AUTO-ENTMCNC: 33.2 G/DL (ref 31.5–36.5)
MCV RBC AUTO: 100 FL (ref 78–100)
MUCOUS THREADS #/AREA URNS LPF: PRESENT /LPF
NITRATE UR QL: NEGATIVE
PH UR STRIP: 7 [PH] (ref 5–7)
PLATELET # BLD AUTO: 204 10E3/UL (ref 150–450)
RBC # BLD AUTO: 3.97 10E6/UL (ref 4.4–5.9)
RBC #/AREA URNS AUTO: ABNORMAL /HPF
SP GR UR STRIP: 1.02 (ref 1–1.03)
SQUAMOUS #/AREA URNS AUTO: ABNORMAL /LPF
UROBILINOGEN UR STRIP-ACNC: 0.2 E.U./DL
WBC # BLD AUTO: 11.4 10E3/UL (ref 4–11)
WBC #/AREA URNS AUTO: ABNORMAL /HPF

## 2021-10-14 PROCEDURE — 36415 COLL VENOUS BLD VENIPUNCTURE: CPT

## 2021-10-14 PROCEDURE — 85027 COMPLETE CBC AUTOMATED: CPT

## 2021-10-14 PROCEDURE — 82043 UR ALBUMIN QUANTITATIVE: CPT

## 2021-10-14 PROCEDURE — 81001 URINALYSIS AUTO W/SCOPE: CPT

## 2021-10-15 LAB
CREAT UR-MCNC: 132 MG/DL
MICROALBUMIN UR-MCNC: 71 MG/L
MICROALBUMIN/CREAT UR: 53.79 MG/G CR (ref 0–17)

## 2021-10-22 ENCOUNTER — THERAPY VISIT (OUTPATIENT)
Dept: PHYSICAL THERAPY | Facility: CLINIC | Age: 86
End: 2021-10-22
Payer: COMMERCIAL

## 2021-10-22 ENCOUNTER — TELEPHONE (OUTPATIENT)
Dept: ORTHOPEDICS | Facility: CLINIC | Age: 86
End: 2021-10-22

## 2021-10-22 DIAGNOSIS — G89.29 CHRONIC PAIN OF LEFT KNEE: ICD-10-CM

## 2021-10-22 DIAGNOSIS — M25.562 CHRONIC PAIN OF LEFT KNEE: ICD-10-CM

## 2021-10-22 DIAGNOSIS — M25.562 ACUTE PAIN OF LEFT KNEE: ICD-10-CM

## 2021-10-22 DIAGNOSIS — M17.12 ARTHRITIS OF LEFT KNEE: ICD-10-CM

## 2021-10-22 DIAGNOSIS — M17.12 PRIMARY OSTEOARTHRITIS OF LEFT KNEE: ICD-10-CM

## 2021-10-22 PROCEDURE — 97110 THERAPEUTIC EXERCISES: CPT | Mod: GP | Performed by: PHYSICAL THERAPIST

## 2021-10-22 PROCEDURE — 97161 PT EVAL LOW COMPLEX 20 MIN: CPT | Mod: GP | Performed by: PHYSICAL THERAPIST

## 2021-10-22 ASSESSMENT — ACTIVITIES OF DAILY LIVING (ADL)
SIT WITH YOUR KNEE BENT: ACTIVITY IS MINIMALLY DIFFICULT
WEAKNESS: I HAVE THE SYMPTOM BUT IT DOES NOT AFFECT MY ACTIVITY
AS_A_RESULT_OF_YOUR_KNEE_INJURY,_HOW_WOULD_YOU_RATE_YOUR_CURRENT_LEVEL_OF_DAILY_ACTIVITY?: NEARLY NORMAL
GO UP STAIRS: ACTIVITY IS FAIRLY DIFFICULT
KNEE_ACTIVITY_OF_DAILY_LIVING_SUM: 45
LIMPING: THE SYMPTOM AFFECTS MY ACTIVITY SLIGHTLY
SQUAT: ACTIVITY IS FAIRLY DIFFICULT
KNEE_ACTIVITY_OF_DAILY_LIVING_SCORE: 64.29
RAW_SCORE: 45
GIVING WAY, BUCKLING OR SHIFTING OF KNEE: I HAVE THE SYMPTOM BUT IT DOES NOT AFFECT MY ACTIVITY
GO DOWN STAIRS: ACTIVITY IS FAIRLY DIFFICULT
WALK: ACTIVITY IS SOMEWHAT DIFFICULT
STAND: ACTIVITY IS SOMEWHAT DIFFICULT
KNEEL ON THE FRONT OF YOUR KNEE: ACTIVITY IS FAIRLY DIFFICULT
RISE FROM A CHAIR: ACTIVITY IS MINIMALLY DIFFICULT
SWELLING: I DO NOT HAVE THE SYMPTOM
HOW_WOULD_YOU_RATE_THE_OVERALL_FUNCTION_OF_YOUR_KNEE_DURING_YOUR_USUAL_DAILY_ACTIVITIES?: NEARLY NORMAL
HOW_WOULD_YOU_RATE_THE_CURRENT_FUNCTION_OF_YOUR_KNEE_DURING_YOUR_USUAL_DAILY_ACTIVITIES_ON_A_SCALE_FROM_0_TO_100_WITH_100_BEING_YOUR_LEVEL_OF_KNEE_FUNCTION_PRIOR_TO_YOUR_INJURY_AND_0_BEING_THE_INABILITY_TO_PERFORM_ANY_OF_YOUR_USUAL_DAILY_ACTIVITIES?: 60
STIFFNESS: I HAVE THE SYMPTOM BUT IT DOES NOT AFFECT MY ACTIVITY
PAIN: THE SYMPTOM AFFECTS MY ACTIVITY SLIGHTLY

## 2021-10-22 NOTE — PROGRESS NOTES
Physical Therapy Initial Evaluation  Subjective:  The history is provided by the patient and a relative (spouse and daughter). No  was used.   Patient Health History  Kevin Fierro being seen for left knee pain.     Date of Onset: August 2021.   Problem occurred: He suspects it started after using recombant bike in therapy and at home.   Pain is reported as 2/10 on pain scale.  General health as reported by patient is good.  Pertinent medical history includes: high blood pressure, incontinence, overweight and sleep disorder/apnea.   Red flags:  None as reported by patient.  Medical allergies: See EPIC.   Surgeries include:  None.    Current medications:  High blood pressure medication.    Current occupation is retired .                     Therapist Generated HPI Evaluation  Problem details: Pt. complains of left knee pain that has been present for 2-3 months.  No known trauma.  PT order dated 9/24/21.  He reports having minimal sx's for the past week.      .         Type of problem:  Left knee.    This is a chronic condition.  Occurance: biking.  Where condition occurred: at home.  Patient reports pain:  Anterior and in the joint.  Pain is described as aching and is intermittent.  Pain radiates to:  Knee. Pain timing: minimal pain currently.  Since onset symptoms are rapidly improving.  Associated symptoms:  Loss of strength. Exacerbated by: biking.  and relieved by rest.  Special tests included:  X-ray (See EPIC, OA).  Previous treatment includes physical therapy. There was significant improvement following previous treatment.  Barriers include:  None as reported by patient.                        Objective:  System                                                Knee Evaluation:  ROM:  AROM: normal  PROM: normal        Pain: No pain evident today    Strength:     Extension:  Left: 3+/5   Pain:      Right: 3+/5   Pain:  Flexion:  Left: 3+/5   Pain:      Right: 3+/5   Pain:     Quad Set Left: Fair    Pain:   Quad Set Right: Fair    Pain:      Palpation:  Normal      Edema:  Normal            General     ROS    Assessment/Plan:    Patient is a 87 year old male with left side knee complaints.    Patient has the following significant findings with corresponding treatment plan.                Diagnosis 1:  L knee pain  Pain -  self management, education, directional preference exercise and home program  Decreased strength - therapeutic exercise and therapeutic activities  Impaired muscle performance - neuro re-education  Decreased function - therapeutic activities    Therapy Evaluation Codes:   1) Clinical presentation characteristics are:   Stable/Uncomplicated.  2) Decision-Making    Low complexity using standardized patient assessment instrument and/or measureable assessment of functional outcome.  Cumulative Therapy Evaluation is: Low complexity.    Previous and current functional limitations:  (See Goal Flow Sheet for this information)    Short term and Long term goals: (See Goal Flow Sheet for this information)     Communication ability:  Patient appears to be able to clearly communicate and understand verbal and written communication and follow directions correctly.  Treatment Explanation - The following has been discussed with the patient:   RX ordered/plan of care  Anticipated outcomes  Possible risks and side effects  This patient would benefit from PT intervention to resume normal activities.   Rehab potential is excellent.    Frequency:  1 X week, once daily  Duration:  for 2 weeks  Discharge Plan:  Achieve all LTG.  Independent in home treatment program.  Reach maximal therapeutic benefit.    Please refer to the daily flowsheet for treatment today, total treatment time and time spent performing 1:1 timed codes.

## 2021-10-22 NOTE — TELEPHONE ENCOUNTER
Received call from wife, Leticia, stating patient is scheduled to get a gel injection on 10/27/21. For the past week or so, he has been having very little knee pain. He was given 3 new exercises from physical therapy today and will see if they cause him pain. If no pain, she asks if Dr. Yeo still recommends the gel shot or following up in the office to see how he is doing on 10/27/21.   She can be reached at: [phone].     TANNA Pardo RN

## 2021-10-25 ENCOUNTER — TRANSFERRED RECORDS (OUTPATIENT)
Dept: HEALTH INFORMATION MANAGEMENT | Facility: CLINIC | Age: 86
End: 2021-10-25

## 2021-10-25 NOTE — TELEPHONE ENCOUNTER
Phone call to patients wife    Informed her that it is up to the patient if he wants to get gel injection now vs. Get it later. Informed wife that he can wait if he does not want to get injection right now, auth is good through end of calendar year, if next year he wants gel injection patient just needs to call office so we can obtain new PA.    Wife and patient state they want to wait on getting gel injection for now, cancelled appointment     Luann Yeung, ATC

## 2021-11-12 DIAGNOSIS — L21.9 SEBORRHEIC DERMATITIS: ICD-10-CM

## 2021-11-12 DIAGNOSIS — M48.061 SPINAL STENOSIS OF LUMBAR REGION, UNSPECIFIED WHETHER NEUROGENIC CLAUDICATION PRESENT: Primary | ICD-10-CM

## 2021-11-12 DIAGNOSIS — L21.9 SEBORRHEIC DERMATITIS, UNSPECIFIED: ICD-10-CM

## 2021-11-12 DIAGNOSIS — L30.9 ECZEMA, UNSPECIFIED TYPE: ICD-10-CM

## 2021-11-16 RX ORDER — CLOTRIMAZOLE AND BETAMETHASONE DIPROPIONATE 10; .64 MG/G; MG/G
CREAM TOPICAL
Qty: 45 G | Refills: 3 | Status: SHIPPED | OUTPATIENT
Start: 2021-11-16 | End: 2022-11-30

## 2021-11-16 NOTE — TELEPHONE ENCOUNTER
Failed protocol.  please route to  team if patient needs an appointment     Maryana TRINIDADRN BSN  [facility]  [phone]

## 2021-11-26 PROBLEM — M25.562 CHRONIC PAIN OF LEFT KNEE: Status: RESOLVED | Noted: 2021-10-22 | Resolved: 2021-11-26

## 2021-11-26 PROBLEM — G89.29 CHRONIC PAIN OF LEFT KNEE: Status: RESOLVED | Noted: 2021-10-22 | Resolved: 2021-11-26

## 2021-12-13 ENCOUNTER — MYC MEDICAL ADVICE (OUTPATIENT)
Dept: FAMILY MEDICINE | Facility: CLINIC | Age: 86
End: 2021-12-13
Payer: COMMERCIAL

## 2021-12-13 DIAGNOSIS — I21.4 NSTEMI (NON-ST ELEVATED MYOCARDIAL INFARCTION) (H): ICD-10-CM

## 2021-12-13 DIAGNOSIS — I10 HYPERTENSION GOAL BP (BLOOD PRESSURE) < 140/90: ICD-10-CM

## 2021-12-13 DIAGNOSIS — N18.31 STAGE 3A CHRONIC KIDNEY DISEASE (H): ICD-10-CM

## 2021-12-16 RX ORDER — ATORVASTATIN CALCIUM 40 MG/1
TABLET, FILM COATED ORAL
Qty: 90 TABLET | Refills: 3 | Status: SHIPPED | OUTPATIENT
Start: 2021-12-16 | End: 2021-12-28

## 2021-12-16 NOTE — TELEPHONE ENCOUNTER
That's a very high price for atorvastatin, is there another option at his local pharmacy?, [facility], [facility]

## 2021-12-16 NOTE — TELEPHONE ENCOUNTER
Patient here with wife.     Concern with swelling due to side effect of metoprolol.     Elevating helps, worse at night. Swelling on top of both feet. Sensation of puffiness. No pain. No redness.Unsure when it started.     Routing to provider to review and advise.     Daija Barrett RN  [facility]

## 2021-12-20 NOTE — TELEPHONE ENCOUNTER
[facility] Unsure if patient has been able to . Sent [place] message.     Attempt # 1    Called # [phone]     Left a detailed VM to call back at [phone] and ask for any available Triage Nurse.    Daija Barrett RN  [facility]

## 2021-12-29 RX ORDER — ATORVASTATIN CALCIUM 40 MG/1
TABLET, FILM COATED ORAL
Qty: 30 TABLET | Refills: 0 | Status: SHIPPED | OUTPATIENT
Start: 2021-12-29 | End: 2022-01-21

## 2022-01-02 DIAGNOSIS — I10 HYPERTENSION GOAL BP (BLOOD PRESSURE) < 140/90: ICD-10-CM

## 2022-01-02 DIAGNOSIS — N18.31 STAGE 3A CHRONIC KIDNEY DISEASE (H): ICD-10-CM

## 2022-01-03 RX ORDER — TERAZOSIN 10 MG/1
CAPSULE ORAL
Qty: 90 CAPSULE | Refills: 3 | Status: SHIPPED | OUTPATIENT
Start: 2022-01-03 | End: 2022-11-30

## 2022-01-04 NOTE — TELEPHONE ENCOUNTER
Routing refill request to provider for review/approval because:  Labs out of range:  cr  Cosmo TRINIDAD RN, BSN

## 2022-01-04 NOTE — TELEPHONE ENCOUNTER
rx done, recent cpx    Creatinine   Date Value Ref Range Status   10/04/2021 1.38 (H) 0.66 - 1.25 mg/dL Final   05/06/2021 1.45 (H) 0.66 - 1.25 mg/dL Final     GFR Estimate   Date Value Ref Range Status   10/04/2021 46 (L) >60 mL/min/1.73m2 Final     Comment:     As of July 11, 2021, eGFR is calculated by the CKD-EPI creatinine equation, without race adjustment. eGFR can be influenced by muscle mass, exercise, and diet. The reported eGFR is an estimation only and is only applicable if the renal function is stable.   05/06/2021 43 (L) >60 mL/min/[1.73_m2] Final     Comment:     Non  GFR Calc  Starting 12/18/2018, serum creatinine based estimated GFR (eGFR) will be   calculated using the Chronic Kidney Disease Epidemiology Collaboration   (CKD-EPI) equation.

## 2022-01-21 DIAGNOSIS — N18.31 STAGE 3A CHRONIC KIDNEY DISEASE (H): ICD-10-CM

## 2022-01-21 DIAGNOSIS — I10 HYPERTENSION GOAL BP (BLOOD PRESSURE) < 140/90: ICD-10-CM

## 2022-01-21 DIAGNOSIS — I21.4 NSTEMI (NON-ST ELEVATED MYOCARDIAL INFARCTION) (H): ICD-10-CM

## 2022-01-21 RX ORDER — ATORVASTATIN CALCIUM 40 MG/1
TABLET, FILM COATED ORAL
Qty: 90 TABLET | Refills: 2 | Status: SHIPPED | OUTPATIENT
Start: 2022-01-21 | End: 2022-11-30

## 2022-01-21 NOTE — TELEPHONE ENCOUNTER
Prescription approved per [facility] Refill Protocol.  Cosmo TRINIDAD RN, BSN

## 2022-02-09 ENCOUNTER — TELEPHONE (OUTPATIENT)
Dept: PALLIATIVE MEDICINE | Facility: CLINIC | Age: 87
End: 2022-02-09
Payer: COMMERCIAL

## 2022-02-09 DIAGNOSIS — G89.29 CHRONIC BILATERAL LOW BACK PAIN WITHOUT SCIATICA: Primary | ICD-10-CM

## 2022-02-09 DIAGNOSIS — I21.4 NSTEMI (NON-ST ELEVATED MYOCARDIAL INFARCTION) (H): ICD-10-CM

## 2022-02-09 DIAGNOSIS — I10 HYPERTENSION GOAL BP (BLOOD PRESSURE) < 140/90: ICD-10-CM

## 2022-02-09 DIAGNOSIS — M70.62 TROCHANTERIC BURSITIS OF BOTH HIPS: ICD-10-CM

## 2022-02-09 DIAGNOSIS — M54.50 CHRONIC BILATERAL LOW BACK PAIN WITHOUT SCIATICA: Primary | ICD-10-CM

## 2022-02-09 DIAGNOSIS — N18.31 STAGE 3A CHRONIC KIDNEY DISEASE (H): ICD-10-CM

## 2022-02-09 DIAGNOSIS — M48.061 SPINAL STENOSIS OF LUMBAR REGION WITHOUT NEUROGENIC CLAUDICATION: ICD-10-CM

## 2022-02-09 DIAGNOSIS — M70.61 TROCHANTERIC BURSITIS OF BOTH HIPS: ICD-10-CM

## 2022-02-09 RX ORDER — AMLODIPINE BESYLATE 5 MG/1
5 TABLET ORAL DAILY
Qty: 90 TABLET | Refills: 0 | Status: SHIPPED | OUTPATIENT
Start: 2022-02-09 | End: 2022-05-10

## 2022-02-09 RX ORDER — AMLODIPINE BESYLATE 5 MG/1
5 TABLET ORAL DAILY
Qty: 90 TABLET | Refills: 3 | Status: CANCELLED | OUTPATIENT
Start: 2022-02-09

## 2022-02-09 RX ORDER — METOPROLOL SUCCINATE 100 MG/1
100 TABLET, EXTENDED RELEASE ORAL DAILY
Qty: 90 TABLET | Refills: 3 | Status: CANCELLED | OUTPATIENT
Start: 2022-02-09

## 2022-02-09 RX ORDER — METOPROLOL SUCCINATE 100 MG/1
100 TABLET, EXTENDED RELEASE ORAL DAILY
Qty: 90 TABLET | Refills: 0 | Status: SHIPPED | OUTPATIENT
Start: 2022-02-09 | End: 2022-05-10

## 2022-02-09 NOTE — TELEPHONE ENCOUNTER
Reason for call:  Other   Patient called regarding (reason for call): call back  Additional comments: Pt calling to state the PT Dr. Leal recommended is leaving [place] and they are wondering if there is a new PT he recommends in [place].    Phone number to reach patient:  Home number on file [phone] (home)    Best Time:  anytime    Can we leave a detailed message on this number?  YES    Travel screening: Not Applicable     Dianna MUJICA    [facility]

## 2022-02-10 NOTE — TELEPHONE ENCOUNTER
Call returned to pt    LM x to discuss PT options    Awaiting return call    Alecia MCGINNIS RN Care Coordinator  [facility]

## 2022-02-11 ENCOUNTER — TELEPHONE (OUTPATIENT)
Dept: FAMILY MEDICINE | Facility: CLINIC | Age: 87
End: 2022-02-11
Payer: COMMERCIAL

## 2022-02-11 DIAGNOSIS — M54.2 NECK PAIN: ICD-10-CM

## 2022-02-11 DIAGNOSIS — G89.29 CHRONIC BILATERAL LOW BACK PAIN, UNSPECIFIED WHETHER SCIATICA PRESENT: ICD-10-CM

## 2022-02-11 DIAGNOSIS — M48.061 SPINAL STENOSIS OF LUMBAR REGION, UNSPECIFIED WHETHER NEUROGENIC CLAUDICATION PRESENT: Primary | ICD-10-CM

## 2022-02-11 DIAGNOSIS — M54.50 CHRONIC BILATERAL LOW BACK PAIN, UNSPECIFIED WHETHER SCIATICA PRESENT: ICD-10-CM

## 2022-02-11 DIAGNOSIS — M48.062 SPINAL STENOSIS OF LUMBAR REGION WITH NEUROGENIC CLAUDICATION: ICD-10-CM

## 2022-02-11 NOTE — TELEPHONE ENCOUNTER
Routing to provider to review and order if appropriate.    Pt and spouse returning call - PT they have been working with is [facility] location. Spoke with Dr Yeo who states he does not refer for chronic pain PT. Pt and spouse hoping to continue pain PT at  with Bill Gutierrez.     Follow-up made for 3/7/22 at 1030    Alecia MCGINNIS RN Care Coordinator  [facility]

## 2022-02-11 NOTE — TELEPHONE ENCOUNTER
Reason for Call: Request for an order or referral:     Order or referral being requested: PHYSICAL THERAPY  Chronic pain of the neck and back     Date needed: as soon as possible     Has the patient been seen by the PCP for this problem? YES    Additional comments: Patient's wife calling stating that patient needs to be seen for PHYSICAL THERAPY  For his chronic neck and back pain. Carlos Johnson is not longer treating so they are in need of another referral. They'd like to stay within  and in [place]. Please advise    Phone number Patient can be reached at:  Home number on file [phone] (home)    Best Time:  Any     Can we leave a detailed message on this number?  YES    Call taken on 2/11/2022 at 11:40 AM by Tai Flores

## 2022-02-14 ENCOUNTER — TELEPHONE (OUTPATIENT)
Dept: FAMILY MEDICINE | Facility: CLINIC | Age: 87
End: 2022-02-14
Payer: COMMERCIAL

## 2022-02-14 DIAGNOSIS — M54.50 CHRONIC BILATERAL LOW BACK PAIN, UNSPECIFIED WHETHER SCIATICA PRESENT: ICD-10-CM

## 2022-02-14 DIAGNOSIS — G89.29 OTHER CHRONIC PAIN: ICD-10-CM

## 2022-02-14 DIAGNOSIS — G89.29 CHRONIC BILATERAL LOW BACK PAIN, UNSPECIFIED WHETHER SCIATICA PRESENT: ICD-10-CM

## 2022-02-14 DIAGNOSIS — M48.062 SPINAL STENOSIS OF LUMBAR REGION WITH NEUROGENIC CLAUDICATION: ICD-10-CM

## 2022-02-14 DIAGNOSIS — M54.2 NECK PAIN: ICD-10-CM

## 2022-02-14 DIAGNOSIS — M48.061 SPINAL STENOSIS OF LUMBAR REGION, UNSPECIFIED WHETHER NEUROGENIC CLAUDICATION PRESENT: Primary | ICD-10-CM

## 2022-02-14 NOTE — TELEPHONE ENCOUNTER
Called # [phone]     Pt contacted, noted his wife is taking care of it and to call her at home number.    Writer contacted wife, who noted she requested Dr. Raghav Gutierrez, though noted she cannot be scheduled with him unless the referral stated so. Writer advised will change referral. Referral changed resent.          Gume Park RN   [facility] Triage

## 2022-02-14 NOTE — TELEPHONE ENCOUNTER
----- Message from Nicole Bishop sent at 2/14/2022 11:58 AM CST -----  Regarding: physical therapy order  Hello,    I have a physical therapy order for this patient.   Please enter chronic pain for the therapy and add Rgahav Gutierrez for the therapist.    Thank you.    [facility]  [phone]

## 2022-02-14 NOTE — TELEPHONE ENCOUNTER
I placed dx in the orders - they have to request Raghav Gutierrez - see recent telephone encounter

## 2022-02-14 NOTE — TELEPHONE ENCOUNTER
Routing to provider for orders    Pt and spouse would like to start PT for chronic hip and back pain    Alecia MCGINNIS RN Care Coordinator  [facility]

## 2022-02-14 NOTE — TELEPHONE ENCOUNTER
Patient called back and said the referral has to specifically state to see Dr Raghav Vieira in the referral.  Please rewrite referral and then I will call patient to let them know.       Maryana Montemayor

## 2022-02-14 NOTE — TELEPHONE ENCOUNTER
See other encounter, changed to reflect Dr. Gutierrez.    Gume RENTERIA RN   [facility]

## 2022-02-14 NOTE — TELEPHONE ENCOUNTER
Called and gave patient wife information on the orders for PT and also scheduled a med check for April 25th with Dr Jesús Montemayor

## 2022-02-14 NOTE — TELEPHONE ENCOUNTER
What are they hoping to address with PT? They've been seen for back pain, hip pain and knee pain.    They worked with a PT for knee pain based on Dr. Yeo's recommendations.    Are they hoping for treatment of their chronic back and hip pain? I think this would be appropriate for Raghav Gutierrez our chronic pain PT.    They've also requested PT orders from their PCP which has been placed for back and neck pain but this wouldn't be with our chronic pain therapist.    John Leal, [facility]

## 2022-02-16 NOTE — TELEPHONE ENCOUNTER
Called patient and notified to schedule pain PT, please call Rehab Scheduling at [phone]. Remind them you are scheduling for Pain PT.    MELVIN CochranN, RN  Care Coordinator  [facility] [place]

## 2022-02-28 ENCOUNTER — THERAPY VISIT (OUTPATIENT)
Dept: PHYSICAL THERAPY | Facility: CLINIC | Age: 87
End: 2022-02-28
Attending: FAMILY MEDICINE
Payer: COMMERCIAL

## 2022-02-28 DIAGNOSIS — G89.29 CHRONIC BILATERAL LOW BACK PAIN WITHOUT SCIATICA: ICD-10-CM

## 2022-02-28 DIAGNOSIS — M25.552 BILATERAL HIP PAIN: ICD-10-CM

## 2022-02-28 DIAGNOSIS — M48.062 SPINAL STENOSIS OF LUMBAR REGION WITH NEUROGENIC CLAUDICATION: ICD-10-CM

## 2022-02-28 DIAGNOSIS — M54.2 NECK PAIN: ICD-10-CM

## 2022-02-28 DIAGNOSIS — M70.62 TROCHANTERIC BURSITIS OF BOTH HIPS: ICD-10-CM

## 2022-02-28 DIAGNOSIS — G89.29 CHRONIC BILATERAL LOW BACK PAIN, UNSPECIFIED WHETHER SCIATICA PRESENT: ICD-10-CM

## 2022-02-28 DIAGNOSIS — M25.551 BILATERAL HIP PAIN: ICD-10-CM

## 2022-02-28 DIAGNOSIS — M54.50 LUMBAGO: ICD-10-CM

## 2022-02-28 DIAGNOSIS — M54.50 CHRONIC BILATERAL LOW BACK PAIN, UNSPECIFIED WHETHER SCIATICA PRESENT: ICD-10-CM

## 2022-02-28 DIAGNOSIS — M70.61 TROCHANTERIC BURSITIS OF BOTH HIPS: ICD-10-CM

## 2022-02-28 DIAGNOSIS — M48.061 SPINAL STENOSIS OF LUMBAR REGION, UNSPECIFIED WHETHER NEUROGENIC CLAUDICATION PRESENT: ICD-10-CM

## 2022-02-28 DIAGNOSIS — M54.50 CHRONIC BILATERAL LOW BACK PAIN WITHOUT SCIATICA: ICD-10-CM

## 2022-02-28 DIAGNOSIS — M48.061 SPINAL STENOSIS OF LUMBAR REGION WITHOUT NEUROGENIC CLAUDICATION: ICD-10-CM

## 2022-02-28 PROCEDURE — 97112 NEUROMUSCULAR REEDUCATION: CPT | Mod: GP | Performed by: PHYSICAL THERAPIST

## 2022-02-28 PROCEDURE — 97110 THERAPEUTIC EXERCISES: CPT | Mod: GP | Performed by: PHYSICAL THERAPIST

## 2022-02-28 PROCEDURE — 97161 PT EVAL LOW COMPLEX 20 MIN: CPT | Mod: GP | Performed by: PHYSICAL THERAPIST

## 2022-02-28 NOTE — PROGRESS NOTES
Physical Therapy Initial Evaluation  Subjective:  The history is provided by the patient (and daughter). No  was used.   Patient Health History  Kevin Fierro being seen for LBP, hip pain, also neck pain.     Problem began: 9/10/2021.   Problem occurred: insidious   Pain is reported as 8/10 (1-8/10) on pain scale.  General health as reported by patient is fair.  Health conditions: [place], CKD.   Red flags:  None as reported by patient.  Medical allergies: pollen extract.   Surgeries include:  None.    Current medications: cholesterol.    Current occupation is Retired.                     Therapist Generated HPI Evaluation  Problem details:   1. Chronic low back pain: Patient reports a gradually worsening 5 year history of bilateral low back pain.  MRI showed moderate central and foraminal stenosis at L4-5 as well as widespread mod-sev degenerative disc disease and mod facet arthropathy in the L4-5 levels. Based on patient's history, exam and MRI, differential includes: Lumbar spinal stenosis, lumbar ddd, facet arthropathy.      2. Bilateral hip bursitis.         Type of problem:  Lumbar.    This is a chronic condition.  Condition occurred with:  Insidious onset.  Where condition occurred: for unknown reasons.  Site of Pain: entire B lumbar spine region.  Pain is described as aching and is constant.  Pain radiates to:  No radiation. Pain is the same all the time.  Since onset symptoms are unchanged.  Symptoms are exacerbated by sitting, bending, standing, walking and lifting  and relieved by nothing.    Previous treatment includes physical therapy. There was mild improvement following previous treatment.  Barriers include:  None as reported by patient.                        Objective:    Gait:    Gait Type:  Antalgic   Assistive Devices:  Cane  Deviations:  Hip:  Decr dynamic control L and decr dynamic control RAnkle:  Push off decr R and push off decr L    Flexibility/Screens:     Upper  Extremity:    Decreased left upper extremity flexibility at:  Pectoralis Minor    Decreased right upper extremity flexibility present at:  Pectoralis Minor    Spine:  Decreased left spine flexibility:  Upper Trap    Decreased right spine flexibility:  Upper Trap             Lumbar/SI Evaluation  ROM:    AROM Lumbar:   Flexion:        Mod loss  Ext:                    Marked loss   Side Bend:        Left:     Right:   Rotation:           Left:     Right:   Side Glide:        Left:     Right:                                  Cervical/Thoracic Evaluation    AROM:  AROM Cervical:    Flexion:          Min loss  Extension:       Mod loss  Rotation:         Left: mod loss     Right: mod loss  Side Bend:      Left:     Right:                                                        Hip Evaluation  HIP AROM:    Flexion: Left: 100    Right:  100                                         General Evaluation:      Gross Strength:              Lower Extremity:   Significant findings:  Able to heel raise, toe raise but unable to perform heel walk, toe walk                  Balance:    Single Leg Stance--Eyes Open:  Left: unable without UE assist/30 sec    Right: unable without UE assist/30 sec                                                     ROS    Assessment/Plan:    Patient is a 87 year old male with lumbar, both sides hip complaints.    Patient has the following significant findings with corresponding treatment plan.                Diagnosis 1:  Lumbago  Pain -  self management, education and home program  Decreased ROM/flexibility - manual therapy, therapeutic exercise and home program  Decreased strength - therapeutic exercise, therapeutic activities and home program  Impaired gait - gait training and home program  Impaired muscle performance - neuro re-education and home program  Decreased function - therapeutic activities and home program  Impaired posture - neuro re-education and home program  Diagnosis 2:  B hip pain    Pain -  self management, education and home program  Decreased ROM/flexibility - manual therapy, therapeutic exercise and home program  Decreased strength - therapeutic exercise, therapeutic activities and home program  Impaired gait - gait training and home program  Impaired muscle performance - neuro re-education and home program  Decreased function - therapeutic activities and home program  Impaired posture - neuro re-education and home program    Therapy Evaluation Codes:   1) History comprised of:   Personal factors that impact the plan of care:      Age, Past/current experiences and Time since onset of symptoms.    Comorbidity factors that impact the plan of care are:      Cancer and Osteoarthritis.     Medications impacting care: cholesterol.  2) Examination of Body Systems comprised of:   Body structures and functions that impact the plan of care:      Cervical spine, Hip and Lumbar spine.   Activity limitations that impact the plan of care are:      Bending, Dressing, Lifting, Sitting, Squatting/kneeling, Standing and Walking.  3) Clinical presentation characteristics are:   Stable/Uncomplicated.  4) Decision-Making    Low complexity using standardized patient assessment instrument and/or measureable assessment of functional outcome.  Cumulative Therapy Evaluation is: Low complexity.    Previous and current functional limitations:  (See Goal Flow Sheet for this information)    Short term and Long term goals: (See Goal Flow Sheet for this information)     Communication ability:  Patient appears to be able to clearly communicate and understand verbal and written communication and follow directions correctly.  Treatment Explanation - The following has been discussed with the patient:   RX ordered/plan of care  Anticipated outcomes  Possible risks and side effects  This patient would benefit from PT intervention to resume normal activities.   Rehab potential is good.    Frequency:  1 X week, once  daily  Duration:  for 8 weeks tapering to 2 X a month over 4 months  Discharge Plan:  Achieve all LTG.  Independent in home treatment program.  Reach maximal therapeutic benefit.    Please refer to the daily flowsheet for treatment today, total treatment time and time spent performing 1:1 timed codes.

## 2022-02-28 NOTE — PROGRESS NOTES
[facility]    OUTPATIENT Physical Therapy ORTHOPEDIC EVALUATION  PLAN OF TREATMENT FOR OUTPATIENT REHABILITATION  (COMPLETE FOR INITIAL CLAIMS ONLY)  Patient's Last Name, First Name, M.I.  YOB: 1934  Kevin Fierro    Provider s Name:  [facility]   Medical Record No.  [id number]   Start of Care Date:  02/28/22   Onset Date:   02/14/22 (date PT order)   Type:     _X__PT   ___OT Medical Diagnosis:    Encounter Diagnoses   Name Primary?     Chronic bilateral low back pain without sciatica      Spinal stenosis of lumbar region without neurogenic claudication      Trochanteric bursitis of both hips      Spinal stenosis of lumbar region, unspecified whether neurogenic claudication present      Spinal stenosis of lumbar region with neurogenic claudication      Chronic bilateral low back pain, unspecified whether sciatica present      Neck pain      Lumbago      Bilateral hip pain         Treatment Diagnosis:  Lumbago        Goals:     02/28/22 0500   Body Part   Goals listed below are for lumbar, hips   Goal #1   Goal #1 ambulation   Previous Functional Level No restrictions   Current Functional Level Minutes patient can walk;with cane   Performance Level 3, pain 8/10   STG Target Performance Minutes patient will be able to walk;with cane   Performance Level 5, pain 6/10   Rationale for safe household ambulation;for safe outdoor household ambulation;for safe community ambulation;to maintain proper body mechanics/posture while ambulating to avoid additional compensatory injury due to improper gait mechanics;to promote a healthy and active lifestyle   Due Date 03/21/22    LTG Target Performance Minutes patient will be able to  walk;with cane   Performance Level 15, pain 4/10   Rationale for safe household ambulation;for safe outdoor household ambulation;for safe  community ambulation;to maintain proper body mechanics/posture while ambulating to avoid additional compensatory injury due to improper gait mechanics;to promote a healthy and active lifestyle   Due Date 05/28/22   Goal #2   Goal #2 standing   Previous Functional Level No restrictions   Current Functional Level Minutes patient can stand   Performance level 3, pain 8/10   STG Target Performance Minutes patient will be able to stand   Performance level 5, pain 6/10   Rationale for housekeeping tasks such as vacuuming, bed making, mowing, gardening;for personal hygiene;for meal preparation;for safe household ambulation   Due date 03/21/22   LTG Target Performance Minutes patient will be able to stand   Performance Level 15, pain 4/10   Rationale for housekeeping tasks such as vacuuming, bed making, mowing;for personal hygiene;for meal preparation;for safe household ambulation   Due date 08/28/22       Therapy Frequency:  1x/wk x 8 wks, 2x/month x 4 months  Predicted Duration of Therapy Intervention:  6 months    Raghav Gutierrez, PT                 I CERTIFY THE NEED FOR THESE SERVICES FURNISHED UNDER        THIS PLAN OF TREATMENT AND WHILE UNDER MY CARE     (Physician attestation of this document indicates review and certification of the therapy plan).                       Certification Date From:  02/28/22   Certification Date To:  05/29/22    Referring Provider:  Chance Espinosa    Initial Assessment        See Epic Evaluation SOC Date: 02/28/22

## 2022-03-03 ENCOUNTER — MYC MEDICAL ADVICE (OUTPATIENT)
Dept: FAMILY MEDICINE | Facility: CLINIC | Age: 87
End: 2022-03-03
Payer: COMMERCIAL

## 2022-03-07 ENCOUNTER — VIRTUAL VISIT (OUTPATIENT)
Dept: PALLIATIVE MEDICINE | Facility: CLINIC | Age: 87
End: 2022-03-07
Payer: COMMERCIAL

## 2022-03-07 DIAGNOSIS — G89.29 CHRONIC BILATERAL LOW BACK PAIN WITHOUT SCIATICA: Primary | ICD-10-CM

## 2022-03-07 DIAGNOSIS — M17.12 ARTHRITIS OF LEFT KNEE: ICD-10-CM

## 2022-03-07 DIAGNOSIS — M54.2 NECK PAIN: ICD-10-CM

## 2022-03-07 DIAGNOSIS — M54.50 CHRONIC BILATERAL LOW BACK PAIN WITHOUT SCIATICA: Primary | ICD-10-CM

## 2022-03-07 DIAGNOSIS — M48.061 SPINAL STENOSIS OF LUMBAR REGION WITHOUT NEUROGENIC CLAUDICATION: ICD-10-CM

## 2022-03-07 PROCEDURE — 99214 OFFICE O/P EST MOD 30 MIN: CPT | Mod: 95 | Performed by: PHYSICAL MEDICINE & REHABILITATION

## 2022-03-07 NOTE — PATIENT INSTRUCTIONS
1. You can call [phone]  To request general physical therapy.    2. If you want to try pool therapy, call your insurance and see where you would be covered for this and let us know, I can put in this referral.    3. Try the following chair yoga exercises:  [email or web address]    4. Follow up as needed.    ----------------------------------------------------------------  Clinic Number:  [phone]     Call with any questions about your care and for scheduling assistance.     Calls are returned Monday through Friday between 8 AM and 4:30 PM. We usually get back to you within 2 business days depending on the issue/request.    If we are prescribing your medications:    For opioid medication refills, call the clinic or send a [employer] message 7 days in advance.  Please include:    Name of requested medication    Name of the pharmacy.    For non-opioid medications, call your pharmacy directly to request a refill. Please allow 3-4 days to be processed.     Per [place] State Law:    All controlled substance prescriptions must be filled within 30 days of being written.      For those controlled substances allowing refills, pickup must occur within 30 days of last fill.      We believe regular attendance is key to your success in our program!      Any time you are unable to keep your appointment we ask that you call us at least 24 hours in advance to cancel.This will allow us to offer the appointment time to another patient.     Multiple missed appointments may lead to dismissal from the clinic.

## 2022-03-07 NOTE — PROGRESS NOTES
Calixto is a 87 year old who is being evaluated via a billable video visit.    Is Pt currently in [place]? Yes    NOTE:  If Pt is not in [place], Appointment needs to be canceled and rescheduled.    How would you like to obtain your AVS? MyChart  If the video visit is dropped, the invitation should be resent by:   Will anyone else be joining your video visit? Yes: family. How would they like to receive their invitation? Text to cell phone: .    ALLI Maki [facility]   Pain Management Center                          [facility]    Date of visit: 3/7/22      Assessment:  Kevin Fierro is a 86 year old male with a past medical history significant for NSTEMI, aortic stenosis, Hypertension, Hyperlipidemia, CKD stage 3, GERD, BPH, MIMI who presents with complaints of chronic low back pain.      1. Chronic low back pain: Patient reports a gradually worsening 5 year history of bilateral low back pain. Prior exam showed moderately reduced lumbar spine ROM in all planes, neurological exam was normal, SLR negative bilaterally. There is some right sided paraspinal tenderness but not consistently reproduced. MRI showed moderate central and foraminal stenosis at L4-5 as well as widespread mod-sev degenerative disc disease and mod facet arthropathy in the L4-5 levels. Based on patient's history, exam and MRI, differential includes: Lumbar spinal stenosis, lumbar ddd, facet arthropathy. No improvement with lumbar epidural steroid injection or facet injections, significant improvement with PT. They're having a flare of their back pain and would like to restart PT. Phone numbers were provided to schedule.    2. Acute on chronic neck pain: Calixto reports worsening neck stiffness and aching in the past month. Would like to address this with PT, referrals are in place.    3. Bilateral hip bursitis: Improvement noted with injection in jan 2020 which has tapered off. Can repeat bilateral bursa injections  every 3-4 months as needed.    4. Left knee pain: Symptoms began after 1 session on a recumbent bicycle, they have tried some PT exercises without any relief. On prior exam they had diffuse tenderness around the left knee with a mild posterior effusion compared to the right.  Knee XR showed mod-severe arthritis. Improvement noted with knee injection and with rest.           Plan:  Diagnosis reviewed, treatment options addressed, and risks/benefits discussed. The patient was involved in shared decision making regarding the plan as laid out below.     1. Education: The patient was educated as to the natural history of their disorder. Reassurance was given and the patient was encouraged to engage in activity and movement as able.  2. Physical Therapy: Started chronic pain PT but no appts available soon, will consider general PT and pool PT as well.   1. Chair yoga video provided  3. Diagnostic Studies:  None  4. Medication Management:    1. Continue tylenol 1000mg qid prn  5. Further procedures recommended: left knee injection can be repeated as needed.  1. Can repeat bilateral GT bursa injections every 3+ months as needed.  6. Follow up: As needed.      DO [facility]        Chief complaint:   Chief Complaint   Patient presents with     Pain       Interval history:  Kevin Fierro is a 86 year old male last seen by me on 9/10/21.        Since his last visit, Kevin Fierro reports:  -Calixto had been having worsening back pain and his spouse called to set up an appointment. They were told to set up an appointment when they were scheduled for physical therapy.    -They continue to have low back pain, similar to the pain they had before. They had improvement with PT before as well and would like to continue this.    -Raghav is booking out too far, they may consider general PT instead of chronic pain PT.    -Calixto has been having worsening neck pain and stiffness. Wants to address this  with PT as well. They deny any weakness or red flag signs/symptoms.    -Tylenol doesn't seem to help much, they wonder if there are any other medications they can take.    Pain scores:  Pain intensity on average is 6 on a scale of 0-10.        Current medication treatments include:  -Tylenol  Pain medications are being prescribed by none.     Previous medication treatments included:  none     Other treatments have included:  Kevin Fierro has not been seen at a pain clinic in the past.    Behavioral interventions: hasn't tried  PT: significantly helpful for back pain.  Manual Medicine: hasn't tried recently     Interventional:  Acupuncture: hasn't tried  TENs Unit: hasn't tried  Injections: -no improvement with lumbar epidural steroid injection or lumbar facet injections.  Bursa injections were helpful for hip pain - 1/30/20 - Dr. West.   left knee injection - sept 2021     Side Effects: no side effect    Medications:  Current Outpatient Medications   Medication Sig Dispense Refill     amLODIPine (NORVASC) 5 MG tablet Take 1 tablet (5 mg) by mouth daily 90 tablet 0     aspirin 81 MG EC tablet Take 81 mg by mouth At Bedtime       atorvastatin (LIPITOR) 40 MG tablet TAKE ONE TABLET BY MOUTH EVERY NIGHT AT BEDTIME 90 tablet 2     clotrimazole-betamethasone (LOTRISONE) 1-0.05 % external cream APPLY TOPICALLY AFFECTED AREA(S) TWO TIMES A DAY 45 g 3     finasteride (PROSCAR) 5 MG tablet Take 1 tablet (5 mg) by mouth daily 90 tablet 3     metoprolol succinate ER (TOPROL-XL) 100 MG 24 hr tablet Take 1 tablet (100 mg) by mouth daily 90 tablet 0     multivitamin, therapeutic (THERA-VIT) TABS tablet Take 1 tablet by mouth At Bedtime       omeprazole (PRILOSEC) 20 MG DR capsule Take 20 mg by mouth daily as needed       selenium sulfide (SELSUN) 2.5 % external lotion APPLY DAILY TO EVERY OTHER DAY, LATHER, WAIT 10 MINUTES AND RINSE 118 mL 3     terazosin (HYTRIN) 10 MG capsule TAKE ONE CAPSULE BY MOUTH AT BEDTIME 90  capsule 3     triamcinolone (KENALOG) 0.1 % external cream Apply topically 2 times daily as needed for irritation         Medical History: any changes in medical history since they were last seen? No    Review of Systems:  The 14 system ROS was reviewed from the intake questionnaire, and is positive for: back pain, neck pain, arthritis, stiffness    There were no vitals taken for this visit.  Virtual visit      BILLING TIME DOCUMENTATION:   The total TIME spent on this patient on the date of the encounter/appointment was 35 minutes.      TOTAL TIME includes:   Time spent preparing to see the patient (reviewing records and tests) -  Time spent face to face (or over the phone) with the patient  Time spent ordering tests, medications, procedures and referrals  Time spent documenting clinical information in Epic         John Leal DO  [facility]      Video Start Time: 1033am  Video-Visit Details    Type of service:  Video Visit    Video End Time:10:58 AM    Originating Location (pt. Location): Home    Distant Location (provider location):  [facility] [place]     Platform used for Video Visit: Lisa

## 2022-03-08 NOTE — TELEPHONE ENCOUNTER
Appointments in Next Year    Mar 11, 2022 10:15 AM  Nurse Only with JOHN NOLASCO/LPN  [facility] ([facility] ) [phone]   Apr 19, 2022 11:30 AM  Chronic Pain OT Follow Up with Raghav Gutierrez PT  [facility] ([facility] ) [phone]   Apr 25, 2022  1:00 PM  (Arrive by 12:40 PM)  Provider Visit with Chance Espinosa MD  [facility] ([facility] ) [phone]   May 02, 2022 11:30 AM  Chronic Pain OT Follow Up with Raghav Gutierrez PT  [facility] ([facility] ) [phone]   May 16, 2022 11:30 AM  Chronic Pain OT Follow Up with Raghav Gutierrez PT  [facility] ([facility] ) [phone]        Patient has an appointment regarding issues that he sent a Rolithhart message regarding    Lizzy ORTEGA RN   [facility] Triage

## 2022-03-11 ENCOUNTER — ALLIED HEALTH/NURSE VISIT (OUTPATIENT)
Dept: NURSING | Facility: CLINIC | Age: 87
End: 2022-03-11
Payer: COMMERCIAL

## 2022-03-11 DIAGNOSIS — H61.20 CERUMEN IMPACTION: Primary | ICD-10-CM

## 2022-03-11 PROCEDURE — 99207 PR NO CHARGE NURSE ONLY: CPT

## 2022-03-11 NOTE — NURSING NOTE
Patient came in to the clinic for bilateral ear wash.    Denied ear pain   Felt like R ear had decreased hearing compared to his normal     No redness in the Right or left ear canal    Warm water lavage to both ears    White clump (smaller than a pea) of soft wax removed from Left ear    Pea size black soft wax piece was removed without difficulty.     Patient denied pain and stated he could hear better from the right after was removal    Re-examined each ear with light, both clear    Patient instructed to follow up prn    Lizzy ORTEGA RN   [facility] Triage

## 2022-03-23 ENCOUNTER — TRANSFERRED RECORDS (OUTPATIENT)
Dept: HEALTH INFORMATION MANAGEMENT | Facility: CLINIC | Age: 87
End: 2022-03-23
Payer: COMMERCIAL

## 2022-04-11 ENCOUNTER — TRANSFERRED RECORDS (OUTPATIENT)
Dept: HEALTH INFORMATION MANAGEMENT | Facility: CLINIC | Age: 87
End: 2022-04-11
Payer: COMMERCIAL

## 2022-04-19 ENCOUNTER — THERAPY VISIT (OUTPATIENT)
Dept: PHYSICAL THERAPY | Facility: CLINIC | Age: 87
End: 2022-04-19
Payer: COMMERCIAL

## 2022-04-19 DIAGNOSIS — M25.551 BILATERAL HIP PAIN: ICD-10-CM

## 2022-04-19 DIAGNOSIS — M54.50 LUMBAGO: Primary | ICD-10-CM

## 2022-04-19 DIAGNOSIS — M25.552 BILATERAL HIP PAIN: ICD-10-CM

## 2022-04-19 PROCEDURE — 97110 THERAPEUTIC EXERCISES: CPT | Mod: GP | Performed by: PHYSICAL THERAPIST

## 2022-04-19 PROCEDURE — 97112 NEUROMUSCULAR REEDUCATION: CPT | Mod: GP | Performed by: PHYSICAL THERAPIST

## 2022-04-25 ENCOUNTER — OFFICE VISIT (OUTPATIENT)
Dept: FAMILY MEDICINE | Facility: CLINIC | Age: 87
End: 2022-04-25
Payer: COMMERCIAL

## 2022-04-25 VITALS
WEIGHT: 212.8 LBS | OXYGEN SATURATION: 95 % | BODY MASS INDEX: 31.52 KG/M2 | DIASTOLIC BLOOD PRESSURE: 78 MMHG | TEMPERATURE: 98.1 F | HEART RATE: 81 BPM | HEIGHT: 69 IN | SYSTOLIC BLOOD PRESSURE: 122 MMHG

## 2022-04-25 DIAGNOSIS — N18.31 STAGE 3A CHRONIC KIDNEY DISEASE (H): ICD-10-CM

## 2022-04-25 DIAGNOSIS — G47.30 HYPERSOMNIA WITH SLEEP APNEA: ICD-10-CM

## 2022-04-25 DIAGNOSIS — I25.10 CORONARY ARTERY DISEASE INVOLVING NATIVE CORONARY ARTERY OF NATIVE HEART WITHOUT ANGINA PECTORIS: ICD-10-CM

## 2022-04-25 DIAGNOSIS — H90.3 SENSORINEURAL HEARING LOSS (SNHL) OF BOTH EARS: ICD-10-CM

## 2022-04-25 DIAGNOSIS — C44.310 BCC (BASAL CELL CARCINOMA), FACE: Primary | ICD-10-CM

## 2022-04-25 DIAGNOSIS — E78.5 HYPERLIPIDEMIA LDL GOAL <70: ICD-10-CM

## 2022-04-25 DIAGNOSIS — I73.9 PERIPHERAL VASCULAR DISEASE (H): ICD-10-CM

## 2022-04-25 DIAGNOSIS — I10 HYPERTENSION GOAL BP (BLOOD PRESSURE) < 140/90: ICD-10-CM

## 2022-04-25 DIAGNOSIS — Z82.49 FAMILY HISTORY OF CARDIOVASCULAR DISEASE: ICD-10-CM

## 2022-04-25 DIAGNOSIS — R79.9 ABNORMAL FINDING OF BLOOD CHEMISTRY, UNSPECIFIED: ICD-10-CM

## 2022-04-25 DIAGNOSIS — Z51.81 MEDICATION MONITORING ENCOUNTER: ICD-10-CM

## 2022-04-25 DIAGNOSIS — K21.9 GASTROESOPHAGEAL REFLUX DISEASE WITHOUT ESOPHAGITIS: ICD-10-CM

## 2022-04-25 DIAGNOSIS — G47.10 HYPERSOMNIA WITH SLEEP APNEA: ICD-10-CM

## 2022-04-25 PROCEDURE — 99214 OFFICE O/P EST MOD 30 MIN: CPT | Performed by: FAMILY MEDICINE

## 2022-04-25 NOTE — PROGRESS NOTES
Assessment & Plan       ICD-10-CM    1. BCC (basal cell carcinoma), face  C44.310 REVIEW OF HEALTH MAINTENANCE PROTOCOL ORDERS   2. Coronary artery disease involving native coronary artery of native heart without angina pectoris  I25.10 REVIEW OF HEALTH MAINTENANCE PROTOCOL ORDERS     Adult Cardiology Eval  Referral     Comprehensive metabolic panel     Lipid panel reflex to direct LDL Fasting     CK total     UA reflex to Microscopic and Culture     Albumin Random Urine Quantitative with Creat Ratio     TSH with free T4 reflex     Hemoglobin A1c   3. Peripheral vascular disease (H)  I73.9 REVIEW OF HEALTH MAINTENANCE PROTOCOL ORDERS     Adult Cardiology Eval  Referral     Comprehensive metabolic panel     Lipid panel reflex to direct LDL Fasting     UA reflex to Microscopic and Culture     Albumin Random Urine Quantitative with Creat Ratio     Hemoglobin A1c   4. Family history of cardiovascular disease  Z82.49 REVIEW OF HEALTH MAINTENANCE PROTOCOL ORDERS     Adult Cardiology Eval  Referral     Comprehensive metabolic panel     Lipid panel reflex to direct LDL Fasting     CK total     UA reflex to Microscopic and Culture     Albumin Random Urine Quantitative with Creat Ratio     Hemoglobin A1c   5. Stage 3a chronic kidney disease (H)  N18.31 REVIEW OF HEALTH MAINTENANCE PROTOCOL ORDERS     Adult Cardiology Eval  Referral     Comprehensive metabolic panel     UA reflex to Microscopic and Culture     Albumin Random Urine Quantitative with Creat Ratio     Hemoglobin A1c   6. Hypertension goal BP (blood pressure) < 140/90  I10 REVIEW OF HEALTH MAINTENANCE PROTOCOL ORDERS     Adult Cardiology Eval  Referral     Comprehensive metabolic panel     CBC with platelets     UA reflex to Microscopic and Culture     Albumin Random Urine Quantitative with Creat Ratio     TSH with free T4 reflex   7. Hyperlipidemia LDL goal <70  E78.5 REVIEW OF HEALTH MAINTENANCE PROTOCOL ORDERS      "Adult Cardiology Eval  Referral     Comprehensive metabolic panel     CK total   8. Hypersomnia with sleep apnea  G47.10 REVIEW OF HEALTH MAINTENANCE PROTOCOL ORDERS    G47.30 TSH with free T4 reflex   9. Gastroesophageal reflux disease without esophagitis  K21.9 REVIEW OF HEALTH MAINTENANCE PROTOCOL ORDERS     CBC with platelets   10. Sensorineural hearing loss (SNHL) of both ears  H90.3 REVIEW OF HEALTH MAINTENANCE PROTOCOL ORDERS   11. Medication monitoring encounter  Z51.81 REVIEW OF HEALTH MAINTENANCE PROTOCOL ORDERS     Adult Cardiology Eval  Referral     Comprehensive metabolic panel     Lipid panel reflex to direct LDL Fasting     CBC with platelets     CK total     UA reflex to Microscopic and Culture     Albumin Random Urine Quantitative with Creat Ratio     TSH with free T4 reflex     Hemoglobin A1c   12. Abnormal finding of blood chemistry, unspecified   R79.9 Hemoglobin A1c       Discussed treatment/modality options, including risk and benefits, he desires:    1) suture removed - Dr Calero, healing well    2) Dr Carlson follow up     3) Preop in 7/2022, consider flomax after IOL    4) CPX in 10/2022    5) TdaP in 5/2022    6) [facility]    All diagnosis above reviewed and noted above, otherwise stable.      See [facility] orders for further details.        BMI:   Estimated body mass index is 31.43 kg/m  as calculated from the following:    Height as of this encounter: 1.753 m (5' 9\").    Weight as of this encounter: 96.5 kg (212 lb 12.8 oz).     No follow-ups on file.    No LOS data to display    Doing chart review, history and exam, documentation and further activities as noted.           Chance Espinosa MD, FAAFP     [facility]  [address] [place] [id number]  [email or web address]   Office: [phone]  Fax: [phone]  Pager: [phone]       Subjective   Calixto is a 87 year old who " presents for the following health issues  accompanied by his spouse.    HPI     Medication follow up and suture removal     Recent Moh's - 2 weeks ago - Dr Caelro - here for suture removal    Due for follow up with cardiology, Dr Carlson    CAD - no cp - no sob at rest, some WALKER, no edema    CKD 3 A    BP Readings from Last 3 Encounters:   04/25/22 122/78   10/13/21 123/83   09/24/21 117/72     Creatinine   Date Value Ref Range Status   10/04/2021 1.38 (H) 0.66 - 1.25 mg/dL Final   05/06/2021 1.45 (H) 0.66 - 1.25 mg/dL Final     GFR Estimate   Date Value Ref Range Status   10/04/2021 46 (L) >60 mL/min/1.73m2 Final     Comment:     As of July 11, 2021, eGFR is calculated by the CKD-EPI creatinine equation, without race adjustment. eGFR can be influenced by muscle mass, exercise, and diet. The reported eGFR is an estimation only and is only applicable if the renal function is stable.   05/06/2021 43 (L) >60 mL/min/[1.73_m2] Final     Comment:     Non  GFR Calc  Starting 12/18/2018, serum creatinine based estimated GFR (eGFR) will be   calculated using the Chronic Kidney Disease Epidemiology Collaboration   (CKD-EPI) equation.       Lipids    Recent Labs   Lab Test 10/04/21  0920 05/06/21  0908 08/26/16  0850 05/14/15  0822 05/15/14  0753   CHOL 145 139   < > 159 180   HDL 54 55   < > 49 33*   LDL 65 59   < > 83 87   TRIG 128 126   < > 137 303*   CHOLHDLRATIO  --   --   --  3.2 5.5*    < > = values in this interval not displayed.     MIMI - using CPAP    GERD - no issues    [facility] on 4/27- hearing aids follow up - SNHL            Review of Systems   CONSTITUTIONAL: NEGATIVE for fever, chills, change in weight  INTEGUMENTARY/SKIN: NEGATIVE for worrisome rashes, moles or lesions  EYES: NEGATIVE for vision changes or irritation  ENT/MOUTH: NEGATIVE for ear, mouth and throat problems  RESP: NEGATIVE for significant cough or SOB  CV: NEGATIVE for chest pain, palpitations or peripheral edema  GI: NEGATIVE for  "nausea, abdominal pain, heartburn, or change in bowel habits  : NEGATIVE for frequency, dysuria, or hematuria  MUSCULOSKELETAL: NEGATIVE for significant arthralgias or myalgia  NEURO: NEGATIVE for weakness, dizziness or paresthesias  ENDOCRINE: NEGATIVE for temperature intolerance, skin/hair changes  HEME: NEGATIVE for bleeding problems  PSYCHIATRIC: NEGATIVE for changes in mood or affect      Objective    /78   Pulse 81   Temp 98.1  F (36.7  C) (Tympanic)   Ht 1.753 m (5' 9\")   Wt 96.5 kg (212 lb 12.8 oz)   SpO2 95%   BMI 31.43 kg/m    Body mass index is 31.43 kg/m .  Physical Exam   GENERAL: healthy, alert and no distress  EYES: Eyes grossly normal to inspection, PERRL and conjunctivae and sclerae normal  HENT: ear canals and TM's normal, nose and mouth without ulcers or lesions  NECK: no adenopathy, no asymmetry, masses, or scars and thyroid normal to palpation  RESP: lungs clear to auscultation - no rales, rhonchi or wheezes  CV: regular rate and rhythm, normal S1 S2, no S3 or S4, no murmur, click or rub, no peripheral edema and peripheral pulses strong  ABDOMEN: soft, nontender, no hepatosplenomegaly, no masses and bowel sounds normal  MS: no gross musculoskeletal defects noted, no edema  SKIN: no suspicious lesions or rashes - wounds healing well  NEURO: Normal strength and tone, mentation intact and speech normal  PSYCH: mentation appears normal, affect normal/bright    Right medial cheek - running suture removed in it's entirety - healing well - no signs of infection        "

## 2022-04-28 ENCOUNTER — TELEPHONE (OUTPATIENT)
Dept: CARDIOLOGY | Facility: CLINIC | Age: 87
End: 2022-04-28
Payer: COMMERCIAL

## 2022-04-28 DIAGNOSIS — I35.0 NONRHEUMATIC AORTIC VALVE STENOSIS: Primary | ICD-10-CM

## 2022-04-28 NOTE — TELEPHONE ENCOUNTER
New echo order placed and sent to scheduling to call patient. Natacha Mccarthy RN on 4/28/2022 at 9:47 AM

## 2022-04-28 NOTE — TELEPHONE ENCOUNTER
Health Call Center    Phone Message    May a detailed message be left on voicemail: yes     Reason for Call: Order(s): Other:   Reason for requested:  ECHO order  Date needed:  Provider name: Robyn     Received order from Dr. Espinosa on 22 for pt to see Cardiologist-Dr. Carlson also order ECHO but the order has  in 2022. Please update and reach back out to pt to schedule both.    Action Taken: Message routed to:  Other: Cardiology    Travel Screening: Not Applicable

## 2022-05-02 ENCOUNTER — THERAPY VISIT (OUTPATIENT)
Dept: PHYSICAL THERAPY | Facility: CLINIC | Age: 87
End: 2022-05-02
Payer: COMMERCIAL

## 2022-05-02 ENCOUNTER — MYC MEDICAL ADVICE (OUTPATIENT)
Dept: FAMILY MEDICINE | Facility: CLINIC | Age: 87
End: 2022-05-02

## 2022-05-02 DIAGNOSIS — M25.551 BILATERAL HIP PAIN: ICD-10-CM

## 2022-05-02 DIAGNOSIS — M54.50 LUMBAGO: Primary | ICD-10-CM

## 2022-05-02 DIAGNOSIS — M25.552 BILATERAL HIP PAIN: ICD-10-CM

## 2022-05-02 PROCEDURE — 97110 THERAPEUTIC EXERCISES: CPT | Mod: GP | Performed by: PHYSICAL THERAPIST

## 2022-05-02 PROCEDURE — 97112 NEUROMUSCULAR REEDUCATION: CPT | Mod: GP | Performed by: PHYSICAL THERAPIST

## 2022-05-04 NOTE — TELEPHONE ENCOUNTER
Please see my chart message below     Please review and advise     Thank you     Danielle Mills RN, BSN  [facility]

## 2022-05-04 NOTE — PROGRESS NOTES
Subjective:  HPI  Physical Exam                    Objective:  System    Physical Exam    General     ROS    Assessment/Plan:    PROGRESS  REPORT    Progress reporting period is from 2-28-22 to 5-2-22.       SUBJECTIVE  Subjective: Continues to slowly improve, still tires quickly. Trying to walk more often. Feels he breathes well during day and does not hold breath during transitional movements.    Current Pain level:  (4-6/10).      Initial Pain level: 8/10.   Changes in function:  Yes (See Goal flowsheet attached for changes in current functional level)  Adverse reaction to treatment or activity: None    OBJECTIVE  Changes noted in objective findings:  The objective findings below are from DOS 5-2-22.  Objective: AROM Lumbar flex mod loss, ext marked loss, cervical flex min loss, ext mod loss, R and L rotation mod loss.  Gait with 4WW most stable but pt prefers cane.     ASSESSMENT/PLAN  Updated problem list and treatment plan: Diagnosis 1:  Lumbago  Pain -  self management, education and home program  Decreased ROM/flexibility - manual therapy, therapeutic exercise and home program  Decreased strength - therapeutic exercise, therapeutic activities and home program  Impaired gait - gait training and home program  Impaired muscle performance - neuro re-education and home program  Decreased function - therapeutic activities and home program  Impaired posture - neuro re-education and home program  Diagnosis 2:  B hip pain   Pain -  self management, education and home program  Decreased ROM/flexibility - manual therapy, therapeutic exercise and home program  Decreased strength - therapeutic exercise, therapeutic activities and home program  Impaired gait - gait training and home program  Impaired muscle performance - neuro re-education and home program  Decreased function - therapeutic activities and home program  Impaired posture - neuro re-education and home program  STG/LTGs have been met or progress has been made  towards goals:  Yes (See Goal flow sheet completed today.)  Assessment of Progress: The patient's condition is improving slowly.  Self Management Plans:  Patient has been instructed in a home treatment program.  Patient  has been instructed in self management of symptoms.  I have re-evaluated this patient and find that the nature, scope, duration and intensity of the therapy is appropriate for the medical condition of the patient.  Kevin continues to require the following intervention to meet STG and LTG's:  PT    Recommendations:  This patient would benefit from continued therapy.     Frequency:  2 X a month, once daily  Duration:  for 3 months        Please refer to the daily flowsheet for treatment today, total treatment time and time spent performing 1:1 timed codes.

## 2022-05-10 DIAGNOSIS — I10 HYPERTENSION GOAL BP (BLOOD PRESSURE) < 140/90: ICD-10-CM

## 2022-05-10 DIAGNOSIS — I21.4 NSTEMI (NON-ST ELEVATED MYOCARDIAL INFARCTION) (H): ICD-10-CM

## 2022-05-10 DIAGNOSIS — N18.31 STAGE 3A CHRONIC KIDNEY DISEASE (H): ICD-10-CM

## 2022-05-10 RX ORDER — METOPROLOL SUCCINATE 100 MG/1
100 TABLET, EXTENDED RELEASE ORAL DAILY
Qty: 90 TABLET | Refills: 0 | Status: CANCELLED | OUTPATIENT
Start: 2022-05-10

## 2022-05-10 RX ORDER — METOPROLOL SUCCINATE 100 MG/1
100 TABLET, EXTENDED RELEASE ORAL DAILY
Qty: 90 TABLET | Refills: 0 | Status: SHIPPED | OUTPATIENT
Start: 2022-05-10 | End: 2022-08-03

## 2022-05-10 RX ORDER — AMLODIPINE BESYLATE 5 MG/1
5 TABLET ORAL DAILY
Qty: 90 TABLET | Refills: 0 | Status: SHIPPED | OUTPATIENT
Start: 2022-05-10 | End: 2022-08-03

## 2022-05-10 RX ORDER — AMLODIPINE BESYLATE 5 MG/1
5 TABLET ORAL DAILY
Qty: 90 TABLET | Refills: 0 | Status: CANCELLED | OUTPATIENT
Start: 2022-05-10

## 2022-05-23 ENCOUNTER — HOSPITAL ENCOUNTER (OUTPATIENT)
Dept: CARDIOLOGY | Facility: CLINIC | Age: 87
Discharge: HOME | End: 2022-05-23
Attending: INTERNAL MEDICINE | Admitting: INTERNAL MEDICINE
Payer: COMMERCIAL

## 2022-05-23 DIAGNOSIS — I35.0 NONRHEUMATIC AORTIC VALVE STENOSIS: ICD-10-CM

## 2022-05-23 LAB — LVEF ECHO: NORMAL

## 2022-05-23 PROCEDURE — 93306 TTE W/DOPPLER COMPLETE: CPT | Mod: 26 | Performed by: INTERNAL MEDICINE

## 2022-05-23 PROCEDURE — 999N000208 ECHOCARDIOGRAM COMPLETE

## 2022-05-23 PROCEDURE — 255N000002 HC RX 255 OP 636: Performed by: INTERNAL MEDICINE

## 2022-05-23 RX ADMIN — HUMAN ALBUMIN MICROSPHERES AND PERFLUTREN 3 ML: 10; .22 INJECTION, SOLUTION INTRAVENOUS at 14:03

## 2022-06-01 ENCOUNTER — LAB (OUTPATIENT)
Dept: LAB | Facility: CLINIC | Age: 87
End: 2022-06-01
Payer: COMMERCIAL

## 2022-06-01 DIAGNOSIS — G47.10 HYPERSOMNIA WITH SLEEP APNEA: ICD-10-CM

## 2022-06-01 DIAGNOSIS — I73.9 PERIPHERAL VASCULAR DISEASE (H): ICD-10-CM

## 2022-06-01 DIAGNOSIS — R79.9 ABNORMAL FINDING OF BLOOD CHEMISTRY, UNSPECIFIED: ICD-10-CM

## 2022-06-01 DIAGNOSIS — I10 HYPERTENSION GOAL BP (BLOOD PRESSURE) < 140/90: ICD-10-CM

## 2022-06-01 DIAGNOSIS — E78.5 HYPERLIPIDEMIA LDL GOAL <70: ICD-10-CM

## 2022-06-01 DIAGNOSIS — G47.30 HYPERSOMNIA WITH SLEEP APNEA: ICD-10-CM

## 2022-06-01 DIAGNOSIS — I25.10 CORONARY ARTERY DISEASE INVOLVING NATIVE CORONARY ARTERY OF NATIVE HEART WITHOUT ANGINA PECTORIS: ICD-10-CM

## 2022-06-01 DIAGNOSIS — Z82.49 FAMILY HISTORY OF CARDIOVASCULAR DISEASE: ICD-10-CM

## 2022-06-01 DIAGNOSIS — Z51.81 MEDICATION MONITORING ENCOUNTER: ICD-10-CM

## 2022-06-01 DIAGNOSIS — N18.31 STAGE 3A CHRONIC KIDNEY DISEASE (H): ICD-10-CM

## 2022-06-01 DIAGNOSIS — K21.9 GASTROESOPHAGEAL REFLUX DISEASE WITHOUT ESOPHAGITIS: ICD-10-CM

## 2022-06-01 LAB
ALBUMIN SERPL-MCNC: 3.6 G/DL (ref 3.4–5)
ALBUMIN UR-MCNC: NEGATIVE MG/DL
ALP SERPL-CCNC: 47 U/L (ref 40–150)
ALT SERPL W P-5'-P-CCNC: 37 U/L (ref 0–70)
ANION GAP SERPL CALCULATED.3IONS-SCNC: 9 MMOL/L (ref 3–14)
APPEARANCE UR: CLEAR
AST SERPL W P-5'-P-CCNC: 26 U/L (ref 0–45)
BILIRUB SERPL-MCNC: 0.4 MG/DL (ref 0.2–1.3)
BILIRUB UR QL STRIP: NEGATIVE
BUN SERPL-MCNC: 28 MG/DL (ref 7–30)
CALCIUM SERPL-MCNC: 9.3 MG/DL (ref 8.5–10.1)
CHLORIDE BLD-SCNC: 108 MMOL/L (ref 94–109)
CHOLEST SERPL-MCNC: 131 MG/DL
CK SERPL-CCNC: 96 U/L (ref 30–300)
CO2 SERPL-SCNC: 22 MMOL/L (ref 20–32)
COLOR UR AUTO: YELLOW
CREAT SERPL-MCNC: 1.36 MG/DL (ref 0.66–1.25)
CREAT UR-MCNC: 99 MG/DL
ERYTHROCYTE [DISTWIDTH] IN BLOOD BY AUTOMATED COUNT: 13.2 % (ref 10–15)
FASTING STATUS PATIENT QL REPORTED: YES
GFR SERPL CREATININE-BSD FRML MDRD: 50 ML/MIN/1.73M2
GLUCOSE BLD-MCNC: 106 MG/DL (ref 70–99)
GLUCOSE UR STRIP-MCNC: NEGATIVE MG/DL
HBA1C MFR BLD: 5.6 % (ref 0–5.6)
HCT VFR BLD AUTO: 39.6 % (ref 40–53)
HDLC SERPL-MCNC: 48 MG/DL
HGB BLD-MCNC: 13 G/DL (ref 13.3–17.7)
HGB UR QL STRIP: ABNORMAL
KETONES UR STRIP-MCNC: NEGATIVE MG/DL
LDLC SERPL CALC-MCNC: 62 MG/DL
LEUKOCYTE ESTERASE UR QL STRIP: NEGATIVE
MCH RBC QN AUTO: 32.3 PG (ref 26.5–33)
MCHC RBC AUTO-ENTMCNC: 32.8 G/DL (ref 31.5–36.5)
MCV RBC AUTO: 98 FL (ref 78–100)
MICROALBUMIN UR-MCNC: 54 MG/L
MICROALBUMIN/CREAT UR: 54.55 MG/G CR (ref 0–17)
NITRATE UR QL: NEGATIVE
NONHDLC SERPL-MCNC: 83 MG/DL
PH UR STRIP: 7 [PH] (ref 5–7)
PLATELET # BLD AUTO: 197 10E3/UL (ref 150–450)
POTASSIUM BLD-SCNC: 4 MMOL/L (ref 3.4–5.3)
PROT SERPL-MCNC: 7.2 G/DL (ref 6.8–8.8)
RBC # BLD AUTO: 4.03 10E6/UL (ref 4.4–5.9)
RBC #/AREA URNS AUTO: NORMAL /HPF
SODIUM SERPL-SCNC: 139 MMOL/L (ref 133–144)
SP GR UR STRIP: 1.02 (ref 1–1.03)
TRIGL SERPL-MCNC: 105 MG/DL
TSH SERPL DL<=0.005 MIU/L-ACNC: 2.41 MU/L (ref 0.4–4)
UROBILINOGEN UR STRIP-ACNC: 0.2 E.U./DL
WBC # BLD AUTO: 9.1 10E3/UL (ref 4–11)
WBC #/AREA URNS AUTO: NORMAL /HPF

## 2022-06-01 PROCEDURE — 85027 COMPLETE CBC AUTOMATED: CPT

## 2022-06-01 PROCEDURE — 84443 ASSAY THYROID STIM HORMONE: CPT

## 2022-06-01 PROCEDURE — 81001 URINALYSIS AUTO W/SCOPE: CPT

## 2022-06-01 PROCEDURE — 82043 UR ALBUMIN QUANTITATIVE: CPT

## 2022-06-01 PROCEDURE — 82550 ASSAY OF CK (CPK): CPT

## 2022-06-01 PROCEDURE — 36415 COLL VENOUS BLD VENIPUNCTURE: CPT

## 2022-06-01 PROCEDURE — 80061 LIPID PANEL: CPT

## 2022-06-01 PROCEDURE — 80053 COMPREHEN METABOLIC PANEL: CPT

## 2022-06-01 PROCEDURE — 83036 HEMOGLOBIN GLYCOSYLATED A1C: CPT

## 2022-06-02 ENCOUNTER — TELEPHONE (OUTPATIENT)
Dept: PALLIATIVE MEDICINE | Facility: CLINIC | Age: 87
End: 2022-06-02

## 2022-06-02 NOTE — TELEPHONE ENCOUNTER
Transition plan: Pt to transition to Dr Montserrat CHARLESN, RN Care Coordinator  [facility]

## 2022-06-08 PROBLEM — I77.810 AORTIC ROOT DILATATION (H): Status: ACTIVE | Noted: 2022-06-08

## 2022-06-08 PROBLEM — I77.810 ASCENDING AORTA DILATATION (H): Status: ACTIVE | Noted: 2022-06-08

## 2022-06-14 ENCOUNTER — OFFICE VISIT (OUTPATIENT)
Dept: CARDIOLOGY | Facility: CLINIC | Age: 87
End: 2022-06-14
Payer: COMMERCIAL

## 2022-06-14 VITALS
HEART RATE: 85 BPM | OXYGEN SATURATION: 95 % | SYSTOLIC BLOOD PRESSURE: 90 MMHG | HEIGHT: 69 IN | WEIGHT: 213.2 LBS | BODY MASS INDEX: 31.58 KG/M2 | DIASTOLIC BLOOD PRESSURE: 50 MMHG

## 2022-06-14 DIAGNOSIS — I10 HYPERTENSION GOAL BP (BLOOD PRESSURE) < 140/90: ICD-10-CM

## 2022-06-14 DIAGNOSIS — I35.0 NONRHEUMATIC AORTIC VALVE STENOSIS: Primary | ICD-10-CM

## 2022-06-14 DIAGNOSIS — I21.4 NSTEMI (NON-ST ELEVATED MYOCARDIAL INFARCTION) (H): ICD-10-CM

## 2022-06-14 PROCEDURE — 99214 OFFICE O/P EST MOD 30 MIN: CPT | Performed by: INTERNAL MEDICINE

## 2022-06-14 NOTE — PROGRESS NOTES
HISTORY:    Kevin Fierro is a pleasant 88-year-old male accompanied by his wife and daughter today.  He has a history of palpitations and dyspnea on exertion which were evaluated in 2017.  No cause of his dyspnea was discovered and his palpitations were felt to be benign arrhythmias.  He has a history of hypertension and last year had an echocardiogram showing aortic stenosis for which she was seen and for which today's follow-up is planned.    Today Calixto reports that he continues to experience dyspnea on exertion.  He is overweight and does not exercise regularly.  He is able to walk up a flight of steps but he goes slowly.  At the top of the steps he could still hold a conversation.  He does not think that his dyspnea has worsened since last year and might of actually gotten a little bit better.  He also continues to experience an occasional sense of palpitations, unchanged from several years ago when his evaluation showed this to be benign arrhythmias.    Calixto denies any exertional chest arm neck shoulder or jaw discomfort and has not experienced PND or orthopnea, syncope or near syncope, strokelike symptoms, or symptoms of claudication.  He does notice some mild peripheral edema intermittently.    Calixto had a recent echocardiogram done which I reviewed with him.  It shows normal LV and RV function.  His aortic stenosis has progressed with a mean gradient now of 26 mmHg and a valve area of 1.1cm .  This is increased 16 months earlier when his gradient was 20 mmHg with a valve area calculated at 1.4 cm .      ASSESSMENT/PLAN:    1.  Aortic stenosis.  This remains moderate in severity with some progression.  I explained to the family that progression is expected and there is not anything that can be done to prevent this.  I warned him of symptoms of fatigue and dyspnea should his aortic stenosis progress significantly over the next year, but I think this is very unlikely.  I am reassured by the fact that he states  clearly that his dyspnea is no worse and perhaps better than it was a year ago.  We will plan on repeating his echocardiogram with office visit in 1 year.  I also discussed the eventual need for a TAVR and his family had several questions.  2.  History of non-STEMI.  This patient presented with chest pain and slight troponin elevation but underwent coronary angiography and was found to have no visible coronary disease.  This was felt to likely be microvascular disease.  Today he reports that he has not experienced any further chest discomfort but he also reports that he has been inactive since his event in January 2021.  I encouraged him to get back and exercise on a regular basis and explained that this is extremely valuable for him in the long run, and would likely improve his dyspnea.  I believe his dyspnea is likely secondary to his obesity and deconditioning as well as his advanced age of 88 years.    Thank you for inviting me to participate in the care of your patient.  Please don't hesitate to call if I can be of further assistance.  32 minutes were spent today reviewing the chart and other records, interviewing and examining the patient, and documenting our visit.    Chart documentation was completed, in part, with [employer] voice-recognition software. Even though reviewed, some grammatical, spelling, and word errors may remain.       Orders Placed This Encounter   Procedures     Follow-Up with Cardiology     Echocardiogram Complete     No orders of the defined types were placed in this encounter.    There are no discontinued medications.    10 year ASCVD risk: The ASCVD Risk score ([place] EBONI Jr., et al., 2013) failed to calculate for the following reasons:    The 2013 ASCVD risk score is only valid for ages 40 to 79    The patient has a prior MI or stroke diagnosis    Encounter Diagnoses   Name Primary?     Nonrheumatic aortic valve stenosis Yes     NSTEMI (non-ST elevated myocardial infarction) (H)       Hypertension goal BP (blood pressure) < 140/90        CURRENT MEDICATIONS:  Current Outpatient Medications   Medication Sig Dispense Refill     amLODIPine (NORVASC) 5 MG tablet Take 1 tablet (5 mg) by mouth daily 90 tablet 0     aspirin 81 MG EC tablet Take 81 mg by mouth At Bedtime       atorvastatin (LIPITOR) 40 MG tablet TAKE ONE TABLET BY MOUTH EVERY NIGHT AT BEDTIME 90 tablet 2     clotrimazole-betamethasone (LOTRISONE) 1-0.05 % external cream APPLY TOPICALLY AFFECTED AREA(S) TWO TIMES A DAY (Patient taking differently: Apply topically 2 times daily as needed) 45 g 3     finasteride (PROSCAR) 5 MG tablet Take 1 tablet (5 mg) by mouth daily 90 tablet 3     metoprolol succinate ER (TOPROL XL) 100 MG 24 hr tablet Take 1 tablet (100 mg) by mouth daily 90 tablet 0     multivitamin, therapeutic (THERA-VIT) TABS tablet Take 1 tablet by mouth At Bedtime       omeprazole (PRILOSEC) 20 MG DR capsule Take 20 mg by mouth daily as needed       selenium sulfide (SELSUN) 2.5 % external lotion APPLY DAILY TO EVERY OTHER DAY, LATHER, WAIT 10 MINUTES AND RINSE (Patient taking differently: daily as needed LATHER, WAIT 10 MINUTES AND RINSE) 118 mL 3     terazosin (HYTRIN) 10 MG capsule TAKE ONE CAPSULE BY MOUTH AT BEDTIME 90 capsule 3     triamcinolone (KENALOG) 0.1 % external cream Apply topically 2 times daily as needed for irritation         ALLERGIES     Allergies   Allergen Reactions     Pollen Extract      Tree pollen         PAST MEDICAL HISTORY:  Past Medical History:   Diagnosis Date     Aortic root dilatation (H)      Arthritis .     Ascending aorta dilatation (H)      BCC (basal cell carcinoma of skin)     right jaw, rt parietal, Dr Limon/[place], 2016, 10/2018, 11/2019, 3/2022     Choroidal nevus of left eye     Dr Connor     Colon polyps      Coronary artery disease involving native coronary artery of native heart without angina pectoris 01/2021    NSTEMI, cardiac cath 1/2021: non-obstructive     ED (erectile  dysfunction)      Family history of cardiovascular disease      GERD (gastroesophageal reflux disease) 2013     Hematuria 1999    dr scott     Hernia, abdominal      Hyperlipidemia LDL goal <70 1990     Hypertension goal BP (blood pressure) < 140/90 1990     Hypertrophy of prostate without urinary obstruction and other lower urinary tract symptoms (LUTS)      Lumbar stenosis 2017    mild to moderate foraminal and central     Lung nodules 11/2010    CT scan stable     Nonrheumatic aortic valve stenosis 01/2021    mild to moderate     obstructive SLEEP APNEA 05/2007    CPAP     Other and unspecified 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 05/25/2005     Palpitations      PSVT 11/2017    few runs, rare PVC     Restrictive lung disease      Seasonal allergies      Sensorineural hearing loss (SNHL) of both ears 05/2005    hearing aids, L>R - [facility] Hernandez [facility]     Stage 3a chronic kidney disease (H)        PAST SURGICAL HISTORY:  Past Surgical History:   Procedure Laterality Date     CV HEART CATHETERIZATION WITH POSSIBLE INTERVENTION N/A 01/07/2021    Procedure: Heart Catheterization with Possible Intervention;  Surgeon: Raúl Rich MD;  Location:  [facility]     CV LEFT HEART CATH N/A 01/07/2021    Procedure: Left Heart Cath;  Surgeon: Raúl Rich MD;  Location:  [facility]     CYSTOSCOPY       HC REPAIR INCISIONAL HERNIA,REDUCIBLE  1960    Hernia Repair, Incisional, Unilateral     NM MPI WITH LEXISCAN  05/2021    normal     STRESS ECHO ([facility])  7/09, 5/12, 7/17    Negative     TONSILLECTOMY & ADENOIDECTOMY  1946     ZZHC COLONOSCOPY THRU STOMA, DIAGNOSTIC  2005, 5/10, 5/11    Polyps-> due 2015 - Ct Colonography negative       FAMILY HISTORY:  Family History   Problem Relation Age of Onset     C.A.MANE. Father         age 50's     Hypertension Father      JENNY Brother      JENNY Brother         mid 40's     Diabetes Brother      Cancer Brother   "       pancreatic CA     Coronary Artery Disease Brother      Cancer - colorectal No family hx of      Prostate Cancer No family hx of        SOCIAL HISTORY:  Social History     Socioeconomic History     Marital status:      Spouse name: Leticia     Number of children: 3     Years of education: 18   Occupational History     Employer: RETIRED   Tobacco Use     Smoking status: Never Smoker     Smokeless tobacco: Never Used   Substance and Sexual Activity     Alcohol use: Yes     Alcohol/week: 4.0 - 5.0 standard drinks     Types: 4 - 5 Standard drinks or equivalent per week     Comment: 4-5 drinks per week avg     Drug use: No     Sexual activity: Yes     Partners: Female   Other Topics Concern     Blood Transfusions No     Caffeine Concern Yes     Comment: 1 serv/day, rare pop, occas chocolate (3-4/yr)     Sleep Concern Yes     Comment: IMMI, wakes feeling rested     Exercise Yes     Comment: 30-45' 2-3 x/wk     Seat Belt Yes     Parent/sibling w/ CABG, MI or angioplasty before 65F 55M? Yes       Review of Systems:  Skin:        Eyes:       ENT:       Respiratory:  Positive for dyspnea on exertion;shortness of breath  Cardiovascular:  Negative    Gastroenterology:      Genitourinary:       Musculoskeletal:  Positive for back pain;neck pain  Neurologic:       Psychiatric:       Heme/Lymph/Imm:       Endocrine:         Physical Exam:  Vitals: BP 90/50 (BP Location: Right arm, Patient Position: Sitting, Cuff Size: Adult Regular)   Pulse 85   Ht 1.753 m (5' 9\")   Wt 96.7 kg (213 lb 3.2 oz)   SpO2 95%   BMI 31.48 kg/m      Constitutional:  cooperative, alert and oriented, well developed, well nourished, in no acute distress obese      Skin:  warm and dry to the touch        Head:  normocephalic        Eyes:  sclera white;no xanthalasma;no nystagmus        ENT:  no pallor or cyanosis   masked    Neck:  carotid pulses are full and equal bilaterally, JVP normal, no carotid bruit        Chest:  normal breath sounds, " clear to auscultation, normal A-P diameter, normal symmetry, normal respiratory excursion, no use of accessory muscles        Cardiac: regular rhythm;normal S1 and S2;no S3 or S4;apical impulse not displaced       grade 1;grade 2;systolic ejection murmur;apical radiation to the carotid        Abdomen:  abdomen soft;BS normoactive        Vascular: pulses full and equal                                      Extremities and Muscular Skeletal:  no edema           Neurological:  no gross motor deficits        Psych:  affect appropriate, oriented to time, person and place     Recent Lab Results:  LIPID RESULTS:  Lab Results   Component Value Date    CHOL 131 06/01/2022    CHOL 139 05/06/2021    HDL 48 06/01/2022    HDL 55 05/06/2021    LDL 62 06/01/2022    LDL 59 05/06/2021    TRIG 105 06/01/2022    TRIG 126 05/06/2021    CHOLHDLRATIO 3.2 05/14/2015       LIVER ENZYME RESULTS:  Lab Results   Component Value Date    AST 26 06/01/2022    AST 29 05/06/2021    ALT 37 06/01/2022    ALT 42 05/06/2021       CBC RESULTS:  Lab Results   Component Value Date    WBC 9.1 06/01/2022    WBC 8.0 05/06/2021    RBC 4.03 (L) 06/01/2022    RBC 4.09 (L) 05/06/2021    HGB 13.0 (L) 06/01/2022    HGB 13.4 05/06/2021    HCT 39.6 (L) 06/01/2022    HCT 40.6 05/06/2021    MCV 98 06/01/2022    MCV 99 05/06/2021    MCH 32.3 06/01/2022    MCH 32.8 05/06/2021    MCHC 32.8 06/01/2022    MCHC 33.0 05/06/2021    RDW 13.2 06/01/2022    RDW 13.0 05/06/2021     06/01/2022     05/06/2021       BMP RESULTS:  Lab Results   Component Value Date     06/01/2022     05/06/2021    POTASSIUM 4.0 06/01/2022    POTASSIUM 4.5 05/06/2021    CHLORIDE 108 06/01/2022    CHLORIDE 111 (H) 05/06/2021    CO2 22 06/01/2022    CO2 24 05/06/2021    ANIONGAP 9 06/01/2022    ANIONGAP 5 05/06/2021     (H) 06/01/2022    GLC 98 05/06/2021    BUN 28 06/01/2022    BUN 29 05/06/2021    CR 1.36 (H) 06/01/2022    CR 1.45 (H) 05/06/2021    GFRESTIMATED 50 (L)  06/01/2022    GFRESTIMATED 43 (L) 05/06/2021    GFRESTBLACK 50 (L) 05/06/2021    TRAN 9.3 06/01/2022    TRAN 9.2 05/06/2021        A1C RESULTS:  Lab Results   Component Value Date    A1C 5.6 06/01/2022    A1C 5.9 (H) 05/06/2021       INR RESULTS:  No results found for: INR      Dariel Carlson MD, [facility]    CC  No referring provider defined for this encounter.

## 2022-06-14 NOTE — LETTER
6/14/2022    Chance Espinosa MD  [address] [facility] [address]    RE: Kevin Fierro       Dear Colleague,     I had the pleasure of seeing Kevin Fierro in the [facility].  HISTORY:    Kevin Fierro is a pleasant 88-year-old male accompanied by his wife and daughter today.  He has a history of palpitations and dyspnea on exertion which were evaluated in 2017.  No cause of his dyspnea was discovered and his palpitations were felt to be benign arrhythmias.  He has a history of hypertension and last year had an echocardiogram showing aortic stenosis for which she was seen and for which today's follow-up is planned.    Today Calixto reports that he continues to experience dyspnea on exertion.  He is overweight and does not exercise regularly.  He is able to walk up a flight of steps but he goes slowly.  At the top of the steps he could still hold a conversation.  He does not think that his dyspnea has worsened since last year and might of actually gotten a little bit better.  He also continues to experience an occasional sense of palpitations, unchanged from several years ago when his evaluation showed this to be benign arrhythmias.    Calixto denies any exertional chest arm neck shoulder or jaw discomfort and has not experienced PND or orthopnea, syncope or near syncope, strokelike symptoms, or symptoms of claudication.  He does notice some mild peripheral edema intermittently.    Calixto had a recent echocardiogram done which I reviewed with him.  It shows normal LV and RV function.  His aortic stenosis has progressed with a mean gradient now of 26 mmHg and a valve area of 1.1cm .  This is increased 16 months earlier when his gradient was 20 mmHg with a valve area calculated at 1.4 cm .      ASSESSMENT/PLAN:    1.  Aortic stenosis.  This remains moderate in severity with some progression.  I explained to the family that progression is expected and there is not anything that can be done to  prevent this.  I warned him of symptoms of fatigue and dyspnea should his aortic stenosis progress significantly over the next year, but I think this is very unlikely.  I am reassured by the fact that he states clearly that his dyspnea is no worse and perhaps better than it was a year ago.  We will plan on repeating his echocardiogram with office visit in 1 year.  I also discussed the eventual need for a TAVR and his family had several questions.  2.  History of non-STEMI.  This patient presented with chest pain and slight troponin elevation but underwent coronary angiography and was found to have no visible coronary disease.  This was felt to likely be microvascular disease.  Today he reports that he has not experienced any further chest discomfort but he also reports that he has been inactive since his event in January 2021.  I encouraged him to get back and exercise on a regular basis and explained that this is extremely valuable for him in the long run, and would likely improve his dyspnea.  I believe his dyspnea is likely secondary to his obesity and deconditioning as well as his advanced age of 88 years.    Thank you for inviting me to participate in the care of your patient.  Please don't hesitate to call if I can be of further assistance.  32 minutes were spent today reviewing the chart and other records, interviewing and examining the patient, and documenting our visit.    Chart documentation was completed, in part, with [employer] voice-recognition software. Even though reviewed, some grammatical, spelling, and word errors may remain.       Orders Placed This Encounter   Procedures     Follow-Up with Cardiology     Echocardiogram Complete     No orders of the defined types were placed in this encounter.    There are no discontinued medications.    10 year ASCVD risk: The ASCVD Risk score (Irene EBONI Jr., et al., 2013) failed to calculate for the following reasons:    The 2013 ASCVD risk score is only valid for  ages 40 to 79    The patient has a prior MI or stroke diagnosis    Encounter Diagnoses   Name Primary?     Nonrheumatic aortic valve stenosis Yes     NSTEMI (non-ST elevated myocardial infarction) (H)      Hypertension goal BP (blood pressure) < 140/90        CURRENT MEDICATIONS:  Current Outpatient Medications   Medication Sig Dispense Refill     amLODIPine (NORVASC) 5 MG tablet Take 1 tablet (5 mg) by mouth daily 90 tablet 0     aspirin 81 MG EC tablet Take 81 mg by mouth At Bedtime       atorvastatin (LIPITOR) 40 MG tablet TAKE ONE TABLET BY MOUTH EVERY NIGHT AT BEDTIME 90 tablet 2     clotrimazole-betamethasone (LOTRISONE) 1-0.05 % external cream APPLY TOPICALLY AFFECTED AREA(S) TWO TIMES A DAY (Patient taking differently: Apply topically 2 times daily as needed) 45 g 3     finasteride (PROSCAR) 5 MG tablet Take 1 tablet (5 mg) by mouth daily 90 tablet 3     metoprolol succinate ER (TOPROL XL) 100 MG 24 hr tablet Take 1 tablet (100 mg) by mouth daily 90 tablet 0     multivitamin, therapeutic (THERA-VIT) TABS tablet Take 1 tablet by mouth At Bedtime       omeprazole (PRILOSEC) 20 MG DR capsule Take 20 mg by mouth daily as needed       selenium sulfide (SELSUN) 2.5 % external lotion APPLY DAILY TO EVERY OTHER DAY, LATHER, WAIT 10 MINUTES AND RINSE (Patient taking differently: daily as needed LATHER, WAIT 10 MINUTES AND RINSE) 118 mL 3     terazosin (HYTRIN) 10 MG capsule TAKE ONE CAPSULE BY MOUTH AT BEDTIME 90 capsule 3     triamcinolone (KENALOG) 0.1 % external cream Apply topically 2 times daily as needed for irritation         ALLERGIES     Allergies   Allergen Reactions     Pollen Extract      Tree pollen         PAST MEDICAL HISTORY:  Past Medical History:   Diagnosis Date     Aortic root dilatation (H)      Arthritis .     Ascending aorta dilatation (H)      BCC (basal cell carcinoma of skin)     right jaw, rt parietal, Dr Limon/[place], 2016, 10/2018, 11/2019, 3/2022     Choroidal nevus of left eye       Nikolas     Colon polyps      Coronary artery disease involving native coronary artery of native heart without angina pectoris 01/2021    NSTEMI, cardiac cath 1/2021: non-obstructive     ED (erectile dysfunction)      Family history of cardiovascular disease      GERD (gastroesophageal reflux disease) 2013     Hematuria 1999    dr scott     Hernia, abdominal      Hyperlipidemia LDL goal <70 1990     Hypertension goal BP (blood pressure) < 140/90 1990     Hypertrophy of prostate without urinary obstruction and other lower urinary tract symptoms (LUTS)      Lumbar stenosis 2017    mild to moderate foraminal and central     Lung nodules 11/2010    CT scan stable     Nonrheumatic aortic valve stenosis 01/2021    mild to moderate     obstructive SLEEP APNEA 05/2007    CPAP     Other and unspecified 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 05/25/2005     Palpitations      PSVT 11/2017    few runs, rare PVC     Restrictive lung disease      Seasonal allergies      Sensorineural hearing loss (SNHL) of both ears 05/2005    hearing aids, L>R [facility]     Stage 3a chronic kidney disease (H)        PAST SURGICAL HISTORY:  Past Surgical History:   Procedure Laterality Date     CV HEART CATHETERIZATION WITH POSSIBLE INTERVENTION N/A 01/07/2021    Procedure: Heart Catheterization with Possible Intervention;  Surgeon: Raúl Rich MD;  Location:  HEART CARDIAC CATH LAB     CV LEFT HEART CATH N/A 01/07/2021    Procedure: Left Heart Cath;  Surgeon: Raúl Rich MD;  Location:  HEART CARDIAC CATH LAB     CYSTOSCOPY       HC REPAIR INCISIONAL HERNIA,REDUCIBLE  1960    Hernia Repair, Incisional, Unilateral     NM MPI WITH LEXISCAN  05/2021    normal     STRESS ECHO (METRO)  7/09, 5/12, 7/17    Negative     TONSILLECTOMY & ADENOIDECTOMY  1946     [facility] COLONOSCOPY THRU STOMA, DIAGNOSTIC  2005, 5/10, 5/11    Polyps-> due 2015 - Ct Colonography negative       FAMILY  "HISTORY:  Family History   Problem Relation Age of Onset     ARDEN.FIDENCIO Father         age 50's     Hypertension Father      ARDEN.FIDENCIO Brother      JENNY Brother         mid 40's     Diabetes Brother      Cancer Brother         pancreatic CA     Coronary Artery Disease Brother      Cancer - colorectal No family hx of      Prostate Cancer No family hx of        SOCIAL HISTORY:  Social History     Socioeconomic History     Marital status:      Spouse name: Leticia     Number of children: 3     Years of education: 18   Occupational History     Employer: RETIRED   Tobacco Use     Smoking status: Never Smoker     Smokeless tobacco: Never Used   Substance and Sexual Activity     Alcohol use: Yes     Alcohol/week: 4.0 - 5.0 standard drinks     Types: 4 - 5 Standard drinks or equivalent per week     Comment: 4-5 drinks per week avg     Drug use: No     Sexual activity: Yes     Partners: Female   Other Topics Concern     Blood Transfusions No     Caffeine Concern Yes     Comment: 1 serv/day, rare pop, occas chocolate (3-4/yr)     Sleep Concern Yes     Comment: MIMI, wakes feeling rested     Exercise Yes     Comment: 30-45' 2-3 x/wk     Seat Belt Yes     Parent/sibling w/ CABG, MI or angioplasty before 65F 55M? Yes       Review of Systems:  Skin:        Eyes:       ENT:       Respiratory:  Positive for dyspnea on exertion;shortness of breath  Cardiovascular:  Negative    Gastroenterology:      Genitourinary:       Musculoskeletal:  Positive for back pain;neck pain  Neurologic:       Psychiatric:       Heme/Lymph/Imm:       Endocrine:         Physical Exam:  Vitals: BP 90/50 (BP Location: Right arm, Patient Position: Sitting, Cuff Size: Adult Regular)   Pulse 85   Ht 1.753 m (5' 9\")   Wt 96.7 kg (213 lb 3.2 oz)   SpO2 95%   BMI 31.48 kg/m      Constitutional:  cooperative, alert and oriented, well developed, well nourished, in no acute distress obese      Skin:  warm and dry to the touch        Head:  normocephalic    "     Eyes:  sclera white;no xanthalasma;no nystagmus        ENT:  no pallor or cyanosis   masked    Neck:  carotid pulses are full and equal bilaterally, JVP normal, no carotid bruit        Chest:  normal breath sounds, clear to auscultation, normal A-P diameter, normal symmetry, normal respiratory excursion, no use of accessory muscles        Cardiac: regular rhythm;normal S1 and S2;no S3 or S4;apical impulse not displaced       grade 1;grade 2;systolic ejection murmur;apical radiation to the carotid        Abdomen:  abdomen soft;BS normoactive        Vascular: pulses full and equal                                      Extremities and Muscular Skeletal:  no edema           Neurological:  no gross motor deficits        Psych:  affect appropriate, oriented to time, person and place     Recent Lab Results:  LIPID RESULTS:  Lab Results   Component Value Date    CHOL 131 06/01/2022    CHOL 139 05/06/2021    HDL 48 06/01/2022    HDL 55 05/06/2021    LDL 62 06/01/2022    LDL 59 05/06/2021    TRIG 105 06/01/2022    TRIG 126 05/06/2021    CHOLHDLRATIO 3.2 05/14/2015       LIVER ENZYME RESULTS:  Lab Results   Component Value Date    AST 26 06/01/2022    AST 29 05/06/2021    ALT 37 06/01/2022    ALT 42 05/06/2021       CBC RESULTS:  Lab Results   Component Value Date    WBC 9.1 06/01/2022    WBC 8.0 05/06/2021    RBC 4.03 (L) 06/01/2022    RBC 4.09 (L) 05/06/2021    HGB 13.0 (L) 06/01/2022    HGB 13.4 05/06/2021    HCT 39.6 (L) 06/01/2022    HCT 40.6 05/06/2021    MCV 98 06/01/2022    MCV 99 05/06/2021    MCH 32.3 06/01/2022    MCH 32.8 05/06/2021    MCHC 32.8 06/01/2022    MCHC 33.0 05/06/2021    RDW 13.2 06/01/2022    RDW 13.0 05/06/2021     06/01/2022     05/06/2021       BMP RESULTS:  Lab Results   Component Value Date     06/01/2022     05/06/2021    POTASSIUM 4.0 06/01/2022    POTASSIUM 4.5 05/06/2021    CHLORIDE 108 06/01/2022    CHLORIDE 111 (H) 05/06/2021    CO2 22 06/01/2022    CO2 24  05/06/2021    ANIONGAP 9 06/01/2022    ANIONGAP 5 05/06/2021     (H) 06/01/2022    GLC 98 05/06/2021    BUN 28 06/01/2022    BUN 29 05/06/2021    CR 1.36 (H) 06/01/2022    CR 1.45 (H) 05/06/2021    GFRESTIMATED 50 (L) 06/01/2022    GFRESTIMATED 43 (L) 05/06/2021    GFRESTBLACK 50 (L) 05/06/2021    TRAN 9.3 06/01/2022    TRAN 9.2 05/06/2021        A1C RESULTS:  Lab Results   Component Value Date    A1C 5.6 06/01/2022    A1C 5.9 (H) 05/06/2021       INR RESULTS:  No results found for: INR      Dariel Carlson MD, [facility]    CC  No referring provider defined for this encounter.    Thank you for allowing me to participate in the care of your patient.      Sincerely,     Dariel Carlson MD     [facility]

## 2022-07-01 ENCOUNTER — NURSE TRIAGE (OUTPATIENT)
Dept: FAMILY MEDICINE | Facility: CLINIC | Age: 87
End: 2022-07-01

## 2022-07-01 NOTE — TELEPHONE ENCOUNTER
"Situation  Wife is calling requesting an appointment for her  for \" a bad chest cold\"    Tested negative for Covid  As of yesterday.  Was at a wedding last week.     Assessment   Reason for Disposition    Patient wants to be seen    Cough with cold symptoms (e.g., runny nose, postnasal drip, throat clearing)    Additional Information    Negative: Bluish (or gray) lips or face    Negative: Severe difficulty breathing (e.g., struggling for each breath, speaks in single words)    Negative: Rapid onset of cough and has hives    Negative: Coughing started suddenly after medicine, an allergic food or bee sting    Negative: Difficulty breathing after exposure to flames, smoke, or fumes    Negative: Sounds like a life-threatening emergency to the triager    Negative: Previous asthma attacks and this feels like asthma attack    Negative: Chest pain present when not coughing    Negative: Difficulty breathing    Negative: Passed out (i.e., fainted, collapsed and was not responding)    Negative: Patient sounds very sick or weak to the triager    Negative: Coughed up > 1 tablespoon (15 ml) blood (Exception: blood-tinged sputum)    Negative: Fever > 103 F (39.4 C)    Negative: Fever > 101 F (38.3 C) and over 60 years of age    Negative: Fever > 100.0 F (37.8 C) and has diabetes mellitus or a weak immune system (e.g., HIV positive, cancer chemotherapy, organ transplant, splenectomy, chronic steroids)    Negative: Fever > 100.0 F (37.8 C) and bedridden (e.g., nursing home patient, stroke, chronic illness, recovering from surgery)    Negative: Increasing ankle swelling    Negative: Wheezing is present    Negative: SEVERE coughing spells (e.g., whooping sound after coughing, vomiting after coughing)    Negative: Coughing up gita-colored (reddish-brown) or blood-tinged sputum    Negative: Fever present > 3 days (72 hours)    Negative: Fever returns after gone for over 24 hours and symptoms worse or not improved    Negative: " "Using nasal washes and pain medicine > 24 hours and sinus pain persists    Negative: Known COPD or other severe lung disease (i.e., bronchiectasis, cystic fibrosis, lung surgery) and worsening symptoms (i.e., increased sputum purulence or amount, increased breathing difficulty)    Negative: Continuous (nonstop) coughing interferes with work or school and no improvement using cough treatment per Care Advice     States coughs every 15 minutes for less than one minute. \    Negative: Cough has been present for > 3 weeks    Negative: Allergy symptoms are also present (e.g., itchy eyes, clear nasal discharge, postnasal drip)    Negative: Nasal discharge present > 10 days    Negative: Exposure to TB (Tuberculosis)    Negative: Taking an ACE Inhibitor medication (e.g., benazepril/LOTENSIN, captopril/CAPOTEN, enalapril/VASOTEC, lisinopril/ZESTRIL)    Answer Assessment - Initial Assessment Questions  1. ONSET: \"When did the cough begin?\"       4 days ago. Was at a wedding one week ago.   2. SEVERITY: \"How bad is the cough today?\"        Every 15 minutes,   3. RESPIRATORY DISTRESS: \"Describe your breathing.\"       Denied SOB at rest  Baseline WALKER unchanged.     4. FEVER: \"Do you have a fever?\" If so, ask: \"What is your temperature, how was it measured, and when did it start?\"      No   5. HEMOPTYSIS: \"Are you coughing up any blood?\" If so ask: \"How much?\" (flecks, streaks, tablespoons, etc.)      No   6. TREATMENT: \"What have you done so far to treat the cough?\" (e.g., meds, fluids, humidifier)      No   7. CARDIAC HISTORY: \"Do you have any history of heart disease?\" (e.g., heart attack, congestive heart failure)       No   8. LUNG HISTORY: \"Do you have any history of lung disease?\"  (e.g., pulmonary embolus, asthma, emphysema)  Denied all   9. PE RISK FACTORS: \"Do you have a history of blood clots?\" (or: recent major surgery, recent prolonged travel, bedridden)     No   10. OTHER SYMPTOMS: \"Do you have any other symptoms? " "(e.g., runny nose, wheezing, chest pain)       No chest pain   No wheezing   Runny nose- clear     11. PREGNANCY: \"Is there any chance you are pregnant?\" \"When was your last menstrual period?\"        N/a   12. TRAVEL: \"Have you traveled out of the country in the last month?\" (e.g., travel history, exposures)        No travel   Was at a wedding last Saturday    Protocols used: COUGH-A-[place]      Recommendations  Wife wants patient to be seen today because she is fearful he will get pneumonia no appointment with any provider at the [facility]. Patient declined to attempt and find an appointment at another location.   See care advice  advised wife on [place]  urgent care locations, stated those are too far. Patient' wife stated she would bring him to [place] urgent care because of the proximity to her house.       Lizzy ORTEGA RN   [facility] Triage           "

## 2022-07-21 ENCOUNTER — OFFICE VISIT (OUTPATIENT)
Dept: FAMILY MEDICINE | Facility: CLINIC | Age: 87
End: 2022-07-21
Payer: COMMERCIAL

## 2022-07-21 VITALS
TEMPERATURE: 98.1 F | RESPIRATION RATE: 20 BRPM | SYSTOLIC BLOOD PRESSURE: 100 MMHG | WEIGHT: 207 LBS | OXYGEN SATURATION: 95 % | DIASTOLIC BLOOD PRESSURE: 60 MMHG | BODY MASS INDEX: 30.66 KG/M2 | HEART RATE: 94 BPM | HEIGHT: 69 IN

## 2022-07-21 DIAGNOSIS — H26.9 CATARACT OF LEFT EYE, UNSPECIFIED CATARACT TYPE: ICD-10-CM

## 2022-07-21 DIAGNOSIS — Z01.818 PREOP GENERAL PHYSICAL EXAM: Primary | ICD-10-CM

## 2022-07-21 DIAGNOSIS — K21.9 GASTROESOPHAGEAL REFLUX DISEASE, UNSPECIFIED WHETHER ESOPHAGITIS PRESENT: ICD-10-CM

## 2022-07-21 DIAGNOSIS — Z23 NEED FOR TDAP VACCINATION: ICD-10-CM

## 2022-07-21 DIAGNOSIS — H60.391 INFECTIVE OTITIS EXTERNA, RIGHT: ICD-10-CM

## 2022-07-21 PROCEDURE — 99214 OFFICE O/P EST MOD 30 MIN: CPT | Performed by: PHYSICIAN ASSISTANT

## 2022-07-21 RX ORDER — CIPROFLOXACIN AND DEXAMETHASONE 3; 1 MG/ML; MG/ML
4 SUSPENSION/ DROPS AURICULAR (OTIC) 2 TIMES DAILY
Qty: 7.5 ML | Refills: 0 | Status: SHIPPED | OUTPATIENT
Start: 2022-07-21 | End: 2022-07-21

## 2022-07-21 RX ORDER — NEOMYCIN SULFATE, POLYMYXIN B SULFATE, HYDROCORTISONE 3.5; 10000; 1 MG/ML; [USP'U]/ML; MG/ML
3 SOLUTION/ DROPS AURICULAR (OTIC) 4 TIMES DAILY
Qty: 10 ML | Refills: 0 | Status: SHIPPED | OUTPATIENT
Start: 2022-07-21 | End: 2022-07-28

## 2022-07-21 NOTE — PROGRESS NOTES
[address] [address]  Phone: [phone]  Primary Provider: Chance Espinosa  Pre-op Performing Provider: STANFORD HAUSER    PREOPERATIVE EVALUATION:  Today's date: 7/21/2022    Kevin Fierro is a 88 year old male who presents for a preoperative evaluation.    Surgical Information:  Surgery/Procedure: Left Eye Cataract  Surgery Location: [facility]  Surgeon: Dr. Curry Murrell  Surgery Date: 7/27/2022  Time of Surgery: 12:00 PM  Where patient plans to recover: At home with family  Fax number for surgical facility: [phone]    Type of Anesthesia Anticipated: General    Assessment & Plan     The proposed surgical procedure is considered LOW risk.    Preop general physical exam  Cataract of left eye, unspecified cataract type  Cleared for surgical procedure without further diagnostics    Gastroesophageal reflux disease, unspecified whether esophagitis present  Stable.  Continue current regimen  - omeprazole (PRILOSEC) 20 MG DR capsule  Dispense: 90 capsule; Refill: 3    Infective otitis externa, right  Incidental finding on exam.  Will treat with topical antibiotic.  - neomycin-polymyxin-hydrocortisone (CORTISPORIN) 3.5-38697-9 otic solution  Dispense: 10 mL; Refill: 0    Need for Tdap vaccination  Due for vaccine.  Due to insurance constraints with Medicare we will have him get this at his pharmacy.  - Tdap, tetanus-diphtheria-acell pertussis, (ADACEL) 5-2-15.5 LF-MCG/0.5 injection  Dispense: 0.5 mL; Refill: 0    Risks and Recommendations:  The patient has the following additional risks and recommendations for perioperative complications:  Obstructive Sleep Apnea:   Watch for desaturations during procedure.  Positional considerations as well.    Medication Instructions:  Patient is to take all scheduled medications on the day of surgery    RECOMMENDATION:  APPROVAL GIVEN to proceed with proposed procedure, without further diagnostic  evaluation.      Subjective     HPI related to upcoming procedure: Left eye cataract impeding vision.        Pre-op Questionnaire 7/25/2022   1. Have you ever had a heart attack or stroke? No   2. Have you ever had surgery on your heart or blood vessels, such as a stent placement, a coronary artery bypass, or surgery on an artery in your head, neck, heart, or legs? YES - Cardiac cath - 2021 - no stents   3. Do you have chest pain with activity? No   4. Do you have a history of  heart failure? No   5. Do you currently have a cold, bronchitis or symptoms of other infection? No   6. Do you have a cough, shortness of breath, or wheezing? No   7. Do you or anyone in your family have previous history of blood clots? No   8. Do you or does anyone in your family have a serious bleeding problem such as prolonged bleeding following surgeries or cuts? No   9. Have you ever had problems with anemia or been told to take iron pills? No   10. Have you had any abnormal blood loss such as black, tarry or bloody stools? No   11. Have you ever had a blood transfusion? UNKNOWN    12. Are you willing to have a blood transfusion if it is medically needed before, during, or after your surgery? Yes   13. Have you or any of your relatives ever had problems with anesthesia? No   14. Do you have sleep apnea, excessive snoring or daytime drowsiness? YES - MIMI with CPAP   14a. Do you have a CPAP machine? Yes   15. Do you have any artifical heart valves or other implanted medical devices like a pacemaker, defibrillator, or continuous glucose monitor? No   16. Do you have artificial joints? No   17. Are you allergic to latex? No           Health Care Directive:  Patient has a Health Care Directive on file      Preoperative Review of :   reviewed - no record of controlled substances prescribed.    Status of Chronic Conditions:  See problem list for active medical problems.  Problems all longstanding and stable, except as noted/documented.   See ROS for pertinent symptoms related to these conditions.      Review of Systems  CONSTITUTIONAL: NEGATIVE for fever, chills, change in weight  ENT/MOUTH: NEGATIVE for ear, mouth and throat problems  RESP: NEGATIVE for significant cough or SOB  CV: NEGATIVE for chest pain, palpitations or peripheral edema    Patient Active Problem List    Diagnosis Date Noted     Hyperlipidemia LDL goal <70 10/31/2010     Priority: High     Hypersomnia with sleep apnea 05/31/2007     Priority: High     Problem list name updated by automated process. Provider to review       Hypertension goal BP (blood pressure) < 140/90 07/09/2004     Priority: High     Ascending aorta dilatation (H) 06/08/2022     Priority: Medium     Aortic root dilatation (H) 06/08/2022     Priority: Medium     Peripheral vascular disease (H) 04/25/2022     Priority: Medium     Lumbago 02/28/2022     Priority: Medium     Bilateral hip pain 02/28/2022     Priority: Medium     Nonrheumatic aortic valve stenosis      Priority: Medium     mild to moderate       Coronary artery disease involving native coronary artery of native heart without angina pectoris 01/2021     Priority: Medium     NSTEMI, cardiac cath 1/2021: non-obstructive       BCC (basal cell carcinoma of skin) 10/01/2018     Priority: Medium     right jaw - Dr Ashly       Restrictive lung disease      Priority: Medium     Environmental allergies 05/30/2017     Priority: Medium     Lumbar stenosis 01/01/2017     Priority: Medium     mild to moderate foraminal and central       CKD (chronic kidney disease) stage 3, GFR 30-59 ml/min (H)      Priority: Medium     GERD (gastroesophageal reflux disease)      Priority: Medium     Lung nodules      Priority: Medium     Seasonal allergies      Priority: Medium     Other and unspecified 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 05/25/2005     Priority: Medium     IMO update changed this record. Please review for accuracy        Hypertrophy of prostate without urinary obstruction 07/09/2004     Priority: Medium     Problem list name updated by automated process. Provider to review       Advanced directives, counseling/discussion 05/31/2017     Priority: Low     Advance Care Planning:   Receipt of ACP document:  Received: Health Care Directive which was witnessed or notarized on 1/25/13.  Document not previously scanned.  Validation form completed and sent with document to be scanned.  Code Status needs to be updated to reflect choices in most recent ACP document. Orders placed.  Confirmed/documented designated decision maker(s). See permanent comments section of demographics in clinical tab. View document(s) and details by clicking on code status.   Added by Luciana Taylor on 1/17/2014.  Advance Directive Initial Visit  Kevin Fierro presents in person for initial session regarding advance care planning/completion of a Health Care Directive. Pt accompanied by wife, Leticia, and daughter, Leonila, today. He was referred to the facilitator by self.  He currently has existing document that was reviewed today. Pt has identified healthcare agents. Advised pt to discuss pt's wishes with health care agents.  Plan:  [employer] Advance Care Planning information provided to patient. Pt is going to fill out [place] d/t several changes needed to existing document.  Follow up meeting: Pt may schedule f/u facilitation. Pt will be filling out [place] and will call writer with any questions/concerns. Pt has writer's contact information. Advised pt to destroy all of existing documents once pt's [place] Health [place] has been notorized or signed by 2 witnesses. Instructed pt once [place] Health [place] has been signed by 2 witnesses or notorized that directive should be brought either to the [facility] or the [facility] to be copied and reviewed. Informed pt that pt needs to keep original  copy of directive.    Pt agrees to plan.    Viviane Chavira RN, Advanced Care Planning Facilitator January 15, 2013        ED (erectile dysfunction)      Priority: Low     Family history of cardiovascular disease      Priority: Low     Hearing loss 05/20/2005     Priority: Low     Problem list name updated by automated process. Provider to review       Seborrheic dermatitis 05/20/2005     Priority: Low     Problem list name updated by automated process. Provider to review        Past Medical History:   Diagnosis Date     Aortic root dilatation (H)      Arthritis .     Ascending aorta dilatation (H)      BCC (basal cell carcinoma of skin)     right jaw, rt parietal, Dr Limon/[place], 2016, 10/2018, 11/2019, 3/2022     Choroidal nevus of left eye     Dr Connor     Colon polyps      Coronary artery disease involving native coronary artery of native heart without angina pectoris 01/2021    NSTEMI, cardiac cath 1/2021: non-obstructive     ED (erectile dysfunction)      Family history of cardiovascular disease      GERD (gastroesophageal reflux disease) 2013     Hematuria 1999     sonia     Hernia, abdominal      Hyperlipidemia LDL goal <70 1990     Hypertension goal BP (blood pressure) < 140/90 1990     Hypertrophy of prostate without urinary obstruction and other lower urinary tract symptoms (LUTS)      Lumbar stenosis 2017    mild to moderate foraminal and central     Lung nodules 11/2010    CT scan stable     Nonrheumatic aortic valve stenosis 01/2021    mild to moderate     obstructive SLEEP APNEA 05/2007    CPAP     Other and unspecified 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 05/25/2005     Palpitations      PSVT 11/2017    few runs, rare PVC     Restrictive lung disease      Seasonal allergies      Sensorineural hearing loss (SNHL) of both ears 05/2005    hearing [facility], L>R [facility]     Stage 3a chronic kidney disease (H)      Past Surgical History:   Procedure  Laterality Date     CV HEART CATHETERIZATION WITH POSSIBLE INTERVENTION N/A 01/07/2021    Procedure: Heart Catheterization with Possible Intervention;  Surgeon: Raúl Rich MD;  Location:  HEART CARDIAC CATH LAB     CV LEFT HEART CATH N/A 01/07/2021    Procedure: Left Heart Cath;  Surgeon: Raúl Rich MD;  Location:  HEART CARDIAC CATH LAB     CYSTOSCOPY       HC REPAIR INCISIONAL HERNIA,REDUCIBLE  1960    Hernia Repair, Incisional, Unilateral     NM MPI WITH LEXISCAN  05/2021    normal     STRESS ECHO (METRO)  7/09, 5/12, 7/17    Negative     TONSILLECTOMY & ADENOIDECTOMY  1946     ZZ COLONOSCOPY THRU STOMA, DIAGNOSTIC  2005, 5/10, 5/11    Polyps-> due 2015 - Ct Colonography negative     Current Outpatient Medications   Medication Sig Dispense Refill     amLODIPine (NORVASC) 5 MG tablet Take 1 tablet (5 mg) by mouth daily 90 tablet 0     aspirin 81 MG EC tablet Take 81 mg by mouth At Bedtime       atorvastatin (LIPITOR) 40 MG tablet TAKE ONE TABLET BY MOUTH EVERY NIGHT AT BEDTIME 90 tablet 2     clotrimazole-betamethasone (LOTRISONE) 1-0.05 % external cream APPLY TOPICALLY AFFECTED AREA(S) TWO TIMES A DAY (Patient taking differently: Apply topically 2 times daily as needed) 45 g 3     finasteride (PROSCAR) 5 MG tablet Take 1 tablet (5 mg) by mouth daily 90 tablet 3     metoprolol succinate ER (TOPROL XL) 100 MG 24 hr tablet Take 1 tablet (100 mg) by mouth daily 90 tablet 0     multivitamin, therapeutic (THERA-VIT) TABS tablet Take 1 tablet by mouth At Bedtime       neomycin-polymyxin-hydrocortisone (CORTISPORIN) 3.5-42730-9 otic solution Place 3 drops in ear(s) 4 times daily for 7 days 10 mL 0     omeprazole (PRILOSEC) 20 MG DR capsule Take 1 capsule (20 mg) by mouth daily as needed (heartburn) 90 capsule 3     selenium sulfide (SELSUN) 2.5 % external lotion APPLY DAILY TO EVERY OTHER DAY, LATHER, WAIT 10 MINUTES AND RINSE (Patient taking differently: daily as needed LATHER, WAIT 10 MINUTES AND  "RINSE) 118 mL 3     terazosin (HYTRIN) 10 MG capsule TAKE ONE CAPSULE BY MOUTH AT BEDTIME 90 capsule 3     triamcinolone (KENALOG) 0.1 % external cream Apply topically 2 times daily as needed for irritation         Allergies   Allergen Reactions     Pollen Extract      Tree pollen          Social History     Tobacco Use     Smoking status: Never Smoker     Smokeless tobacco: Never Used   Substance Use Topics     Alcohol use: Yes     Alcohol/week: 4.0 - 5.0 standard drinks     Types: 4 - 5 Standard drinks or equivalent per week     Comment: 4-5 drinks per week avg     Family History   Problem Relation Age of Onset     C.A.D. Father         age 50's     Hypertension Father      C.A.D. Brother      C.A.D. Brother         mid 40's     Diabetes Brother      Cancer Brother         pancreatic CA     Coronary Artery Disease Brother      Cancer - colorectal No family hx of      Prostate Cancer No family hx of      History   Drug Use No         Objective     /60   Pulse 94   Temp 98.1  F (36.7  C) (Tympanic)   Resp 20   Ht 1.753 m (5' 9\")   Wt 93.9 kg (207 lb)   SpO2 95%   BMI 30.57 kg/m      Physical Exam  GENERAL APPEARANCE: healthy, alert and no distress  HENT: ear canals -right EAC erythematous and edematous with superficial purulent material noted, left EAC within normal limits,  TM's normal and nose and mouth without ulcers or lesions  RESP: lungs clear to auscultation - no rales, rhonchi or wheezes  CV: regular rate and rhythm, normal S1 S2, no S3 or S4 and no murmur, click or rub   ABDOMEN: soft, nontender, no HSM or masses and bowel sounds normal  NEURO: Normal strength and tone, sensory exam grossly normal, mentation intact and speech normal    Recent Labs   Lab Test 06/01/22  0944 10/14/21  1512 10/04/21  0920   HGB 13.0* 13.2*  --     204  --      --  140   POTASSIUM 4.0  --  4.3   CR 1.36*  --  1.38*   A1C 5.6  --  5.5        Diagnostics:  No labs were ordered during this visit.   No EKG " required for low risk surgery (cataract, skin procedure, breast biopsy, etc).    Revised Cardiac Risk Index (RCRI):  The patient has the following serious cardiovascular risks for perioperative complications:   - Coronary Artery Disease (MI, positive stress test, angina, Qs on EKG) = 1 point     RCRI Interpretation: 1 point: Class II (low risk - 0.9% complication rate)           Signed Electronically by: Pallavi Barahona PA-C  Copy of this evaluation report is provided to requesting physician.

## 2022-07-21 NOTE — PATIENT INSTRUCTIONS
Preparing for Your Surgery  Getting started  A nurse will call you to review your health history and instructions. They will give you an arrival time based on your scheduled surgery time. Please be ready to share:    Your doctor's clinic name and phone number    Your medical, surgical and anesthesia history    A list of allergies and sensitivities    A list of medicines, including herbal treatments and over-the-counter drugs    Whether the patient has a legal guardian (ask how to send us the papers in advance)  Please tell us if you're pregnant--or if there's any chance you might be pregnant. Some surgeries may injure a fetus (unborn baby), so they require a pregnancy test. Surgeries that are safe for a fetus don't always need a test, and you can choose whether to have one.   If you have a child who's having surgery, please ask for a copy of Preparing for Your Child's Surgery.    Preparing for surgery    Within 30 days of surgery: Have a pre-op exam (sometimes called an H&P, or History and Physical). This can be done at a clinic or pre-operative center.  ? If you're having a , you may not need this exam. Talk to your care team.    At your pre-op exam, talk to your care team about all medicines you take. If you need to stop any medicines before surgery, ask when to start taking them again.  ? We do this for your safety. Many medicines can make you bleed too much during surgery. Some change how well surgery (anesthesia) drugs work.    Call your insurance company to let them know you're having surgery. (If you don't have insurance, call [phone].)    Call your clinic if there's any change in your health. This includes signs of a cold or flu (sore throat, runny nose, cough, rash, fever). It also includes a scrape or scratch near the surgery site.    If you have questions on the day of surgery, call your hospital or surgery center.  COVID testing  You may need to be tested for COVID-19 before having  surgery. If so, we will give you instructions.  Eating and drinking guidelines  For your safety: Unless your surgeon tells you otherwise, follow the guidelines below.    Eat and drink as usual until 8 hours before surgery. After that, no food or milk.    Drink clear liquids until 2 hours before surgery. These are liquids you can see through, like water, Gatorade and Propel Water. You may also have black coffee and tea (no cream or milk).    Nothing by mouth within 2 hours of surgery. This includes gum, candy and breath mints.    If you drink alcohol: Stop drinking it the night before surgery.    If your care team tells you to take medicine on the morning of surgery, it's okay to take it with a sip of water.  Preventing infection    Shower or bathe the night before and morning of your surgery. Follow the instructions your clinic gave you. (If no instructions, use regular soap.)    Don't shave or clip hair near your surgery site. We'll remove the hair if needed.    Don't smoke or vape the morning of surgery. You may chew nicotine gum up to 2 hours before surgery. A nicotine patch is okay.  ? Note: Some surgeries require you to completely quit smoking and nicotine. Check with your surgeon.    Your care team will make every effort to keep you safe from infection. We will:  ? Clean our hands often with soap and water (or an alcohol-based hand rub).  ? Clean the skin at your surgery site with a special soap that kills germs.  ? Give you a special gown to keep you warm. (Cold raises the risk of infection.)  ? Wear special hair covers, masks, gowns and gloves during surgery.  ? Give antibiotic medicine, if prescribed. Not all surgeries need antibiotics.  What to bring on the day of surgery    Photo ID and insurance card    Copy of your health care directive, if you have one    Glasses and hearing aides (bring cases)  ? You can't wear contacts during surgery    Inhaler and eye drops, if you use them (tell us about these when  you arrive)    CPAP machine or breathing device, if you use them    A few personal items, if spending the night    If you have . . .  ? A pacemaker, ICD (cardiac defibrillator) or other implant: Bring the ID card.  ? An implanted stimulator: Bring the remote control.  ? A legal guardian: Bring a copy of the certified (court-stamped) guardianship papers.  Please remove any jewelry, including body piercings. Leave jewelry and other valuables at home.  If you're going home the day of surgery    You must have a responsible adult drive you home. They should stay with you overnight as well.    If you don't have someone to stay with you, and you aren't safe to go home alone, we may keep you overnight. Insurance often won't pay for this.  After surgery  If it's hard to control your pain or you need more pain medicine, please call your surgeon's office.  Questions?   If you have any questions for your care team, list them here: _________________________________________________________________________________________________________________________________________________________________________ ____________________________________ ____________________________________ ____________________________________  For informational purposes only. Not to replace the advice of your health care provider. Copyright   2003, 2019 [facility]. All rights reserved. Clinically reviewed by Sruthi Aldrich MD. [place] [id number] - REV 07/21.

## 2022-07-25 ENCOUNTER — TELEPHONE (OUTPATIENT)
Dept: FAMILY MEDICINE | Facility: CLINIC | Age: 87
End: 2022-07-25

## 2022-07-25 NOTE — TELEPHONE ENCOUNTER
Surgery center calling and needs pre op signed in chart prior to surgery tomorrow please ensure note is signed today

## 2022-07-25 NOTE — TELEPHONE ENCOUNTER
Preop signed.  Ready for faxing.      Pallavi Barahona MBA, MS, PA-C  [facility]

## 2022-08-01 ENCOUNTER — THERAPY VISIT (OUTPATIENT)
Dept: PHYSICAL THERAPY | Facility: CLINIC | Age: 87
End: 2022-08-01
Payer: COMMERCIAL

## 2022-08-01 DIAGNOSIS — M54.50 LUMBAGO: Primary | ICD-10-CM

## 2022-08-01 DIAGNOSIS — M25.551 BILATERAL HIP PAIN: ICD-10-CM

## 2022-08-01 DIAGNOSIS — M25.552 BILATERAL HIP PAIN: ICD-10-CM

## 2022-08-01 PROCEDURE — 97110 THERAPEUTIC EXERCISES: CPT | Mod: GP | Performed by: PHYSICAL THERAPIST

## 2022-08-01 PROCEDURE — 97530 THERAPEUTIC ACTIVITIES: CPT | Mod: GP | Performed by: PHYSICAL THERAPIST

## 2022-08-01 PROCEDURE — 97112 NEUROMUSCULAR REEDUCATION: CPT | Mod: GP | Performed by: PHYSICAL THERAPIST

## 2022-08-01 NOTE — PROGRESS NOTES
Subjective:  HPI  Physical Exam                    Objective:  System    Physical Exam    General     ROS    Assessment/Plan:    PROGRESS  REPORT    Progress reporting period is from 5-2-22 to 8-1-22.       SUBJECTIVE  Subjective: Uses his 4WW more often. Still gets tired, fatigued fairly quickly.  Can walk longer now, jorge with his 4WW.    Current Pain level:  (4-5/10).      Initial Pain level: 8/10.   Changes in function:  Yes (See Goal flowsheet attached for changes in current functional level)  Adverse reaction to treatment or activity: None    OBJECTIVE  Changes noted in objective findings:  The objective findings below are from DOS 8-1-22.  Objective: Lumbar flex mod loss, ext marked loss.  Improved gait mechanics useing 4WW with longer stride length, good toe off, more upright posture.     ASSESSMENT/PLAN  Updated problem list and treatment plan: Diagnosis 1:  Lumbago  Pain -  self management, education and home program  Decreased ROM/flexibility - manual therapy, therapeutic exercise and home program  Decreased strength - therapeutic exercise, therapeutic activities and home program  Impaired gait - gait training and home program  Impaired muscle performance - neuro re-education and home program  Decreased function - therapeutic activities and home program  Impaired posture - neuro re-education and home program  Diagnosis 2:  B hip pain   Pain -  self management, education and home program  Decreased ROM/flexibility - manual therapy, therapeutic exercise and home program  Decreased strength - therapeutic exercise, therapeutic activities and home program  Impaired gait - gait training and home program  Impaired muscle performance - neuro re-education and home program  Decreased function - therapeutic activities and home program  Impaired posture - neuro re-education and home program  STG/LTGs have been met or progress has been made towards goals:  Yes (See Goal flow sheet completed today.)  Assessment of  Progress: The patient's condition is improving.  Self Management Plans:  Patient has been instructed in a home treatment program.  Patient  has been instructed in self management of symptoms.  I have re-evaluated this patient and find that the nature, scope, duration and intensity of the therapy is appropriate for the medical condition of the patient.  Kevin continues to require the following intervention to meet STG and LTG's:  PT    Recommendations:  This patient would benefit from continued therapy.     Frequency:  1 X week, once daily, every 3 weeks  Duration:  for 3 months        Please refer to the daily flowsheet for treatment today, total treatment time and time spent performing 1:1 timed codes.

## 2022-08-01 NOTE — PROGRESS NOTES
[facility]    OUTPATIENT Physical Therapy ORTHOPEDIC EVALUATION  PLAN OF TREATMENT FOR OUTPATIENT REHABILITATION  (COMPLETE FOR INITIAL CLAIMS ONLY)  Patient's Last Name, First Name, M.I.  YOB: 1934  Kevin Fierro    Provider s Name:  [facility]   Medical Record No.  [id number]   Start of Care Date:  02/28/22   Onset Date:   02/14/22 (date PT order)   Type:     _X__PT   ___OT Medical Diagnosis:    Encounter Diagnoses   Name Primary?    Lumbago Yes    Bilateral hip pain         Treatment Diagnosis:  Lumbago        Goals:     08/01/22 0500   Body Part   Goals listed below are for lumbar, hips   Goal #1   Goal #1 ambulation   Previous Functional Level No restrictions   Current Functional Level Minutes patient can walk;with cane   Performance Level 10, pain 4-5/10   STG Target Performance Minutes patient will be able to walk;with cane   Performance Level 5, pain 6/10   Rationale for safe household ambulation;for safe outdoor household ambulation;for safe community ambulation;to maintain proper body mechanics/posture while ambulating to avoid additional compensatory injury due to improper gait mechanics;to promote a healthy and active lifestyle   Due Date 05/10/22   Date Goal Met 05/02/22    LTG Target Performance Minutes patient will be able to  walk;with cane   Performance Level 15, pain 4/10   Rationale for safe household ambulation;for safe outdoor household ambulation;for safe community ambulation;to maintain proper body mechanics/posture while ambulating to avoid additional compensatory injury due to improper gait mechanics;to promote a healthy and active lifestyle   Due Date 09/01/22   If goal not met, Why? Progressing slowly toward goal, fatigue is limiting factor, needs more time, date extended   Goal #2   Goal #2 standing   Previous Functional Level No  restrictions   Current Functional Level Minutes patient can stand   Performance level 5-10, pain 4-5/10   STG Target Performance Minutes patient will be able to stand   Performance level 5, pain 6/10   Rationale for housekeeping tasks such as vacuuming, bed making, mowing, gardening;for personal hygiene;for meal preparation;for safe household ambulation   Due date 05/10/22   Date Goal Met 08/01/22   LTG Target Performance Minutes patient will be able to stand   Performance Level 15, pain 4/10   Rationale for housekeeping tasks such as vacuuming, bed making, mowing;for personal hygiene;for meal preparation;for safe household ambulation   Due date 10/29/22   If goal not met, Why? progressing slowly toward goal, date extended, needs more time       Therapy Frequency:  Updated:  1x/wk every 3 wks x 3 months  Predicted Duration of Therapy Intervention:  Updated: 3 more months    Raghav Gutierrez, PT                 I CERTIFY THE NEED FOR THESE SERVICES FURNISHED UNDER        THIS PLAN OF TREATMENT AND WHILE UNDER MY CARE     (Physician attestation of this document indicates review and certification of the therapy plan).                     Certification Date From:  08/01/22   Certification Date To:  10/29/22    Referring Provider:  John Gupta*    Initial Assessment        See Epic Evaluation SOC Date: 02/28/22

## 2022-08-02 DIAGNOSIS — I21.4 NSTEMI (NON-ST ELEVATED MYOCARDIAL INFARCTION) (H): ICD-10-CM

## 2022-08-02 DIAGNOSIS — N18.31 STAGE 3A CHRONIC KIDNEY DISEASE (H): ICD-10-CM

## 2022-08-02 DIAGNOSIS — I10 HYPERTENSION GOAL BP (BLOOD PRESSURE) < 140/90: ICD-10-CM

## 2022-08-03 RX ORDER — METOPROLOL SUCCINATE 100 MG/1
100 TABLET, EXTENDED RELEASE ORAL DAILY
Qty: 90 TABLET | Refills: 3 | Status: SHIPPED | OUTPATIENT
Start: 2022-08-03 | End: 2022-11-30

## 2022-08-03 RX ORDER — AMLODIPINE BESYLATE 5 MG/1
5 TABLET ORAL DAILY
Qty: 90 TABLET | Refills: 3 | Status: SHIPPED | OUTPATIENT
Start: 2022-08-03 | End: 2022-11-30

## 2022-09-20 ENCOUNTER — TRANSFERRED RECORDS (OUTPATIENT)
Dept: HEALTH INFORMATION MANAGEMENT | Facility: CLINIC | Age: 87
End: 2022-09-20

## 2022-09-20 LAB — RETINOPATHY: NORMAL

## 2022-11-10 ENCOUNTER — DOCUMENTATION ONLY (OUTPATIENT)
Dept: LAB | Facility: CLINIC | Age: 87
End: 2022-11-10

## 2022-11-10 DIAGNOSIS — E78.5 HYPERLIPIDEMIA LDL GOAL <70: ICD-10-CM

## 2022-11-10 DIAGNOSIS — I77.810 ASCENDING AORTA DILATATION (H): ICD-10-CM

## 2022-11-10 DIAGNOSIS — Z00.00 ROUTINE GENERAL MEDICAL EXAMINATION AT A HEALTH CARE FACILITY: Primary | ICD-10-CM

## 2022-11-10 DIAGNOSIS — Z00.00 MEDICARE ANNUAL WELLNESS VISIT, SUBSEQUENT: ICD-10-CM

## 2022-11-10 DIAGNOSIS — N18.31 STAGE 3A CHRONIC KIDNEY DISEASE (H): ICD-10-CM

## 2022-11-10 DIAGNOSIS — K21.9 GASTROESOPHAGEAL REFLUX DISEASE WITHOUT ESOPHAGITIS: ICD-10-CM

## 2022-11-10 DIAGNOSIS — I35.0 AORTIC VALVE STENOSIS, ETIOLOGY OF CARDIAC VALVE DISEASE UNSPECIFIED: ICD-10-CM

## 2022-11-10 DIAGNOSIS — I10 HYPERTENSION GOAL BP (BLOOD PRESSURE) < 140/90: ICD-10-CM

## 2022-11-10 DIAGNOSIS — I25.10 CORONARY ARTERY DISEASE INVOLVING NATIVE CORONARY ARTERY OF NATIVE HEART WITHOUT ANGINA PECTORIS: ICD-10-CM

## 2022-11-10 DIAGNOSIS — R79.9 ABNORMAL FINDING OF BLOOD CHEMISTRY, UNSPECIFIED: ICD-10-CM

## 2022-11-10 DIAGNOSIS — I73.9 PERIPHERAL VASCULAR DISEASE (H): ICD-10-CM

## 2022-11-10 DIAGNOSIS — J98.4 RESTRICTIVE LUNG DISEASE: ICD-10-CM

## 2022-11-10 DIAGNOSIS — Z51.81 MEDICATION MONITORING ENCOUNTER: ICD-10-CM

## 2022-11-10 DIAGNOSIS — Z82.49 FAMILY HISTORY OF CARDIOVASCULAR DISEASE: ICD-10-CM

## 2022-11-10 DIAGNOSIS — I77.810 AORTIC ROOT DILATATION (H): ICD-10-CM

## 2022-11-16 ENCOUNTER — LAB (OUTPATIENT)
Dept: LAB | Facility: CLINIC | Age: 87
End: 2022-11-16
Payer: COMMERCIAL

## 2022-11-16 DIAGNOSIS — Z00.00 MEDICARE ANNUAL WELLNESS VISIT, SUBSEQUENT: ICD-10-CM

## 2022-11-16 DIAGNOSIS — Z00.00 ROUTINE GENERAL MEDICAL EXAMINATION AT A HEALTH CARE FACILITY: ICD-10-CM

## 2022-11-16 DIAGNOSIS — I10 HYPERTENSION GOAL BP (BLOOD PRESSURE) < 140/90: ICD-10-CM

## 2022-11-16 DIAGNOSIS — I25.10 CORONARY ARTERY DISEASE INVOLVING NATIVE CORONARY ARTERY OF NATIVE HEART WITHOUT ANGINA PECTORIS: ICD-10-CM

## 2022-11-16 DIAGNOSIS — N18.31 STAGE 3A CHRONIC KIDNEY DISEASE (H): ICD-10-CM

## 2022-11-16 DIAGNOSIS — E78.5 HYPERLIPIDEMIA LDL GOAL <70: ICD-10-CM

## 2022-11-16 DIAGNOSIS — Z82.49 FAMILY HISTORY OF CARDIOVASCULAR DISEASE: ICD-10-CM

## 2022-11-16 DIAGNOSIS — R79.9 ABNORMAL FINDING OF BLOOD CHEMISTRY, UNSPECIFIED: ICD-10-CM

## 2022-11-16 DIAGNOSIS — Z51.81 MEDICATION MONITORING ENCOUNTER: ICD-10-CM

## 2022-11-16 DIAGNOSIS — K21.9 GASTROESOPHAGEAL REFLUX DISEASE WITHOUT ESOPHAGITIS: ICD-10-CM

## 2022-11-16 DIAGNOSIS — I73.9 PERIPHERAL VASCULAR DISEASE (H): ICD-10-CM

## 2022-11-16 LAB
ALBUMIN SERPL BCG-MCNC: 3.9 G/DL (ref 3.5–5.2)
ALBUMIN UR-MCNC: NEGATIVE MG/DL
ALP SERPL-CCNC: 43 U/L (ref 40–129)
ALT SERPL W P-5'-P-CCNC: 20 U/L (ref 10–50)
ANION GAP SERPL CALCULATED.3IONS-SCNC: 14 MMOL/L (ref 7–15)
APPEARANCE UR: CLEAR
AST SERPL W P-5'-P-CCNC: 25 U/L (ref 10–50)
BACTERIA #/AREA URNS HPF: ABNORMAL /HPF
BILIRUB SERPL-MCNC: 0.4 MG/DL
BILIRUB UR QL STRIP: NEGATIVE
BUN SERPL-MCNC: 34.6 MG/DL (ref 8–23)
CALCIUM SERPL-MCNC: 9.2 MG/DL (ref 8.8–10.2)
CHLORIDE SERPL-SCNC: 106 MMOL/L (ref 98–107)
CHOLEST SERPL-MCNC: 143 MG/DL
CK SERPL-CCNC: 78 U/L (ref 39–308)
COLOR UR AUTO: YELLOW
CREAT SERPL-MCNC: 1.49 MG/DL (ref 0.67–1.17)
CREAT UR-MCNC: 90 MG/DL
DEPRECATED HCO3 PLAS-SCNC: 20 MMOL/L (ref 22–29)
ERYTHROCYTE [DISTWIDTH] IN BLOOD BY AUTOMATED COUNT: 13 % (ref 10–15)
GFR SERPL CREATININE-BSD FRML MDRD: 45 ML/MIN/1.73M2
GLUCOSE SERPL-MCNC: 109 MG/DL (ref 70–99)
GLUCOSE UR STRIP-MCNC: NEGATIVE MG/DL
HBA1C MFR BLD: 5.6 % (ref 0–5.6)
HCT VFR BLD AUTO: 38.9 % (ref 40–53)
HDLC SERPL-MCNC: 49 MG/DL
HGB BLD-MCNC: 13.1 G/DL (ref 13.3–17.7)
HGB UR QL STRIP: ABNORMAL
KETONES UR STRIP-MCNC: NEGATIVE MG/DL
LDLC SERPL CALC-MCNC: 65 MG/DL
LEUKOCYTE ESTERASE UR QL STRIP: NEGATIVE
MCH RBC QN AUTO: 33 PG (ref 26.5–33)
MCHC RBC AUTO-ENTMCNC: 33.7 G/DL (ref 31.5–36.5)
MCV RBC AUTO: 98 FL (ref 78–100)
MICROALBUMIN UR-MCNC: 44.1 MG/L
MICROALBUMIN/CREAT UR: 49 MG/G CR (ref 0–17)
NITRATE UR QL: NEGATIVE
NONHDLC SERPL-MCNC: 94 MG/DL
PH UR STRIP: 6.5 [PH] (ref 5–7)
PLATELET # BLD AUTO: 194 10E3/UL (ref 150–450)
POTASSIUM SERPL-SCNC: 4.3 MMOL/L (ref 3.4–5.3)
PROT SERPL-MCNC: 6.4 G/DL (ref 6.4–8.3)
RBC # BLD AUTO: 3.97 10E6/UL (ref 4.4–5.9)
RBC #/AREA URNS AUTO: ABNORMAL /HPF
SODIUM SERPL-SCNC: 140 MMOL/L (ref 136–145)
SP GR UR STRIP: 1.02 (ref 1–1.03)
SQUAMOUS #/AREA URNS AUTO: ABNORMAL /LPF
TRIGL SERPL-MCNC: 147 MG/DL
TSH SERPL DL<=0.005 MIU/L-ACNC: 2.55 UIU/ML (ref 0.3–4.2)
UROBILINOGEN UR STRIP-ACNC: 0.2 E.U./DL
WBC # BLD AUTO: 8.5 10E3/UL (ref 4–11)
WBC #/AREA URNS AUTO: ABNORMAL /HPF

## 2022-11-16 PROCEDURE — 81001 URINALYSIS AUTO W/SCOPE: CPT

## 2022-11-16 PROCEDURE — 82550 ASSAY OF CK (CPK): CPT

## 2022-11-16 PROCEDURE — 84443 ASSAY THYROID STIM HORMONE: CPT

## 2022-11-16 PROCEDURE — 85027 COMPLETE CBC AUTOMATED: CPT

## 2022-11-16 PROCEDURE — 83036 HEMOGLOBIN GLYCOSYLATED A1C: CPT

## 2022-11-16 PROCEDURE — 80053 COMPREHEN METABOLIC PANEL: CPT

## 2022-11-16 PROCEDURE — 82043 UR ALBUMIN QUANTITATIVE: CPT

## 2022-11-16 PROCEDURE — 80061 LIPID PANEL: CPT

## 2022-11-16 PROCEDURE — 36415 COLL VENOUS BLD VENIPUNCTURE: CPT

## 2022-11-23 ENCOUNTER — TELEPHONE (OUTPATIENT)
Dept: FAMILY MEDICINE | Facility: CLINIC | Age: 87
End: 2022-11-23

## 2022-11-23 ENCOUNTER — VIRTUAL VISIT (OUTPATIENT)
Dept: FAMILY MEDICINE | Facility: CLINIC | Age: 87
End: 2022-11-23
Payer: COMMERCIAL

## 2022-11-23 DIAGNOSIS — U07.1 CLINICAL DIAGNOSIS OF COVID-19: Primary | ICD-10-CM

## 2022-11-23 PROCEDURE — 99441 PR PHYSICIAN TELEPHONE EVALUATION 5-10 MIN: CPT | Mod: CS | Performed by: NURSE PRACTITIONER

## 2022-11-23 NOTE — PATIENT INSTRUCTIONS
COVID-19 Outpatient Treatments  Your care team can help you find the best treatments for COVID-19. Talk to a health care provider or refer to the FDA medicine fact sheets below.    Important: You CAN'T have molnupiravir or Paxlovid if you are starting the medicine more than 5 days after your symptoms have started.  Paxlovid: https://www.fda.gov/media/662215/download  Molnupiravir: https://www.fda.gov/media/715316/download  Monoclonal antibodies: https://combatcovid.hhs.gov/what-are-monoclonal-antibodies  Paxlovid (nimatrelvir and ritonavir)  How it works  Two medicines (nirmatrelvir and ritonavir) are taken together. They stop the virus from growing. Less amount of virus is easier for your body to fight.  Benefits  Lowers risk of a hospital stay or death from COVID-19.  How to take    Medicine comes in a daily container with both medicine tablets. Take by mouth twice daily (once in the morning, once at night) for 5 days.    The number of tablets to take varies by patient.    Don't chew or break capsules. Swallow whole.  When to take  Take as soon as possible after positive COVID-19 test result, and within 5 days of your first symptoms.  Who can take it  Patients must be 12 years or older, weigh at least 88 pounds, and have tested positive for COVID-19. This is the preferred treatment for pregnant patients.  Possible side effects  Can cause altered sense of taste, diarrhea (loose, watery stools), high blood pressure, muscle aches.  Medicine conflicts    Some medicines may conflict with Paxlovid and may cause serious side effects.    Tell your care team about all the medicines you take, including prescription and over-the-counter medicines, vitamins and herbal supplements.    Your provider will review your medicines to make sure that you can safely take Paxlovid.  Cautions    Paxlovid is not advised for patients with severe kidney or liver disease. If you have kidney or liver problems, the dose may need to be  changed.    If you are pregnant or breastfeeding, talk to your care team about your options.    If you are sexually active, use trusted birth control while taking Paxlovid.  Molnupiravir  How it works  Stops the virus from growing. Less amount of virus is easier for your body to fight.  Benefits  Lowers risk of a hospital stay or death from COVID-19.  How to take  Take 4 capsules by mouth every 12 hours (4 in the morning and 4 at night) for 5 days. Don't chew or break capsules. Swallow whole.  When to take  Take as soon as possible after positive COVID-19 test result, and within 5 days of your first symptoms.  Who can take it  Patients must be 18 years or older and have tested positive for COVID-19.  Possible side effects  Diarrhea (loose, watery stools), nausea (feeling sick to your stomach), dizziness, headaches.  Medicine conflicts  Right now, there are no known conflicts with other drugs. But tell your care team all medicines you take.  Cautions    This is not advised for patients who are pregnant.    Patients who could become pregnant should use trusted birth control until 4 days after their last dose.    Sexually active people of any gender should use trusted birth control for 3 months after their last dose.  Monoclonal antibodies  How it works  Monoclonal antibodies can detect pieces of the COVID virus and stop it from infecting your cells.  Benefits  Lowers risk of a hospital stay or death from COVID-19. Monoclonal antibodies are known to work well against the omicron variant.  How it is given to you  You will receive the treatment either by an infusion through your vein (IV) or shots.  When to take  Get as soon as possible after you test positive for COVID-19, and within 7 days of your first symptoms.  Who can take it  Patients must be 12 years or older, weigh at least 88 pounds and have tested positive for COVID-19.  Possible side effects  Fever, chills, diarrhea (loose, watery stools), dizziness,  itchiness and rash.  More serious side effects include: fever, difficulty breathing, low oxygen level in your blood, chills, tiredness, fast or slow heart rate, chest discomfort or pain, weakness, confusion, nausea, headache, shortness of breath, low or high blood pressure, wheezing, swelling of your lips, face, or throat, rash including hives, itching, muscle aches, dizziness, feeling faint and sweating.  If you receive an IV, you may have brief pain, bleeding and bruising of the skin, soreness, swelling and possible infection at the place where you get the IV needle.  Medicine conflicts  Please tell you care team other medicines you take so they can assess if there are any conflicts.  Cautions  Your doctor will talk with you about risks and benefits of this treatment and will help choose the best option for you.  For informational purposes only. Not to replace the advice of your health care provider.  Copyright   2022 [facility]. All rights reserved. Clinically reviewed by Elena Townsend. [place] [id number] - 08/22.    Instructions for Patients      What are the symptoms of COVID-19?  Symptoms can include fever, cough, shortness of breath, chills, headache, muscle pain sore throat, fatigue, runny or stuffy nose, and loss of taste and smell. Other less common symptoms include nausea, vomiting, or diarrhea (watery stools).    Know when to call 911. Emergency warning signs include:    Trouble breathing or shortness of breath    Pain or pressure in the chest that doesn't go away    Feeling confused like you haven't felt before, or not being able to wake up    Bluish-colored lips or face    How can I take care of myself?  1. Get lots of rest. Drink extra fluids (unless a doctor has told you not to).  2. Take Tylenol (acetaminophen) for fever or pain. If you have liver or kidney problems, ask your family doctor if it's okay to take Tylenol   Adults:   650 mg (two 325 mg pills or tablets) every 4 to 6 hours,  or...   1,000 mg (two 500 mg pills or tablets) every 8 hours as needed.  Note: Don't take more than 3,000 mg in one day. Acetaminophen is found in many medicines (both prescribed and over the counter). Read all labels to be sure you don't take too much.  For children, check the Tylenol bottle for the right dose. The dose is based on the child's age or weight.  3. Take over the counter medicines for your symptoms as needed. Talk to your pharmacist.  4. If you have other health problems (like cancer, heart failure, an organ transplant, or severe kidney disease): Call your specialty clinic if you don't feel better in the next 2 days.    Where can I get more information?     [facility] COVID-19 Resource Hub: www.[facility].org/covid19/     CDC Quarantine & Isolation: https://www.cdc.gov/coronavirus/2019-ncov/your-health/quarantine-isolation.html     CDC - What to Do If You're Sick: https://www.cdc.gov/coronavirus/2019-ncov/if-you-are-sick/index.html    [facility] clinical trials (COVID-19 research studies): clinicalaffairs.[facility]/umn-clinical-trials    [place] Department of Health COVID-19 Public Hotline: 1-580.826.7863  Coping with Life After COVID-19  Being in the hospital because of COVID-19 is scary. Going home can be scary, too. You may face changes to your life, the way you work or what you can eat. It s hard to adjust to change, and it s normal to feel afraid, frustrated or even angry. These feelings usually go away over time. If your feelings don t start to get better, it s called  adjustment disorder.      What signs should I look out for?  Adjustment disorder can happen to anyone in a time of stress. It makes it hard to cope with daily life. You may feel lonely or fight with loved ones, even if you re glad to be home. Watch for these signs:  Fear or worry  Hard time focusing  Sadness or anger  Trouble talking to family or friends  Feeling like you don t fit in or isolating  yourself  Problems with sleep   Drinking alcohol or taking drugs to cope    What can I do?  You can help yourself get better. Feeling you have control helps you move forward. You may wonder if you ll be able to do things you did before. Be patient. Do your best to make the most of every day. Try to build relationships, be as active as you can, eat right and keep a sense of humor. Avoid smoking and drinking too much alcohol. Call your family doctor or clinic if you re not sure what to do. They can guide you to care or other services.    When should I get help?  Think about getting counseling if your sadness or frustration gets worse. Together with a trained counselor, you can talk about your problems adjusting to life after your hospital stay. You can come up with new ways to handle changes that give you more control. Your family doctor or care team can help you find a counselor.     Get help if you re thinking about hurting yourself. If you need help right away, call 911 or go to the nearest emergency room. You can also try the Crisis Text Line.    Crisis Text Line: 889-429 (http://www.crisistextline.org)  The Crisis Text Line serves anyone, in any crisis. It gives free, 24/7 support. Here's how it works:  Text HOME to 990104 from anywhere in the [place], anytime, about any type of crisis.  A live, trained Crisis Counselor will text you back quickly.  The volunteer Crisis Counselor can help you move from a  hot moment  to a  cool moment.  They can also help you work out a safety plan.

## 2022-11-23 NOTE — PROGRESS NOTES
"Calixto is a 88 year old who is being evaluated via a billable telephone visit.      What phone number would you like to be contacted at? [phone]- home  How would you like to obtain your AVS? [facility]    Assessment & Plan     Clinical diagnosis of COVID-19  Positive COVID test 11/23/22; symptoms started 11/22/22.  Reassuring exam no higher level of care felt to be needed.   Patient is considered high risk with age, HTN.  All medications reviewed: Interaction with statin medication atorvastatin.  Decreased creatinine renal dosing Paxlovid prescribed.  It is explained that Paxlovid is a antiviral to help prevent hospitalization with ineffectiveness approximately 90%.    If develops symptoms such as shortness of breath chest pain decreased oxygen saturation weakness high fever etc. should be seen in ED or call 911.  Quarantine and symptom control at home discussed. Questions answered.  Note provided for work.  Discussed proning as well as obtaining a pulse oximetry for at home.  Oxygen saturation less than 90% need to be seen in person.  Paxlovid sent to our pharmacy.   Written education provided.   Kevin verbalizes understanding of plan of care and is in agreement.      - nirmatrelvir and ritonavir (PAXLOVID) therapy pack  Dispense: 20 tablet; Refill: 0    BMI:   Estimated body mass index is 30.57 kg/m  as calculated from the following:    Height as of 7/21/22: 1.753 m (5' 9\").    Weight as of 7/21/22: 93.9 kg (207 lb).     Return in about 1 week (around 11/30/2022) for follow up as needed.      YOSELIN [facility] [place]    Subjective   Calixto is a 88 year old, presenting for the following health issues:  Covid Treatment    HPI     COVID-19 Symptom Review  How many days ago did these symptoms start? Last night symptoms; positive today    Are any of the following symptoms significant for you?    New or worsening difficulty breathing? No    Worsening cough? Yes, I am coughing up " mucus.    Fever or chills? No    Headache: No    Sore throat: No    Chest pain: No    Diarrhea: No    Body aches? YES    What treatments has patient tried? Tylenol - no relief   Does patient live in a nursing home, group home, or shelter? No  Does patient have a way to get food/medications during quarantined? Yes, I have a friend or family member who can help me.      Review of Systems   Constitutional, HEENT, cardiovascular, pulmonary, GI, , musculoskeletal, neuro, skin, endocrine and psych systems are negative, except as otherwise noted in the HPI.      Objective         Vitals:  No vitals were obtained today due to virtual visit.    Physical Exam   healthy, alert and no distress  PSYCH: Alert and oriented times 3; coherent speech, normal   rate and volume, able to articulate logical thoughts, able   to abstract reason, no tangential thoughts, no hallucinations   or delusions  His affect is normal and pleasant  RESP: No cough, no audible wheezing, able to talk in full sentences  Remainder of exam unable to be completed due to telephone visits    Phone call duration: 9 minutes

## 2022-11-23 NOTE — TELEPHONE ENCOUNTER
Pt tested positive for COVID today     He has cough, runny nose - clear     Denies: CP, SOB, wheezing, headache today, N/V    Advised pt he need to log into the my chart for getting the paxlovid   Do a evisit    Patient stated an understanding and agreed with plan.    Danielle Mills RN, BSN  [facility]

## 2022-11-30 ENCOUNTER — OFFICE VISIT (OUTPATIENT)
Dept: FAMILY MEDICINE | Facility: CLINIC | Age: 87
End: 2022-11-30
Payer: COMMERCIAL

## 2022-11-30 VITALS
DIASTOLIC BLOOD PRESSURE: 58 MMHG | TEMPERATURE: 98 F | HEART RATE: 84 BPM | RESPIRATION RATE: 16 BRPM | OXYGEN SATURATION: 96 % | WEIGHT: 207 LBS | SYSTOLIC BLOOD PRESSURE: 100 MMHG | HEIGHT: 69 IN | BODY MASS INDEX: 30.66 KG/M2

## 2022-11-30 DIAGNOSIS — E78.5 HYPERLIPIDEMIA LDL GOAL <70: ICD-10-CM

## 2022-11-30 DIAGNOSIS — K21.9 GASTROESOPHAGEAL REFLUX DISEASE, UNSPECIFIED WHETHER ESOPHAGITIS PRESENT: ICD-10-CM

## 2022-11-30 DIAGNOSIS — G47.10 HYPERSOMNIA WITH SLEEP APNEA: ICD-10-CM

## 2022-11-30 DIAGNOSIS — I73.9 PERIPHERAL VASCULAR DISEASE (H): ICD-10-CM

## 2022-11-30 DIAGNOSIS — N40.0 HYPERTROPHY OF PROSTATE WITHOUT URINARY OBSTRUCTION: ICD-10-CM

## 2022-11-30 DIAGNOSIS — I35.0 AORTIC VALVE STENOSIS, ETIOLOGY OF CARDIAC VALVE DISEASE UNSPECIFIED: ICD-10-CM

## 2022-11-30 DIAGNOSIS — L30.9 ECZEMA, UNSPECIFIED TYPE: ICD-10-CM

## 2022-11-30 DIAGNOSIS — I21.4 NSTEMI (NON-ST ELEVATED MYOCARDIAL INFARCTION) (H): ICD-10-CM

## 2022-11-30 DIAGNOSIS — I10 HYPERTENSION GOAL BP (BLOOD PRESSURE) < 140/90: ICD-10-CM

## 2022-11-30 DIAGNOSIS — Z00.00 MEDICARE ANNUAL WELLNESS VISIT, SUBSEQUENT: ICD-10-CM

## 2022-11-30 DIAGNOSIS — G47.30 HYPERSOMNIA WITH SLEEP APNEA: ICD-10-CM

## 2022-11-30 DIAGNOSIS — K21.9 GASTROESOPHAGEAL REFLUX DISEASE WITHOUT ESOPHAGITIS: ICD-10-CM

## 2022-11-30 DIAGNOSIS — R91.8 LUNG NODULES: ICD-10-CM

## 2022-11-30 DIAGNOSIS — I77.810 ASCENDING AORTA DILATATION (H): ICD-10-CM

## 2022-11-30 DIAGNOSIS — I25.10 CORONARY ARTERY DISEASE INVOLVING NATIVE CORONARY ARTERY OF NATIVE HEART WITHOUT ANGINA PECTORIS: ICD-10-CM

## 2022-11-30 DIAGNOSIS — H90.3 SENSORINEURAL HEARING LOSS (SNHL) OF BOTH EARS: ICD-10-CM

## 2022-11-30 DIAGNOSIS — M48.062 SPINAL STENOSIS OF LUMBAR REGION WITH NEUROGENIC CLAUDICATION: ICD-10-CM

## 2022-11-30 DIAGNOSIS — I77.810 AORTIC ROOT DILATATION (H): ICD-10-CM

## 2022-11-30 DIAGNOSIS — Z91.09 ENVIRONMENTAL ALLERGIES: ICD-10-CM

## 2022-11-30 DIAGNOSIS — Z51.81 MEDICATION MONITORING ENCOUNTER: ICD-10-CM

## 2022-11-30 DIAGNOSIS — Z00.00 ROUTINE GENERAL MEDICAL EXAMINATION AT A HEALTH CARE FACILITY: Primary | ICD-10-CM

## 2022-11-30 DIAGNOSIS — J98.4 RESTRICTIVE LUNG DISEASE: ICD-10-CM

## 2022-11-30 DIAGNOSIS — L21.9 SEBORRHEIC DERMATITIS: ICD-10-CM

## 2022-11-30 DIAGNOSIS — Z82.49 FAMILY HISTORY OF CARDIOVASCULAR DISEASE: ICD-10-CM

## 2022-11-30 DIAGNOSIS — L21.9 SEBORRHEIC DERMATITIS, UNSPECIFIED: ICD-10-CM

## 2022-11-30 DIAGNOSIS — N18.31 STAGE 3A CHRONIC KIDNEY DISEASE (H): ICD-10-CM

## 2022-11-30 PROCEDURE — G0439 PPPS, SUBSEQ VISIT: HCPCS | Performed by: FAMILY MEDICINE

## 2022-11-30 PROCEDURE — 99214 OFFICE O/P EST MOD 30 MIN: CPT | Mod: 25 | Performed by: FAMILY MEDICINE

## 2022-11-30 RX ORDER — FINASTERIDE 5 MG/1
5 TABLET, FILM COATED ORAL DAILY
Qty: 90 TABLET | Refills: 3 | Status: SHIPPED | OUTPATIENT
Start: 2022-11-30 | End: 2023-10-19

## 2022-11-30 RX ORDER — ATORVASTATIN CALCIUM 40 MG/1
40 TABLET, FILM COATED ORAL AT BEDTIME
Qty: 90 TABLET | Refills: 3 | Status: SHIPPED | OUTPATIENT
Start: 2022-11-30 | End: 2023-10-19

## 2022-11-30 RX ORDER — METOPROLOL SUCCINATE 100 MG/1
100 TABLET, EXTENDED RELEASE ORAL DAILY
Qty: 90 TABLET | Refills: 3 | Status: SHIPPED | OUTPATIENT
Start: 2022-11-30 | End: 2023-10-19

## 2022-11-30 RX ORDER — CLOTRIMAZOLE AND BETAMETHASONE DIPROPIONATE 10; .64 MG/G; MG/G
CREAM TOPICAL 2 TIMES DAILY PRN
Qty: 45 G | Refills: 1 | Status: SHIPPED | OUTPATIENT
Start: 2022-11-30 | End: 2023-10-19

## 2022-11-30 RX ORDER — TERAZOSIN 10 MG/1
10 CAPSULE ORAL AT BEDTIME
Qty: 90 CAPSULE | Refills: 3 | Status: SHIPPED | OUTPATIENT
Start: 2022-11-30 | End: 2023-10-19

## 2022-11-30 RX ORDER — AMLODIPINE BESYLATE 5 MG/1
5 TABLET ORAL DAILY
Qty: 90 TABLET | Refills: 3 | Status: SHIPPED | OUTPATIENT
Start: 2022-11-30 | End: 2023-10-19

## 2022-11-30 ASSESSMENT — ENCOUNTER SYMPTOMS
HEMATOCHEZIA: 0
NAUSEA: 0
PALPITATIONS: 0
SORE THROAT: 0
HEARTBURN: 0
DYSURIA: 0
NERVOUS/ANXIOUS: 0
JOINT SWELLING: 0
ABDOMINAL PAIN: 0
DIARRHEA: 0
DIZZINESS: 0
CHILLS: 0
FEVER: 0
EYE PAIN: 0
COUGH: 0
SHORTNESS OF BREATH: 1
HEMATURIA: 0
HEADACHES: 0
FREQUENCY: 1
PARESTHESIAS: 0
WEAKNESS: 0
CONSTIPATION: 0
ARTHRALGIAS: 1
MYALGIAS: 0

## 2022-11-30 ASSESSMENT — ACTIVITIES OF DAILY LIVING (ADL): CURRENT_FUNCTION: NO ASSISTANCE NEEDED

## 2022-11-30 NOTE — PROGRESS NOTES
"[facility] Moris De Luna is a 88 year old who presents for Preventive Visit.         Patient has been advised of split billing requirements and indicates understanding: Yes     Are you in the first 12 months of your Medicare coverage?  No    Healthy Habits:     In general, how would you rate your overall health?  Good    Frequency of exercise:  None    Do you usually eat at least 4 servings of fruit and vegetables a day, include whole grains    & fiber and avoid regularly eating high fat or \"junk\" foods?  Yes    Taking medications regularly:  Yes    Medication side effects:  None    Ability to successfully perform activities of daily living:  No assistance needed    Home Safety:  No safety concerns identified    Hearing Impairment:  Difficulty following a conversation in a noisy restaurant or crowded room, feel that people are mumbling or not speaking clearly, difficulty following dialogue in the theater, difficult to understand a speaker at a public meeting or Faith service, need to ask people to speak up or repeat themselves, find that men's voices are easier to understand than woman's, difficulty understanding soft or whispered speech and difficulty understanding speech on the telephone    In the past 6 months, have you been bothered by leaking of urine? Yes    In general, how would you rate your overall mental or emotional health?  Good      PHQ-2 Total Score: 0    Additional concerns today:  No    Have you ever done Advance Care Planning? (For example, a Health Directive, POLST, or a discussion with a medical provider or your loved ones about your wishes): Yes, advance care planning is on file.    Fall risk  Fallen 2 or more times in the past year?: No  Any fall with injury in the past year?: No    Cognitive Screening     1) Repeat 3 items (Leader, Season, Table)    2) Clock draw: NORMAL  3) 3 item recall: Recalls 2 objects  - 1-2   Results: NORMAL clock, 1-2 items recalled: " COGNITIVE IMPAIRMENT LESS LIKELY    Mini-CogTM Copyright LA Akers. Licensed by the author for use in [facility]; reprinted with permission ([email or web address]). All rights reserved.      Do you have sleep apnea, excessive snoring or daytime drowsiness?: yes    Reviewed and updated as needed this visit by clinical staff   Tobacco   Meds  Problems  Med Hx  Surg Hx           Reviewed and updated as needed this visit by Provider                 Social History     Tobacco Use     Smoking status: Never     Smokeless tobacco: Never   Substance Use Topics     Alcohol use: Yes     Alcohol/week: 4.0 - 5.0 standard drinks     Types: 4 - 5 Standard drinks or equivalent per week     Comment: 4-5 drinks per week avg     If you drink alcohol do you typically have >3 drinks per day or >7 drinks per week? No    Alcohol Use 11/30/2022   Prescreen: >3 drinks/day or >7 drinks/week? No   Prescreen: >3 drinks/day or >7 drinks/week? -     Recent COVID - treated with paxlovid - started 11/22    CAD - Aortic stenosis - aortic root dilatation - ascending aorta dilatation - no cp - no sob at rest, some WALKER, no edema - Dr Carlson    RLD / lung nodules - stable    PVD - no issues    Hyperlipidemia Follow-Up      Are you regularly taking any medication or supplement to lower your cholesterol?   Yes- atorvastatin    Are you having muscle aches or other side effects that you think could be caused by your cholesterol lowering medication?  No     Recent Labs   Lab Test 11/16/22  0913 06/01/22  0944 08/26/16  0850 05/14/15  0822   CHOL 143 131   < > 159   HDL 49 48   < > 49   LDL 65 62   < > 83   TRIG 147 105   < > 137   CHOLHDLRATIO  --   --   --  3.2    < > = values in this interval not displayed.     CKD 3A / Hypertension Follow-up      Do you check your blood pressure regularly outside of the clinic? No     Are you following a low salt diet? No    Are your blood pressures ever more than 140 on the top number (systolic) OR more   than  90 on the bottom number (diastolic), for example 140/90? No     BP Readings from Last 3 Encounters:   11/30/22 100/58   07/21/22 100/60   06/14/22 90/50     Creatinine   Date Value Ref Range Status   11/16/2022 1.49 (H) 0.67 - 1.17 mg/dL Final   05/06/2021 1.45 (H) 0.66 - 1.25 mg/dL Final     GFR Estimate   Date Value Ref Range Status   11/16/2022 45 (L) >60 mL/min/1.73m2 Final     Comment:     Effective December 21, 2021 eGFRcr in adults is calculated using the 2021 CKD-EPI creatinine equation which includes age and gender (Marcelina et al., NEJM, DOI: 10.1056/WNSEzf5382985)   05/06/2021 43 (L) >60 mL/min/[1.73_m2] Final     Comment:     Non  GFR Calc  Starting 12/18/2018, serum creatinine based estimated GFR (eGFR) will be   calculated using the Chronic Kidney Disease Epidemiology Collaboration   (CKD-EPI) equation.       Sleep Apnea - uses CPAP every night    Urine incontinence - stable    Current providers sharing in care for this patient include:   Patient Care Team:  Chance Espinosa MD as PCP - General  Chance Espinosa MD as Assigned PCP  Dariel Carlson MD as Assigned Heart and Vascular Provider  Yeo, Albert, MD as Assigned Musculoskeletal Provider    The following health maintenance items are reviewed in Epic and correct as of today:  Health Maintenance   Topic Date Due     ANNUAL REVIEW OF HM ORDERS  04/25/2023     BMP  11/16/2023     LIPID  11/16/2023     MICROALBUMIN  11/16/2023     HEMOGLOBIN  11/16/2023     MEDICARE ANNUAL WELLNESS VISIT  11/30/2023     FALL RISK ASSESSMENT  11/30/2023     ADVANCE CARE PLANNING  11/30/2027     DTAP/TDAP/TD IMMUNIZATION (4 - Td or Tdap) 07/21/2032     PHQ-2 (once per calendar year)  Completed     INFLUENZA VACCINE  Completed     Pneumococcal Vaccine: 65+ Years  Completed     URINALYSIS  Completed     ZOSTER IMMUNIZATION  Completed     COVID-19 Vaccine  Completed     IPV IMMUNIZATION  Aged Out     MENINGITIS IMMUNIZATION  Aged Out     Review of Systems    Constitutional: Negative for chills and fever.   HENT: Positive for hearing loss. Negative for congestion, ear pain and sore throat.    Eyes: Negative for pain and visual disturbance.   Respiratory: Positive for shortness of breath. Negative for cough.    Cardiovascular: Negative for chest pain, palpitations and peripheral edema.   Gastrointestinal: Negative for abdominal pain, constipation, diarrhea, heartburn, hematochezia and nausea.   Genitourinary: Positive for frequency and urgency. Negative for dysuria, genital sores, hematuria, impotence and penile discharge.   Musculoskeletal: Positive for arthralgias. Negative for joint swelling and myalgias.   Skin: Negative for rash.   Neurological: Negative for dizziness, weakness, headaches and paresthesias.   Psychiatric/Behavioral: Negative for mood changes. The patient is not nervous/anxious.      Health Maintenance     Colonoscopy:  NA   FIT:  NA              PSA:  NA   DEXA:  NA    There are no preventive care reminders to display for this patient.    Current Problem List    Patient Active Problem List   Diagnosis     Hypertension goal BP (blood pressure) < 140/90     Hypertrophy of prostate without urinary obstruction     Hearing loss     Seborrheic dermatitis     Other and unspecified 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     Hypersomnia with sleep apnea     Family history of cardiovascular disease     Seasonal allergies     ED (erectile dysfunction)     Hyperlipidemia LDL goal <70     Lung nodules     Advanced directives, counseling/discussion     GERD (gastroesophageal reflux disease)     CKD (chronic kidney disease) stage 3, GFR 30-59 ml/min (H)     Environmental allergies     Lumbar stenosis     Restrictive lung disease     BCC (basal cell carcinoma of skin)     Coronary artery disease involving native coronary artery of native heart without angina pectoris     Aortic stenosis     Lumbago     Bilateral hip pain     Peripheral  vascular disease (H)     Ascending aorta dilatation (H)     Aortic root dilatation (H)       Past Medical History    Past Medical History:   Diagnosis Date     Aortic root dilatation (H)      Aortic stenosis     moderate - Dr Carlson     Arthritis .     Ascending aorta dilatation (H)      BCC (basal cell carcinoma of skin)     right jaw, rt parietal, Dr Ashly/Galilea, 2016, 10/2018, 11/2019, 3/2022     Choroidal nevus of left eye     Dr Connor     Colon polyps      Coronary artery disease involving native coronary artery of native heart without angina pectoris 01/2021    NSTEMI, cardiac cath 1/2021: non-obstructive     ED (erectile dysfunction)      Family history of cardiovascular disease      GERD (gastroesophageal reflux disease) 2013     Hematuria 1999     sonia     Hernia, abdominal      Hyperlipidemia LDL goal <70 1990     Hypertension goal BP (blood pressure) < 140/90 1990     Hypertrophy of prostate without urinary obstruction and other lower urinary tract symptoms (LUTS)      Lumbar stenosis 2017    mild to moderate foraminal and central     Lung nodules 11/2010    CT scan stable     Nonrheumatic aortic valve stenosis 01/2021    mild to moderate     obstructive SLEEP APNEA 05/2007    CPAP     Other and unspecified 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 05/25/2005     Palpitations      PSVT 11/2017    few runs, rare PVC     Restrictive lung disease      Seasonal allergies      Sensorineural hearing loss (SNHL) of both ears 05/2005    hearing aids, L>R [facility]     Stage 3a chronic kidney disease (H)        Past Surgical History    Past Surgical History:   Procedure Laterality Date     CV HEART CATHETERIZATION WITH POSSIBLE INTERVENTION N/A 01/07/2021    Procedure: Heart Catheterization with Possible Intervention;  Surgeon: Raúl Rich MD;  Location:  HEART CARDIAC CATH LAB     CV LEFT HEART CATH N/A 01/07/2021    Procedure: Left Heart Cath;  Surgeon:  Raúl Rich MD;  Location:  HEART CARDIAC CATH LAB     CYSTOSCOPY       HC REPAIR INCISIONAL HERNIA,REDUCIBLE  1960    Hernia Repair, Incisional, Unilateral     NM MPI WITH LEXISCAN  05/2021    normal     STRESS ECHO (METRO)  7/09, 5/12, 7/17    Negative     TONSILLECTOMY & ADENOIDECTOMY  1946     ZZHC COLONOSCOPY THRU STOMA, DIAGNOSTIC  2005, 5/10, 5/11    Polyps-> due 2015 - Ct Colonography negative       Current Medications    Current Outpatient Medications   Medication Sig Dispense Refill     amLODIPine (NORVASC) 5 MG tablet Take 1 tablet (5 mg) by mouth daily 90 tablet 3     aspirin 81 MG EC tablet Take 81 mg by mouth At Bedtime       atorvastatin (LIPITOR) 40 MG tablet Take 1 tablet (40 mg) by mouth At Bedtime 90 tablet 3     clotrimazole-betamethasone (LOTRISONE) 1-0.05 % external cream Apply topically 2 times daily as needed (rash) 45 g 1     finasteride (PROSCAR) 5 MG tablet Take 1 tablet (5 mg) by mouth daily 90 tablet 3     metoprolol succinate ER (TOPROL XL) 100 MG 24 hr tablet Take 1 tablet (100 mg) by mouth daily 90 tablet 3     multivitamin, therapeutic (THERA-VIT) TABS tablet Take 1 tablet by mouth At Bedtime       omeprazole (PRILOSEC) 20 MG DR capsule Take 1 capsule (20 mg) by mouth daily as needed (heartburn) 90 capsule 3     selenium sulfide (SELSUN) 2.5 % external lotion APPLY DAILY TO EVERY OTHER DAY, LATHER, WAIT 10 MINUTES AND RINSE (Patient taking differently: daily as needed LATHER, WAIT 10 MINUTES AND RINSE) 118 mL 3     terazosin (HYTRIN) 10 MG capsule Take 1 capsule (10 mg) by mouth At Bedtime 90 capsule 3     triamcinolone (KENALOG) 0.1 % external cream Apply topically 2 times daily as needed for irritation         Allergies    Allergies   Allergen Reactions     Pollen Extract      Tree pollen         Immunizations    Immunization History   Administered Date(s) Administered     COVID-19 Vaccine 12+ (Pfizer) 01/26/2021, 02/18/2021, 09/27/2021     COVID-19 Vaccine 18+ (Moderna)  04/01/2022     COVID-19 Vaccine Bivalent Booster 18+ (Moderna) 09/16/2022     Flu, Unspecified 10/01/2006, 10/01/2008, 10/01/2009, 09/22/2021     Influenza (H1N1) 01/20/2010     Influenza (High Dose) 3 valent vaccine 10/03/2011, 09/20/2012, 10/15/2013, 10/03/2014, 10/01/2015, 10/15/2015, 09/30/2016, 09/01/2017, 10/10/2017, 09/17/2018, 12/01/2018, 10/07/2019     Influenza (IIV3) PF 10/18/2004, 10/12/2005, 10/23/2006, 10/17/2007, 10/15/2009, 09/22/2010     Influenza Vaccine 65+ (Fluzone HD) 09/04/2020, 09/22/2021, 09/16/2022     Pneumo Conj 13-V (2010&after) 05/20/2015     Pneumococcal 23 valent 01/04/2007     TD (ADULT, 7+) 04/20/2007     TDAP Vaccine (Boostrix) 05/23/2012     Tdap (Adacel,Boostrix) 07/21/2022     Zoster vaccine recombinant adjuvanted (SHINGRIX) 09/17/2018, 11/16/2018     Zoster vaccine, live 01/01/2008       Family History    Family History   Problem Relation Age of Onset     C.A.D. Father         age 50's     Hypertension Father      C.A.D. Brother      C.A.D. Brother         mid 40's     Diabetes Brother      Cancer Brother         pancreatic CA     Coronary Artery Disease Brother      Cancer - colorectal No family hx of      Prostate Cancer No family hx of        Social History    Social History     Socioeconomic History     Marital status:      Spouse name: Leticia     Number of children: 3     Years of education: 18     Highest education level: Not on file   Occupational History     Employer: RETIRED   Tobacco Use     Smoking status: Never     Smokeless tobacco: Never   Vaping Use     Vaping Use: Never used   Substance and Sexual Activity     Alcohol use: Yes     Alcohol/week: 4.0 - 5.0 standard drinks     Types: 4 - 5 Standard drinks or equivalent per week     Comment: 4-5 drinks per week avg     Drug use: No     Sexual activity: Yes     Partners: Female   Other Topics Concern      Service Not Asked     Blood Transfusions No     Caffeine Concern Yes     Comment: 1 serv/day, rare  "pop, occas chocolate (3-4/yr)     Occupational Exposure Not Asked     Hobby Hazards Not Asked     Sleep Concern Yes     Comment: MIMI, wakes feeling rested     Stress Concern Not Asked     Weight Concern Not Asked     Special Diet Not Asked     Back Care Not Asked     Exercise Yes     Comment: 30-45' 2-3 x/wk     Bike Helmet Not Asked     Seat Belt Yes     Self-Exams Not Asked     Parent/sibling w/ CABG, MI or angioplasty before 65F 55M? Yes   Social History Narrative     Not on file     Social Determinants of Health     Financial Resource Strain: Not on file   Food Insecurity: Not on file   Transportation Needs: Not on file   Physical Activity: Not on file   Stress: Not on file   Social Connections: Not on file   Intimate Partner Violence: Not on file   Housing Stability: Not on file       ROS    CONSTITUTIONAL: NEGATIVE for fever, chills, change in weight  INTEGUMENTARY/SKIN: NEGATIVE for worrisome rashes, moles or lesions  EYES: NEGATIVE for vision changes or irritation  ENT/MOUTH: NEGATIVE for ear, mouth and throat problems  RESP: NEGATIVE for significant cough or SOB  CV: NEGATIVE for chest pain, palpitations or peripheral edema  GI: NEGATIVE for nausea, abdominal pain, heartburn, or change in bowel habits  : NEGATIVE for frequency, dysuria, or hematuria  MUSCULOSKELETAL: NEGATIVE for significant arthralgias or myalgia  NEURO: NEGATIVE for weakness, dizziness or paresthesias  ENDOCRINE: NEGATIVE for temperature intolerance, skin/hair changes  HEME: NEGATIVE for bleeding problems  PSYCHIATRIC: NEGATIVE for changes in mood or affect    OBJECTIVE    /58 (BP Location: Right arm, Patient Position: Chair, Cuff Size: Adult Large)   Pulse 84   Temp 98  F (36.7  C) (Temporal)   Resp 16   Ht 1.753 m (5' 9\")   Wt 93.9 kg (207 lb)   SpO2 96%   BMI 30.57 kg/m      EXAM:    GENERAL: healthy, alert and no distress  EYES: Eyes grossly normal to inspection, PERRL and conjunctivae and sclerae normal  HENT: ear " canals and TM's normal, nose and mouth without ulcers or lesions  NECK: no adenopathy, no asymmetry, masses, or scars and thyroid normal to palpation  RESP: lungs clear to auscultation - no rales, rhonchi or wheezes  CV: regular rate and rhythm, normal S1 S2, no S3 or S4, no murmur, click or rub, no peripheral edema and peripheral pulses strong  ABDOMEN: soft, nontender, no hepatosplenomegaly, no masses and bowel sounds normal   (male): pt declines  RECTAL: pt declines  MS: no gross musculoskeletal defects noted, no edema  SKIN: no suspicious lesions or rashes  NEURO: Normal strength and tone, mentation intact and speech normal  PSYCH: mentation appears normal, affect normal/bright  LYMPH: no cervical, supraclavicular, axillary, or inguinal adenopathy    DIAGNOSTICS/PROCEDURES    reviewed    ASSESSMENT      ICD-10-CM    1. Routine general medical examination at a health care facility  Z00.00       2. Medicare annual wellness visit, subsequent  Z00.00       3. Coronary artery disease involving native coronary artery of native heart without angina pectoris  I25.10 OFFICE/OUTPT VISIT,EST,LEVL IV      4. Family history of cardiovascular disease  Z82.49 OFFICE/OUTPT VISIT,EST,LEVL IV      5. Peripheral vascular disease (H)  I73.9 OFFICE/OUTPT VISIT,EST,LEVL IV      6. Stage 3a chronic kidney disease (H)  N18.31 OFFICE/OUTPT VISIT,EST,LEVL IV     amLODIPine (NORVASC) 5 MG tablet     atorvastatin (LIPITOR) 40 MG tablet     metoprolol succinate ER (TOPROL XL) 100 MG 24 hr tablet     terazosin (HYTRIN) 10 MG capsule      7. Hypertension goal BP (blood pressure) < 140/90  I10 OFFICE/OUTPT VISIT,EST,LEVL IV     amLODIPine (NORVASC) 5 MG tablet     atorvastatin (LIPITOR) 40 MG tablet     metoprolol succinate ER (TOPROL XL) 100 MG 24 hr tablet     terazosin (HYTRIN) 10 MG capsule      8. Hyperlipidemia LDL goal <70  E78.5 OFFICE/OUTPT VISIT,EST,LEVL IV      9. Hypersomnia with sleep apnea  G47.10 OFFICE/OUTPT VISIT,EST,LEVL IV     G47.30       10. Ascending aorta dilatation (H)  I77.810 OFFICE/OUTPT VISIT,EST,LEVL IV      11. Aortic root dilatation (H)  I77.810 OFFICE/OUTPT VISIT,EST,LEVL IV      12. Aortic valve stenosis, etiology of cardiac valve disease unspecified  I35.0 OFFICE/OUTPT VISIT,EST,LEVL IV      13. Restrictive lung disease  J98.4 OFFICE/OUTPT VISIT,EST,LEVL IV      14. Environmental allergies  Z91.09 OFFICE/OUTPT VISIT,EST,LEVL IV      15. Spinal stenosis of lumbar region with neurogenic claudication  M48.062 OFFICE/OUTPT VISIT,EST,LEVL IV      16. Lung nodules  R91.8 OFFICE/OUTPT VISIT,EST,LEVL IV      17. Gastroesophageal reflux disease without esophagitis  K21.9 OFFICE/OUTPT VISIT,EST,LEVL IV      18. Hypertrophy of prostate without urinary obstruction  N40.0 OFFICE/OUTPT VISIT,EST,LEVL IV     finasteride (PROSCAR) 5 MG tablet      19. Sensorineural hearing loss (SNHL) of both ears  H90.3 OFFICE/OUTPT VISIT,EST,LEVL IV      20. Medication monitoring encounter  Z51.81 OFFICE/OUTPT VISIT,EST,LEVL IV      21. NSTEMI (non-ST elevated myocardial infarction) (H)  I21.4 amLODIPine (NORVASC) 5 MG tablet     atorvastatin (LIPITOR) 40 MG tablet     metoprolol succinate ER (TOPROL XL) 100 MG 24 hr tablet      22. Seborrheic dermatitis, unspecified  L21.9 clotrimazole-betamethasone (LOTRISONE) 1-0.05 % external cream      23. Eczema, unspecified type  L30.9 clotrimazole-betamethasone (LOTRISONE) 1-0.05 % external cream      24. Seborrheic dermatitis  L21.9 clotrimazole-betamethasone (LOTRISONE) 1-0.05 % external cream      25. Gastroesophageal reflux disease, unspecified whether esophagitis present  K21.9 omeprazole (PRILOSEC) 20 MG DR capsule          PLAN    Discussed treatment/modality options, including risk and benefits, he desires:    advised alcohol consumption 1oz per day or less, advised aspirin 81 mg po daily, advised 1 multivitamin per day, advised calcium 8396-4747 mg/d and Vitamin D 800-1200 IU/d, advised dentist every  "6 months, advised diet, exercise, and weight loss, advised opthalmologist every 1-2 years, advised self testicular exam q month, further health care maintenance, further lab(s), immunization(s), medication refill(s) and observation    Discussed controversies surrounding PSA. Specifically reviewed 2017 USPSTF findings recommending discussion of PSA testing for men ages 55-69.  Reviewed findings of the  Randomized Study of Screening for Prostate Cancer which showed a 30% reduction in advanced stage prostate cancer and a 20% reduction in death rate from prostate cancer in this age group. PSA-based screening may prevent up to 2 deaths and up to 3 cases of metastatic disease per 1,000 men screened over 13 years.    We've elected not to do PSA this year after discussing these controversies.    All diagnosis above reviewed and noted above, otherwise stable.      See [employer] orders for further details.      1) meds refilled    2) labs reviewed    3) immunizations reviewed    4) Dr [facility]son    [facility]    6) [facility]    Return in about 1 year (around 11/30/2023) for Annual Wellness Visit, Complete Physical, Medication Recheck Visit, Follow Up Chronic.    There are no preventive care reminders to display for this patient.    COUNSELING    Reviewed preventive health counseling, as reflected in patient instructions    BP Readings from Last 1 Encounters:   11/30/22 100/58     Estimated body mass index is 30.57 kg/m  as calculated from the following:    Height as of this encounter: 1.753 m (5' 9\").    Weight as of this encounter: 93.9 kg (207 lb).      Weight management plan: diet and exercise     reports that he has never smoked. He has never used smokeless tobacco.      Counseling Resources:    ATP IV Guidelines  Pooled Cohorts Equation Calculator  FRAX Risk Assessment  ICSI Preventive Guidelines  Dietary Guidelines for Americans, 2010  USDA's MyPlate  ASA Prophylaxis  Lung CA Screening           Chance Espinosa, " MD, FAAFP     [facility]  [address] [place] [id number]  [email or web address]   Office: [phone]  Fax: [phone]  Pager: [phone]     Identified Health Risks:

## 2022-12-01 ENCOUNTER — TELEPHONE (OUTPATIENT)
Dept: FAMILY MEDICINE | Facility: CLINIC | Age: 87
End: 2022-12-01

## 2022-12-01 NOTE — TELEPHONE ENCOUNTER
Patient's wife called. She wanted to let the [facility] know that they are dropping off a letter for [employer] off at the clinic tomorrow that Dr. Espinosa will need to sign.    Savanna Zimmer

## 2022-12-02 ENCOUNTER — TELEPHONE (OUTPATIENT)
Dept: FAMILY MEDICINE | Facility: CLINIC | Age: 87
End: 2022-12-02

## 2022-12-02 NOTE — TELEPHONE ENCOUNTER
Reason for Call:  Form, our goal is to have forms completed with 72 hours, however, some forms may require a visit or additional information.    Type of letter, form or note:  Medicare Reward form    Who is the form from?: Patient    Where did the form come from: Patient or family brought in       What clinic location was the form placed at?: [facility]    Where the form was placed: Dr Espinosa Box/Folder    What number is listed as a contact on the form?: [phone]           Call taken on 12/2/2022 at 4:45 PM by Erika Cano

## 2023-01-01 PROBLEM — M25.552 BILATERAL HIP PAIN: Status: RESOLVED | Noted: 2022-02-28 | Resolved: 2023-01-01

## 2023-01-01 PROBLEM — M25.551 BILATERAL HIP PAIN: Status: RESOLVED | Noted: 2022-02-28 | Resolved: 2023-01-01

## 2023-01-01 PROBLEM — M54.50 LUMBAGO: Status: RESOLVED | Noted: 2022-02-28 | Resolved: 2023-01-01

## 2023-05-12 ENCOUNTER — HOSPITAL ENCOUNTER (OUTPATIENT)
Dept: CARDIOLOGY | Facility: CLINIC | Age: 88
Discharge: HOME | End: 2023-05-12
Attending: INTERNAL MEDICINE | Admitting: INTERNAL MEDICINE
Payer: COMMERCIAL

## 2023-05-12 DIAGNOSIS — I35.0 NONRHEUMATIC AORTIC VALVE STENOSIS: ICD-10-CM

## 2023-05-12 LAB — LVEF ECHO: NORMAL

## 2023-05-12 PROCEDURE — 93306 TTE W/DOPPLER COMPLETE: CPT | Mod: 26 | Performed by: INTERNAL MEDICINE

## 2023-05-12 PROCEDURE — 999N000208 ECHOCARDIOGRAM COMPLETE

## 2023-05-12 PROCEDURE — 255N000002 HC RX 255 OP 636: Performed by: INTERNAL MEDICINE

## 2023-05-12 RX ADMIN — HUMAN ALBUMIN MICROSPHERES AND PERFLUTREN 3 ML: 10; .22 INJECTION, SOLUTION INTRAVENOUS at 12:50

## 2023-05-15 NOTE — RESULT ENCOUNTER NOTE
Annual echo shows continued slow progression of his aortic stenosis.  I will review with him at annual visit.   Results and recommendations routed Via My Chart with call back information if needed.

## 2023-09-12 ENCOUNTER — OFFICE VISIT (OUTPATIENT)
Dept: CARDIOLOGY | Facility: CLINIC | Age: 88
End: 2023-09-12
Payer: COMMERCIAL

## 2023-09-12 VITALS
BODY MASS INDEX: 31.1 KG/M2 | DIASTOLIC BLOOD PRESSURE: 56 MMHG | OXYGEN SATURATION: 95 % | HEIGHT: 69 IN | WEIGHT: 210 LBS | SYSTOLIC BLOOD PRESSURE: 98 MMHG | HEART RATE: 75 BPM

## 2023-09-12 DIAGNOSIS — I35.0 NONRHEUMATIC AORTIC VALVE STENOSIS: Primary | ICD-10-CM

## 2023-09-12 PROCEDURE — 99214 OFFICE O/P EST MOD 30 MIN: CPT | Performed by: INTERNAL MEDICINE

## 2023-09-12 NOTE — PROGRESS NOTES
HISTORY:    Kevin Fierro is a pleasant 89-year-old gentleman accompanied by his wife today.  He has a history of palpitations and dyspnea on exertion.  He underwent a coronary angiogram after a small troponin bump associated with chest discomfort, and this showed no significant disease.  He also has history of aortic stenosis which we are following him for.  He is here today for an annual visit.  He also has a history of obstructive sleep apnea for which he uses CPAP, and hypertension well-controlled.    Today Calixto reports that he feels that his symptoms are basically stable.  He remains short of breath with activity and tries to avoid heavy activities and make him short of breath.  He can still make it up a flight of steps but very slowly.  He denies exertional chest arm neck shoulder or jaw discomfort, syncope or near syncope, palpitations, or PND/orthopnea.  He has noticed some mild peripheral edema but it really does not bother him.    In mid May, underwent a echocardiogram and I reviewed those results with him today.  His study showed moderate to severe aortic stenosis with a mean gradient of 33 mmHg and a valve area of 1.1 cm  calculated.      ASSESSMENT/PLAN:    1.  Aortic stenosis.  There is slow and gradual progression of his aortic stenosis but he does not have any symptoms that are likely attributable to this.  He does have some chronic dyspnea on exertion but this has not changed significantly in the past couple of years.  I warned him that if he notices a significant further decline in his exercise capacity he should let us know and I also warned him to let us know if he develops chest pain or syncope.  My plan will be to repeat an echocardiogram in another 6 months (9 months from his last echo) followed by an office visit.  2.  History of hypertension currently blood pressure is very well controlled using a combination of amlodipine and metoprolol.  3.  Palpitations.  Historically the patient has  complained of palpitations but he states that he just notices that his heart pounds if he exercises vigorously but does not notice any resting irregularities to his rhythm.  4.  History of small non-STEMI with minimal coronary disease, fits the definition of MINOCA.  No ongoing anginal symptoms.    Thank you for inviting me to participate in the care of your patient.  Please don't hesitate to call if I can be of further assistance.  35 minutes were spent today reviewing the chart and other records, interviewing and examining the patient, and documenting our visit.    Chart documentation was completed, in part, with [employer] voice-recognition software. Even though reviewed, some grammatical, spelling, and word errors may remain.       Orders Placed This Encounter   Procedures    Follow-Up with Cardiology    Echocardiogram Complete     No orders of the defined types were placed in this encounter.    There are no discontinued medications.    10 year ASCVD risk: The ASCVD Risk score (Jennifer CASTILLO, et al., 2019) failed to calculate for the following reasons:    The 2019 ASCVD risk score is only valid for ages 40 to 79    The patient has a prior MI or stroke diagnosis    Encounter Diagnosis   Name Primary?    Nonrheumatic aortic valve stenosis Yes       CURRENT MEDICATIONS:  Current Outpatient Medications   Medication Sig Dispense Refill    amLODIPine (NORVASC) 5 MG tablet Take 1 tablet (5 mg) by mouth daily 90 tablet 3    aspirin 81 MG EC tablet Take 81 mg by mouth At Bedtime      atorvastatin (LIPITOR) 40 MG tablet Take 1 tablet (40 mg) by mouth At Bedtime 90 tablet 3    clotrimazole-betamethasone (LOTRISONE) 1-0.05 % external cream Apply topically 2 times daily as needed (rash) 45 g 1    finasteride (PROSCAR) 5 MG tablet Take 1 tablet (5 mg) by mouth daily 90 tablet 3    metoprolol succinate ER (TOPROL XL) 100 MG 24 hr tablet Take 1 tablet (100 mg) by mouth daily 90 tablet 3    multivitamin, therapeutic (THERA-VIT) TABS  tablet Take 1 tablet by mouth At Bedtime      omeprazole (PRILOSEC) 20 MG DR capsule Take 1 capsule (20 mg) by mouth daily as needed (heartburn) 90 capsule 3    selenium sulfide (SELSUN) 2.5 % external lotion APPLY DAILY TO EVERY OTHER DAY, LATHER, WAIT 10 MINUTES AND RINSE (Patient taking differently: daily as needed LATHER, WAIT 10 MINUTES AND RINSE) 118 mL 3    terazosin (HYTRIN) 10 MG capsule Take 1 capsule (10 mg) by mouth At Bedtime 90 capsule 3    triamcinolone (KENALOG) 0.1 % external cream Apply topically 2 times daily as needed for irritation         ALLERGIES     Allergies   Allergen Reactions    Pollen Extract      Tree pollen         PAST MEDICAL HISTORY:  Past Medical History:   Diagnosis Date    Aortic root dilatation (H)     Aortic stenosis     moderate - Dr Carlson    Arthritis .    Ascending aorta dilatation (H)     BCC (basal cell carcinoma of skin)     right jaw, rt parietal, Dr Limon/Galile[place], 2016, 10/2018, 11/2019, 3/2022    Choroidal nevus of left eye     Dr Connor    Colon polyps     Coronary artery disease involving native coronary artery of native heart without angina pectoris 01/2021    NSTEMI, cardiac cath 1/2021: non-obstructive    ED (erectile dysfunction)     Family history of cardiovascular disease     GERD (gastroesophageal reflux disease) 2013    Hematuria 1999     [place]    Hernia, abdominal     Hyperlipidemia LDL goal <70 1990    Hypertension goal BP (blood pressure) < 140/90 1990    Hypertrophy of prostate without urinary obstruction and other lower urinary tract symptoms (LUTS)     Lumbar stenosis 2017    mild to moderate foraminal and central    Lung nodules 11/2010    CT scan stable    Nonrheumatic aortic valve stenosis 01/2021    mild to moderate    obstructive SLEEP APNEA 05/2007    CPAP    Other and unspecified 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 05/25/2005    Palpitations     PSVT 11/2017    few runs, rare PVC    Restrictive lung disease      Seasonal allergies     Sensorineural hearing loss (SNHL) of both ears 05/2005    hearing aids, L>R [facility]    Stage 3a chronic kidney disease (H)        PAST SURGICAL HISTORY:  Past Surgical History:   Procedure Laterality Date    CV HEART CATHETERIZATION WITH POSSIBLE INTERVENTION N/A 01/07/2021    Procedure: Heart Catheterization with Possible Intervention;  Surgeon: Raúl Rich MD;  Location:  HEART CARDIAC CATH LAB    CV LEFT HEART CATH N/A 01/07/2021    Procedure: Left Heart Cath;  Surgeon: Raúl Rich MD;  Location:  HEART CARDIAC CATH LAB    CYSTOSCOPY      NM MPI WITH LEXISCAN  05/2021    normal    STRESS ECHO ([facility])  7/09, 5/12, 7/17    Negative    TONSILLECTOMY & ADENOIDECTOMY  1946    ZZHC COLONOSCOPY THRU STOMA, DIAGNOSTIC  2005, 5/10, 5/11    Polyps-> due 2015 - Ct Colonography negative    ZZHC REPAIR INCISIONAL HERNIA,REDUCIBLE  1960    Hernia Repair, Incisional, Unilateral       FAMILY HISTORY:  Family History   Problem Relation Age of Onset    C.A.D. Father         age 50's    Hypertension Father     C.A.D. Brother     C.A.D. Brother         mid 40's    Diabetes Brother     Cancer Brother         pancreatic CA    Coronary Artery Disease Brother     Cancer - colorectal No family hx of     Prostate Cancer No family hx of        SOCIAL HISTORY:  Social History     Socioeconomic History    Marital status:      Spouse name: Leticia    Number of children: 3    Years of education: 18    Highest education level: None   Occupational History     Employer: RETIRED   Tobacco Use    Smoking status: Never    Smokeless tobacco: Never   Vaping Use    Vaping Use: Never used   Substance and Sexual Activity    Alcohol use: Yes     Alcohol/week: 5.0 - 8.0 standard drinks of alcohol     Types: 5 - 8 Standard drinks or equivalent per week     Comment: 4-5 drinks per week avg    Drug use: No    Sexual activity: Yes     Partners: Female   Other Topics Concern    Blood  "Transfusions No    Caffeine Concern Yes     Comment: 1 serv/day, rare pop, occas chocolate (3-4/yr)    Sleep Concern Yes     Comment: MIMI, wakes feeling rested    Exercise Yes     Comment: 30-45' 2-3 x/wk    Seat Belt Yes    Parent/sibling w/ CABG, MI or angioplasty before 65F 55M? Yes       Review of Systems:  Skin:        Eyes:       ENT:       Respiratory:  Positive for dyspnea on exertion;sleep apnea;CPAP  Cardiovascular:    Positive for;edema;fatigue  Gastroenterology:      Genitourinary:       Musculoskeletal:       Neurologic:       Psychiatric:       Heme/Lymph/Imm:       Endocrine:         Physical Exam:  Vitals: BP 98/56 (BP Location: Right arm, Patient Position: Sitting, Cuff Size: Adult Regular)   Pulse 75   Ht 1.753 m (5' 9\")   Wt 95.3 kg (210 lb)   SpO2 95%   BMI 31.01 kg/m      Constitutional:  cooperative, alert and oriented, well developed, well nourished, in no acute distress obese [place]    Skin:  warm and dry to the touch        Head:  normocephalic, no masses or lesions        Eyes:  sclera white;no xanthalasma;no nystagmus        ENT:  no pallor or cyanosis   masked    Neck:  carotid pulses are full and equal bilaterally, JVP normal, no carotid bruit        Chest:  normal breath sounds, clear to auscultation, normal A-P diameter, normal symmetry, normal respiratory excursion, no use of accessory muscles        Cardiac: regular rhythm;normal S1 and S2;no S3 or S4;apical impulse not displaced       grade 2;systolic ejection murmur;RUSB;apical radiation to the carotid        Abdomen:  abdomen soft;BS normoactive        Vascular: pulses full and equal                                      Extremities and Muscular Skeletal:        bilateral LE edema;pitting;1+     Neurological:  no gross motor deficits        Psych:  affect appropriate, oriented to time, person and place     Recent Lab Results:  LIPID RESULTS:  Lab Results   Component Value Date    CHOL 143 11/16/2022    CHOL 139 05/06/2021    HDL " 49 11/16/2022    HDL 55 05/06/2021    LDL 65 11/16/2022    LDL 59 05/06/2021    TRIG 147 11/16/2022    TRIG 126 05/06/2021    CHOLHDLRATIO 3.2 05/14/2015       LIVER ENZYME RESULTS:  Lab Results   Component Value Date    AST 25 11/16/2022    AST 29 05/06/2021    ALT 20 11/16/2022    ALT 42 05/06/2021       CBC RESULTS:  Lab Results   Component Value Date    WBC 8.5 11/16/2022    WBC 8.0 05/06/2021    RBC 3.97 (L) 11/16/2022    RBC 4.09 (L) 05/06/2021    HGB 13.1 (L) 11/16/2022    HGB 13.4 05/06/2021    HCT 38.9 (L) 11/16/2022    HCT 40.6 05/06/2021    MCV 98 11/16/2022    MCV 99 05/06/2021    MCH 33.0 11/16/2022    MCH 32.8 05/06/2021    MCHC 33.7 11/16/2022    MCHC 33.0 05/06/2021    RDW 13.0 11/16/2022    RDW 13.0 05/06/2021     11/16/2022     05/06/2021       BMP RESULTS:  Lab Results   Component Value Date     11/16/2022     05/06/2021    POTASSIUM 4.3 11/16/2022    POTASSIUM 4.0 06/01/2022    POTASSIUM 4.5 05/06/2021    CHLORIDE 106 11/16/2022    CHLORIDE 108 06/01/2022    CHLORIDE 111 (H) 05/06/2021    CO2 20 (L) 11/16/2022    CO2 22 06/01/2022    CO2 24 05/06/2021    ANIONGAP 14 11/16/2022    ANIONGAP 9 06/01/2022    ANIONGAP 5 05/06/2021     (H) 11/16/2022     (H) 06/01/2022    GLC 98 05/06/2021    BUN 34.6 (H) 11/16/2022    BUN 28 06/01/2022    BUN 29 05/06/2021    CR 1.49 (H) 11/16/2022    CR 1.45 (H) 05/06/2021    GFRESTIMATED 45 (L) 11/16/2022    GFRESTIMATED 43 (L) 05/06/2021    GFRESTBLACK 50 (L) 05/06/2021    TRAN 9.2 11/16/2022    TRAN 9.2 05/06/2021        A1C RESULTS:  Lab Results   Component Value Date    A1C 5.6 11/16/2022    A1C 5.9 (H) 05/06/2021       INR RESULTS:  No results found for: INR      Dariel Carlson MD, [facility]    CC  No referring provider defined for this encounter.

## 2023-09-12 NOTE — LETTER
9/12/2023    Chance Espinosa MD  [id number] [facility] [address]    RE: Kevin Fierro       Dear Colleague,     I had the pleasure of seeing Kevin Fierro in the [facility].  HISTORY:    Kevin Fierro is a pleasant 89-year-old gentleman accompanied by his wife today.  He has a history of palpitations and dyspnea on exertion.  He underwent a coronary angiogram after a small troponin bump associated with chest discomfort, and this showed no significant disease.  He also has history of aortic stenosis which we are following him for.  He is here today for an annual visit.  He also has a history of obstructive sleep apnea for which he uses CPAP, and hypertension well-controlled.    Today Calixto reports that he feels that his symptoms are basically stable.  He remains short of breath with activity and tries to avoid heavy activities and make him short of breath.  He can still make it up a flight of steps but very slowly.  He denies exertional chest arm neck shoulder or jaw discomfort, syncope or near syncope, palpitations, or PND/orthopnea.  He has noticed some mild peripheral edema but it really does not bother him.    In mid May, underwent a echocardiogram and I reviewed those results with him today.  His study showed moderate to severe aortic stenosis with a mean gradient of 33 mmHg and a valve area of 1.1 cm  calculated.      ASSESSMENT/PLAN:    1.  Aortic stenosis.  There is slow and gradual progression of his aortic stenosis but he does not have any symptoms that are likely attributable to this.  He does have some chronic dyspnea on exertion but this has not changed significantly in the past couple of years.  I warned him that if he notices a significant further decline in his exercise capacity he should let us know and I also warned him to let us know if he develops chest pain or syncope.  My plan will be to repeat an echocardiogram in another 6 months (9 months from his last  echo) followed by an office visit.  2.  History of hypertension currently blood pressure is very well controlled using a combination of amlodipine and metoprolol.  3.  Palpitations.  Historically the patient has complained of palpitations but he states that he just notices that his heart pounds if he exercises vigorously but does not notice any resting irregularities to his rhythm.  4.  History of small non-STEMI with minimal coronary disease, fits the definition of MINOCA.  No ongoing anginal symptoms.    Thank you for inviting me to participate in the care of your patient.  Please don't hesitate to call if I can be of further assistance.  35 minutes were spent today reviewing the chart and other records, interviewing and examining the patient, and documenting our visit.    Chart documentation was completed, in part, with [employer] voice-recognition software. Even though reviewed, some grammatical, spelling, and word errors may remain.       Orders Placed This Encounter   Procedures    Follow-Up with Cardiology    Echocardiogram Complete     No orders of the defined types were placed in this encounter.    There are no discontinued medications.    10 year ASCVD risk: The ASCVD Risk score ([place] DK, et al., 2019) failed to calculate for the following reasons:    The 2019 ASCVD risk score is only valid for ages 40 to 79    The patient has a prior MI or stroke diagnosis    Encounter Diagnosis   Name Primary?    Nonrheumatic aortic valve stenosis Yes       CURRENT MEDICATIONS:  Current Outpatient Medications   Medication Sig Dispense Refill    amLODIPine (NORVASC) 5 MG tablet Take 1 tablet (5 mg) by mouth daily 90 tablet 3    aspirin 81 MG EC tablet Take 81 mg by mouth At Bedtime      atorvastatin (LIPITOR) 40 MG tablet Take 1 tablet (40 mg) by mouth At Bedtime 90 tablet 3    clotrimazole-betamethasone (LOTRISONE) 1-0.05 % external cream Apply topically 2 times daily as needed (rash) 45 g 1    finasteride (PROSCAR) 5 MG  tablet Take 1 tablet (5 mg) by mouth daily 90 tablet 3    metoprolol succinate ER (TOPROL XL) 100 MG 24 hr tablet Take 1 tablet (100 mg) by mouth daily 90 tablet 3    multivitamin, therapeutic (THERA-VIT) TABS tablet Take 1 tablet by mouth At Bedtime      omeprazole (PRILOSEC) 20 MG DR capsule Take 1 capsule (20 mg) by mouth daily as needed (heartburn) 90 capsule 3    selenium sulfide (SELSUN) 2.5 % external lotion APPLY DAILY TO EVERY OTHER DAY, LATHER, WAIT 10 MINUTES AND RINSE (Patient taking differently: daily as needed LATHER, WAIT 10 MINUTES AND RINSE) 118 mL 3    terazosin (HYTRIN) 10 MG capsule Take 1 capsule (10 mg) by mouth At Bedtime 90 capsule 3    triamcinolone (KENALOG) 0.1 % external cream Apply topically 2 times daily as needed for irritation         ALLERGIES     Allergies   Allergen Reactions    Pollen Extract      Tree pollen         PAST MEDICAL HISTORY:  Past Medical History:   Diagnosis Date    Aortic root dilatation (H)     Aortic stenosis     moderate - Dr Carlson    Arthritis .    Ascending aorta dilatation (H)     BCC (basal cell carcinoma of skin)     right jaw, rt parietal, Dr Limon/Galilea, 2016, 10/2018, 11/2019, 3/2022    Choroidal nevus of left eye     Dr Connor    Colon polyps     Coronary artery disease involving native coronary artery of native heart without angina pectoris 01/2021    NSTEMI, cardiac cath 1/2021: non-obstructive    ED (erectile dysfunction)     Family history of cardiovascular disease     GERD (gastroesophageal reflux disease) 2013    Hematuria 1999    dr scott    Hernia, abdominal     Hyperlipidemia LDL goal <70 1990    Hypertension goal BP (blood pressure) < 140/90 1990    Hypertrophy of prostate without urinary obstruction and other lower urinary tract symptoms (LUTS)     Lumbar stenosis 2017    mild to moderate foraminal and central    Lung nodules 11/2010    CT scan stable    Nonrheumatic aortic valve stenosis 01/2021    mild to moderate    obstructive SLEEP  APNEA 05/2007    CPAP    Other and unspecified 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 05/25/2005    Palpitations     PSVT 11/2017    few runs, rare PVC    Restrictive lung disease     Seasonal allergies     Sensorineural hearing loss (SNHL) of both ears 05/2005    hearing aids, L>R - [facility]    Stage 3a chronic kidney disease (H)        PAST SURGICAL HISTORY:  Past Surgical History:   Procedure Laterality Date    CV HEART CATHETERIZATION WITH POSSIBLE INTERVENTION N/A 01/07/2021    Procedure: Heart Catheterization with Possible Intervention;  Surgeon: Raúl iRch MD;  Location:  HEART CARDIAC CATH LAB    CV LEFT HEART CATH N/A 01/07/2021    Procedure: Left Heart Cath;  Surgeon: Raúl Rich MD;  Location:  HEART CARDIAC CATH LAB    CYSTOSCOPY      NM MPI WITH LEXISCAN  05/2021    normal    STRESS ECHO ([facility])  7/09, 5/12, 7/17    Negative    TONSILLECTOMY & ADENOIDECTOMY  1946    ZZHC COLONOSCOPY THRU STOMA, DIAGNOSTIC  2005, 5/10, 5/11    Polyps-> due 2015 - Ct Colonography negative    ZZHC REPAIR INCISIONAL HERNIA,REDUCIBLE  1960    Hernia Repair, Incisional, Unilateral       FAMILY HISTORY:  Family History   Problem Relation Age of Onset    C.A.D. Father         age 50's    Hypertension Father     C.A.D. Brother     C.A.D. Brother         mid 40's    Diabetes Brother     Cancer Brother         pancreatic CA    Coronary Artery Disease Brother     Cancer - colorectal No family hx of     Prostate Cancer No family hx of        SOCIAL HISTORY:  Social History     Socioeconomic History    Marital status:      Spouse name: Leticia    Number of children: 3    Years of education: 18    Highest education level: None   Occupational History     Employer: RETIRED   Tobacco Use    Smoking status: Never    Smokeless tobacco: Never   Vaping Use    Vaping Use: Never used   Substance and Sexual Activity    Alcohol use: Yes     Alcohol/week: 5.0 - 8.0 standard  "drinks of alcohol     Types: 5 - 8 Standard drinks or equivalent per week     Comment: 4-5 drinks per week avg    Drug use: No    Sexual activity: Yes     Partners: Female   Other Topics Concern    Blood Transfusions No    Caffeine Concern Yes     Comment: 1 serv/day, rare pop, occas chocolate (3-4/yr)    Sleep Concern Yes     Comment: MIMI, wakes feeling rested    Exercise Yes     Comment: 30-45' 2-3 x/wk    Seat Belt Yes    Parent/sibling w/ CABG, MI or angioplasty before 65F 55M? Yes       Review of Systems:  Skin:        Eyes:       ENT:       Respiratory:  Positive for dyspnea on exertion;sleep apnea;CPAP  Cardiovascular:    Positive for;edema;fatigue  Gastroenterology:      Genitourinary:       Musculoskeletal:       Neurologic:       Psychiatric:       Heme/Lymph/Imm:       Endocrine:         Physical Exam:  Vitals: BP 98/56 (BP Location: Right arm, Patient Position: Sitting, Cuff Size: Adult Regular)   Pulse 75   Ht 1.753 m (5' 9\")   Wt 95.3 kg (210 lb)   SpO2 95%   BMI 31.01 kg/m      Constitutional:  cooperative, alert and oriented, well developed, well nourished, in no acute distress obese [place]    Skin:  warm and dry to the touch        Head:  normocephalic, no masses or lesions        Eyes:  sclera white;no xanthalasma;no nystagmus        ENT:  no pallor or cyanosis   masked    Neck:  carotid pulses are full and equal bilaterally, JVP normal, no carotid bruit        Chest:  normal breath sounds, clear to auscultation, normal A-P diameter, normal symmetry, normal respiratory excursion, no use of accessory muscles        Cardiac: regular rhythm;normal S1 and S2;no S3 or S4;apical impulse not displaced       grade 2;systolic ejection murmur;RUSB;apical radiation to the carotid        Abdomen:  abdomen soft;BS normoactive        Vascular: pulses full and equal                                      Extremities and Muscular Skeletal:        bilateral LE edema;pitting;1+     Neurological:  no gross motor " deficits        Psych:  affect appropriate, oriented to time, person and place     Recent Lab Results:  LIPID RESULTS:  Lab Results   Component Value Date    CHOL 143 11/16/2022    CHOL 139 05/06/2021    HDL 49 11/16/2022    HDL 55 05/06/2021    LDL 65 11/16/2022    LDL 59 05/06/2021    TRIG 147 11/16/2022    TRIG 126 05/06/2021    CHOLHDLRATIO 3.2 05/14/2015       LIVER ENZYME RESULTS:  Lab Results   Component Value Date    AST 25 11/16/2022    AST 29 05/06/2021    ALT 20 11/16/2022    ALT 42 05/06/2021       CBC RESULTS:  Lab Results   Component Value Date    WBC 8.5 11/16/2022    WBC 8.0 05/06/2021    RBC 3.97 (L) 11/16/2022    RBC 4.09 (L) 05/06/2021    HGB 13.1 (L) 11/16/2022    HGB 13.4 05/06/2021    HCT 38.9 (L) 11/16/2022    HCT 40.6 05/06/2021    MCV 98 11/16/2022    MCV 99 05/06/2021    MCH 33.0 11/16/2022    MCH 32.8 05/06/2021    MCHC 33.7 11/16/2022    MCHC 33.0 05/06/2021    RDW 13.0 11/16/2022    RDW 13.0 05/06/2021     11/16/2022     05/06/2021       BMP RESULTS:  Lab Results   Component Value Date     11/16/2022     05/06/2021    POTASSIUM 4.3 11/16/2022    POTASSIUM 4.0 06/01/2022    POTASSIUM 4.5 05/06/2021    CHLORIDE 106 11/16/2022    CHLORIDE 108 06/01/2022    CHLORIDE 111 (H) 05/06/2021    CO2 20 (L) 11/16/2022    CO2 22 06/01/2022    CO2 24 05/06/2021    ANIONGAP 14 11/16/2022    ANIONGAP 9 06/01/2022    ANIONGAP 5 05/06/2021     (H) 11/16/2022     (H) 06/01/2022    GLC 98 05/06/2021    BUN 34.6 (H) 11/16/2022    BUN 28 06/01/2022    BUN 29 05/06/2021    CR 1.49 (H) 11/16/2022    CR 1.45 (H) 05/06/2021    GFRESTIMATED 45 (L) 11/16/2022    GFRESTIMATED 43 (L) 05/06/2021    GFRESTBLACK 50 (L) 05/06/2021    TRAN 9.2 11/16/2022    TRAN 9.2 05/06/2021        A1C RESULTS:  Lab Results   Component Value Date    A1C 5.6 11/16/2022    A1C 5.9 (H) 05/06/2021       INR RESULTS:  No results found for: INR      Dariel Carlson MD, [facility]    CC  No referring provider  defined for this encounter.      Thank you for allowing me to participate in the care of your patient.      Sincerely,     Dariel Carlson MD     [facility]

## 2023-10-13 ENCOUNTER — TELEPHONE (OUTPATIENT)
Dept: FAMILY MEDICINE | Facility: CLINIC | Age: 88
End: 2023-10-13
Payer: COMMERCIAL

## 2023-10-13 ENCOUNTER — DOCUMENTATION ONLY (OUTPATIENT)
Dept: FAMILY MEDICINE | Facility: CLINIC | Age: 88
End: 2023-10-13
Payer: COMMERCIAL

## 2023-10-13 DIAGNOSIS — Z00.00 MEDICARE ANNUAL WELLNESS VISIT, SUBSEQUENT: ICD-10-CM

## 2023-10-13 DIAGNOSIS — I25.10 CORONARY ARTERY DISEASE INVOLVING NATIVE CORONARY ARTERY OF NATIVE HEART WITHOUT ANGINA PECTORIS: ICD-10-CM

## 2023-10-13 DIAGNOSIS — H90.3 SENSORINEURAL HEARING LOSS (SNHL) OF BOTH EARS: ICD-10-CM

## 2023-10-13 DIAGNOSIS — G47.10 HYPERSOMNIA WITH SLEEP APNEA: ICD-10-CM

## 2023-10-13 DIAGNOSIS — R91.8 LUNG NODULES: ICD-10-CM

## 2023-10-13 DIAGNOSIS — N18.31 STAGE 3A CHRONIC KIDNEY DISEASE (H): ICD-10-CM

## 2023-10-13 DIAGNOSIS — Z91.09 ENVIRONMENTAL ALLERGIES: ICD-10-CM

## 2023-10-13 DIAGNOSIS — J98.4 RESTRICTIVE LUNG DISEASE: ICD-10-CM

## 2023-10-13 DIAGNOSIS — I10 HYPERTENSION GOAL BP (BLOOD PRESSURE) < 140/90: ICD-10-CM

## 2023-10-13 DIAGNOSIS — Z82.49 FAMILY HISTORY OF CARDIOVASCULAR DISEASE: ICD-10-CM

## 2023-10-13 DIAGNOSIS — I77.810 ASCENDING AORTA DILATATION (H): ICD-10-CM

## 2023-10-13 DIAGNOSIS — N40.0 HYPERTROPHY OF PROSTATE WITHOUT URINARY OBSTRUCTION: ICD-10-CM

## 2023-10-13 DIAGNOSIS — Z51.81 MEDICATION MONITORING ENCOUNTER: ICD-10-CM

## 2023-10-13 DIAGNOSIS — M48.062 SPINAL STENOSIS OF LUMBAR REGION WITH NEUROGENIC CLAUDICATION: ICD-10-CM

## 2023-10-13 DIAGNOSIS — G47.30 HYPERSOMNIA WITH SLEEP APNEA: ICD-10-CM

## 2023-10-13 DIAGNOSIS — I73.9 PERIPHERAL VASCULAR DISEASE (H): ICD-10-CM

## 2023-10-13 DIAGNOSIS — E78.5 HYPERLIPIDEMIA LDL GOAL <70: ICD-10-CM

## 2023-10-13 DIAGNOSIS — K21.9 GASTROESOPHAGEAL REFLUX DISEASE WITHOUT ESOPHAGITIS: ICD-10-CM

## 2023-10-13 DIAGNOSIS — Z00.00 ROUTINE GENERAL MEDICAL EXAMINATION AT A HEALTH CARE FACILITY: Primary | ICD-10-CM

## 2023-10-13 DIAGNOSIS — I35.0 AORTIC VALVE STENOSIS, ETIOLOGY OF CARDIAC VALVE DISEASE UNSPECIFIED: ICD-10-CM

## 2023-10-13 DIAGNOSIS — I77.810 AORTIC ROOT DILATATION (H): ICD-10-CM

## 2023-10-16 ENCOUNTER — LAB (OUTPATIENT)
Dept: LAB | Facility: CLINIC | Age: 88
End: 2023-10-16
Payer: COMMERCIAL

## 2023-10-16 DIAGNOSIS — G47.30 HYPERSOMNIA WITH SLEEP APNEA: ICD-10-CM

## 2023-10-16 DIAGNOSIS — Z00.00 ROUTINE GENERAL MEDICAL EXAMINATION AT A HEALTH CARE FACILITY: ICD-10-CM

## 2023-10-16 DIAGNOSIS — I10 HYPERTENSION GOAL BP (BLOOD PRESSURE) < 140/90: ICD-10-CM

## 2023-10-16 DIAGNOSIS — K21.9 GASTROESOPHAGEAL REFLUX DISEASE WITHOUT ESOPHAGITIS: ICD-10-CM

## 2023-10-16 DIAGNOSIS — Z00.00 MEDICARE ANNUAL WELLNESS VISIT, SUBSEQUENT: ICD-10-CM

## 2023-10-16 DIAGNOSIS — G47.10 HYPERSOMNIA WITH SLEEP APNEA: ICD-10-CM

## 2023-10-16 DIAGNOSIS — I25.10 CORONARY ARTERY DISEASE INVOLVING NATIVE CORONARY ARTERY OF NATIVE HEART WITHOUT ANGINA PECTORIS: ICD-10-CM

## 2023-10-16 DIAGNOSIS — E78.5 HYPERLIPIDEMIA LDL GOAL <70: ICD-10-CM

## 2023-10-16 DIAGNOSIS — N18.31 STAGE 3A CHRONIC KIDNEY DISEASE (H): ICD-10-CM

## 2023-10-16 DIAGNOSIS — Z82.49 FAMILY HISTORY OF CARDIOVASCULAR DISEASE: ICD-10-CM

## 2023-10-16 DIAGNOSIS — Z51.81 MEDICATION MONITORING ENCOUNTER: ICD-10-CM

## 2023-10-16 DIAGNOSIS — I73.9 PERIPHERAL VASCULAR DISEASE (H): ICD-10-CM

## 2023-10-16 LAB
ALBUMIN SERPL BCG-MCNC: 3.7 G/DL (ref 3.5–5.2)
ALBUMIN UR-MCNC: NEGATIVE MG/DL
ALP SERPL-CCNC: 48 U/L (ref 40–129)
ALT SERPL W P-5'-P-CCNC: 21 U/L (ref 0–70)
ANION GAP SERPL CALCULATED.3IONS-SCNC: 13 MMOL/L (ref 7–15)
APPEARANCE UR: CLEAR
AST SERPL W P-5'-P-CCNC: 34 U/L (ref 0–45)
BACTERIA #/AREA URNS HPF: ABNORMAL /HPF
BILIRUB SERPL-MCNC: 0.4 MG/DL
BILIRUB UR QL STRIP: NEGATIVE
BUN SERPL-MCNC: 27.9 MG/DL (ref 8–23)
CALCIUM SERPL-MCNC: 9.4 MG/DL (ref 8.8–10.2)
CHLORIDE SERPL-SCNC: 105 MMOL/L (ref 98–107)
CHOLEST SERPL-MCNC: 145 MG/DL
CK SERPL-CCNC: 80 U/L (ref 39–308)
COLOR UR AUTO: YELLOW
CREAT SERPL-MCNC: 1.37 MG/DL (ref 0.67–1.17)
CREAT UR-MCNC: 117 MG/DL
DEPRECATED HCO3 PLAS-SCNC: 21 MMOL/L (ref 22–29)
EGFRCR SERPLBLD CKD-EPI 2021: 49 ML/MIN/1.73M2
ERYTHROCYTE [DISTWIDTH] IN BLOOD BY AUTOMATED COUNT: 13.2 % (ref 10–15)
GLUCOSE SERPL-MCNC: 104 MG/DL (ref 70–99)
GLUCOSE UR STRIP-MCNC: NEGATIVE MG/DL
HBA1C MFR BLD: 5.6 % (ref 0–5.6)
HCT VFR BLD AUTO: 38 % (ref 40–53)
HDLC SERPL-MCNC: 58 MG/DL
HGB BLD-MCNC: 12.8 G/DL (ref 13.3–17.7)
HGB UR QL STRIP: ABNORMAL
KETONES UR STRIP-MCNC: NEGATIVE MG/DL
LDLC SERPL CALC-MCNC: 65 MG/DL
LEUKOCYTE ESTERASE UR QL STRIP: NEGATIVE
MCH RBC QN AUTO: 33.3 PG (ref 26.5–33)
MCHC RBC AUTO-ENTMCNC: 33.7 G/DL (ref 31.5–36.5)
MCV RBC AUTO: 99 FL (ref 78–100)
MICROALBUMIN UR-MCNC: 74.4 MG/L
MICROALBUMIN/CREAT UR: 63.59 MG/G CR (ref 0–17)
NITRATE UR QL: NEGATIVE
NONHDLC SERPL-MCNC: 87 MG/DL
PH UR STRIP: 6 [PH] (ref 5–7)
PLATELET # BLD AUTO: 183 10E3/UL (ref 150–450)
POTASSIUM SERPL-SCNC: 4.4 MMOL/L (ref 3.4–5.3)
PROT SERPL-MCNC: 6.5 G/DL (ref 6.4–8.3)
RBC # BLD AUTO: 3.84 10E6/UL (ref 4.4–5.9)
RBC #/AREA URNS AUTO: ABNORMAL /HPF
SODIUM SERPL-SCNC: 139 MMOL/L (ref 135–145)
SP GR UR STRIP: 1.02 (ref 1–1.03)
SQUAMOUS #/AREA URNS AUTO: ABNORMAL /LPF
TRIGL SERPL-MCNC: 112 MG/DL
TSH SERPL DL<=0.005 MIU/L-ACNC: 2.82 UIU/ML (ref 0.3–4.2)
UROBILINOGEN UR STRIP-ACNC: 1 E.U./DL
WBC # BLD AUTO: 9.6 10E3/UL (ref 4–11)
WBC #/AREA URNS AUTO: ABNORMAL /HPF

## 2023-10-16 PROCEDURE — 82043 UR ALBUMIN QUANTITATIVE: CPT

## 2023-10-16 PROCEDURE — 82550 ASSAY OF CK (CPK): CPT

## 2023-10-16 PROCEDURE — 80061 LIPID PANEL: CPT

## 2023-10-16 PROCEDURE — 36415 COLL VENOUS BLD VENIPUNCTURE: CPT

## 2023-10-16 PROCEDURE — 80053 COMPREHEN METABOLIC PANEL: CPT

## 2023-10-16 PROCEDURE — 85027 COMPLETE CBC AUTOMATED: CPT

## 2023-10-16 PROCEDURE — 81001 URINALYSIS AUTO W/SCOPE: CPT

## 2023-10-16 PROCEDURE — 83036 HEMOGLOBIN GLYCOSYLATED A1C: CPT

## 2023-10-16 PROCEDURE — 82570 ASSAY OF URINE CREATININE: CPT

## 2023-10-16 PROCEDURE — 84443 ASSAY THYROID STIM HORMONE: CPT

## 2023-10-19 ENCOUNTER — OFFICE VISIT (OUTPATIENT)
Dept: FAMILY MEDICINE | Facility: CLINIC | Age: 88
End: 2023-10-19
Payer: COMMERCIAL

## 2023-10-19 VITALS
TEMPERATURE: 97.8 F | DIASTOLIC BLOOD PRESSURE: 64 MMHG | WEIGHT: 210.7 LBS | HEART RATE: 86 BPM | RESPIRATION RATE: 16 BRPM | OXYGEN SATURATION: 96 % | HEIGHT: 68 IN | SYSTOLIC BLOOD PRESSURE: 108 MMHG | BODY MASS INDEX: 31.93 KG/M2

## 2023-10-19 DIAGNOSIS — I73.9 PERIPHERAL VASCULAR DISEASE (H): ICD-10-CM

## 2023-10-19 DIAGNOSIS — L21.9 SEBORRHEIC DERMATITIS: ICD-10-CM

## 2023-10-19 DIAGNOSIS — K21.9 GASTROESOPHAGEAL REFLUX DISEASE WITHOUT ESOPHAGITIS: ICD-10-CM

## 2023-10-19 DIAGNOSIS — H90.3 SENSORINEURAL HEARING LOSS (SNHL) OF BOTH EARS: ICD-10-CM

## 2023-10-19 DIAGNOSIS — R91.8 LUNG NODULES: ICD-10-CM

## 2023-10-19 DIAGNOSIS — J98.4 RESTRICTIVE LUNG DISEASE: ICD-10-CM

## 2023-10-19 DIAGNOSIS — N40.0 HYPERTROPHY OF PROSTATE WITHOUT URINARY OBSTRUCTION: ICD-10-CM

## 2023-10-19 DIAGNOSIS — I21.4 NSTEMI (NON-ST ELEVATED MYOCARDIAL INFARCTION) (H): ICD-10-CM

## 2023-10-19 DIAGNOSIS — I25.10 CORONARY ARTERY DISEASE INVOLVING NATIVE CORONARY ARTERY OF NATIVE HEART WITHOUT ANGINA PECTORIS: ICD-10-CM

## 2023-10-19 DIAGNOSIS — N18.31 STAGE 3A CHRONIC KIDNEY DISEASE (H): ICD-10-CM

## 2023-10-19 DIAGNOSIS — Z51.81 MEDICATION MONITORING ENCOUNTER: ICD-10-CM

## 2023-10-19 DIAGNOSIS — I35.0 AORTIC VALVE STENOSIS, ETIOLOGY OF CARDIAC VALVE DISEASE UNSPECIFIED: ICD-10-CM

## 2023-10-19 DIAGNOSIS — Z85.828 HISTORY OF SKIN CANCER: ICD-10-CM

## 2023-10-19 DIAGNOSIS — Z91.09 ENVIRONMENTAL ALLERGIES: ICD-10-CM

## 2023-10-19 DIAGNOSIS — I77.810 ASCENDING AORTA DILATATION (H): ICD-10-CM

## 2023-10-19 DIAGNOSIS — I77.810 AORTIC ROOT DILATATION (H): ICD-10-CM

## 2023-10-19 DIAGNOSIS — Z00.00 ROUTINE GENERAL MEDICAL EXAMINATION AT A HEALTH CARE FACILITY: Primary | ICD-10-CM

## 2023-10-19 DIAGNOSIS — Z00.00 MEDICARE ANNUAL WELLNESS VISIT, SUBSEQUENT: ICD-10-CM

## 2023-10-19 DIAGNOSIS — K21.9 GASTROESOPHAGEAL REFLUX DISEASE, UNSPECIFIED WHETHER ESOPHAGITIS PRESENT: ICD-10-CM

## 2023-10-19 DIAGNOSIS — I10 HYPERTENSION GOAL BP (BLOOD PRESSURE) < 140/90: ICD-10-CM

## 2023-10-19 DIAGNOSIS — G47.30 HYPERSOMNIA WITH SLEEP APNEA: ICD-10-CM

## 2023-10-19 DIAGNOSIS — G47.10 HYPERSOMNIA WITH SLEEP APNEA: ICD-10-CM

## 2023-10-19 DIAGNOSIS — E78.5 HYPERLIPIDEMIA LDL GOAL <70: ICD-10-CM

## 2023-10-19 DIAGNOSIS — L30.9 ECZEMA, UNSPECIFIED TYPE: ICD-10-CM

## 2023-10-19 DIAGNOSIS — L21.9 SEBORRHEIC DERMATITIS, UNSPECIFIED: ICD-10-CM

## 2023-10-19 DIAGNOSIS — M48.062 SPINAL STENOSIS OF LUMBAR REGION WITH NEUROGENIC CLAUDICATION: ICD-10-CM

## 2023-10-19 DIAGNOSIS — Z82.49 FAMILY HISTORY OF CARDIOVASCULAR DISEASE: ICD-10-CM

## 2023-10-19 PROCEDURE — G0439 PPPS, SUBSEQ VISIT: HCPCS | Performed by: FAMILY MEDICINE

## 2023-10-19 PROCEDURE — 99213 OFFICE O/P EST LOW 20 MIN: CPT | Mod: 25 | Performed by: FAMILY MEDICINE

## 2023-10-19 RX ORDER — TERAZOSIN 10 MG/1
10 CAPSULE ORAL AT BEDTIME
Qty: 90 CAPSULE | Refills: 3 | Status: SHIPPED | OUTPATIENT
Start: 2023-10-19 | End: 2024-06-26

## 2023-10-19 RX ORDER — ATORVASTATIN CALCIUM 40 MG/1
40 TABLET, FILM COATED ORAL AT BEDTIME
Qty: 90 TABLET | Refills: 3 | Status: SHIPPED | OUTPATIENT
Start: 2023-10-19

## 2023-10-19 RX ORDER — CLOTRIMAZOLE AND BETAMETHASONE DIPROPIONATE 10; .64 MG/G; MG/G
CREAM TOPICAL 2 TIMES DAILY PRN
Qty: 45 G | Refills: 1 | Status: SHIPPED | OUTPATIENT
Start: 2023-10-19

## 2023-10-19 RX ORDER — METOPROLOL SUCCINATE 100 MG/1
100 TABLET, EXTENDED RELEASE ORAL DAILY
Qty: 90 TABLET | Refills: 3 | Status: SHIPPED | OUTPATIENT
Start: 2023-10-19 | End: 2024-04-09

## 2023-10-19 RX ORDER — SELENIUM SULFIDE 2.5 MG/100ML
LOTION TOPICAL
Qty: 118 ML | Refills: 3 | Status: SHIPPED | OUTPATIENT
Start: 2023-10-19

## 2023-10-19 RX ORDER — RESPIRATORY SYNCYTIAL VIRUS VACCINE 120MCG/0.5
0.5 KIT INTRAMUSCULAR ONCE
Qty: 1 EACH | Refills: 0 | Status: CANCELLED | OUTPATIENT
Start: 2023-10-19 | End: 2023-10-19

## 2023-10-19 RX ORDER — AMLODIPINE BESYLATE 5 MG/1
5 TABLET ORAL DAILY
Qty: 90 TABLET | Refills: 3 | Status: SHIPPED | OUTPATIENT
Start: 2023-10-19 | End: 2024-04-09

## 2023-10-19 RX ORDER — FINASTERIDE 5 MG/1
5 TABLET, FILM COATED ORAL DAILY
Qty: 90 TABLET | Refills: 3 | Status: SHIPPED | OUTPATIENT
Start: 2023-10-19

## 2023-10-19 ASSESSMENT — ENCOUNTER SYMPTOMS
EYE PAIN: 0
DYSURIA: 0
NERVOUS/ANXIOUS: 0
ARTHRALGIAS: 0
FREQUENCY: 1
COUGH: 0
ABDOMINAL PAIN: 0
PALPITATIONS: 0
HEADACHES: 0
SORE THROAT: 0
SHORTNESS OF BREATH: 1
DIARRHEA: 0
DIZZINESS: 0
PARESTHESIAS: 0
HEARTBURN: 0
JOINT SWELLING: 0
FEVER: 0
HEMATURIA: 0
HEMATOCHEZIA: 0
CONSTIPATION: 0
CHILLS: 0
MYALGIAS: 0
WEAKNESS: 0
NAUSEA: 0

## 2023-10-19 ASSESSMENT — ACTIVITIES OF DAILY LIVING (ADL)
CURRENT_FUNCTION: BATHING REQUIRES ASSISTANCE
CURRENT_FUNCTION: LAUNDRY REQUIRES ASSISTANCE
CURRENT_FUNCTION: HOUSEWORK REQUIRES ASSISTANCE
CURRENT_FUNCTION: PREPARING MEALS REQUIRES ASSISTANCE
CURRENT_FUNCTION: SHOPPING REQUIRES ASSISTANCE
CURRENT_FUNCTION: MEDICATION ADMINISTRATION REQUIRES ASSISTANCE

## 2023-10-19 NOTE — PROGRESS NOTES
"[facility] Moris De Luna is a 89 year old who presents for Preventive Visit.      10/19/2023     1:51 PM   Additional Questions   Roomed by Nicole INIGUEZ   Accompanied by wife       Are you in the first 12 months of your Medicare coverage?  No    Pt here with Wife, they state the clinic contacted them regarding this physical / medicare being too early. Pt. States they contacted are and were told it was okay to do early and it would be paid for. Advised pt. Of Medicare rule of 366 days out from last physical . Pt. Aware.     Healthy Habits:     In general, how would you rate your overall health?  Good    Frequency of exercise:  None    Do you usually eat at least 4 servings of fruit and vegetables a day, include whole grains    & fiber and avoid regularly eating high fat or \"junk\" foods?  Yes    Taking medications regularly:  Yes    Medication side effects:  Not applicable    Ability to successfully perform activities of daily living:  Shopping requires assistance, preparing meals requires assistance, housework requires assistance, bathing requires assistance, laundry requires assistance and medication administration requires assistance    Home Safety:  No safety concerns identified    Hearing Impairment:  Difficulty following a conversation in a noisy restaurant or crowded room and difficulty understanding speech on the telephone    In the past 6 months, have you been bothered by leaking of urine? Yes    In general, how would you rate your overall mental or emotional health?  Good    Additional concerns today:  No    Have you ever done Advance Care Planning? (For example, a Health Directive, POLST, or a discussion with a medical provider or your loved ones about your wishes): Yes, advance care planning is on file.    Uses hearing aids with naveed    Fall risk     Cognitive Screening   1) Repeat 3 items (Leader, Season, Table)    2) Clock draw: NORMAL  3) 3 item recall: Recalls 1 object "   Results: NORMAL clock, 1-2 items recalled: COGNITIVE IMPAIRMENT LESS LIKELY    Mini-CogTM Copyright LA Akers. Licensed by the author for use in [facility]; reprinted with permission (brennan@[facility]). All rights reserved.      Do you have sleep apnea, excessive snoring or daytime drowsiness? : yes    Reviewed and updated as needed this visit by clinical staff   Tobacco  Allergies  Meds  Problems  Med Hx  Surg Hx  Fam Hx          Reviewed and updated as needed this visit by Provider                 Social History     Tobacco Use    Smoking status: Never    Smokeless tobacco: Never   Substance Use Topics    Alcohol use: Yes     Alcohol/week: 5.0 - 8.0 standard drinks of alcohol     Types: 5 - 8 Standard drinks or equivalent per week     Comment: 4-5 drinks per week av             10/19/2023     1:50 PM   Alcohol Use   Prescreen: >3 drinks/day or >7 drinks/week? No          No data to display              Do you have a current opioid prescription? No  Do you use any other controlled substances or medications that are not prescribed by a provider? None      Current providers sharing in care for this patient include:     Patient Care Team:  Chance Espinosa MD as PCP - General  Chance Espinosa MD as Assigned PCP  Dariel Carlson MD as Assigned Heart and Vascular Provider    The following health maintenance items are reviewed in Epic and correct as of today:  Health Maintenance   Topic Date Due    RSV VACCINE 60+ (1 - 1-dose 60+ series) Never done    COVID-19 Vaccine (7 - 2023-24 season) 09/01/2023    FALL RISK ASSESSMENT  11/30/2023    BMP  10/16/2024    LIPID  10/16/2024    MICROALBUMIN  10/16/2024    HEMOGLOBIN  10/16/2024    MEDICARE ANNUAL WELLNESS VISIT  10/19/2024    ANNUAL REVIEW OF HM ORDERS  10/19/2024    ADVANCE CARE PLANNING  10/19/2028    DTAP/TDAP/TD IMMUNIZATION (3 - Td or Tdap) 07/21/2032    PHQ-2 (once per calendar year)  Completed    INFLUENZA VACCINE  Completed    Pneumococcal  Vaccine: 65+ Years  Completed    URINALYSIS  Completed    ZOSTER IMMUNIZATION  Completed    IPV IMMUNIZATION  Aged Out    HPV IMMUNIZATION  Aged Out    MENINGITIS IMMUNIZATION  Aged Out     Review of Systems   Constitutional:  Negative for chills and fever.   HENT:  Positive for hearing loss. Negative for congestion, ear pain and sore throat.    Eyes:  Negative for pain and visual disturbance.   Respiratory:  Positive for shortness of breath. Negative for cough.    Cardiovascular:  Negative for chest pain, palpitations and peripheral edema.   Gastrointestinal:  Negative for abdominal pain, constipation, diarrhea, heartburn, hematochezia and nausea.   Genitourinary:  Positive for frequency and urgency. Negative for dysuria, genital sores, hematuria, impotence and penile discharge.   Musculoskeletal:  Negative for arthralgias, joint swelling and myalgias.   Skin:  Negative for rash.   Neurological:  Negative for dizziness, weakness, headaches and paresthesias.   Psychiatric/Behavioral:  Negative for mood changes. The patient is not nervous/anxious.      CAD - no cp - no sob at rest - some WALKER - no edema - Dr Carlson    Ascending aortic / Aortic root dilatation / Aortic stenosis    RLD / Lung nodules / MIMI - using CPAP every night    PVD - no concerns    CKD 3A / Htn    BP Readings from Last 3 Encounters:   10/19/23 108/64   09/12/23 98/56   11/30/22 100/58     Creatinine   Date Value Ref Range Status   10/16/2023 1.37 (H) 0.67 - 1.17 mg/dL Final   05/06/2021 1.45 (H) 0.66 - 1.25 mg/dL Final     GFR Estimate   Date Value Ref Range Status   10/16/2023 49 (L) >60 mL/min/1.73m2 Final   05/06/2021 43 (L) >60 mL/min/[1.73_m2] Final     Comment:     Non  GFR Calc  Starting 12/18/2018, serum creatinine based estimated GFR (eGFR) will be   calculated using the Chronic Kidney Disease Epidemiology Collaboration   (CKD-EPI) equation.       Lipids    Recent Labs   Lab Test 10/16/23  0854 11/16/22  0913   CHOL 145  143   HDL 58 49   LDL 65 65   TRIG 112 147     Lumbar stenosis - aggravated at times, best with lying - has seen Pain clinic    Environmental allergies - nasal congestion using Afrin daily    GERD - controlled with omeprazole    Hx of Skin cancer - follows with Derm    Health Maintenance     Colonoscopy:  NA   FIT:  NA              PSA:  NA   DEXA:  NA    Health Maintenance Due   Topic Date Due    RSV VACCINE 60+ (1 - 1-dose 60+ series) Never done    COVID-19 Vaccine (7 - 2023-24 season) 09/01/2023     Current Problem List    Patient Active Problem List   Diagnosis    Hypertension goal BP (blood pressure) < 140/90    Hypertrophy of prostate without urinary obstruction    Hearing loss    Seborrheic dermatitis    Other and unspecified 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    Hypersomnia with sleep apnea    Family history of cardiovascular disease    Seasonal allergies    ED (erectile dysfunction)    Hyperlipidemia LDL goal <70    Lung nodules    Advanced directives, counseling/discussion    GERD (gastroesophageal reflux disease)    CKD (chronic kidney disease) stage 3, GFR 30-59 ml/min (H)    Environmental allergies    Lumbar stenosis    Restrictive lung disease    BCC (basal cell carcinoma of skin)    Coronary artery disease involving native coronary artery of native heart without angina pectoris    Aortic stenosis    Peripheral vascular disease (H24)    Ascending aorta dilatation (H24)    Aortic root dilatation (H24)    History of skin cancer       Past Medical History    Past Medical History:   Diagnosis Date    Aortic root dilatation (H24)     Aortic stenosis     moderate - Dr Carlson    Arthritis .    Ascending aorta dilatation (H24)     BCC (basal cell carcinoma of skin)     right jaw, rt parietal, Dr Limon/[place], 2016, 10/2018, 11/2019, 3/2022    Choroidal nevus of left eye     Dr Connor    Colon polyps     Coronary artery disease involving native coronary artery of native heart  without angina pectoris 01/2021    NSTEMI, cardiac cath 1/2021: non-obstructive    ED (erectile dysfunction)     Family history of cardiovascular disease     GERD (gastroesophageal reflux disease) 2013    Hematuria 1999    dr scott    Hernia, abdominal     Hyperlipidemia LDL goal <70 1990    Hypertension goal BP (blood pressure) < 140/90 1990    Hypertrophy of prostate without urinary obstruction and other lower urinary tract symptoms (LUTS)     Lumbar stenosis 2017    mild to moderate foraminal and central    Lung nodules 11/2010    CT scan stable    Nonrheumatic aortic valve stenosis 01/2021    mild to moderate    obstructive SLEEP APNEA 05/2007    CPAP    Other and unspecified 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 05/25/2005    Palpitations     PSVT 11/2017    few runs, rare PVC    Restrictive lung disease     Seasonal allergies     Sensorineural hearing loss (SNHL) of both ears 05/2005    hearing aids, L>R [facility]    Stage 3a chronic kidney disease (H)        Past Surgical History    Past Surgical History:   Procedure Laterality Date    CV HEART CATHETERIZATION WITH POSSIBLE INTERVENTION N/A 01/07/2021    Procedure: Heart Catheterization with Possible Intervention;  Surgeon: Raúl Rich MD;  Location:  HEART CARDIAC CATH LAB    CV LEFT HEART CATH N/A 01/07/2021    Procedure: Left Heart Cath;  Surgeon: Raúl Rich MD;  Location:  HEART CARDIAC CATH LAB    CYSTOSCOPY      NM MPI WITH LEXISCAN  05/2021    normal    STRESS ECHO (METRO)  7/09, 5/12, 7/17    Negative    TONSILLECTOMY & ADENOIDECTOMY  1946    ZZ COLONOSCOPY THRU STOMA, DIAGNOSTIC  2005, 5/10, 5/11    Polyps-> due 2015 - Ct Colonography negative    ZZHC REPAIR INCISIONAL HERNIA,REDUCIBLE  1960    Hernia Repair, Incisional, Unilateral       Current Medications    Current Outpatient Medications   Medication Sig Dispense Refill    amLODIPine (NORVASC) 5 MG tablet Take 1 tablet (5 mg) by  mouth daily 90 tablet 3    aspirin 81 MG EC tablet Take 81 mg by mouth At Bedtime      atorvastatin (LIPITOR) 40 MG tablet Take 1 tablet (40 mg) by mouth at bedtime 90 tablet 3    clotrimazole-betamethasone (LOTRISONE) 1-0.05 % external cream Apply topically 2 times daily as needed (rash) 45 g 1    finasteride (PROSCAR) 5 MG tablet Take 1 tablet (5 mg) by mouth daily 90 tablet 3    metoprolol succinate ER (TOPROL XL) 100 MG 24 hr tablet Take 1 tablet (100 mg) by mouth daily 90 tablet 3    multivitamin, therapeutic (THERA-VIT) TABS tablet Take 1 tablet by mouth At Bedtime      omeprazole (PRILOSEC) 20 MG DR capsule Take 1 capsule (20 mg) by mouth daily as needed (heartburn) 90 capsule 3    selenium sulfide (SELSUN) 2.5 % external lotion APPLY DAILY TO EVERY OTHER DAY, LATHER, WAIT 10 MINUTES AND RINSE 118 mL 3    terazosin (HYTRIN) 10 MG capsule Take 1 capsule (10 mg) by mouth at bedtime 90 capsule 3    triamcinolone (KENALOG) 0.1 % external cream Apply topically 2 times daily as needed for irritation         Allergies    Allergies   Allergen Reactions    Pollen Extract      Tree pollen         Immunizations    Immunization History   Administered Date(s) Administered    COVID-19 Bivalent 18+ (Moderna) 09/16/2022, 05/02/2023    COVID-19 MONOVALENT 12+ (Pfizer) 01/26/2021, 02/18/2021, 09/27/2021    COVID-19 Monovalent 18+ (Moderna) 04/01/2022    Flu, Unspecified 10/01/2006, 10/01/2008, 10/01/2009, 09/22/2021, 09/16/2022    Influenza (H1N1) 01/20/2010    Influenza (High Dose) 3 valent vaccine 10/03/2011, 09/20/2012, 10/15/2013, 10/03/2014, 10/01/2015, 10/15/2015, 09/30/2016, 09/01/2017, 10/10/2017, 09/17/2018, 12/01/2018, 10/07/2019    Influenza (IIV3) PF 10/18/2004, 10/12/2005, 10/23/2006, 10/17/2007, 10/15/2009, 09/22/2010    Influenza Vaccine 65+ (Fluzone HD) 09/04/2020, 09/22/2021, 09/16/2022, 09/18/2023    Pneumo Conj 13-V (2010&after) 05/20/2015    Pneumococcal 23 valent 01/04/2007    TD,PF 7+ (Tenivac) 04/20/2007     TDAP (Adacel,Boostrix) 07/21/2022    TDAP Vaccine (Boostrix) 05/23/2012    Zoster recombinant adjuvanted (SHINGRIX) 09/17/2018, 11/16/2018    Zoster vaccine, live 01/01/2008       Family History    Family History   Problem Relation Age of Onset    C.A.D. Father         age 50's    Hypertension Father     C.A.D. Brother     C.A.D. Brother         mid 40's    Diabetes Brother     Cancer Brother         pancreatic CA    Coronary Artery Disease Brother     Cancer - colorectal No family hx of     Prostate Cancer No family hx of        Social History    Social History     Socioeconomic History    Marital status:      Spouse name: Leticia    Number of children: 3    Years of education: 18    Highest education level: Not on file   Occupational History     Employer: RETIRED   Tobacco Use    Smoking status: Never    Smokeless tobacco: Never   Vaping Use    Vaping Use: Never used   Substance and Sexual Activity    Alcohol use: Yes     Alcohol/week: 5.0 - 8.0 standard drinks of alcohol     Types: 5 - 8 Standard drinks or equivalent per week     Comment: 4-5 drinks per week avg    Drug use: No    Sexual activity: Yes     Partners: Female   Other Topics Concern     Service Not Asked    Blood Transfusions No    Caffeine Concern Yes     Comment: 1 serv/day, rare pop, occas chocolate (3-4/yr)    Occupational Exposure Not Asked    Hobby Hazards Not Asked    Sleep Concern Yes     Comment: MIMI, wakes feeling rested    Stress Concern Not Asked    Weight Concern Not Asked    Special Diet Not Asked    Back Care Not Asked    Exercise Yes     Comment: 30-45' 2-3 x/wk    Bike Helmet Not Asked    Seat Belt Yes    Self-Exams Not Asked    Parent/sibling w/ CABG, MI or angioplasty before 65F 55M? Yes   Social History Narrative    Not on file     Social Determinants of Health     Financial Resource Strain: Not on file   Food Insecurity: Not on file   Transportation Needs: Not on file   Physical Activity: Not on file   Stress:  "Not on file   Social Connections: Not on file   Interpersonal Safety: Low Risk  (10/19/2023)    Interpersonal Safety     Do you feel physically and emotionally safe where you currently live?: Yes     Within the past 12 months, have you been hit, slapped, kicked or otherwise physically hurt by someone?: No     Within the past 12 months, have you been humiliated or emotionally abused in other ways by your partner or ex-partner?: No   Housing Stability: Not on file       ROS    CONSTITUTIONAL: NEGATIVE for fever, chills, change in weight  INTEGUMENTARY/SKIN: NEGATIVE for worrisome rashes, moles or lesions  EYES: NEGATIVE for vision changes or irritation  ENT/MOUTH: NEGATIVE for ear, mouth and throat problems  RESP: NEGATIVE for significant cough or SOB  CV: NEGATIVE for chest pain, palpitations or peripheral edema  GI: NEGATIVE for nausea, abdominal pain, heartburn, or change in bowel habits  : NEGATIVE for frequency, dysuria, or hematuria  MUSCULOSKELETAL: NEGATIVE for significant arthralgias or myalgia  NEURO: NEGATIVE for weakness, dizziness or paresthesias  ENDOCRINE: NEGATIVE for temperature intolerance, skin/hair changes  HEME: NEGATIVE for bleeding problems  PSYCHIATRIC: NEGATIVE for changes in mood or affect    OBJECTIVE    /64   Pulse 86   Temp 97.8  F (36.6  C) (Tympanic)   Resp 16   Ht 1.715 m (5' 7.5\")   Wt 95.6 kg (210 lb 11.2 oz)   SpO2 96%   BMI 32.51 kg/m      EXAM:    GENERAL: healthy, alert and no distress  EYES: Eyes grossly normal to inspection, PERRL and conjunctivae and sclerae normal  HENT: ear canals and TM's normal, nose and mouth without ulcers or lesions  NECK: no adenopathy, no asymmetry, masses, or scars and thyroid normal to palpation  RESP: lungs clear to auscultation - no rales, rhonchi or wheezes  CV: regular rate and rhythm, normal S1 S2, no S3 or S4, 2-3/6 systolioc murmur, click or rub, no peripheral edema and peripheral pulses strong  ABDOMEN: soft, nontender, no " hepatosplenomegaly, no masses and bowel sounds normal, large umbilical hernia   (male): pt declines  RECTAL: pt declines  MS: no gross musculoskeletal defects noted, no edema  SKIN: no suspicious lesions or rashes  NEURO: Normal strength and tone, mentation intact and speech normal  PSYCH: mentation appears normal, affect normal/bright  LYMPH: no cervical, supraclavicular, axillary, or inguinal adenopathy    DIAGNOSTICS/PROCEDURES    Pending    ASSESSMENT      ICD-10-CM    1. Routine general medical examination at a health care facility  Z00.00 REVIEW OF HEALTH MAINTENANCE PROTOCOL ORDERS     PRIMARY CARE FOLLOW-UP SCHEDULING      2. Medicare annual wellness visit, subsequent  Z00.00 REVIEW OF HEALTH MAINTENANCE PROTOCOL ORDERS     PRIMARY CARE FOLLOW-UP SCHEDULING      3. Coronary artery disease involving native coronary artery of native heart without angina pectoris  I25.10 REVIEW OF HEALTH MAINTENANCE PROTOCOL ORDERS     PRIMARY CARE FOLLOW-UP SCHEDULING      4. Family history of cardiovascular disease  Z82.49 REVIEW OF HEALTH MAINTENANCE PROTOCOL ORDERS     PRIMARY CARE FOLLOW-UP SCHEDULING      5. Peripheral vascular disease (H24)  I73.9 REVIEW OF HEALTH MAINTENANCE PROTOCOL ORDERS     PRIMARY CARE FOLLOW-UP SCHEDULING      6. Stage 3a chronic kidney disease (H)  N18.31 REVIEW OF HEALTH MAINTENANCE PROTOCOL ORDERS     PRIMARY CARE FOLLOW-UP SCHEDULING     amLODIPine (NORVASC) 5 MG tablet     atorvastatin (LIPITOR) 40 MG tablet     metoprolol succinate ER (TOPROL XL) 100 MG 24 hr tablet     terazosin (HYTRIN) 10 MG capsule      7. Hypertension goal BP (blood pressure) < 140/90  I10 REVIEW OF HEALTH MAINTENANCE PROTOCOL ORDERS     PRIMARY CARE FOLLOW-UP SCHEDULING     amLODIPine (NORVASC) 5 MG tablet     atorvastatin (LIPITOR) 40 MG tablet     metoprolol succinate ER (TOPROL XL) 100 MG 24 hr tablet     terazosin (HYTRIN) 10 MG capsule      8. Hyperlipidemia LDL goal <70  E78.5 REVIEW OF HEALTH MAINTENANCE  PROTOCOL ORDERS     PRIMARY CARE FOLLOW-UP SCHEDULING      9. Ascending aorta dilatation (H24)  I77.810 REVIEW OF HEALTH MAINTENANCE PROTOCOL ORDERS     PRIMARY CARE FOLLOW-UP SCHEDULING      10. Aortic root dilatation (H24)  I77.810 REVIEW OF HEALTH MAINTENANCE PROTOCOL ORDERS     PRIMARY CARE FOLLOW-UP SCHEDULING      11. Aortic valve stenosis, etiology of cardiac valve disease unspecified  I35.0 REVIEW OF HEALTH MAINTENANCE PROTOCOL ORDERS     PRIMARY CARE FOLLOW-UP SCHEDULING      12. Restrictive lung disease  J98.4 REVIEW OF HEALTH MAINTENANCE PROTOCOL ORDERS     PRIMARY CARE FOLLOW-UP SCHEDULING      13. Lung nodules  R91.8 REVIEW OF HEALTH MAINTENANCE PROTOCOL ORDERS     PRIMARY CARE FOLLOW-UP SCHEDULING      14. Hypersomnia with sleep apnea  G47.10 REVIEW OF HEALTH MAINTENANCE PROTOCOL ORDERS    G47.30 PRIMARY CARE FOLLOW-UP SCHEDULING      15. Environmental allergies  Z91.09 REVIEW OF HEALTH MAINTENANCE PROTOCOL ORDERS     PRIMARY CARE FOLLOW-UP SCHEDULING      16. Spinal stenosis of lumbar region with neurogenic claudication  M48.062 REVIEW OF HEALTH MAINTENANCE PROTOCOL ORDERS     PRIMARY CARE FOLLOW-UP SCHEDULING      17. Gastroesophageal reflux disease without esophagitis  K21.9 REVIEW OF HEALTH MAINTENANCE PROTOCOL ORDERS     PRIMARY CARE FOLLOW-UP SCHEDULING      18. History of skin cancer  Z85.828 REVIEW OF HEALTH MAINTENANCE PROTOCOL ORDERS     PRIMARY CARE FOLLOW-UP SCHEDULING      19. Hypertrophy of prostate without urinary obstruction  N40.0 REVIEW OF HEALTH MAINTENANCE PROTOCOL ORDERS     PRIMARY CARE FOLLOW-UP SCHEDULING     finasteride (PROSCAR) 5 MG tablet      20. Sensorineural hearing loss (SNHL) of both ears  H90.3 REVIEW OF HEALTH MAINTENANCE PROTOCOL ORDERS     PRIMARY CARE FOLLOW-UP SCHEDULING      21. Medication monitoring encounter  Z51.81 REVIEW OF HEALTH MAINTENANCE PROTOCOL ORDERS     PRIMARY CARE FOLLOW-UP SCHEDULING      22. NSTEMI (non-ST elevated myocardial infarction) (H)   I21.4 amLODIPine (NORVASC) 5 MG tablet     atorvastatin (LIPITOR) 40 MG tablet     metoprolol succinate ER (TOPROL XL) 100 MG 24 hr tablet      23. Seborrheic dermatitis, unspecified  L21.9 clotrimazole-betamethasone (LOTRISONE) 1-0.05 % external cream     selenium sulfide (SELSUN) 2.5 % external lotion      24. Eczema, unspecified type  L30.9 clotrimazole-betamethasone (LOTRISONE) 1-0.05 % external cream      25. Seborrheic dermatitis  L21.9 clotrimazole-betamethasone (LOTRISONE) 1-0.05 % external cream      26. Gastroesophageal reflux disease, unspecified whether esophagitis present  K21.9 omeprazole (PRILOSEC) 20 MG DR capsule          PLAN    Discussed treatment/modality options, including risk and benefits, he desires:    advised alcohol consumption 1oz per day or less, advised 1 multivitamin per day, advised calcium 8126-8180 mg/d and Vitamin D 800-1200 IU/d, advised dentist every 6 months, advised diet, exercise, and weight loss, advised opthalmologist every 1-2 years, advised self testicular exam q month, further health care maintenance, further lab(s), immunization(s), medication refill(s), and observation    Discussed controversies surrounding PSA. Specifically reviewed 2017 USPSTF findings recommending discussion of PSA testing for men ages 55-69.  Reviewed findings of the  Randomized Study of Screening for Prostate Cancer which showed a 30% reduction in advanced stage prostate cancer and a 20% reduction in death rate from prostate cancer in this age group. PSA-based screening may prevent up to 2 deaths and up to 3 cases of metastatic disease per 1,000 men screened over 13 years.    We've elected not to do PSA this year after discussing these controversies.    All diagnosis above reviewed and noted above, otherwise stable.      [facility] for further details.      1) med refills    2) labs reviewed    [facility]) [facility]    4) Derm soon    5) Cardiology, Echo last 5/2023    6) immunizations  "reviewed/advised    No follow-ups on file.    Health Maintenance Due   Topic Date Due    RSV VACCINE 60+ (1 - 1-dose 60+ series) Never done    COVID-19 Vaccine (7 - 2023-24 season) 09/01/2023       COUNSELING    Reviewed preventive health counseling, as reflected in patient instructions    BP Readings from Last 1 Encounters:   10/19/23 108/64     Estimated body mass index is 32.51 kg/m  as calculated from the following:    Height as of this encounter: 1.715 m (5' 7.5\").    Weight as of this encounter: 95.6 kg (210 lb 11.2 oz).      Weight management plan: diet and exercise     reports that he has never smoked. He has never used smokeless tobacco.      Counseling Resources:    ATP IV Guidelines  Pooled Cohorts Equation Calculator  FRAX Risk Assessment  ICSI Preventive Guidelines  Dietary Guidelines for Americans, 2010  USDA's MyPlate  ASA Prophylaxis  Lung CA Screening           Chance Espinosa MD, FAAFP     [facility]  [address] [place] [address]  [email or web address]   Office: [phone]  Fax: [phone]  Pager: [phone]     Identified Health Risks:  I have reviewed Opioid Use Disorder and Substance Use Disorder risk factors and made any needed referrals.   "

## 2023-10-19 NOTE — PATIENT INSTRUCTIONS
Patient Education   Personalized Prevention Plan  You are due for the preventive services outlined below.  Your care team is available to assist you in scheduling these services.  If you have already completed any of these items, please share that information with your care team to update in your medical record.  Health Maintenance Due   Topic Date Due     RSV VACCINE 60+ (1 - 1-dose 60+ series) Never done     ANNUAL REVIEW OF HM ORDERS  04/25/2023     COVID-19 Vaccine (7 - 2023-24 season) 09/01/2023

## 2023-12-12 NOTE — TELEPHONE ENCOUNTER
Discussed with the patient that anecdotally a firm mattress may help with back pain. This is not a medical recommendation since there are no studies or evidence to support this, I do not think the [facility] would approve this based on medical grounds.    Does the patient know of a program through the VA that allows for these types of purchases to be covered under medical expenses?    John Leal, DO  [facility]       show

## 2024-01-04 ENCOUNTER — ALLIED HEALTH/NURSE VISIT (OUTPATIENT)
Dept: NURSING | Facility: CLINIC | Age: 89
End: 2024-01-04
Payer: COMMERCIAL

## 2024-01-04 VITALS
OXYGEN SATURATION: 94 % | WEIGHT: 210.8 LBS | BODY MASS INDEX: 32.53 KG/M2 | SYSTOLIC BLOOD PRESSURE: 129 MMHG | RESPIRATION RATE: 18 BRPM | HEART RATE: 87 BPM | DIASTOLIC BLOOD PRESSURE: 76 MMHG

## 2024-01-04 DIAGNOSIS — I10 HYPERTENSION GOAL BP (BLOOD PRESSURE) < 140/90: Primary | ICD-10-CM

## 2024-01-04 PROCEDURE — 99207 PR NO CHARGE NURSE ONLY: CPT

## 2024-01-04 ASSESSMENT — PAIN SCALES - GENERAL: PAINLEVEL: NO PAIN (0)

## 2024-01-04 NOTE — PROGRESS NOTES
Kevin Fierro is being followed for Blood Pressure management for [facility] form for $25 reimbursement   Form filled out and given back to patient.      BP Readings from Last 3 Encounters:   01/04/24 129/76   10/19/23 108/64   09/12/23 98/56       Wt Readings from Last 3 Encounters:   01/04/24 95.6 kg (210 lb 12.8 oz)   10/19/23 95.6 kg (210 lb 11.2 oz)   09/12/23 95.3 kg (210 lb)       Is pulse 55 or greater? - Yes    Pulse Readings from Last 3 Encounters:   01/04/24 87   10/19/23 86   09/12/23 75       Current blood pressure medication(s):  Current Outpatient Medications   Medication Sig Dispense Refill    amLODIPine (NORVASC) 5 MG tablet Take 1 tablet (5 mg) by mouth daily 90 tablet 3    aspirin 81 MG EC tablet Take 81 mg by mouth At Bedtime      atorvastatin (LIPITOR) 40 MG tablet Take 1 tablet (40 mg) by mouth at bedtime 90 tablet 3    clotrimazole-betamethasone (LOTRISONE) 1-0.05 % external cream Apply topically 2 times daily as needed (rash) 45 g 1    finasteride (PROSCAR) 5 MG tablet Take 1 tablet (5 mg) by mouth daily 90 tablet 3    metoprolol succinate ER (TOPROL XL) 100 MG 24 hr tablet Take 1 tablet (100 mg) by mouth daily 90 tablet 3    multivitamin, therapeutic (THERA-VIT) TABS tablet Take 1 tablet by mouth At Bedtime      omeprazole (PRILOSEC) 20 MG DR capsule Take 1 capsule (20 mg) by mouth daily as needed (heartburn) 90 capsule 3    selenium sulfide (SELSUN) 2.5 % external lotion APPLY DAILY TO EVERY OTHER DAY, LATHER, WAIT 10 MINUTES AND RINSE 118 mL 3    terazosin (HYTRIN) 10 MG capsule Take 1 capsule (10 mg) by mouth at bedtime 90 capsule 3    triamcinolone (KENALOG) 0.1 % external cream Apply topically 2 times daily as needed for irritation            1. Follow up instructions include:     Per provider .      SUBJECTIVE:                                                    The patient is taking medication as prescribed and is tolerating well.   Patient is not monitoring Blood Pressure at home.    Last 3 home readings na         Out of the following complicating factors: Cough, Headache, Lightheadedness, Shortness of breath, Fatigue, Nausea, Sexual Dysfunction, New onset of swelling or edema, Weakness and New onset of Chest Pain, the patient reports:  None    OBJECTIVE:                                                      Today's BP completed using cuff size: large on right side arm.      Potassium   Date Value Ref Range Status   10/16/2023 4.4 3.4 - 5.3 mmol/L Final   06/01/2022 4.0 3.4 - 5.3 mmol/L Final   05/06/2021 4.5 3.4 - 5.3 mmol/L Final     Creatinine   Date Value Ref Range Status   10/16/2023 1.37 (H) 0.67 - 1.17 mg/dL Final   05/06/2021 1.45 (H) 0.66 - 1.25 mg/dL Final     Urea Nitrogen   Date Value Ref Range Status   10/16/2023 27.9 (H) 8.0 - 23.0 mg/dL Final   06/01/2022 28 7 - 30 mg/dL Final   05/06/2021 29 7 - 30 mg/dL Final     GFR Estimate   Date Value Ref Range Status   10/16/2023 49 (L) >60 mL/min/1.73m2 Final   05/06/2021 43 (L) >60 mL/min/[1.73_m2] Final     Comment:     Non  GFR Calc  Starting 12/18/2018, serum creatinine based estimated GFR (eGFR) will be   calculated using the Chronic Kidney Disease Epidemiology Collaboration   (CKD-EPI) equation.           Education:  general discussion/verbal explanation  Ways to help improve BP/HTN:   Medications as directed     Call if BP at home is persistently > 130/80 and or having symptoms   Lose weight  Diet low in fat and rich in fruits, vegetables and low fat dairy products  Reduce salt in diet  Do something active for at least 30 minutes a day on most days of the week  Cut down on alcohol (if you drink more than 2 drinks per day)  Decrease stress (exercise, read, yoga, meditation, time for self, etc.)   Patient was given an opportunity to ask questions.    Patient verbalized understanding of this plan and is agreeable.    Lizzy Santoyo RN

## 2024-01-05 ENCOUNTER — OFFICE VISIT (OUTPATIENT)
Dept: FAMILY MEDICINE | Facility: CLINIC | Age: 89
End: 2024-01-05
Payer: COMMERCIAL

## 2024-01-05 VITALS
WEIGHT: 213 LBS | TEMPERATURE: 97.9 F | SYSTOLIC BLOOD PRESSURE: 114 MMHG | BODY MASS INDEX: 32.28 KG/M2 | HEIGHT: 68 IN | OXYGEN SATURATION: 96 % | HEART RATE: 89 BPM | RESPIRATION RATE: 16 BRPM | DIASTOLIC BLOOD PRESSURE: 68 MMHG

## 2024-01-05 DIAGNOSIS — H61.20 CERUMEN IN AUDITORY CANAL ON EXAMINATION: Primary | ICD-10-CM

## 2024-01-05 PROCEDURE — 99213 OFFICE O/P EST LOW 20 MIN: CPT | Performed by: FAMILY MEDICINE

## 2024-01-05 RX ORDER — RESPIRATORY SYNCYTIAL VIRUS VACCINE 120MCG/0.5
0.5 KIT INTRAMUSCULAR ONCE
Qty: 1 EACH | Refills: 0 | Status: CANCELLED | OUTPATIENT
Start: 2024-01-05 | End: 2024-01-05

## 2024-01-05 NOTE — PROGRESS NOTES
"  Assessment & Plan     1. Cerumen in auditory canal on examination  Ear lavage completed by GAURAV Washburn. Ear canals clear afterwards.  - MS REMOVAL IMPACTED CERUMEN IRRIGATION/LVG YANETH (RN/MA); Standing    DO PERRY Garcia [facility] [place]    Subjective   Calixto is a 89 year old, presenting for the following health issues:  Ear Problem (Ear cleaning before audiology appointment)        1/5/2024     1:36 PM   Additional Questions   Roomed by Meme MOTT CMA       Ear Problem    History of Present Illness       Reason for visit:  Ear cleaning for audio appt  Symptoms include:  Wax in here  Symptom progression:  Staying the same    He eats 0-1 servings of fruits and vegetables daily.He consumes 0 sweetened beverage(s) daily.He exercises with enough effort to increase his heart rate 9 or less minutes per day.  He exercises with enough effort to increase his heart rate 3 or less days per week.   He is taking medications regularly.       Patient needs ears [facility], States they will not perform the test with wax in his ear.     Patient identified using two patient identifiers.  Ear exam showing wax occlusion completed by provider.  Solution: warm water was placed in the bilateral ear(s) via irrigation tool: elephant ear.          Review of Systems   HENT:  Positive for ear pain.           Objective    /68   Pulse 89   Temp 97.9  F (36.6  C) (Tympanic)   Resp 16   Ht 1.715 m (5' 7.5\")   Wt 96.6 kg (213 lb)   SpO2 96%   BMI 32.87 kg/m    Body mass index is 32.87 kg/m .  Physical Exam   GENERAL: healthy, alert and no distress  HENT: both ears: cerumen in bilateral ear canals, nose and mouth without ulcers or lesions, oropharynx clear, and oral mucous membranes moist                  "

## 2024-01-31 ENCOUNTER — APPOINTMENT (OUTPATIENT)
Dept: LAB | Facility: CLINIC | Age: 89
End: 2024-01-31
Payer: COMMERCIAL

## 2024-03-12 ENCOUNTER — VIRTUAL VISIT (OUTPATIENT)
Dept: FAMILY MEDICINE | Facility: CLINIC | Age: 89
End: 2024-03-12
Payer: COMMERCIAL

## 2024-03-12 DIAGNOSIS — N18.31 STAGE 3A CHRONIC KIDNEY DISEASE (H): ICD-10-CM

## 2024-03-12 DIAGNOSIS — C44.310 BCC (BASAL CELL CARCINOMA), FACE: Primary | ICD-10-CM

## 2024-03-12 DIAGNOSIS — E78.5 HYPERLIPIDEMIA LDL GOAL <70: ICD-10-CM

## 2024-03-12 DIAGNOSIS — I10 HYPERTENSION GOAL BP (BLOOD PRESSURE) < 140/90: ICD-10-CM

## 2024-03-12 DIAGNOSIS — I73.9 PERIPHERAL VASCULAR DISEASE (H): ICD-10-CM

## 2024-03-12 DIAGNOSIS — Z85.828 HISTORY OF SKIN CANCER: ICD-10-CM

## 2024-03-12 DIAGNOSIS — I25.10 CORONARY ARTERY DISEASE INVOLVING NATIVE CORONARY ARTERY OF NATIVE HEART WITHOUT ANGINA PECTORIS: ICD-10-CM

## 2024-03-12 DIAGNOSIS — Z51.81 MEDICATION MONITORING ENCOUNTER: ICD-10-CM

## 2024-03-12 DIAGNOSIS — Z82.49 FAMILY HISTORY OF CARDIOVASCULAR DISEASE: ICD-10-CM

## 2024-03-12 PROCEDURE — 99442 PR PHYSICIAN TELEPHONE EVALUATION 11-20 MIN: CPT | Mod: 93 | Performed by: FAMILY MEDICINE

## 2024-03-12 NOTE — PROGRESS NOTES
Calixto is a 89 year old who is being evaluated via a billable telephone visit.      What phone number would you like to be contacted at? [phone]     How would you like to obtain your AVS? MyChart    Originating Location (pt. Location): Home      Distant Location (provider location):  On-site    Assessment & Plan     BCC (basal cell carcinoma), face      History of skin cancer      Coronary artery disease involving native coronary artery of native heart without angina pectoris      Stage 3a chronic kidney disease (H)      Hypertension goal BP (blood pressure) < 140/90      Hyperlipidemia LDL goal <70      Family history of cardiovascular disease      Medication monitoring encounter      Prescription drug management    11 minutes spent by me on the date of the encounter doing chart review, history and exam, documentation and further activities per the note    Plan:    1) Medications: reviewed    2) Labs: per Dr Limon    3) Immunizations: NA    4) Imaging/Diagnostics: reviewed pathology    5) Consults: Dermatology    6) Advise Moh's, Advise timeline per Dr Limon    Subjective     Calixto is a 89 year old, presenting for the following health issues:    Questions (Regarding Surgery)        3/12/2024    12:49 PM   Additional Questions   Roomed by Reg AMADOR CMA     Questions  - Regarding Moh's surgery - BCC - upper Right eye brow - Dr Limon - scheduled for 3rd week of May    No current concerns or issues, last cpx / visit 10/19/2023    CAD / CKD 3 A / Htn / Lipids / FH CAD    BP Readings from Last 3 Encounters:   01/05/24 114/68   01/04/24 129/76   10/19/23 108/64     Creatinine   Date Value Ref Range Status   10/16/2023 1.37 (H) 0.67 - 1.17 mg/dL Final   05/06/2021 1.45 (H) 0.66 - 1.25 mg/dL Final     GFR Estimate   Date Value Ref Range Status   10/16/2023 49 (L) >60 mL/min/1.73m2 Final   05/06/2021 43 (L) >60 mL/min/[1.73_m2] Final     Comment:     Non  GFR Calc  Starting 12/18/2018, serum creatinine  based estimated GFR (eGFR) will be   calculated using the Chronic Kidney Disease Epidemiology Collaboration   (CKD-EPI) equation.       Recent Labs   Lab Test 10/16/23  0854 11/16/22  0913   CHOL 145 143   HDL 58 49   LDL 65 65   TRIG 112 147     Review of Systems  CONSTITUTIONAL: NEGATIVE for fever, chills, change in weight  INTEGUMENTARY/SKIN: NEGATIVE for worrisome rashes, moles or lesions  EYES: NEGATIVE for vision changes or irritation  ENT/MOUTH: NEGATIVE for ear, mouth and throat problems  RESP: NEGATIVE for significant cough or SOB  CV: NEGATIVE for chest pain, palpitations or peripheral edema  GI: NEGATIVE for nausea, abdominal pain, heartburn, or change in bowel habits  : NEGATIVE for frequency, dysuria, or hematuria  MUSCULOSKELETAL: NEGATIVE for significant arthralgias or myalgia  NEURO: NEGATIVE for weakness, dizziness or paresthesias  ENDOCRINE: NEGATIVE for temperature intolerance, skin/hair changes  HEME: NEGATIVE for bleeding problems  PSYCHIATRIC: NEGATIVE for changes in mood or affect      Objective       Vitals:  No vitals were obtained today due to virtual visit.    Physical Exam   General: Alert and no distress //Respiratory: No audible wheeze, cough, or shortness of breath // Psychiatric:  Appropriate affect, tone, and pace of words    Reviewed notes      Phone call duration: 11 minutes    Signed Electronically by:              Chance Espinosa MD, FAAFP     [facility]  [address] [place] [id number]  [email or web address]   Office: [phone]  Fax: [phone]

## 2024-04-04 ENCOUNTER — OFFICE VISIT (OUTPATIENT)
Dept: FAMILY MEDICINE | Facility: CLINIC | Age: 89
End: 2024-04-04
Payer: COMMERCIAL

## 2024-04-04 ENCOUNTER — NURSE TRIAGE (OUTPATIENT)
Dept: NURSING | Facility: CLINIC | Age: 89
End: 2024-04-04

## 2024-04-04 ENCOUNTER — ORDERS ONLY (AUTO-RELEASED) (OUTPATIENT)
Dept: FAMILY MEDICINE | Facility: CLINIC | Age: 89
End: 2024-04-04
Payer: COMMERCIAL

## 2024-04-04 VITALS
HEART RATE: 94 BPM | BODY MASS INDEX: 32.27 KG/M2 | DIASTOLIC BLOOD PRESSURE: 60 MMHG | OXYGEN SATURATION: 95 % | RESPIRATION RATE: 18 BRPM | TEMPERATURE: 97.6 F | SYSTOLIC BLOOD PRESSURE: 98 MMHG | WEIGHT: 209.1 LBS

## 2024-04-04 DIAGNOSIS — I77.810 AORTIC ROOT DILATATION (H): ICD-10-CM

## 2024-04-04 DIAGNOSIS — I25.10 CORONARY ARTERY DISEASE INVOLVING NATIVE CORONARY ARTERY OF NATIVE HEART WITHOUT ANGINA PECTORIS: ICD-10-CM

## 2024-04-04 DIAGNOSIS — R06.09 OTHER FORMS OF DYSPNEA: ICD-10-CM

## 2024-04-04 DIAGNOSIS — I73.9 PERIPHERAL VASCULAR DISEASE (H): ICD-10-CM

## 2024-04-04 DIAGNOSIS — R55 SYNCOPE, UNSPECIFIED SYNCOPE TYPE: Primary | ICD-10-CM

## 2024-04-04 DIAGNOSIS — I77.810 ASCENDING AORTA DILATATION (H): ICD-10-CM

## 2024-04-04 DIAGNOSIS — G47.30 HYPERSOMNIA WITH SLEEP APNEA: ICD-10-CM

## 2024-04-04 DIAGNOSIS — G47.10 HYPERSOMNIA WITH SLEEP APNEA: ICD-10-CM

## 2024-04-04 DIAGNOSIS — J98.4 RESTRICTIVE LUNG DISEASE: ICD-10-CM

## 2024-04-04 DIAGNOSIS — Z82.49 FAMILY HISTORY OF CARDIOVASCULAR DISEASE: ICD-10-CM

## 2024-04-04 DIAGNOSIS — R55 SYNCOPE, UNSPECIFIED SYNCOPE TYPE: ICD-10-CM

## 2024-04-04 DIAGNOSIS — I10 HYPERTENSION GOAL BP (BLOOD PRESSURE) < 140/90: ICD-10-CM

## 2024-04-04 DIAGNOSIS — N18.31 STAGE 3A CHRONIC KIDNEY DISEASE (H): ICD-10-CM

## 2024-04-04 DIAGNOSIS — I35.0 AORTIC VALVE STENOSIS, ETIOLOGY OF CARDIAC VALVE DISEASE UNSPECIFIED: ICD-10-CM

## 2024-04-04 DIAGNOSIS — R91.8 LUNG NODULES: ICD-10-CM

## 2024-04-04 DIAGNOSIS — Z91.09 ENVIRONMENTAL ALLERGIES: ICD-10-CM

## 2024-04-04 DIAGNOSIS — E78.5 HYPERLIPIDEMIA LDL GOAL <70: ICD-10-CM

## 2024-04-04 LAB
ERYTHROCYTE [DISTWIDTH] IN BLOOD BY AUTOMATED COUNT: 13.7 % (ref 10–15)
HCT VFR BLD AUTO: 37.6 % (ref 40–53)
HGB BLD-MCNC: 12.8 G/DL (ref 13.3–17.7)
MCH RBC QN AUTO: 34 PG (ref 26.5–33)
MCHC RBC AUTO-ENTMCNC: 34 G/DL (ref 31.5–36.5)
MCV RBC AUTO: 100 FL (ref 78–100)
PLATELET # BLD AUTO: 190 10E3/UL (ref 150–450)
RBC # BLD AUTO: 3.76 10E6/UL (ref 4.4–5.9)
WBC # BLD AUTO: 10.7 10E3/UL (ref 4–11)

## 2024-04-04 PROCEDURE — 80053 COMPREHEN METABOLIC PANEL: CPT | Performed by: FAMILY MEDICINE

## 2024-04-04 PROCEDURE — 83880 ASSAY OF NATRIURETIC PEPTIDE: CPT | Performed by: FAMILY MEDICINE

## 2024-04-04 PROCEDURE — 36415 COLL VENOUS BLD VENIPUNCTURE: CPT | Performed by: FAMILY MEDICINE

## 2024-04-04 PROCEDURE — 85027 COMPLETE CBC AUTOMATED: CPT | Performed by: FAMILY MEDICINE

## 2024-04-04 PROCEDURE — 99214 OFFICE O/P EST MOD 30 MIN: CPT | Performed by: FAMILY MEDICINE

## 2024-04-04 ASSESSMENT — ENCOUNTER SYMPTOMS: SYNCOPE: 1

## 2024-04-04 NOTE — TELEPHONE ENCOUNTER
Message handled by Nurse Triage with [facility] - provider name: Dr. Espinosa, ok for appointment today - would have been better to go to ED last night .    Called and spoke with wife. Scheduled appt today at 11    BRYCE WHITMAN RN on 4/4/2024 at 9:24 AM   [facility]

## 2024-04-04 NOTE — TELEPHONE ENCOUNTER
"Nurse Triage SBAR    Is this a 2nd Level Triage? YES, LICENSED PRACTITIONER REVIEW IS REQUIRED    Situation: Wife calling to report patient fainted last night.  Wife has CTC.    Background:   Wife reports patient woke up last night to go to the restroom & fainted.  Wife denies that the patient hit his head.    Assessment:   Today, patient denies feeling lightheaded & dizzy.  Patient denies having difficulty breathing or chest pain.    Patient reports feeling \"fine\" this morning.  At time of call, patient was going out to eat w/ neighbors.    Protocol Recommended Disposition:   Go To ED/UCC Now (Or To Office With PCP Approval), Go to ED Now    Recommendation:   Per protocol, it is advised that the patient go to ED.  Wife would like patient seen by PCP.     Please review & call back patient's wife Leticia [phone].  She has CTC.    Routed to provider    Francisca Jauregui RN on 4/4/2024 at 9:00 AM     Does the patient meet one of the following criteria for ADS visit consideration? 16+ years old, with an [facility] PCP     TIP  Providers, please consider if this condition is appropriate for management at one of our Acute and Diagnostic Services sites.     If patient is a good candidate, please use dotphrase <dot>triageresponse and select Refer to ADS to document.    Reason for Disposition   History of heart problems or congestive heart failure   Age > 50 years    Additional Information   Negative: Still unconscious   Negative: Still feels dizzy or lightheaded   Negative: Difficult to awaken or acting confused (e.g., disoriented, slurred speech)   Negative: Difficulty breathing   Negative: Bluish (or gray) lips or face   Negative: Shock suspected (e.g., cold/pale/clammy skin, too weak to stand, low BP, rapid pulse)   Negative: Bleeding (e.g., vomiting blood, rectal bleeding or tarry stools, severe vaginal bleeding)   Negative: Chest pain   Negative: Extra heartbeats, irregular heart beating, or heart is beating very " fast (i.e., 'palpitations')   Negative: Heart beating < 50 beats per minute OR > 140 beats per minute   Negative: Fainted suddenly after medicine, allergic food or bee sting   Negative: Sounds like a life-threatening emergency to the triager   Negative: Fainted > 15 minutes ago and still looks pale (pale skin, pallor)   Negative: Fainted > 15 minutes ago and still feels weak or dizzy   Negative: Occurred during exercise   Negative: Any head or face injury    Protocols used: [employer]-W-SO

## 2024-04-04 NOTE — PROGRESS NOTES
Assessment & Plan     Syncope, unspecified syncope type    - Comprehensive metabolic panel (BMP + Alb, Alk Phos, ALT, AST, Total. Bili, TP)  - CBC with platelets  - BNP-N terminal pro  - Adult Cardiology Eval  Referral  - Echocardiogram Complete  - ZIO PATCH MAIL OUT  - Comprehensive metabolic panel (BMP + Alb, Alk Phos, ALT, AST, Total. Bili, TP)  - CBC with platelets  - BNP-N terminal pro    Coronary artery disease involving native coronary artery of native heart without angina pectoris    - Comprehensive metabolic panel (BMP + Alb, Alk Phos, ALT, AST, Total. Bili, TP)  - CBC with platelets  - BNP-N terminal pro  - Adult Cardiology Eval  Referral  - Echocardiogram Complete  - ZIO PATCH MAIL OUT  - Comprehensive metabolic panel (BMP + Alb, Alk Phos, ALT, AST, Total. Bili, TP)  - CBC with platelets  - BNP-N terminal pro    Stage 3a chronic kidney disease (H)    - Comprehensive metabolic panel (BMP + Alb, Alk Phos, ALT, AST, Total. Bili, TP)  - CBC with platelets  - Comprehensive metabolic panel (BMP + Alb, Alk Phos, ALT, AST, Total. Bili, TP)  - CBC with platelets    Hypertension goal BP (blood pressure) < 140/90    - Comprehensive metabolic panel (BMP + Alb, Alk Phos, ALT, AST, Total. Bili, TP)  - Comprehensive metabolic panel (BMP + Alb, Alk Phos, ALT, AST, Total. Bili, TP)    Hyperlipidemia LDL goal <70    - Comprehensive metabolic panel (BMP + Alb, Alk Phos, ALT, AST, Total. Bili, TP)  - Comprehensive metabolic panel (BMP + Alb, Alk Phos, ALT, AST, Total. Bili, TP)    Family history of cardiovascular disease      Peripheral vascular disease (H24)      Restrictive lung disease      Lung nodules      Hypersomnia with sleep apnea    - Comprehensive metabolic panel (BMP + Alb, Alk Phos, ALT, AST, Total. Bili, TP)  - CBC with platelets  - BNP-N terminal pro  - Comprehensive metabolic panel (BMP + Alb, Alk Phos, ALT, AST, Total. Bili, TP)  - CBC with platelets  - BNP-N terminal pro    Environmental  "allergies      Ascending aorta dilatation (H24)    - ZIO PATCH MAIL OUT    Aortic root dilatation (H24)    - ZIO PATCH MAIL OUT    Aortic valve stenosis, etiology of cardiac valve disease unspecified    - Echocardiogram Complete    Other forms of dyspnea    - BNP-N terminal pro  - BNP-N terminal pro      Prescription drug management    38 minutes spent by me on the date of the encounter doing chart review, history and exam, documentation and further activities per the note    Work on weight loss  Regular exercise    Plan:    1) Medications: reviewed    2) Labs: pending    3) Immunizations: reviewed    4) Imaging/Diagnostics: ECHO, Zio Patch    5) Consults: Cardiology    6) close follow up    Subjective     Calixto is a 89 year old, presenting for the following health issues:        4/4/2024    11:13 AM   Additional Questions   Roomed by Michelle KING     Syncope     Calixto notes blacked out last night at approximately 3 am, in lazy boy sleeping, got up, walked a few steps, and passed out, no warning, no cp, no sob, no palpitations, thinks he was out only 15 seconds, no signs of a seizure, knocked lamp over, got up and went back to bed, 1 glass daina last pm, upset stomach yesterday, notes this am feels like an 8/10 (good), feels like ready for his usual nap, at a good breakfast    No f/c/s, no uri sx, no n/v/d, no signs of bleeding, no signs of stroke    Triage    Wife reported patient fainted last night.  Wife has CTC.     Background:     Wife reports patient woke up last night to go to the restroom & fainted.  Wife denies that the patient hit his head.     Assessment:   Today, patient denies feeling lightheaded & dizzy.  Patient denies having difficulty breathing or chest pain.     Patient reports feeling \"fine\" this morning.  At time of call, patient was going out to eat w/ neighbors.    Patient Active Problem List   Diagnosis    Hypertension goal BP (blood pressure) < 140/90    Hypertrophy of prostate without urinary " obstruction    Hearing loss    Seborrheic dermatitis    Other and unspecified 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    Hypersomnia with sleep apnea    Family history of cardiovascular disease    Seasonal allergies    ED (erectile dysfunction)    Hyperlipidemia LDL goal <70    Lung nodules    Advanced directives, counseling/discussion    GERD (gastroesophageal reflux disease)    CKD (chronic kidney disease) stage 3, GFR 30-59 ml/min (H)    Environmental allergies    Lumbar stenosis    Restrictive lung disease    BCC (basal cell carcinoma of skin)    Coronary artery disease involving native coronary artery of native heart without angina pectoris    Aortic stenosis    Peripheral vascular disease (H24)    Ascending aorta dilatation (H24)    Aortic root dilatation (H24)    History of skin cancer       Past Medical History:   Diagnosis Date    Aortic root dilatation (H24)     Aortic stenosis     moderate - Dr Carlson    Arthritis .    Ascending aorta dilatation (H24)     BCC (basal cell carcinoma of skin)     right jaw, rt parietal, Dr Limon/[place], 2016, 10/2018, 11/2019, 3/2022    Choroidal nevus of left eye     Dr Connor    Colon polyps     Coronary artery disease involving native coronary artery of native heart without angina pectoris 01/2021    NSTEMI, cardiac cath 1/2021: non-obstructive    ED (erectile dysfunction)     Family history of cardiovascular disease     GERD (gastroesophageal reflux disease) 2013    Hematuria 1999    dr scott    Hernia, abdominal     Hyperlipidemia LDL goal <70 1990    Hypertension goal BP (blood pressure) < 140/90 1990    Hypertrophy of prostate without urinary obstruction and other lower urinary tract symptoms (LUTS)     Lumbar stenosis 2017    mild to moderate foraminal and central    Lung nodules 11/2010    CT scan stable    Nonrheumatic aortic valve stenosis 01/2021    mild to moderate    obstructive SLEEP APNEA 05/2007    CPAP    Other and unspecified  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 05/25/2005    Palpitations     PSVT 11/2017    few runs, rare PVC    Restrictive lung disease     Seasonal allergies     Sensorineural hearing loss (SNHL) of both ears 05/2005    hearing aids, L>R [facility]    Stage 3a chronic kidney disease (H)        Past Surgical History:   Procedure Laterality Date    CV HEART CATHETERIZATION WITH POSSIBLE INTERVENTION N/A 01/07/2021    Procedure: Heart Catheterization with Possible Intervention;  Surgeon: Raúl Rich MD;  Location:  HEART CARDIAC CATH LAB    CV LEFT HEART CATH N/A 01/07/2021    Procedure: Left Heart Cath;  Surgeon: Raúl Rich MD;  Location:  HEART CARDIAC CATH LAB    CYSTOSCOPY      NM MPI WITH LEXISCAN  05/2021    normal    STRESS ECHO (METRO)  7/09, 5/12, 7/17    Negative    TONSILLECTOMY & ADENOIDECTOMY  1946    ZZHC COLONOSCOPY THRU STOMA, DIAGNOSTIC  2005, 5/10, 5/11    Polyps-> due 2015 - Ct Colonography negative    ZZHC REPAIR INCISIONAL HERNIA,REDUCIBLE  1960    Hernia Repair, Incisional, Unilateral       Current Outpatient Medications   Medication Sig Dispense Refill    amLODIPine (NORVASC) 5 MG tablet Take 1 tablet (5 mg) by mouth daily 90 tablet 3    aspirin 81 MG EC tablet Take 81 mg by mouth At Bedtime      atorvastatin (LIPITOR) 40 MG tablet Take 1 tablet (40 mg) by mouth at bedtime 90 tablet 3    clotrimazole-betamethasone (LOTRISONE) 1-0.05 % external cream Apply topically 2 times daily as needed (rash) 45 g 1    finasteride (PROSCAR) 5 MG tablet Take 1 tablet (5 mg) by mouth daily 90 tablet 3    metoprolol succinate ER (TOPROL XL) 100 MG 24 hr tablet Take 1 tablet (100 mg) by mouth daily 90 tablet 3    multivitamin, therapeutic (THERA-VIT) TABS tablet Take 1 tablet by mouth At Bedtime      omeprazole (PRILOSEC) 20 MG DR capsule Take 1 capsule (20 mg) by mouth daily as needed (heartburn) 90 capsule 3    selenium sulfide (SELSUN) 2.5 % external  lotion APPLY DAILY TO EVERY OTHER DAY, LATHER, WAIT 10 MINUTES AND RINSE 118 mL 3    terazosin (HYTRIN) 10 MG capsule Take 1 capsule (10 mg) by mouth at bedtime 90 capsule 3    triamcinolone (KENALOG) 0.1 % external cream Apply topically 2 times daily as needed for irritation         Allergies   Allergen Reactions    Pollen Extract      Tree pollen         Family History   Problem Relation Age of Onset    C.A.D. Father         age 50's    Hypertension Father     C.A.D. Brother     C.A.D. Brother         mid 40's    Diabetes Brother     Cancer Brother         pancreatic CA    Coronary Artery Disease Brother     Cancer - colorectal No family hx of     Prostate Cancer No family hx of        Social History     Socioeconomic History    Marital status:      Spouse name: Leticia    Number of children: 3    Years of education: 18    Highest education level: None   Occupational History     Employer: RETIRED   Tobacco Use    Smoking status: Never    Smokeless tobacco: Never   Vaping Use    Vaping Use: Never used   Substance and Sexual Activity    Alcohol use: Yes     Alcohol/week: 5.0 - 8.0 standard drinks of alcohol     Types: 5 - 8 Standard drinks or equivalent per week     Comment: 4-5 drinks per week avg    Drug use: No    Sexual activity: Yes     Partners: Female   Other Topics Concern    Blood Transfusions No    Caffeine Concern Yes     Comment: 1 serv/day, rare pop, occas chocolate (3-4/yr)    Sleep Concern Yes     Comment: MIMI, wakes feeling rested    Exercise Yes     Comment: 30-45' 2-3 x/wk    Seat Belt Yes    Parent/sibling w/ CABG, MI or angioplasty before 65F 55M? Yes     Social Determinants of Health     Financial Resource Strain: Unknown (1/5/2024)    Financial Resource Strain     Within the past 12 months, have you or your family members you live with been unable to get utilities (heat, electricity) when it was really needed?: Patient declined   Food Insecurity: Low Risk  (1/5/2024)    Food Insecurity      Within the past 12 months, did you worry that your food would run out before you got money to buy more?: No     Within the past 12 months, did the food you bought just not last and you didn t have money to get more?: Patient declined   Transportation Needs: Unknown (1/5/2024)    Transportation Needs     Within the past 12 months, has lack of transportation kept you from medical appointments, getting your medicines, non-medical meetings or appointments, work, or from getting things that you need?: Patient declined   Interpersonal Safety: Low Risk  (10/19/2023)    Interpersonal Safety     Do you feel physically and emotionally safe where you currently live?: Yes     Within the past 12 months, have you been hit, slapped, kicked or otherwise physically hurt by someone?: No     Within the past 12 months, have you been humiliated or emotionally abused in other ways by your partner or ex-partner?: No   Housing Stability: Unknown (1/5/2024)    Housing Stability     Do you have housing? : Patient declined     Are you worried about losing your housing?: Patient declined         Review of Systems  CONSTITUTIONAL: NEGATIVE for fever, chills, change in weight  INTEGUMENTARY/SKIN: NEGATIVE for worrisome rashes, moles or lesions  EYES: NEGATIVE for vision changes or irritation  ENT/MOUTH: NEGATIVE for ear, mouth and throat problems  RESP: NEGATIVE for significant cough or SOB  CV: NEGATIVE for chest pain, palpitations or peripheral edema  GI: NEGATIVE for nausea, abdominal pain, heartburn, or change in bowel habits  : NEGATIVE for frequency, dysuria, or hematuria  MUSCULOSKELETAL: NEGATIVE for significant arthralgias or myalgia  NEURO: NEGATIVE for weakness, dizziness or paresthesias  ENDOCRINE: NEGATIVE for temperature intolerance, skin/hair changes  HEME: NEGATIVE for bleeding problems  PSYCHIATRIC: NEGATIVE for changes in mood or affect      Objective    BP 98/60   Pulse 94   Temp 97.6  F (36.4  C) (Oral)   Resp 18    Wt 94.8 kg (209 lb 1.6 oz)   SpO2 95%   BMI 32.27 kg/m    Body mass index is 32.27 kg/m .    Physical Exam   GENERAL: alert and no distress  EYES: Eyes grossly normal to inspection, PERRL and conjunctivae and sclerae normal  HENT: ear canals and TM's normal, nose and mouth without ulcers or lesions  NECK: no adenopathy, no asymmetry, masses, or scars  RESP: lungs clear to auscultation - no rales, rhonchi or wheezes  CV: regular rate and rhythm, normal S1 S2, no S3 or S4, 2-3/6 systolic murmur, click or rub, no peripheral edema  ABDOMEN: soft, nontender, no hepatosplenomegaly, no masses and bowel sounds normal  MS: no gross musculoskeletal defects noted, no edema  SKIN: no suspicious lesions or rashes  NEURO: Normal strength and tone, mentation intact and speech normal  PSYCH: mentation appears normal, affect normal/bright    Labs reviewed / pending      Signed Electronically by:              Chance Espinosa MD, FAAFP     [facility]  [address] [place] [id number]  [email or web address]   Office: [phone]  Fax: [phone]

## 2024-04-04 NOTE — LETTER
[facility]  [address], [place] [address] [phone]                    April 5, 2024    Kevin Fierro  [address] [facility] [place] [id number]      Dear Kevin,    Here is a summary of your recent test results:    They showed: Labs are overall stable     We advise: Continue current cares. For additional lab test information, [email or web address] is an excellent reference. Your test results are enclosed.Please contact me if you have any questions.    In addition, here is a list of due or overdue Health Maintenance reminders.    Health Maintenance Due   Topic Date Due    RSV VACCINE (Pregnancy & 60+) (1 - 1-dose 60+ series) Never done    COVID-19 Vaccine (7 - 2023-24 season) 09/01/2023       Please call us at [phone] (or use [facility]) to address the above recommendations.            Thank you very much for trusting [facility].     Healthy regards,          Chance Espinosa M.D.        Results for orders placed or performed in visit on 04/04/24   Comprehensive metabolic panel (BMP + Alb, Alk Phos, ALT, AST, Total. Bili, TP)     Status: Abnormal   Result Value Ref Range    Sodium 139 135 - 145 mmol/L    Potassium 4.5 3.4 - 5.3 mmol/L    Carbon Dioxide (CO2) 21 (L) 22 - 29 mmol/L    Anion Gap 14 7 - 15 mmol/L    Urea Nitrogen 28.5 (H) 8.0 - 23.0 mg/dL    Creatinine 1.38 (H) 0.67 - 1.17 mg/dL    GFR Estimate 49 (L) >60 mL/min/1.73m2    Calcium 9.0 8.8 - 10.2 mg/dL    Chloride 104 98 - 107 mmol/L    Glucose 139 (H) 70 - 99 mg/dL    Alkaline Phosphatase 56 40 - 150 U/L    AST 31 0 - 45 U/L    ALT 21 0 - 70 U/L    Protein Total 6.7 6.4 - 8.3 g/dL    Albumin 3.8 3.5 - 5.2 g/dL    Bilirubin Total 0.5 <=1.2 mg/dL   CBC with platelets     Status: Abnormal   Result Value Ref Range    WBC Count 10.7 4.0 - 11.0 10e3/uL    RBC Count 3.76 (L) 4.40 - 5.90 10e6/uL    Hemoglobin 12.8 (L) 13.3 - 17.7 g/dL    Hematocrit 37.6 (L) 40.0 - 53.0 %     78 - 100  fL    MCH 34.0 (H) 26.5 - 33.0 pg    MCHC 34.0 31.5 - 36.5 g/dL    RDW 13.7 10.0 - 15.0 %    Platelet Count 190 150 - 450 10e3/uL   BNP-N terminal pro     Status: Normal   Result Value Ref Range    N Terminal Pro BNP Outpatient 401 0 - 1,800 pg/mL

## 2024-04-05 ENCOUNTER — TELEPHONE (OUTPATIENT)
Dept: FAMILY MEDICINE | Facility: CLINIC | Age: 89
End: 2024-04-05
Payer: COMMERCIAL

## 2024-04-05 LAB
ALBUMIN SERPL BCG-MCNC: 3.8 G/DL (ref 3.5–5.2)
ALP SERPL-CCNC: 56 U/L (ref 40–150)
ALT SERPL W P-5'-P-CCNC: 21 U/L (ref 0–70)
ANION GAP SERPL CALCULATED.3IONS-SCNC: 14 MMOL/L (ref 7–15)
AST SERPL W P-5'-P-CCNC: 31 U/L (ref 0–45)
BILIRUB SERPL-MCNC: 0.5 MG/DL
BUN SERPL-MCNC: 28.5 MG/DL (ref 8–23)
CALCIUM SERPL-MCNC: 9 MG/DL (ref 8.8–10.2)
CHLORIDE SERPL-SCNC: 104 MMOL/L (ref 98–107)
CREAT SERPL-MCNC: 1.38 MG/DL (ref 0.67–1.17)
DEPRECATED HCO3 PLAS-SCNC: 21 MMOL/L (ref 22–29)
EGFRCR SERPLBLD CKD-EPI 2021: 49 ML/MIN/1.73M2
GLUCOSE SERPL-MCNC: 139 MG/DL (ref 70–99)
NT-PROBNP SERPL-MCNC: 401 PG/ML (ref 0–1800)
POTASSIUM SERPL-SCNC: 4.5 MMOL/L (ref 3.4–5.3)
PROT SERPL-MCNC: 6.7 G/DL (ref 6.4–8.3)
SODIUM SERPL-SCNC: 139 MMOL/L (ref 135–145)

## 2024-04-05 NOTE — TELEPHONE ENCOUNTER
Natacha with [facility] calling with a message/question to Dr. Espinosa.     She states that Dr. Espinosa has ordered an Echo and Zio patch for patient, but they can't perform it or obtain results before the scheduled  appointment with Dr. Carlson on 04/09. Is it okay to postpone the Office Visit with Dr. Carlson? The patient can still be seen by Dr. Carlson, but without the result of the Echo and Ziopatch. Otherwise, another cardiologist is available on 04/19.     Can LM to Natacha at: [phone]

## 2024-04-09 ENCOUNTER — OFFICE VISIT (OUTPATIENT)
Dept: CARDIOLOGY | Facility: CLINIC | Age: 89
End: 2024-04-09
Payer: COMMERCIAL

## 2024-04-09 VITALS — WEIGHT: 207 LBS | HEIGHT: 68 IN | OXYGEN SATURATION: 93 % | BODY MASS INDEX: 31.37 KG/M2

## 2024-04-09 DIAGNOSIS — I10 HYPERTENSION GOAL BP (BLOOD PRESSURE) < 140/90: ICD-10-CM

## 2024-04-09 DIAGNOSIS — I25.10 CORONARY ARTERY DISEASE INVOLVING NATIVE CORONARY ARTERY OF NATIVE HEART WITHOUT ANGINA PECTORIS: ICD-10-CM

## 2024-04-09 DIAGNOSIS — N18.31 STAGE 3A CHRONIC KIDNEY DISEASE (H): ICD-10-CM

## 2024-04-09 DIAGNOSIS — R55 SYNCOPE, UNSPECIFIED SYNCOPE TYPE: ICD-10-CM

## 2024-04-09 DIAGNOSIS — I21.4 NSTEMI (NON-ST ELEVATED MYOCARDIAL INFARCTION) (H): ICD-10-CM

## 2024-04-09 DIAGNOSIS — I35.0 NONRHEUMATIC AORTIC VALVE STENOSIS: Primary | ICD-10-CM

## 2024-04-09 PROCEDURE — 99215 OFFICE O/P EST HI 40 MIN: CPT | Performed by: INTERNAL MEDICINE

## 2024-04-09 PROCEDURE — 93000 ELECTROCARDIOGRAM COMPLETE: CPT | Performed by: INTERNAL MEDICINE

## 2024-04-09 RX ORDER — METOPROLOL SUCCINATE 50 MG/1
50 TABLET, EXTENDED RELEASE ORAL DAILY
Refills: 3 | COMMUNITY
Start: 2024-04-09 | End: 2024-06-25

## 2024-04-09 NOTE — PROGRESS NOTES
HISTORY:    Kevin Fierro is a pleasant 89-year-old gentleman accompanied by his wife and his daughter today.  He has a history of dyspnea on exertion and palpitations as well as a small previous non-STEMI with an angiogram showing minimal disease.  He has moderate aortic stenosis, obstructive sleep apnea for which he uses CPAP, and a history of hypertension which has been well-controlled.  He was asked to see me for an additional visit today because of some recent syncopal episodes.    Calixto reports that he has had 2 episodes of syncope in the last few months.  On 1 of these episodes he had been sleeping soundly and got up at about 3 AM to go to the bathroom.  He does not recall whether he had urinated yet but it sounds like he probably did since his wife states that he was facing back toward the bed and he did not have loss of urine with his syncopal episode.  He does not think he had any warning symptoms and his wife reports that he was not out for very long.  He did not lose bowel or bladder control.  He skinned his elbow and that fall, but no other injuries.    The second episode also occurred in the middle of the night.  He went to bed but then got up to continue sleeping in his lounge chair.  He does not recall why he changed to the chair and states that he usually does not do that but he does not think it was because of dyspnea.  He got up out of the chair for reasons that he does not recall and fell on the carpet.  He thinks that he had some warning before falling since he tried to grab onto something but he does not recall details.  He did not injure himself with that fall, and again he did not lose bowel or bladder control.  On neither of these episodes does he recall feeling lightheaded dizzy or narrowed vision prior to his fall.  However, his recollection of both episodes is sparse.    In general Calixto reports that he feels that he is doing well.  At age 89 he has, of course, noted a decline in exercise  capacity but he does not think there has been any abrupt change in the last 6 to 12 months.  He is still able to do fairly normal activities around the house.  He is not finding himself getting particularly short of breath or fatigued with activity.  His last echocardiogram showed normal LV function with a mean gradient across the aortic valve of 33 mmHg with a valve area of 1.1 cm .  Calixto also reports today that he is not experiencing significant dizziness or lightheadedness when he first stands up.    Sitting blood pressure today was 102/64 with a pulse of 90, supine blood pressure is 121/76 and standing blood pressure was 91/58.  There was a small orthostatic blood pressure drop but he did not have any associated symptoms      ASSESSMENT/PLAN:    1.  Syncope.  The patient experienced 2 episodes of syncope both of them at night after getting up from sleep, once to go to the bathroom and another time for reasons that he is not clear on.  He does not recall details of these events but does not believe that he really had any symptoms of dizziness or lightheadedness, and he denies orthostatic dizziness throughout the day.  Nevertheless, he does have some orthostatic blood pressure drop on exam today.  I suspect that his syncope was due to orthostasis and to combat that I think we should allow his resting blood pressure to drift higher.  Dr. Espinosa decreased his amlodipine from 5 down to 2.5 mg and I we will have it stopped entirely.  Of also asked him to cut his metoprolol dose from 100 mg daily down to 50 mg daily.  I have asked him to keep track of his blood pressure and if he sees a significant increase he will contact me.  He has further syncope he will contact me as well.  2.  Aortic stenosis.  Although aortic stenosis can be associated with syncope, I think this is unlikely since his energy and stamina still seem to be pretty normal and his last echo did not show severe aortic stenosis.  Nevertheless, this study  will be repeated and if it shows significant progression we may need to talk about TAVR.    Thank you for inviting me to participate in the care of your patient.  Please don't hesitate to call if I can be of further assistance.  40 minutes were spent today reviewing the chart and other records, interviewing and examining the patient, and documenting our visit.    Chart documentation was completed, in part, with [employer] voice-recognition software. Even though reviewed, some grammatical, spelling, and word errors may remain.       Orders Placed This Encounter   Procedures    Follow-Up with Cardiology KAYE    EKG 12-lead complete w/read - Clinics (performed today)     Orders Placed This Encounter   Medications    metoprolol succinate ER (TOPROL XL) 50 MG 24 hr tablet     Sig: Take 1 tablet (50 mg) by mouth daily     Refill:  3     Medications Discontinued During This Encounter   Medication Reason    amLODIPine (NORVASC) 5 MG tablet     metoprolol succinate ER (TOPROL XL) 100 MG 24 hr tablet        10 year ASCVD risk: The ASCVD Risk score (Jennifer CASTILLO, et al., 2019) failed to calculate for the following reasons:    The 2019 ASCVD risk score is only valid for ages 40 to 79    The patient has a prior MI or stroke diagnosis    Encounter Diagnoses   Name Primary?    Syncope, unspecified syncope type     Coronary artery disease involving native coronary artery of native heart without angina pectoris     Stage 3a chronic kidney disease (H)     Hypertension goal BP (blood pressure) < 140/90     NSTEMI (non-ST elevated myocardial infarction) (H)     Nonrheumatic aortic valve stenosis Yes       CURRENT MEDICATIONS:  Current Outpatient Medications   Medication Sig Dispense Refill    aspirin 81 MG EC tablet Take 81 mg by mouth At Bedtime      atorvastatin (LIPITOR) 40 MG tablet Take 1 tablet (40 mg) by mouth at bedtime 90 tablet 3    clotrimazole-betamethasone (LOTRISONE) 1-0.05 % external cream Apply topically 2 times daily as needed  (rash) 45 g 1    finasteride (PROSCAR) 5 MG tablet Take 1 tablet (5 mg) by mouth daily 90 tablet 3    metoprolol succinate ER (TOPROL XL) 50 MG 24 hr tablet Take 1 tablet (50 mg) by mouth daily  3    multivitamin, therapeutic (THERA-VIT) TABS tablet Take 1 tablet by mouth At Bedtime      omeprazole (PRILOSEC) 20 MG DR capsule Take 1 capsule (20 mg) by mouth daily as needed (heartburn) 90 capsule 3    selenium sulfide (SELSUN) 2.5 % external lotion APPLY DAILY TO EVERY OTHER DAY, LATHER, WAIT 10 MINUTES AND RINSE 118 mL 3    terazosin (HYTRIN) 10 MG capsule Take 1 capsule (10 mg) by mouth at bedtime 90 capsule 3    triamcinolone (KENALOG) 0.1 % external cream Apply topically 2 times daily as needed for irritation         ALLERGIES     Allergies   Allergen Reactions    Pollen Extract      Tree pollen         PAST MEDICAL HISTORY:  Past Medical History:   Diagnosis Date    Aortic root dilatation (H24)     Aortic stenosis     moderate - Dr Carlson    Arthritis .    Ascending aorta dilatation (H24)     BCC (basal cell carcinoma of skin)     right jaw, rt parietal, Dr Limon/[place], 2016, 10/2018, 11/2019, 3/2022    Choroidal nevus of left eye     Dr Connor    Colon polyps     Coronary artery disease involving native coronary artery of native heart without angina pectoris 01/2021    NSTEMI, cardiac cath 1/2021: non-obstructive    ED (erectile dysfunction)     Family history of cardiovascular disease     GERD (gastroesophageal reflux disease) 2013    Hematuria 1999    dr scott    Hernia, abdominal     Hyperlipidemia LDL goal <70 1990    Hypertension goal BP (blood pressure) < 140/90 1990    Hypertrophy of prostate without urinary obstruction and other lower urinary tract symptoms (LUTS)     Lumbar stenosis 2017    mild to moderate foraminal and central    Lung nodules 11/2010    CT scan stable    Nonrheumatic aortic valve stenosis 01/2021    mild to moderate    obstructive SLEEP APNEA 05/2007    CPAP    Other and  unspecified 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 05/25/2005    Palpitations     PSVT 11/2017    few runs, rare PVC    Restrictive lung disease     Seasonal allergies     Sensorineural hearing loss (SNHL) of both ears 05/2005    hearing aids, L>R [facility]    Stage 3a chronic kidney disease (H)        PAST SURGICAL HISTORY:  Past Surgical History:   Procedure Laterality Date    CV HEART CATHETERIZATION WITH POSSIBLE INTERVENTION N/A 01/07/2021    Procedure: Heart Catheterization with Possible Intervention;  Surgeon: Raúl Rich MD;  Location:  [facility]    CV LEFT HEART CATH N/A 01/07/2021    Procedure: Left Heart Cath;  Surgeon: Raúl Rich MD;  Location:  [facility]    CYSTOSCOPY      NM MPI WITH LEXISCAN  05/2021    normal    STRESS ECHO ([facility])  7/09, 5/12, 7/17    Negative    TONSILLECTOMY & ADENOIDECTOMY  1946    ZZHC COLONOSCOPY THRU STOMA, DIAGNOSTIC  2005, 5/10, 5/11    Polyps-> due 2015 - Ct Colonography negative    ZZHC REPAIR INCISIONAL HERNIA,REDUCIBLE  1960    Hernia Repair, Incisional, Unilateral       FAMILY HISTORY:  Family History   Problem Relation Age of Onset    C.A.D. Father         age 50's    Hypertension Father     C.A.D. Brother     C.A.D. Brother         mid 40's    Diabetes Brother     Cancer Brother         pancreatic CA    Coronary Artery Disease Brother     Cancer - colorectal No family hx of     Prostate Cancer No family hx of        SOCIAL HISTORY:  Social History     Socioeconomic History    Marital status:      Spouse name: Leticia    Number of children: 3    Years of education: 18    Highest education level: None   Occupational History     Employer: RETIRED   Tobacco Use    Smoking status: Never    Smokeless tobacco: Never   Vaping Use    Vaping Use: Never used   Substance and Sexual Activity    Alcohol use: Yes     Alcohol/week: 5.0 - 8.0 standard drinks of alcohol     Types: 5 - 8  Standard drinks or equivalent per week     Comment: 4-5 drinks per week avg    Drug use: No    Sexual activity: Yes     Partners: Female   Other Topics Concern    Blood Transfusions No    Caffeine Concern Yes     Comment: 1 serv/day, rare pop, occas chocolate (3-4/yr)    Sleep Concern Yes     Comment: MIMI, wakes feeling rested    Exercise Yes     Comment: 30-45' 2-3 x/wk    Seat Belt Yes    Parent/sibling w/ CABG, MI or angioplasty before 65F 55M? Yes     Social Determinants of Health     Financial Resource Strain: Unknown (1/5/2024)    Financial Resource Strain     Within the past 12 months, have you or your family members you live with been unable to get utilities (heat, electricity) when it was really needed?: Patient declined   Food Insecurity: Low Risk  (1/5/2024)    Food Insecurity     Within the past 12 months, did you worry that your food would run out before you got money to buy more?: No     Within the past 12 months, did the food you bought just not last and you didn t have money to get more?: Patient declined   Transportation Needs: Unknown (1/5/2024)    Transportation Needs     Within the past 12 months, has lack of transportation kept you from medical appointments, getting your medicines, non-medical meetings or appointments, work, or from getting things that you need?: Patient declined   Interpersonal Safety: Low Risk  (10/19/2023)    Interpersonal Safety     Do you feel physically and emotionally safe where you currently live?: Yes     Within the past 12 months, have you been hit, slapped, kicked or otherwise physically hurt by someone?: No     Within the past 12 months, have you been humiliated or emotionally abused in other ways by your partner or ex-partner?: No   Housing Stability: Unknown (1/5/2024)    Housing Stability     Do you have housing? : Patient declined     Are you worried about losing your housing?: Patient declined       Review of Systems:  Skin:  not assessed     Eyes:  not assessed  "   ENT:  not assessed    Respiratory:  Positive for shortness of breath;sleep apnea;CPAP  Cardiovascular:    Positive for;syncope or near-syncope;edema  Gastroenterology: not assessed    Genitourinary:  not assessed    Musculoskeletal:  not assessed    Neurologic:  not assessed    Psychiatric:  not assessed    Heme/Lymph/Imm:  not assessed    Endocrine:  not assessed      Physical Exam:  Vitals: Ht 1.715 m (5' 7.5\")   Wt 93.9 kg (207 lb)   SpO2 93%   BMI 31.94 kg/m      Constitutional:           Skin:           Head:           Eyes:           ENT:           Neck:           Chest:           Cardiac:                    Abdomen:           Vascular:                                        Extremities and Muscular Skeletal:              Neurological:           Psych:        Recent Lab Results:  LIPID RESULTS:  Lab Results   Component Value Date    CHOL 145 10/16/2023    CHOL 139 05/06/2021    HDL 58 10/16/2023    HDL 55 05/06/2021    LDL 65 10/16/2023    LDL 59 05/06/2021    TRIG 112 10/16/2023    TRIG 126 05/06/2021    CHOLHDLRATIO 3.2 05/14/2015       LIVER ENZYME RESULTS:  Lab Results   Component Value Date    AST 31 04/04/2024    AST 29 05/06/2021    ALT 21 04/04/2024    ALT 42 05/06/2021       CBC RESULTS:  Lab Results   Component Value Date    WBC 10.7 04/04/2024    WBC 8.0 05/06/2021    RBC 3.76 (L) 04/04/2024    RBC 4.09 (L) 05/06/2021    HGB 12.8 (L) 04/04/2024    HGB 13.4 05/06/2021    HCT 37.6 (L) 04/04/2024    HCT 40.6 05/06/2021     04/04/2024    MCV 99 05/06/2021    MCH 34.0 (H) 04/04/2024    MCH 32.8 05/06/2021    MCHC 34.0 04/04/2024    MCHC 33.0 05/06/2021    RDW 13.7 04/04/2024    RDW 13.0 05/06/2021     04/04/2024     05/06/2021       BMP RESULTS:  Lab Results   Component Value Date     04/04/2024     05/06/2021    POTASSIUM 4.5 04/04/2024    POTASSIUM 4.0 06/01/2022    POTASSIUM 4.5 05/06/2021    CHLORIDE 104 04/04/2024    CHLORIDE 108 06/01/2022    CHLORIDE 111 (H) " "05/06/2021    CO2 21 (L) 04/04/2024    CO2 22 06/01/2022    CO2 24 05/06/2021    ANIONGAP 14 04/04/2024    ANIONGAP 9 06/01/2022    ANIONGAP 5 05/06/2021     (H) 04/04/2024     (H) 06/01/2022    GLC 98 05/06/2021    BUN 28.5 (H) 04/04/2024    BUN 28 06/01/2022    BUN 29 05/06/2021    CR 1.38 (H) 04/04/2024    CR 1.45 (H) 05/06/2021    GFRESTIMATED 49 (L) 04/04/2024    GFRESTIMATED 43 (L) 05/06/2021    GFRESTBLACK 50 (L) 05/06/2021    TRAN 9.0 04/04/2024    TRAN 9.2 05/06/2021        A1C RESULTS:  Lab Results   Component Value Date    A1C 5.6 10/16/2023    A1C 5.9 (H) 05/06/2021       INR RESULTS:  No results found for: \"INR\"      Dariel Carlson MD, FACC    CC  Chance Espinosa MD  [id number] [place] [address]                  "

## 2024-04-09 NOTE — LETTER
4/9/2024    Chance Espinosa MD  [address] [facility] [address]    RE: Kevin Fierro       Dear Colleague,     I had the pleasure of seeing Kevin Fierro in the [facility].  HISTORY:    Kevin Fierro is a pleasant 89-year-old gentleman accompanied by his wife and his daughter today.  He has a history of dyspnea on exertion and palpitations as well as a small previous non-STEMI with an angiogram showing minimal disease.  He has moderate aortic stenosis, obstructive sleep apnea for which he uses CPAP, and a history of hypertension which has been well-controlled.  He was asked to see me for an additional visit today because of some recent syncopal episodes.    Calixto reports that he has had 2 episodes of syncope in the last few months.  On 1 of these episodes he had been sleeping soundly and got up at about 3 AM to go to the bathroom.  He does not recall whether he had urinated yet but it sounds like he probably did since his wife states that he was facing back toward the bed and he did not have loss of urine with his syncopal episode.  He does not think he had any warning symptoms and his wife reports that he was not out for very long.  He did not lose bowel or bladder control.  He skinned his elbow and that fall, but no other injuries.    The second episode also occurred in the middle of the night.  He went to bed but then got up to continue sleeping in his lounge chair.  He does not recall why he changed to the chair and states that he usually does not do that but he does not think it was because of dyspnea.  He got up out of the chair for reasons that he does not recall and fell on the carpet.  He thinks that he had some warning before falling since he tried to grab onto something but he does not recall details.  He did not injure himself with that fall, and again he did not lose bowel or bladder control.  On neither of these episodes does he recall feeling lightheaded dizzy or  narrowed vision prior to his fall.  However, his recollection of both episodes is sparse.    In general Calixto reports that he feels that he is doing well.  At age 89 he has, of course, noted a decline in exercise capacity but he does not think there has been any abrupt change in the last 6 to 12 months.  He is still able to do fairly normal activities around the house.  He is not finding himself getting particularly short of breath or fatigued with activity.  His last echocardiogram showed normal LV function with a mean gradient across the aortic valve of 33 mmHg with a valve area of 1.1 cm .  Calixto also reports today that he is not experiencing significant dizziness or lightheadedness when he first stands up.    Sitting blood pressure today was 102/64 with a pulse of 90, supine blood pressure is 121/76 and standing blood pressure was 91/58.  There was a small orthostatic blood pressure drop but he did not have any associated symptoms      ASSESSMENT/PLAN:    1.  Syncope.  The patient experienced 2 episodes of syncope both of them at night after getting up from sleep, once to go to the bathroom and another time for reasons that he is not clear on.  He does not recall details of these events but does not believe that he really had any symptoms of dizziness or lightheadedness, and he denies orthostatic dizziness throughout the day.  Nevertheless, he does have some orthostatic blood pressure drop on exam today.  I suspect that his syncope was due to orthostasis and to combat that I think we should allow his resting blood pressure to drift higher.  Dr. Espinosa decreased his amlodipine from 5 down to 2.5 mg and I we will have it stopped entirely.  Of also asked him to cut his metoprolol dose from 100 mg daily down to 50 mg daily.  I have asked him to keep track of his blood pressure and if he sees a significant increase he will contact me.  He has further syncope he will contact me as well.  2.  Aortic stenosis.  Although  aortic stenosis can be associated with syncope, I think this is unlikely since his energy and stamina still seem to be pretty normal and his last echo did not show severe aortic stenosis.  Nevertheless, this study will be repeated and if it shows significant progression we may need to talk about TAVR.    Thank you for inviting me to participate in the care of your patient.  Please don't hesitate to call if I can be of further assistance.  40 minutes were spent today reviewing the chart and other records, interviewing and examining the patient, and documenting our visit.    Chart documentation was completed, in part, with [employer] voice-recognition software. Even though reviewed, some grammatical, spelling, and word errors may remain.       Orders Placed This Encounter   Procedures    Follow-Up with Cardiology KAYE    EKG 12-lead complete w/read - Clinics (performed today)     Orders Placed This Encounter   Medications    metoprolol succinate ER (TOPROL XL) 50 MG 24 hr tablet     Sig: Take 1 tablet (50 mg) by mouth daily     Refill:  3     Medications Discontinued During This Encounter   Medication Reason    amLODIPine (NORVASC) 5 MG tablet     metoprolol succinate ER (TOPROL XL) 100 MG 24 hr tablet        10 year ASCVD risk: The ASCVD Risk score (Jennifer DK, et al., 2019) failed to calculate for the following reasons:    The 2019 ASCVD risk score is only valid for ages 40 to 79    The patient has a prior MI or stroke diagnosis    Encounter Diagnoses   Name Primary?    Syncope, unspecified syncope type     Coronary artery disease involving native coronary artery of native heart without angina pectoris     Stage 3a chronic kidney disease (H)     Hypertension goal BP (blood pressure) < 140/90     NSTEMI (non-ST elevated myocardial infarction) (H)     Nonrheumatic aortic valve stenosis Yes       CURRENT MEDICATIONS:  Current Outpatient Medications   Medication Sig Dispense Refill    aspirin 81 MG EC tablet Take 81 mg by  mouth At Bedtime      atorvastatin (LIPITOR) 40 MG tablet Take 1 tablet (40 mg) by mouth at bedtime 90 tablet 3    clotrimazole-betamethasone (LOTRISONE) 1-0.05 % external cream Apply topically 2 times daily as needed (rash) 45 g 1    finasteride (PROSCAR) 5 MG tablet Take 1 tablet (5 mg) by mouth daily 90 tablet 3    metoprolol succinate ER (TOPROL XL) 50 MG 24 hr tablet Take 1 tablet (50 mg) by mouth daily  3    multivitamin, therapeutic (THERA-VIT) TABS tablet Take 1 tablet by mouth At Bedtime      omeprazole (PRILOSEC) 20 MG DR capsule Take 1 capsule (20 mg) by mouth daily as needed (heartburn) 90 capsule 3    selenium sulfide (SELSUN) 2.5 % external lotion APPLY DAILY TO EVERY OTHER DAY, LATHER, WAIT 10 MINUTES AND RINSE 118 mL 3    terazosin (HYTRIN) 10 MG capsule Take 1 capsule (10 mg) by mouth at bedtime 90 capsule 3    triamcinolone (KENALOG) 0.1 % external cream Apply topically 2 times daily as needed for irritation         ALLERGIES     Allergies   Allergen Reactions    Pollen Extract      Tree pollen         PAST MEDICAL HISTORY:  Past Medical History:   Diagnosis Date    Aortic root dilatation (H24)     Aortic stenosis     moderate - Dr Onealurmes    Arthritis .    Ascending aorta dilatation (H24)     BCC (basal cell carcinoma of skin)     right jaw, rt parietal, Dr Limon/[place], 2016, 10/2018, 11/2019, 3/2022    Choroidal nevus of left eye     Dr Connor    Colon polyps     Coronary artery disease involving native coronary artery of native heart without angina pectoris 01/2021    NSTEMI, cardiac cath 1/2021: non-obstructive    ED (erectile dysfunction)     Family history of cardiovascular disease     GERD (gastroesophageal reflux disease) 2013    Hematuria 1999    dr scott    Hernia, abdominal     Hyperlipidemia LDL goal <70 1990    Hypertension goal BP (blood pressure) < 140/90 1990    Hypertrophy of prostate without urinary obstruction and other lower urinary tract symptoms (LUTS)     Lumbar stenosis  2017    mild to moderate foraminal and central    Lung nodules 11/2010    CT scan stable    Nonrheumatic aortic valve stenosis 01/2021    mild to moderate    obstructive SLEEP APNEA 05/2007    CPAP    Other and unspecified 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 05/25/2005    Palpitations     PSVT 11/2017    few runs, rare PVC    Restrictive lung disease     Seasonal allergies     Sensorineural hearing loss (SNHL) of both ears 05/2005    hearing aids, L>R [facility]    Stage 3a chronic kidney disease (H)        PAST SURGICAL HISTORY:  Past Surgical History:   Procedure Laterality Date    CV HEART CATHETERIZATION WITH POSSIBLE INTERVENTION N/A 01/07/2021    Procedure: Heart Catheterization with Possible Intervention;  Surgeon: Raúl Rich MD;  Location:  HEART CARDIAC CATH LAB    CV LEFT HEART CATH N/A 01/07/2021    Procedure: Left Heart Cath;  Surgeon: Raúl Rich MD;  Location:  HEART CARDIAC CATH LAB    CYSTOSCOPY      NM MPI WITH LEXISCAN  05/2021    normal    STRESS ECHO (METRO)  7/09, 5/12, 7/17    Negative    TONSILLECTOMY & ADENOIDECTOMY  1946    ZZHC COLONOSCOPY THRU STOMA, DIAGNOSTIC  2005, 5/10, 5/11    Polyps-> due 2015 - Ct Colonography negative    ZZHC REPAIR INCISIONAL HERNIA,REDUCIBLE  1960    Hernia Repair, Incisional, Unilateral       FAMILY HISTORY:  Family History   Problem Relation Age of Onset    C.A.D. Father         age 50's    Hypertension Father     C.A.D. Brother     C.A.D. Brother         mid 40's    Diabetes Brother     Cancer Brother         pancreatic CA    Coronary Artery Disease Brother     Cancer - colorectal No family hx of     Prostate Cancer No family hx of        SOCIAL HISTORY:  Social History     Socioeconomic History    Marital status:      Spouse name: Leticia    Number of children: 3    Years of education: 18    Highest education level: None   Occupational History     Employer: RETIRED   Tobacco Use     Smoking status: Never    Smokeless tobacco: Never   Vaping Use    Vaping Use: Never used   Substance and Sexual Activity    Alcohol use: Yes     Alcohol/week: 5.0 - 8.0 standard drinks of alcohol     Types: 5 - 8 Standard drinks or equivalent per week     Comment: 4-5 drinks per week avg    Drug use: No    Sexual activity: Yes     Partners: Female   Other Topics Concern    Blood Transfusions No    Caffeine Concern Yes     Comment: 1 serv/day, rare pop, occas chocolate (3-4/yr)    Sleep Concern Yes     Comment: MIMI, wakes feeling rested    Exercise Yes     Comment: 30-45' 2-3 x/wk    Seat Belt Yes    Parent/sibling w/ CABG, MI or angioplasty before 65F 55M? Yes     Social Determinants of Health     Financial Resource Strain: Unknown (1/5/2024)    Financial Resource Strain     Within the past 12 months, have you or your family members you live with been unable to get utilities (heat, electricity) when it was really needed?: Patient declined   Food Insecurity: Low Risk  (1/5/2024)    Food Insecurity     Within the past 12 months, did you worry that your food would run out before you got money to buy more?: No     Within the past 12 months, did the food you bought just not last and you didn t have money to get more?: Patient declined   Transportation Needs: Unknown (1/5/2024)    Transportation Needs     Within the past 12 months, has lack of transportation kept you from medical appointments, getting your medicines, non-medical meetings or appointments, work, or from getting things that you need?: Patient declined   Interpersonal Safety: Low Risk  (10/19/2023)    Interpersonal Safety     Do you feel physically and emotionally safe where you currently live?: Yes     Within the past 12 months, have you been hit, slapped, kicked or otherwise physically hurt by someone?: No     Within the past 12 months, have you been humiliated or emotionally abused in other ways by your partner or ex-partner?: No   Housing Stability:  "Unknown (1/5/2024)    Housing Stability     Do you have housing? : Patient declined     Are you worried about losing your housing?: Patient declined       Review of Systems:  Skin:  not assessed     Eyes:  not assessed    ENT:  not assessed    Respiratory:  Positive for shortness of breath;sleep apnea;CPAP  Cardiovascular:    Positive for;syncope or near-syncope;edema  Gastroenterology: not assessed    Genitourinary:  not assessed    Musculoskeletal:  not assessed    Neurologic:  not assessed    Psychiatric:  not assessed    Heme/Lymph/Imm:  not assessed    Endocrine:  not assessed      Physical Exam:  Vitals: Ht 1.715 m (5' 7.5\")   Wt 93.9 kg (207 lb)   SpO2 93%   BMI 31.94 kg/m      Constitutional:           Skin:           Head:           Eyes:           ENT:           Neck:           Chest:           Cardiac:                    Abdomen:           Vascular:                                        Extremities and Muscular Skeletal:              Neurological:           Psych:        Recent Lab Results:  LIPID RESULTS:  Lab Results   Component Value Date    CHOL 145 10/16/2023    CHOL 139 05/06/2021    HDL 58 10/16/2023    HDL 55 05/06/2021    LDL 65 10/16/2023    LDL 59 05/06/2021    TRIG 112 10/16/2023    TRIG 126 05/06/2021    CHOLHDLRATIO 3.2 05/14/2015       LIVER ENZYME RESULTS:  Lab Results   Component Value Date    AST 31 04/04/2024    AST 29 05/06/2021    ALT 21 04/04/2024    ALT 42 05/06/2021       CBC RESULTS:  Lab Results   Component Value Date    WBC 10.7 04/04/2024    WBC 8.0 05/06/2021    RBC 3.76 (L) 04/04/2024    RBC 4.09 (L) 05/06/2021    HGB 12.8 (L) 04/04/2024    HGB 13.4 05/06/2021    HCT 37.6 (L) 04/04/2024    HCT 40.6 05/06/2021     04/04/2024    MCV 99 05/06/2021    MCH 34.0 (H) 04/04/2024    MCH 32.8 05/06/2021    MCHC 34.0 04/04/2024    MCHC 33.0 05/06/2021    RDW 13.7 04/04/2024    RDW 13.0 05/06/2021     04/04/2024     05/06/2021       BMP RESULTS:  Lab Results " "  Component Value Date     04/04/2024     05/06/2021    POTASSIUM 4.5 04/04/2024    POTASSIUM 4.0 06/01/2022    POTASSIUM 4.5 05/06/2021    CHLORIDE 104 04/04/2024    CHLORIDE 108 06/01/2022    CHLORIDE 111 (H) 05/06/2021    CO2 21 (L) 04/04/2024    CO2 22 06/01/2022    CO2 24 05/06/2021    ANIONGAP 14 04/04/2024    ANIONGAP 9 06/01/2022    ANIONGAP 5 05/06/2021     (H) 04/04/2024     (H) 06/01/2022    GLC 98 05/06/2021    BUN 28.5 (H) 04/04/2024    BUN 28 06/01/2022    BUN 29 05/06/2021    CR 1.38 (H) 04/04/2024    CR 1.45 (H) 05/06/2021    GFRESTIMATED 49 (L) 04/04/2024    GFRESTIMATED 43 (L) 05/06/2021    GFRESTBLACK 50 (L) 05/06/2021    TRAN 9.0 04/04/2024    TRAN 9.2 05/06/2021        A1C RESULTS:  Lab Results   Component Value Date    A1C 5.6 10/16/2023    A1C 5.9 (H) 05/06/2021       INR RESULTS:  No results found for: \"INR\"      Dariel Carlson MD, [facility]    CC  Chance Espinosa MD  [address] [place], [place] [id number]      Thank you for allowing me to participate in the care of your patient.      Sincerely,     Dariel Carlson MD     [facility]  "

## 2024-04-16 ENCOUNTER — TELEPHONE (OUTPATIENT)
Dept: FAMILY MEDICINE | Facility: CLINIC | Age: 89
End: 2024-04-16
Payer: COMMERCIAL

## 2024-04-16 NOTE — TELEPHONE ENCOUNTER
Attempt # 1    Called #   Telephone Information:   Mobile [phone]         Left a non detailed VM     Danielle Mills RN, BSN  [facility]

## 2024-04-16 NOTE — TELEPHONE ENCOUNTER
Pt's wife (Leticia) sent the following message via her [employer]. Message copy/pasted to get into pt's chart:      Calixto has been instructed not to take blood pressure medications.   Please share this information about Calixto s blood pressure with Dr. Espinosa:   4/10 115/69   4/11. 124/92   4/12.  119/77   4/13.   107/72   4/14.    80/62   Please have him share it with Dr. Durand.  Thank you.   Leticia Fierro

## 2024-04-17 NOTE — TELEPHONE ENCOUNTER
Per Dr Carlson notes    Stop Amlodipine  Decrease Metoprolol to 50 mg per day    Close follow up    Watch BP/P

## 2024-04-17 NOTE — TELEPHONE ENCOUNTER
Called Malika, left detailed VM with information below and advised to call back with questions.     Annabel Zambrano RN [facility]

## 2024-04-17 NOTE — TELEPHONE ENCOUNTER
Malika, Home care nurse calls. [id number]. She is with pt now.     Phone #[phone]  Fax [phone]    She is asking about the medication changes from Dr Carlson on 4/9. Pt told Malika Nurse that his Amlodipine and Metoprolol have been stopped. However, his Amlodipine was stopped BUT his Metoprolol was decreased to 50 mg daily, from 100 mg.   She states he has NOT been taking the Metoprolol since his cardiology appt.   She states his heart rate has been elevated, .     She is with pt currently and will have him start the Metoprolol 50 mg daily.     Today, /51, HR 99.     He denies any dizziness since stopping the medications.     Malika is asking for fax of current med list. Faxed over his med list to the fax #.     She is asking to be notified if there are any other medication changes, via fax or phone.

## 2024-04-23 ENCOUNTER — HOSPITAL ENCOUNTER (OUTPATIENT)
Dept: CARDIOLOGY | Facility: CLINIC | Age: 89
Discharge: HOME | End: 2024-04-23
Attending: FAMILY MEDICINE | Admitting: FAMILY MEDICINE
Payer: COMMERCIAL

## 2024-04-23 DIAGNOSIS — R55 SYNCOPE, UNSPECIFIED SYNCOPE TYPE: ICD-10-CM

## 2024-04-23 DIAGNOSIS — I35.0 AORTIC VALVE STENOSIS, ETIOLOGY OF CARDIAC VALVE DISEASE UNSPECIFIED: ICD-10-CM

## 2024-04-23 DIAGNOSIS — I25.10 CORONARY ARTERY DISEASE INVOLVING NATIVE CORONARY ARTERY OF NATIVE HEART WITHOUT ANGINA PECTORIS: ICD-10-CM

## 2024-04-23 LAB — LVEF ECHO: NORMAL

## 2024-04-23 PROCEDURE — 93306 TTE W/DOPPLER COMPLETE: CPT

## 2024-04-23 PROCEDURE — 93306 TTE W/DOPPLER COMPLETE: CPT | Mod: 26 | Performed by: INTERNAL MEDICINE

## 2024-04-29 PROCEDURE — 93248 EXT ECG>7D<15D REV&INTERPJ: CPT | Performed by: INTERNAL MEDICINE

## 2024-04-29 NOTE — RESULT ENCOUNTER NOTE
"Per Dr. Carlson, \"Echo stable with moderate aortic stenosis, does not explain his recent syncope.  Continue current cares.\"    Patient updated with results and comments via MyChart. "

## 2024-05-06 ENCOUNTER — TELEPHONE (OUTPATIENT)
Dept: FAMILY MEDICINE | Facility: CLINIC | Age: 89
End: 2024-05-06
Payer: COMMERCIAL

## 2024-05-06 ENCOUNTER — TELEPHONE (OUTPATIENT)
Dept: CARDIOLOGY | Facility: CLINIC | Age: 89
End: 2024-05-06
Payer: COMMERCIAL

## 2024-05-06 NOTE — TELEPHONE ENCOUNTER
Called patient to review results. Pt and wife are heading out the door for an appointment. They will return a call after the office visit.       ----- Message from Dariel Carlson MD sent at 5/5/2024  9:18 PM CDT -----  Monitor showed an asymptomatic run of VT @ 153 bpm.  Let's get magnesium checked and repeat Lexiscan.  If both of these are normal, no further evaluation needed.

## 2024-05-06 NOTE — TELEPHONE ENCOUNTER
S-(situation): Spouse calls to request Blood Pressure Check appointment tomorrow (preferably 1 or 1:30 pm)    B-(background): Spouse noticed sudden increase in BP since May 1. Uses home monitor and not sure if reading is accurate.     A-(assessment): BP on 05/01: 142/84, 05/05:149/96 which went down to 115/66 after walking. No other complaints/symptoms.     R-(recommendations): Routing to care team for review and advise.     Can call patient at [phone] after 1:30 PM today.

## 2024-05-07 NOTE — TELEPHONE ENCOUNTER
Called # below     Left a non detailed VM     Danielle Mills RN, BSN  [facility]

## 2024-05-08 ENCOUNTER — MEDICAL CORRESPONDENCE (OUTPATIENT)
Dept: HEALTH INFORMATION MANAGEMENT | Facility: CLINIC | Age: 89
End: 2024-05-08
Payer: COMMERCIAL

## 2024-05-08 NOTE — TELEPHONE ENCOUNTER
Called # [phone] (home)     Advised pt on the information below     Patient stated an understanding and agreed with plan.    Made a nurse visit for BP     Danielle Mills RN, BSN  [facility]

## 2024-05-09 ENCOUNTER — ALLIED HEALTH/NURSE VISIT (OUTPATIENT)
Dept: NURSING | Facility: CLINIC | Age: 89
End: 2024-05-09
Payer: COMMERCIAL

## 2024-05-09 VITALS
SYSTOLIC BLOOD PRESSURE: 118 MMHG | RESPIRATION RATE: 20 BRPM | BODY MASS INDEX: 31.4 KG/M2 | HEART RATE: 95 BPM | DIASTOLIC BLOOD PRESSURE: 64 MMHG | WEIGHT: 203.5 LBS | OXYGEN SATURATION: 97 %

## 2024-05-09 DIAGNOSIS — I10 HYPERTENSION GOAL BP (BLOOD PRESSURE) < 140/90: Primary | ICD-10-CM

## 2024-05-09 PROCEDURE — 99207 PR NO CHARGE NURSE ONLY: CPT

## 2024-05-09 ASSESSMENT — PAIN SCALES - GENERAL: PAINLEVEL: NO PAIN (0)

## 2024-05-09 NOTE — PROGRESS NOTES
Kevin Fierro is being followed for Blood Pressure management.    Informed patient of 5/6 cardiology note. Gave phone # To call cardiology . Explained magnesium check and lexiscan that is recommended.     BP Readings from Last 3 Encounters:   05/09/24 118/64   04/04/24 98/60   01/05/24 114/68       Wt Readings from Last 3 Encounters:   05/09/24 92.3 kg (203 lb 8 oz)   04/09/24 93.9 kg (207 lb)   04/04/24 94.8 kg (209 lb 1.6 oz)       Is pulse 55 or greater? - Yes    Pulse Readings from Last 3 Encounters:   05/09/24 95   04/04/24 94   01/05/24 89       Current blood pressure medication(s):Took metoprolol at 9am today.    Current Outpatient Medications   Medication Sig Dispense Refill    aspirin 81 MG EC tablet Take 81 mg by mouth At Bedtime      atorvastatin (LIPITOR) 40 MG tablet Take 1 tablet (40 mg) by mouth at bedtime 90 tablet 3    clotrimazole-betamethasone (LOTRISONE) 1-0.05 % external cream Apply topically 2 times daily as needed (rash) 45 g 1    finasteride (PROSCAR) 5 MG tablet Take 1 tablet (5 mg) by mouth daily 90 tablet 3    metoprolol succinate ER (TOPROL XL) 50 MG 24 hr tablet Take 1 tablet (50 mg) by mouth daily  3    multivitamin, therapeutic (THERA-VIT) TABS tablet Take 1 tablet by mouth At Bedtime      omeprazole (PRILOSEC) 20 MG DR capsule Take 1 capsule (20 mg) by mouth daily as needed (heartburn) 90 capsule 3    selenium sulfide (SELSUN) 2.5 % external lotion APPLY DAILY TO EVERY OTHER DAY, LATHER, WAIT 10 MINUTES AND RINSE 118 mL 3    terazosin (HYTRIN) 10 MG capsule Take 1 capsule (10 mg) by mouth at bedtime 90 capsule 3    triamcinolone (KENALOG) 0.1 % external cream Apply topically 2 times daily as needed for irritation            1. Follow up instructions include:     Per provider .      SUBJECTIVE:                                                    The patient is taking medication as prescribed and is tolerating well.   Patient is monitoring Blood Pressure at home.   Last 3 home  readings see list- placed on providers desk.     Out of the following complicating factors: Cough, Headache, Lightheadedness, Shortness of breath, Fatigue, Nausea, Sexual Dysfunction, New onset of swelling or edema, Weakness and New onset of Chest Pain, the patient reports:  Shortness of breath- baseline mild with walking.     OBJECTIVE:                                                      Today's BP completed using cuff size: regular on right side arm.      Potassium   Date Value Ref Range Status   04/04/2024 4.5 3.4 - 5.3 mmol/L Final   06/01/2022 4.0 3.4 - 5.3 mmol/L Final   05/06/2021 4.5 3.4 - 5.3 mmol/L Final     Creatinine   Date Value Ref Range Status   04/04/2024 1.38 (H) 0.67 - 1.17 mg/dL Final   05/06/2021 1.45 (H) 0.66 - 1.25 mg/dL Final     Urea Nitrogen   Date Value Ref Range Status   04/04/2024 28.5 (H) 8.0 - 23.0 mg/dL Final   06/01/2022 28 7 - 30 mg/dL Final   05/06/2021 29 7 - 30 mg/dL Final     GFR Estimate   Date Value Ref Range Status   04/04/2024 49 (L) >60 mL/min/1.73m2 Final   05/06/2021 43 (L) >60 mL/min/[1.73_m2] Final     Comment:     Non  GFR Calc  Starting 12/18/2018, serum creatinine based estimated GFR (eGFR) will be   calculated using the Chronic Kidney Disease Epidemiology Collaboration   (CKD-EPI) equation.           Education:  general discussion/verbal explanation    Explained to call clinic and talk with a nurse if he is having low blood pressure < 100 and or increased/new SOB, dizziness, feeling lightheaded, cough, headache,  excessive fatigue, swelling in legs feet or ankles     Ways to help improve BP/HTN:   Medications as directed  Call if home blood pressure is persistently > 130/80 and or experiencing symptoms listed above.   Lose weight  Diet low in fat and rich in fruits, vegetables and low fat dairy products  Reduce salt in diet  Do something active for at least 30 minutes a day on most days of the week  Cut down on alcohol (if you drink more than 2  drinks per day)  Decrease stress (exercise, read, yoga, meditation, time for self, etc.)   Patient was given an opportunity to ask questions.    Patient verbalized understanding of this plan and is agreeable.    Lizzy Briones RN

## 2024-05-14 ENCOUNTER — MEDICAL CORRESPONDENCE (OUTPATIENT)
Dept: HEALTH INFORMATION MANAGEMENT | Facility: CLINIC | Age: 89
End: 2024-05-14

## 2024-05-14 ENCOUNTER — TELEPHONE (OUTPATIENT)
Dept: CARDIOLOGY | Facility: CLINIC | Age: 89
End: 2024-05-14
Payer: COMMERCIAL

## 2024-05-14 DIAGNOSIS — I47.29 PAROXYSMAL VENTRICULAR TACHYCARDIA (H): Primary | ICD-10-CM

## 2024-05-14 NOTE — TELEPHONE ENCOUNTER
[facility] Call Center    Phone Message    May a detailed message be left on voicemail: yes     Reason for Call: Other: pts wife was calling confused stating Robyn wanted pt to be seen this Thursday and do a lexiscan in that time frame as well. [facility] cannot find any notes from cardiology team stating this from [facility] or that [facility] is a [facility] There are no lexiscan orders in either. Last visit note states pt should be seen by KAYE and order has October rough date to complete that around. There is a nursing note recommending a lexiscan  and magnesium check. States that's from a cardiology note on 05/06/24, but [facility] cannot see what note they are referencing. Please call pts wife back to discuss what is needed for the pt.      Action Taken: Other: cardiology     Travel Screening: Not Applicable                                                               Thank you!  [facility]

## 2024-05-14 NOTE — TELEPHONE ENCOUNTER
Wife Leticia notified of Dr. Carlson recommendations for lexiscan and magnesium level.  Orders placed  and scheduling number given.  Natacha Mccarthy RN on 5/14/2024 at 11:06 AM

## 2024-05-14 NOTE — TELEPHONE ENCOUNTER
Patient's wife is calling back to inquire magnesium and repeat lexiscan. Please advise orders.    Please call home phone - ok to LDM and send [employer] message to advise orders are placed

## 2024-05-16 ENCOUNTER — HOSPITAL ENCOUNTER (OUTPATIENT)
Dept: NUCLEAR MEDICINE | Facility: CLINIC | Age: 89
Setting detail: NUCLEAR MEDICINE
Discharge: HOME | End: 2024-05-16
Attending: INTERNAL MEDICINE
Payer: COMMERCIAL

## 2024-05-16 ENCOUNTER — HOSPITAL ENCOUNTER (OUTPATIENT)
Dept: CARDIOLOGY | Facility: CLINIC | Age: 89
Discharge: HOME | End: 2024-05-16
Attending: INTERNAL MEDICINE
Payer: COMMERCIAL

## 2024-05-16 DIAGNOSIS — I47.29 PAROXYSMAL VENTRICULAR TACHYCARDIA (H): ICD-10-CM

## 2024-05-16 LAB
CV STRESS MAX HR HE: 100
RATE PRESSURE PRODUCT: NORMAL
STRESS ECHO BASELINE DIASTOLIC HE: 81
STRESS ECHO BASELINE HR: 91 BPM
STRESS ECHO BASELINE SYSTOLIC BP: 137
STRESS ECHO CALCULATED PERCENT HR: 77 %
STRESS ECHO LAST STRESS DIASTOLIC BP: 70
STRESS ECHO LAST STRESS SYSTOLIC BP: 124
STRESS ECHO TARGET HR: 130

## 2024-05-16 PROCEDURE — 93018 CV STRESS TEST I&R ONLY: CPT | Performed by: INTERNAL MEDICINE

## 2024-05-16 PROCEDURE — 78452 HT MUSCLE IMAGE SPECT MULT: CPT | Mod: 26 | Performed by: INTERNAL MEDICINE

## 2024-05-16 PROCEDURE — 343N000001 HC RX 343: Performed by: INTERNAL MEDICINE

## 2024-05-16 PROCEDURE — 78452 HT MUSCLE IMAGE SPECT MULT: CPT

## 2024-05-16 PROCEDURE — 250N000011 HC RX IP 250 OP 636: Mod: JZ | Performed by: INTERNAL MEDICINE

## 2024-05-16 PROCEDURE — 93017 CV STRESS TEST TRACING ONLY: CPT

## 2024-05-16 PROCEDURE — A9500 TC99M SESTAMIBI: HCPCS | Performed by: INTERNAL MEDICINE

## 2024-05-16 PROCEDURE — 93016 CV STRESS TEST SUPVJ ONLY: CPT | Performed by: INTERNAL MEDICINE

## 2024-05-16 RX ORDER — REGADENOSON 0.08 MG/ML
INJECTION, SOLUTION INTRAVENOUS
Status: COMPLETED
Start: 2024-05-16 | End: 2024-05-16

## 2024-05-16 RX ADMIN — Medication 11 MILLICURIE: at 08:26

## 2024-05-16 RX ADMIN — Medication 32.8 MILLICURIE: at 09:45

## 2024-05-16 RX ADMIN — REGADENOSON 0.4 MG: 0.08 INJECTION, SOLUTION INTRAVENOUS at 09:44

## 2024-05-17 ENCOUNTER — LAB (OUTPATIENT)
Dept: LAB | Facility: CLINIC | Age: 89
End: 2024-05-17
Payer: COMMERCIAL

## 2024-05-17 DIAGNOSIS — I47.29 PAROXYSMAL VENTRICULAR TACHYCARDIA (H): ICD-10-CM

## 2024-05-17 PROCEDURE — 83735 ASSAY OF MAGNESIUM: CPT

## 2024-05-17 PROCEDURE — 36415 COLL VENOUS BLD VENIPUNCTURE: CPT

## 2024-05-18 LAB — MAGNESIUM SERPL-MCNC: 2 MG/DL (ref 1.7–2.3)

## 2024-05-23 NOTE — TELEPHONE ENCOUNTER
Offer 11:20am  On the 14th    Copy of results are up front to  - please let patient know he can  when he's available  
Scheduled and notified.    America Campuzano      
Spouse - ysabel calling to set up a follow up with Dr. Espinosa for Calixto re: results. She is also requesting a copy of the results. Can they pick this up today? Please advise.  [phone] (home)   Thank you  Shana Marie  
.

## 2024-06-17 PROBLEM — Z71.89 ADVANCED DIRECTIVES, COUNSELING/DISCUSSION: Status: RESOLVED | Noted: 2017-05-31 | Resolved: 2024-06-17

## 2024-06-19 ENCOUNTER — TELEPHONE (OUTPATIENT)
Dept: FAMILY MEDICINE | Facility: CLINIC | Age: 89
End: 2024-06-19
Payer: COMMERCIAL

## 2024-06-19 NOTE — TELEPHONE ENCOUNTER
Reason for Call:  Appointment Request    Patient requesting this type of appt:  remove sutures/pt has surgery at [facility] in [place]     Requested provider: Chance Espinosa    Reason patient unable to be scheduled: Not within requested timeframe    When does patient want to be seen/preferred time:  6/26 or 6/27    Comments:     Could we send this information to you in [facility] or would you prefer to receive a phone call?:   Patient would prefer a phone call   Okay to leave a detailed message?: Yes at Home number on file [phone] (home)    Call taken on 6/19/2024 at 3:39 PM by Radha Mo

## 2024-06-25 ENCOUNTER — TELEPHONE (OUTPATIENT)
Dept: CARDIOLOGY | Facility: CLINIC | Age: 89
End: 2024-06-25
Payer: COMMERCIAL

## 2024-06-25 DIAGNOSIS — N18.31 STAGE 3A CHRONIC KIDNEY DISEASE (H): ICD-10-CM

## 2024-06-25 DIAGNOSIS — I21.4 NSTEMI (NON-ST ELEVATED MYOCARDIAL INFARCTION) (H): ICD-10-CM

## 2024-06-25 DIAGNOSIS — I10 HYPERTENSION GOAL BP (BLOOD PRESSURE) < 140/90: ICD-10-CM

## 2024-06-25 RX ORDER — METOPROLOL SUCCINATE 50 MG/1
50 TABLET, EXTENDED RELEASE ORAL DAILY
Qty: 90 TABLET | Refills: 3 | Status: SHIPPED | OUTPATIENT
Start: 2024-06-25 | End: 2024-06-26

## 2024-06-26 ENCOUNTER — OFFICE VISIT (OUTPATIENT)
Dept: FAMILY MEDICINE | Facility: CLINIC | Age: 89
End: 2024-06-26
Payer: COMMERCIAL

## 2024-06-26 ENCOUNTER — TELEPHONE (OUTPATIENT)
Dept: FAMILY MEDICINE | Facility: CLINIC | Age: 89
End: 2024-06-26

## 2024-06-26 VITALS
HEIGHT: 68 IN | TEMPERATURE: 97.6 F | HEART RATE: 105 BPM | SYSTOLIC BLOOD PRESSURE: 132 MMHG | BODY MASS INDEX: 29.55 KG/M2 | RESPIRATION RATE: 16 BRPM | OXYGEN SATURATION: 95 % | WEIGHT: 195 LBS | DIASTOLIC BLOOD PRESSURE: 84 MMHG

## 2024-06-26 DIAGNOSIS — I25.10 CORONARY ARTERY DISEASE INVOLVING NATIVE CORONARY ARTERY OF NATIVE HEART WITHOUT ANGINA PECTORIS: ICD-10-CM

## 2024-06-26 DIAGNOSIS — I73.9 PERIPHERAL VASCULAR DISEASE (H): ICD-10-CM

## 2024-06-26 DIAGNOSIS — I10 HYPERTENSION GOAL BP (BLOOD PRESSURE) < 140/90: ICD-10-CM

## 2024-06-26 DIAGNOSIS — Z51.81 MEDICATION MONITORING ENCOUNTER: ICD-10-CM

## 2024-06-26 DIAGNOSIS — Z98.890 HISTORY OF MOHS MICROGRAPHIC SURGERY FOR SKIN CANCER: ICD-10-CM

## 2024-06-26 DIAGNOSIS — N18.31 STAGE 3A CHRONIC KIDNEY DISEASE (H): ICD-10-CM

## 2024-06-26 DIAGNOSIS — Z85.828 HISTORY OF MOHS MICROGRAPHIC SURGERY FOR SKIN CANCER: ICD-10-CM

## 2024-06-26 DIAGNOSIS — Z85.828 HISTORY OF SKIN CANCER: Primary | ICD-10-CM

## 2024-06-26 DIAGNOSIS — I21.4 NSTEMI (NON-ST ELEVATED MYOCARDIAL INFARCTION) (H): ICD-10-CM

## 2024-06-26 DIAGNOSIS — E78.5 HYPERLIPIDEMIA LDL GOAL <70: ICD-10-CM

## 2024-06-26 PROCEDURE — 99212 OFFICE O/P EST SF 10 MIN: CPT | Performed by: FAMILY MEDICINE

## 2024-06-26 RX ORDER — LIDOCAINE 50 MG/G
OINTMENT TOPICAL
COMMUNITY
Start: 2023-10-23

## 2024-06-26 NOTE — TELEPHONE ENCOUNTER
Forms/Letter Request    Type of form/letter: Nursing Home/Assisted Living Orders  [facility]  Do we have the form/letter: Yes: given to Dr Espinosa    Who is the form from? Home care    Where did/will the form come from? form was faxed in    When is form/letter needed by: when available    How would you like the form/letter returned: Fax : [phone]    [facility].  They also need the most recent visit notes.    Metroprolol Succinate   Terazosine HCL 10 mg capsules    [facility] written scripts and office notes    [phone]

## 2024-06-26 NOTE — PROGRESS NOTES
"  Assessment & Plan     History of skin cancer      History of Mohs micrographic surgery for skin cancer      Coronary artery disease involving native coronary artery of native heart without angina pectoris      Peripheral vascular disease (H24)      Stage 3a chronic kidney disease (H)      Hypertension goal BP (blood pressure) < 140/90      Hyperlipidemia LDL goal <70      Medication monitoring encounter        BMI  Estimated body mass index is 30.09 kg/m  as calculated from the following:    Height as of this encounter: 1.715 m (5' 7.5\").    Weight as of this encounter: 88.5 kg (195 lb).   Weight management plan: diet and exercise    Work on weight loss  Regular exercise    Plan:    1) Medications: reviewed    2) Labs: reviewed    3) Immunizations: reviewed    4) Imaging/Diagnostics: NA    5) Consults: Dermatology    6) interrupted sutures x 3 identified, removed, no source of running sutures noted    7) close follow up, appears stable    16 minutes spent by myself on the date of the encounter doing chart review, history and exam, documentation and further activities per the note.    Subjective   Calixto is a 89 year old, presenting for the following health issues:  Suture Removal        6/26/2024    11:10 AM   Additional Questions   Roomed by Meme MOTT CMA     History of Present Illness       Reason for visit:  Suture removal    He eats 4 or more servings of fruits and vegetables daily.He consumes 4 sweetened beverage(s) daily.He exercises with enough effort to increase his heart rate 9 or less minutes per day.  He exercises with enough effort to increase his heart rate 3 or less days per week.   He is taking medications regularly.     Remove sutures/pt had Moh's surgery at [facility] in [place] - Dr Reyes - no notes received to date     Patient Active Problem List   Diagnosis    Hypertension goal BP (blood pressure) < 140/90    Hypertrophy of prostate without urinary obstruction    Hearing loss    Seborrheic " dermatitis    Other and unspecified 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    Hypersomnia with sleep apnea    Family history of cardiovascular disease    Seasonal allergies    ED (erectile dysfunction)    Hyperlipidemia LDL goal <70    Lung nodules    GERD (gastroesophageal reflux disease)    CKD (chronic kidney disease) stage 3, GFR 30-59 ml/min (H)    Environmental allergies    Lumbar stenosis    Restrictive lung disease    BCC (basal cell carcinoma of skin)    Coronary artery disease involving native coronary artery of native heart without angina pectoris    Aortic stenosis    Peripheral vascular disease (H24)    Ascending aorta dilatation (H24)    Aortic root dilatation (H24)    History of skin cancer       Past Medical History:   Diagnosis Date    Aortic root dilatation (H24)     Aortic stenosis     moderate - Dr Carlson    Arthritis .    Ascending aorta dilatation (H24)     BCC (basal cell carcinoma of skin)     right jaw, rt parietal, Dr Limon/[place], 2016, 10/2018, 11/2019, 3/2022    Choroidal nevus of left eye     Dr Connor    Colon polyps     Coronary artery disease involving native coronary artery of native heart without angina pectoris 01/2021    NSTEMI, cardiac cath 1/2021: non-obstructive    ED (erectile dysfunction)     Family history of cardiovascular disease     GERD (gastroesophageal reflux disease) 2013    Hematuria 1999    dr scott    Hernia, abdominal     Hyperlipidemia LDL goal <70 1990    Hypertension goal BP (blood pressure) < 140/90 1990    Hypertrophy of prostate without urinary obstruction and other lower urinary tract symptoms (LUTS)     Lumbar stenosis 2017    mild to moderate foraminal and central    Lung nodules 11/2010    CT scan stable    Nonrheumatic aortic valve stenosis 01/2021    mild to moderate    obstructive SLEEP APNEA 05/2007    CPAP    Other and unspecified 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 05/25/2005     Palpitations     PSVT 11/2017    few runs, rare PVC/PAC, 1 episode VT    Restrictive lung disease     Seasonal allergies     Sensorineural hearing loss (SNHL) of both ears 05/2005    hearing aids, L>R - [facility] Hernandez [facility]    Stage 3a chronic kidney disease (H)        Past Surgical History:   Procedure Laterality Date    CV HEART CATHETERIZATION WITH POSSIBLE INTERVENTION N/A 01/07/2021    Procedure: Heart Catheterization with Possible Intervention;  Surgeon: Raúl Rich MD;  Location:  HEART CARDIAC CATH LAB    CV LEFT HEART CATH N/A 01/07/2021    Procedure: Left Heart Cath;  Surgeon: Raúl Rich MD;  Location:  HEART CARDIAC CATH LAB    CYSTOSCOPY      NM MPI WITH LEXISCAN  05/2021    normal    STRESS ECHO (METRO)  7/09, 5/12, 7/17    Negative    TONSILLECTOMY & ADENOIDECTOMY  1946    ZZHC COLONOSCOPY THRU STOMA, DIAGNOSTIC  2005, 5/10, 5/11    Polyps-> due 2015 - Ct Colonography negative    [facility]UC[facility]  1960    Hernia Repair, Incisional, Unilateral       Current Outpatient Medications   Medication Sig Dispense Refill    aspirin 81 MG EC tablet Take 81 mg by mouth At Bedtime      atorvastatin (LIPITOR) 40 MG tablet Take 1 tablet (40 mg) by mouth at bedtime 90 tablet 3    betamethasone valerate (VALISONE) 0.1 % external ointment APPLY TO AFFECTED AREA TOPICALLY EVERY DAY AS NEEDED      clotrimazole-betamethasone (LOTRISONE) 1-0.05 % external cream Apply topically 2 times daily as needed (rash) 45 g 1    finasteride (PROSCAR) 5 MG tablet Take 1 tablet (5 mg) by mouth daily 90 tablet 3    lidocaine (XYLOCAINE) 5 % external ointment APPLY SMALL AMOUNT TOPICALLY EVERY DAY AS NEEDED FOR BURNING SENSATION/NERVE PAIN      metoprolol succinate ER (TOPROL XL) 50 MG 24 hr tablet Take 1 tablet (50 mg) by mouth daily 90 tablet 3    multivitamin, therapeutic (THERA-VIT) TABS tablet Take 1 tablet by mouth At Bedtime      omeprazole (PRILOSEC) 20 MG DR capsule Take 1 capsule (20  mg) by mouth daily as needed (heartburn) 90 capsule 3    selenium sulfide (SELSUN) 2.5 % external lotion APPLY DAILY TO EVERY OTHER DAY, LATHER, WAIT 10 MINUTES AND RINSE 118 mL 3    terazosin (HYTRIN) 10 MG capsule Take 1 capsule (10 mg) by mouth at bedtime 90 capsule 3    triamcinolone (KENALOG) 0.1 % external cream Apply topically 2 times daily as needed for irritation         Allergies   Allergen Reactions    Pollen Extract      Tree pollen         Family History   Problem Relation Age of Onset    C.A.D. Father         age 50's    Hypertension Father     C.A.D. Brother     C.A.D. Brother         mid 40's    Diabetes Brother     Cancer Brother         pancreatic CA    Coronary Artery Disease Brother     Cancer - colorectal No family hx of     Prostate Cancer No family hx of        Social History     Socioeconomic History    Marital status:      Spouse name: Leticia    Number of children: 3    Years of education: 18    Highest education level: None   Occupational History     Employer: RETIRED   Tobacco Use    Smoking status: Never    Smokeless tobacco: Never   Vaping Use    Vaping status: Never Used   Substance and Sexual Activity    Alcohol use: Yes     Alcohol/week: 5.0 - 8.0 standard drinks of alcohol     Types: 5 - 8 Standard drinks or equivalent per week     Comment: 4-5 drinks per week avg    Drug use: No    Sexual activity: Yes     Partners: Female   Other Topics Concern    Blood Transfusions No    Caffeine Concern Yes     Comment: 1 serv/day, rare pop, occas chocolate (3-4/yr)    Sleep Concern Yes     Comment: MIMI, wakes feeling rested    Exercise Yes     Comment: 30-45' 2-3 x/wk    Seat Belt Yes    Parent/sibling w/ CABG, MI or angioplasty before 65F 55M? Yes     Social Determinants of Health     Financial Resource Strain: Unknown (1/5/2024)    Financial Resource Strain     Within the past 12 months, have you or your family members you live with been unable to get utilities (heat, electricity) when  it was really needed?: Patient declined   Food Insecurity: Low Risk  (1/5/2024)    Food Insecurity     Within the past 12 months, did you worry that your food would run out before you got money to buy more?: No     Within the past 12 months, did the food you bought just not last and you didn t have money to get more?: Patient declined   Transportation Needs: Unknown (1/5/2024)    Transportation Needs     Within the past 12 months, has lack of transportation kept you from medical appointments, getting your medicines, non-medical meetings or appointments, work, or from getting things that you need?: Patient declined   Interpersonal Safety: Low Risk  (10/19/2023)    Interpersonal Safety     Do you feel physically and emotionally safe where you currently live?: Yes     Within the past 12 months, have you been hit, slapped, kicked or otherwise physically hurt by someone?: No     Within the past 12 months, have you been humiliated or emotionally abused in other ways by your partner or ex-partner?: No   Housing Stability: Unknown (1/5/2024)    Housing Stability     Do you have housing? : Patient declined     Are you worried about losing your housing?: Patient declined         Review of Systems  CONSTITUTIONAL: NEGATIVE for fever, chills, change in weight  INTEGUMENTARY/SKIN: NEGATIVE for worrisome rashes, moles or lesions  EYES: NEGATIVE for vision changes or irritation  ENT/MOUTH: NEGATIVE for ear, mouth and throat problems  RESP: NEGATIVE for significant cough or SOB  CV: NEGATIVE for chest pain, palpitations or peripheral edema  GI: NEGATIVE for nausea, abdominal pain, heartburn, or change in bowel habits  : NEGATIVE for frequency, dysuria, or hematuria  MUSCULOSKELETAL: NEGATIVE for significant arthralgias or myalgia  NEURO: NEGATIVE for weakness, dizziness or paresthesias  ENDOCRINE: NEGATIVE for temperature intolerance, skin/hair changes  HEME: NEGATIVE for bleeding problems  PSYCHIATRIC: NEGATIVE for changes in  "mood or affect      Objective    /84   Pulse 105   Temp 97.6  F (36.4  C) (Tympanic)   Resp 16   Ht 1.715 m (5' 7.5\")   Wt 88.5 kg (195 lb)   SpO2 95%   BMI 30.09 kg/m    Body mass index is 30.09 kg/m .    Physical Exam   GENERAL: alert and no distress  EYES: Eyes grossly normal to inspection, PERRL and conjunctivae and sclerae normal  NECK: no adenopathy, no asymmetry, masses, or scars  RESP: lungs clear to auscultation - no rales, rhonchi or wheezes  CV: regular rate and rhythm, normal S1 S2, no S3 or S4, no murmur, click or rub, no peripheral edema  ABDOMEN: soft, nontender, no hepatosplenomegaly, no masses and bowel sounds normal  MS: no gross musculoskeletal defects noted, no edema  SKIN: well healed vertical rt midline forehead lesion with mild central gap, no signs of infection, healing well, sutures removed, declines steri strips/band aid  NEURO: Normal strength and tone, mentation intact and speech normal  PSYCH: mentation appears normal, affect normal/bright    No notes from derm      Signed Electronically by:            Chance Espinosa MD, FAAFP     [facility]  [address] [place] [id number]  [email or web address]   Office: [phone]  Fax: [phone]         "

## 2024-06-27 RX ORDER — METOPROLOL SUCCINATE 50 MG/1
50 TABLET, EXTENDED RELEASE ORAL DAILY
Qty: 90 TABLET | Refills: 3 | Status: SHIPPED | OUTPATIENT
Start: 2024-06-27

## 2024-06-27 RX ORDER — TERAZOSIN 10 MG/1
10 CAPSULE ORAL AT BEDTIME
Qty: 90 CAPSULE | Refills: 3 | Status: SHIPPED | OUTPATIENT
Start: 2024-06-27

## 2024-06-27 NOTE — TELEPHONE ENCOUNTER
Faxed to [address] and [facility] to [facility] [phone]    Sent to [facility]  And filed in [facility].

## 2024-08-07 ENCOUNTER — TELEPHONE (OUTPATIENT)
Dept: CARDIOLOGY | Facility: CLINIC | Age: 89
End: 2024-08-07
Payer: COMMERCIAL

## 2024-08-13 ENCOUNTER — TELEPHONE (OUTPATIENT)
Dept: FAMILY MEDICINE | Facility: CLINIC | Age: 89
End: 2024-08-13
Payer: COMMERCIAL

## 2024-08-13 NOTE — TELEPHONE ENCOUNTER
Forms/Letter Request    Type of form/letter: POLST - Life sustaining treatment - forms to complete    Do we have the form/letter: Yes:     Who is the form from? Patient    Where did/will the form come from? Patient or family brought in       When is form/letter needed by: asap    How would you like the form/letter returned:     Patient Notified form requests are processed in 5-7 business days:No    Could we send this information to you in [employer] [place] or would you prefer to receive a phone call?:   Patient would prefer a phone call   Okay to leave a detailed message?: Yes at Cell number on file:    Telephone Information:   Mobile [phone]     Letty GUO

## 2024-08-14 ENCOUNTER — TELEPHONE (OUTPATIENT)
Dept: FAMILY MEDICINE | Facility: CLINIC | Age: 89
End: 2024-08-14
Payer: COMMERCIAL

## 2024-08-14 NOTE — TELEPHONE ENCOUNTER
Spoke to Pt and son Raghav about POLST selections. He had picked to attempt CPR for box A and Selective treatment for box B. This is not allowed. If picking Attempt resuscitation must also pick full treatment. Pt states He does not want CPR. Writer will redo paperwork and son will  for Pt signature. Placed at  for  by son. ASF paperwork given to TCShyla, for completion.      Annabel Zambrano RN [facility]

## 2024-09-05 ENCOUNTER — MEDICAL CORRESPONDENCE (OUTPATIENT)
Dept: HEALTH INFORMATION MANAGEMENT | Facility: CLINIC | Age: 89
End: 2024-09-05
Payer: COMMERCIAL

## 2024-09-05 ENCOUNTER — TELEPHONE (OUTPATIENT)
Dept: FAMILY MEDICINE | Facility: CLINIC | Age: 89
End: 2024-09-05
Payer: COMMERCIAL

## 2024-09-05 NOTE — TELEPHONE ENCOUNTER
Home Health Care        Reason for call:  [facility] -  - Annual MD orders - Resident is moving in today ((09/03/24)     Orders are needed for this patient.  above    Pt Provider: Dr. Espinosa    Phone Number Homecare Nurse can be reached at: Fax       Could we send this information to you in [place] or would you prefer to receive a phone call?:   na    Put in providers in box    Letty K

## 2024-09-05 NOTE — TELEPHONE ENCOUNTER
Forms reviewed and signed.           Chance Espinosa MD, FAAFP     [facility]  [address] [place] [id number]  [email or web address]   Office: [phone]  Fax: [phone]  Pager: [phone]

## 2024-09-05 NOTE — TELEPHONE ENCOUNTER
Form signed from Dr Espinosa  Faxed back to [phone]  Sent to [facility]  And filed in [facility].

## 2024-10-17 ENCOUNTER — DOCUMENTATION ONLY (OUTPATIENT)
Dept: FAMILY MEDICINE | Facility: CLINIC | Age: 89
End: 2024-10-17
Payer: COMMERCIAL

## 2024-10-17 DIAGNOSIS — E78.5 HYPERLIPIDEMIA LDL GOAL <70: ICD-10-CM

## 2024-10-17 DIAGNOSIS — G47.30 HYPERSOMNIA WITH SLEEP APNEA: ICD-10-CM

## 2024-10-17 DIAGNOSIS — G47.10 HYPERSOMNIA WITH SLEEP APNEA: ICD-10-CM

## 2024-10-17 DIAGNOSIS — Z00.00 MEDICARE ANNUAL WELLNESS VISIT, SUBSEQUENT: ICD-10-CM

## 2024-10-17 DIAGNOSIS — R06.02 SHORTNESS OF BREATH: ICD-10-CM

## 2024-10-17 DIAGNOSIS — N18.31 STAGE 3A CHRONIC KIDNEY DISEASE (H): ICD-10-CM

## 2024-10-17 DIAGNOSIS — I10 HYPERTENSION GOAL BP (BLOOD PRESSURE) < 140/90: ICD-10-CM

## 2024-10-17 DIAGNOSIS — R79.9 ABNORMAL FINDING OF BLOOD CHEMISTRY, UNSPECIFIED: ICD-10-CM

## 2024-10-17 DIAGNOSIS — I73.9 PERIPHERAL VASCULAR DISEASE (H): ICD-10-CM

## 2024-10-17 DIAGNOSIS — I25.10 CORONARY ARTERY DISEASE INVOLVING NATIVE CORONARY ARTERY OF NATIVE HEART WITHOUT ANGINA PECTORIS: ICD-10-CM

## 2024-10-17 DIAGNOSIS — R91.8 LUNG NODULES: ICD-10-CM

## 2024-10-17 DIAGNOSIS — K21.9 GASTROESOPHAGEAL REFLUX DISEASE WITHOUT ESOPHAGITIS: ICD-10-CM

## 2024-10-17 DIAGNOSIS — J98.4 RESTRICTIVE LUNG DISEASE: ICD-10-CM

## 2024-10-17 DIAGNOSIS — Z00.00 ROUTINE GENERAL MEDICAL EXAMINATION AT A HEALTH CARE FACILITY: Primary | ICD-10-CM

## 2024-10-17 DIAGNOSIS — Z51.81 MEDICATION MONITORING ENCOUNTER: ICD-10-CM

## 2024-10-17 DIAGNOSIS — I77.810 ASCENDING AORTA DILATATION (H): ICD-10-CM

## 2024-10-17 DIAGNOSIS — I77.810 AORTIC ROOT DILATATION (H): ICD-10-CM

## 2024-12-29 ENCOUNTER — HEALTH MAINTENANCE LETTER (OUTPATIENT)
Age: 89
End: 2024-12-29

## 2025-03-11 ENCOUNTER — LAB REQUISITION (OUTPATIENT)
Dept: LAB | Facility: CLINIC | Age: OVER 89
End: 2025-03-11
Payer: COMMERCIAL

## 2025-03-11 DIAGNOSIS — I10 ESSENTIAL (PRIMARY) HYPERTENSION: ICD-10-CM

## 2025-03-11 DIAGNOSIS — D64.9 ANEMIA, UNSPECIFIED: ICD-10-CM

## 2025-03-12 LAB
ERYTHROCYTE [DISTWIDTH] IN BLOOD BY AUTOMATED COUNT: 13.4 % (ref 10–15)
HCT VFR BLD AUTO: 35.8 % (ref 40–53)
HGB BLD-MCNC: 11.6 G/DL (ref 13.3–17.7)
MCH RBC QN AUTO: 33.1 PG (ref 26.5–33)
MCHC RBC AUTO-ENTMCNC: 32.4 G/DL (ref 31.5–36.5)
MCV RBC AUTO: 102 FL (ref 78–100)
PLATELET # BLD AUTO: 167 10E3/UL (ref 150–450)
RBC # BLD AUTO: 3.5 10E6/UL (ref 4.4–5.9)
WBC # BLD AUTO: 10.6 10E3/UL (ref 4–11)

## 2025-03-12 PROCEDURE — 36415 COLL VENOUS BLD VENIPUNCTURE: CPT | Mod: ORL | Performed by: NURSE PRACTITIONER

## 2025-03-12 PROCEDURE — 85027 COMPLETE CBC AUTOMATED: CPT | Mod: ORL | Performed by: NURSE PRACTITIONER

## 2025-03-12 PROCEDURE — 80048 BASIC METABOLIC PNL TOTAL CA: CPT | Mod: ORL | Performed by: NURSE PRACTITIONER

## 2025-03-12 PROCEDURE — P9604 ONE-WAY ALLOW PRORATED TRIP: HCPCS | Mod: ORL | Performed by: NURSE PRACTITIONER

## 2025-03-13 LAB
ANION GAP SERPL CALCULATED.3IONS-SCNC: 10 MMOL/L (ref 7–15)
BUN SERPL-MCNC: 31.8 MG/DL (ref 8–23)
CALCIUM SERPL-MCNC: 9.4 MG/DL (ref 8.8–10.4)
CHLORIDE SERPL-SCNC: 108 MMOL/L (ref 98–107)
CREAT SERPL-MCNC: 1.48 MG/DL (ref 0.67–1.17)
EGFRCR SERPLBLD CKD-EPI 2021: 45 ML/MIN/1.73M2
GLUCOSE SERPL-MCNC: 115 MG/DL (ref 70–99)
HCO3 SERPL-SCNC: 23 MMOL/L (ref 22–29)
POTASSIUM SERPL-SCNC: 4.5 MMOL/L (ref 3.4–5.3)
SODIUM SERPL-SCNC: 141 MMOL/L (ref 135–145)

## 2025-03-28 ENCOUNTER — TELEPHONE (OUTPATIENT)
Dept: FAMILY MEDICINE | Facility: CLINIC | Age: OVER 89
End: 2025-03-28
Payer: COMMERCIAL

## 2025-03-28 NOTE — TELEPHONE ENCOUNTER
Malika from [facility] calls.     BP today was 97/61 - usually runs 130s/70s.    Asking about amlodipine - if this should be discontinued?  Non-[facility] med, she's not sure where he got this.   Part of co-managed care.   Has bene taking it for a long time  ordering doctor? Luciana Cho at [facility]    Patient goes to [facility] clinic.     Omeprazole - non [facility] medication - [facility] - nurse there stated if taking long-term could have side effects. [facility] wants us to [facility] them if we want him to continue. Has been taking daily    Home care RN reviewed med list:   Taking   Acetaminophen 500 mg -  2 tabs every 6 hours, PRN  Amlodipine 2.5 mg - 1 tab daily  Aspirin 81 mg - 1 tab daily  Atorvastatin 80 mg - 1 tab daily  Calcium + vit D 500 mg - 1 tab daily   Finasteride 5 mg - 1 tab daily  Metoprolol 12.5 mg daily  Omeprazole 20 mg - 1 cap daily  Terazosin 10 mg - 1 cap daily    Malika wants us to fax new med list to them at fax# [phone] -      Routing to provider to review and advise.     BRYCE WHITMAN RN on 3/28/2025 at 3:28 PM   [facility]

## 2025-04-01 NOTE — TELEPHONE ENCOUNTER
[facility]. Pt reports he does not set up his meds and the [facility] nurse does it, he does not know about meds and cannot do a med rec. Spoke to Daughter Leonila, [facility] on file. She reports Pt is no longer receiving his care through Dr. Espinosa. He is now seeing the in house provider through [facility]. Called Malika and left  advising of this.     Annabel Zambrano RN [facility]

## (undated) DEVICE — MEDITRACE MULTIFUNTION ADULT RADIOTRANSPARENT ELECTRODE FOR ZOLL

## (undated) DEVICE — CATH ANGIO INFINITI 3DRC 4FRX100CM 538476

## (undated) DEVICE — CATH DIAG 4FR JL 4.5 538417

## (undated) DEVICE — MANIFOLD KIT ANGIO AUTOMATED 014613

## (undated) DEVICE — KIT HAND CONTROL ANGIOTOUCH ACIST 65CM AT-P65

## (undated) DEVICE — INTRO SHEATH 4FRX10CM PINNACLE RSS402

## (undated) DEVICE — TOTE ANGIO CORP PC15AT SAN32CC83O

## (undated) DEVICE — NDL PERC ENTRY THINWALL 18GA 7.0" G00166

## (undated) RX ORDER — FENTANYL CITRATE 50 UG/ML
INJECTION, SOLUTION INTRAMUSCULAR; INTRAVENOUS
Status: DISPENSED
Start: 2021-01-07

## (undated) RX ORDER — LIDOCAINE HYDROCHLORIDE 10 MG/ML
INJECTION, SOLUTION EPIDURAL; INFILTRATION; INTRACAUDAL; PERINEURAL
Status: DISPENSED
Start: 2021-01-07

## (undated) RX ORDER — HEPARIN SODIUM 1000 [USP'U]/ML
INJECTION, SOLUTION INTRAVENOUS; SUBCUTANEOUS
Status: DISPENSED
Start: 2021-01-07

## (undated) RX ORDER — HEPARIN SODIUM 200 [USP'U]/100ML
INJECTION, SOLUTION INTRAVENOUS
Status: DISPENSED
Start: 2021-01-07